# Patient Record
Sex: FEMALE | Race: WHITE | NOT HISPANIC OR LATINO | Employment: FULL TIME | URBAN - METROPOLITAN AREA
[De-identification: names, ages, dates, MRNs, and addresses within clinical notes are randomized per-mention and may not be internally consistent; named-entity substitution may affect disease eponyms.]

---

## 2017-04-27 ENCOUNTER — ALLSCRIPTS OFFICE VISIT (OUTPATIENT)
Dept: OTHER | Facility: OTHER | Age: 52
End: 2017-04-27

## 2017-05-13 ENCOUNTER — HOSPITAL ENCOUNTER (EMERGENCY)
Facility: HOSPITAL | Age: 52
Discharge: HOME | End: 2017-05-13
Attending: EMERGENCY MEDICINE
Payer: COMMERCIAL

## 2017-05-13 ENCOUNTER — APPOINTMENT (EMERGENCY)
Dept: RADIOLOGY | Facility: HOSPITAL | Age: 52
End: 2017-05-13
Payer: COMMERCIAL

## 2017-05-13 VITALS
OXYGEN SATURATION: 98 % | WEIGHT: 147 LBS | TEMPERATURE: 99.2 F | RESPIRATION RATE: 18 BRPM | HEIGHT: 65 IN | SYSTOLIC BLOOD PRESSURE: 149 MMHG | DIASTOLIC BLOOD PRESSURE: 62 MMHG | BODY MASS INDEX: 24.49 KG/M2 | HEART RATE: 89 BPM

## 2017-05-13 DIAGNOSIS — M70.62 TROCHANTERIC BURSITIS OF LEFT HIP: Primary | ICD-10-CM

## 2017-05-13 PROCEDURE — 73502 X-RAY EXAM HIP UNI 2-3 VIEWS: CPT

## 2017-05-13 PROCEDURE — 99283 EMERGENCY DEPT VISIT LOW MDM: CPT

## 2017-05-13 RX ORDER — NAPROXEN 500 MG/1
500 TABLET ORAL 2 TIMES DAILY WITH MEALS
Qty: 14 TABLET | Refills: 0 | Status: SHIPPED | OUTPATIENT
Start: 2017-05-13 | End: 2021-03-28 | Stop reason: ALTCHOICE

## 2017-05-13 RX ORDER — METHOCARBAMOL 500 MG/1
500 TABLET, FILM COATED ORAL 2 TIMES DAILY PRN
Qty: 14 TABLET | Refills: 0 | Status: SHIPPED | OUTPATIENT
Start: 2017-05-13 | End: 2021-03-28 | Stop reason: ALTCHOICE

## 2017-05-13 RX ORDER — ACETAMINOPHEN 325 MG/1
975 TABLET ORAL ONCE
Status: COMPLETED | OUTPATIENT
Start: 2017-05-13 | End: 2017-05-13

## 2017-05-13 RX ORDER — BUPIVACAINE HYDROCHLORIDE 2.5 MG/ML
10 INJECTION, SOLUTION EPIDURAL; INFILTRATION; INTRACAUDAL ONCE
Status: COMPLETED | OUTPATIENT
Start: 2017-05-13 | End: 2017-05-13

## 2017-05-13 RX ADMIN — ACETAMINOPHEN 975 MG: 325 TABLET, FILM COATED ORAL at 16:44

## 2017-05-13 RX ADMIN — BUPIVACAINE HYDROCHLORIDE 10 ML: 2.5 INJECTION, SOLUTION EPIDURAL; INFILTRATION; INTRACAUDAL at 17:02

## 2017-05-13 RX ADMIN — TRIAMCINOLONE ACETONIDE 20 MG: 10 INJECTION, SUSPENSION INTRA-ARTICULAR; INTRALESIONAL at 17:06

## 2018-01-13 VITALS
BODY MASS INDEX: 25.53 KG/M2 | HEART RATE: 80 BPM | SYSTOLIC BLOOD PRESSURE: 145 MMHG | DIASTOLIC BLOOD PRESSURE: 85 MMHG | HEIGHT: 65 IN | WEIGHT: 153.25 LBS

## 2018-05-08 ENCOUNTER — TRANSCRIBE ORDERS (OUTPATIENT)
Dept: ADMINISTRATIVE | Facility: HOSPITAL | Age: 53
End: 2018-05-08

## 2018-05-08 DIAGNOSIS — M79.662 BILATERAL CALF PAIN: Primary | ICD-10-CM

## 2018-05-08 DIAGNOSIS — M79.661 BILATERAL CALF PAIN: Primary | ICD-10-CM

## 2018-05-09 ENCOUNTER — HOSPITAL ENCOUNTER (OUTPATIENT)
Dept: NON INVASIVE DIAGNOSTICS | Facility: HOSPITAL | Age: 53
Discharge: HOME | End: 2018-05-09
Payer: COMMERCIAL

## 2018-05-09 DIAGNOSIS — M79.662 BILATERAL CALF PAIN: ICD-10-CM

## 2018-05-09 DIAGNOSIS — M79.661 BILATERAL CALF PAIN: ICD-10-CM

## 2018-05-09 PROCEDURE — 93970 EXTREMITY STUDY: CPT | Performed by: SURGERY

## 2018-05-09 PROCEDURE — 93970 EXTREMITY STUDY: CPT

## 2020-12-19 ENCOUNTER — NURSE TRIAGE (OUTPATIENT)
Dept: OTHER | Facility: OTHER | Age: 55
End: 2020-12-19

## 2020-12-19 DIAGNOSIS — Z20.828 SARS-ASSOCIATED CORONAVIRUS EXPOSURE: ICD-10-CM

## 2020-12-19 DIAGNOSIS — Z20.828 SARS-ASSOCIATED CORONAVIRUS EXPOSURE: Primary | ICD-10-CM

## 2020-12-19 PROCEDURE — U0003 INFECTIOUS AGENT DETECTION BY NUCLEIC ACID (DNA OR RNA); SEVERE ACUTE RESPIRATORY SYNDROME CORONAVIRUS 2 (SARS-COV-2) (CORONAVIRUS DISEASE [COVID-19]), AMPLIFIED PROBE TECHNIQUE, MAKING USE OF HIGH THROUGHPUT TECHNOLOGIES AS DESCRIBED BY CMS-2020-01-R: HCPCS | Performed by: FAMILY MEDICINE

## 2020-12-21 ENCOUNTER — TELEPHONE (OUTPATIENT)
Dept: DERMATOLOGY | Facility: CLINIC | Age: 55
End: 2020-12-21

## 2020-12-21 LAB — SARS-COV-2 RNA SPEC QL NAA+PROBE: DETECTED

## 2020-12-21 NOTE — RESULT ENCOUNTER NOTE
I spoke with Michael Garner and let her know that her COVID-19 swab was positive  Continue symptomatic treatment  Advised she implement home isolation measures including      Staying home  Stay in a specific "sick room" or area and away from other people or animals, including pets  Wear a mask when leaving your room  Use a separate bathroom, if available  Wipe down all commonly touched surfaces with household       Please schedule follow up video visit

## 2021-03-28 ENCOUNTER — HOSPITAL ENCOUNTER (EMERGENCY)
Facility: HOSPITAL | Age: 56
Discharge: HOME | End: 2021-03-28
Attending: EMERGENCY MEDICINE
Payer: COMMERCIAL

## 2021-03-28 VITALS
RESPIRATION RATE: 20 BRPM | OXYGEN SATURATION: 100 % | HEART RATE: 77 BPM | WEIGHT: 165 LBS | TEMPERATURE: 98.2 F | DIASTOLIC BLOOD PRESSURE: 87 MMHG | BODY MASS INDEX: 27.46 KG/M2 | SYSTOLIC BLOOD PRESSURE: 135 MMHG

## 2021-03-28 DIAGNOSIS — T14.8XXA BRUISING: Primary | ICD-10-CM

## 2021-03-28 LAB
ALBUMIN SERPL BCP-MCNC: 3.6 G/DL (ref 3.5–5)
ALP SERPL-CCNC: 60 U/L (ref 46–116)
ALT SERPL W P-5'-P-CCNC: 31 U/L (ref 12–78)
ANION GAP SERPL CALCULATED.3IONS-SCNC: 13 MMOL/L (ref 4–13)
APTT PPP: 24 SECONDS (ref 23–37)
AST SERPL W P-5'-P-CCNC: 24 U/L (ref 5–45)
BASOPHILS # BLD AUTO: 0.05 THOUSANDS/ΜL (ref 0–0.1)
BASOPHILS NFR BLD AUTO: 1 % (ref 0–1)
BILIRUB DIRECT SERPL-MCNC: 0.1 MG/DL (ref 0–0.2)
BILIRUB SERPL-MCNC: 0.2 MG/DL (ref 0.2–1)
BUN SERPL-MCNC: 25 MG/DL (ref 5–25)
CALCIUM SERPL-MCNC: 8.9 MG/DL (ref 8.3–10.1)
CHLORIDE SERPL-SCNC: 107 MMOL/L (ref 100–108)
CO2 SERPL-SCNC: 22 MMOL/L (ref 21–32)
CREAT SERPL-MCNC: 0.99 MG/DL (ref 0.6–1.3)
EOSINOPHIL # BLD AUTO: 0.08 THOUSAND/ΜL (ref 0–0.61)
EOSINOPHIL NFR BLD AUTO: 1 % (ref 0–6)
ERYTHROCYTE [DISTWIDTH] IN BLOOD BY AUTOMATED COUNT: 14.1 % (ref 11.6–15.1)
GFR SERPL CREATININE-BSD FRML MDRD: 65 ML/MIN/1.73SQ M
GLUCOSE SERPL-MCNC: 116 MG/DL (ref 65–140)
HCT VFR BLD AUTO: 37.1 % (ref 34.8–46.1)
HGB BLD-MCNC: 12 G/DL (ref 11.5–15.4)
IMM GRANULOCYTES # BLD AUTO: 0.04 THOUSAND/UL (ref 0–0.2)
IMM GRANULOCYTES NFR BLD AUTO: 0 % (ref 0–2)
INR PPP: 0.88 (ref 0.84–1.19)
LYMPHOCYTES # BLD AUTO: 1.46 THOUSANDS/ΜL (ref 0.6–4.47)
LYMPHOCYTES NFR BLD AUTO: 16 % (ref 14–44)
MCH RBC QN AUTO: 30.3 PG (ref 26.8–34.3)
MCHC RBC AUTO-ENTMCNC: 32.3 G/DL (ref 31.4–37.4)
MCV RBC AUTO: 94 FL (ref 82–98)
MONOCYTES # BLD AUTO: 0.82 THOUSAND/ΜL (ref 0.17–1.22)
MONOCYTES NFR BLD AUTO: 9 % (ref 4–12)
NEUTROPHILS # BLD AUTO: 6.73 THOUSANDS/ΜL (ref 1.85–7.62)
NEUTS SEG NFR BLD AUTO: 73 % (ref 43–75)
NRBC BLD AUTO-RTO: 0 /100 WBCS
PLATELET # BLD AUTO: 364 THOUSANDS/UL (ref 149–390)
PMV BLD AUTO: 10 FL (ref 8.9–12.7)
POTASSIUM SERPL-SCNC: 4.2 MMOL/L (ref 3.5–5.3)
PROT SERPL-MCNC: 7.4 G/DL (ref 6.4–8.2)
PROTHROMBIN TIME: 12 SECONDS (ref 11.6–14.5)
RBC # BLD AUTO: 3.96 MILLION/UL (ref 3.81–5.12)
SODIUM SERPL-SCNC: 142 MMOL/L (ref 136–145)
WBC # BLD AUTO: 9.18 THOUSAND/UL (ref 4.31–10.16)

## 2021-03-28 PROCEDURE — 80048 BASIC METABOLIC PNL TOTAL CA: CPT | Performed by: PHYSICIAN ASSISTANT

## 2021-03-28 PROCEDURE — 85025 COMPLETE CBC W/AUTO DIFF WBC: CPT | Performed by: PHYSICIAN ASSISTANT

## 2021-03-28 PROCEDURE — 99283 EMERGENCY DEPT VISIT LOW MDM: CPT

## 2021-03-28 PROCEDURE — 85610 PROTHROMBIN TIME: CPT | Performed by: PHYSICIAN ASSISTANT

## 2021-03-28 PROCEDURE — 85730 THROMBOPLASTIN TIME PARTIAL: CPT | Performed by: PHYSICIAN ASSISTANT

## 2021-03-28 PROCEDURE — 36415 COLL VENOUS BLD VENIPUNCTURE: CPT | Performed by: PHYSICIAN ASSISTANT

## 2021-03-28 PROCEDURE — 80076 HEPATIC FUNCTION PANEL: CPT | Performed by: PHYSICIAN ASSISTANT

## 2021-03-28 PROCEDURE — 99282 EMERGENCY DEPT VISIT SF MDM: CPT | Performed by: PHYSICIAN ASSISTANT

## 2021-03-28 RX ORDER — LOSARTAN POTASSIUM 50 MG/1
50 TABLET ORAL DAILY
COMMUNITY

## 2021-03-28 RX ORDER — PREDNISONE 10 MG/1
TABLET ORAL DAILY
COMMUNITY
End: 2021-08-08

## 2021-03-28 RX ORDER — SULFAMETHOXAZOLE AND TRIMETHOPRIM 800; 160 MG/1; MG/1
1 TABLET ORAL EVERY 12 HOURS SCHEDULED
COMMUNITY
End: 2021-08-08

## 2021-03-28 RX ORDER — ESCITALOPRAM OXALATE 5 MG/1
10 TABLET ORAL
COMMUNITY
Start: 2020-12-19

## 2021-03-28 NOTE — ED PROVIDER NOTES
History  Chief Complaint   Patient presents with    Bleeding/Bruising     tO ed WITH C/O BRUISING ALL OVER AFTER STARTING PREDNISONE wEDNESDAY FOR SWOLLEN ELBOW  dENIES ANY PAIN OR INJURY  49 yo female w/ hx of HTN presents to the Emergency Department for evaluation of non traumatic ecchymoses to bilateral legs and arms x 1 day  States she recently was started on TMP-SMX and prednisone for an infected L olecranon bursitis 5d ago, as well was placed on losartan  States she typically bruises easily but has never had this without trauma  No gingival bleeding during toothbrushing, no hematuria or vaginal bleeding  No CP or SOB  Denies hx of liver disease or EtOH use  Prior to Admission Medications   Prescriptions Last Dose Informant Patient Reported? Taking?   escitalopram (LEXAPRO) 5 mg tablet   Yes No   losartan (COZAAR) 50 mg tablet  Self Yes Yes   Sig: Take 50 mg by mouth daily   predniSONE 10 mg tablet  Self Yes Yes   Sig: Take by mouth daily   sulfamethoxazole-trimethoprim (BACTRIM DS) 800-160 mg per tablet  Self Yes Yes   Sig: Take 1 tablet by mouth every 12 (twelve) hours      Facility-Administered Medications: None       Past Medical History:   Diagnosis Date    Hypertension        History reviewed  No pertinent surgical history  History reviewed  No pertinent family history  I have reviewed and agree with the history as documented  E-Cigarette/Vaping    E-Cigarette Use Never User      E-Cigarette/Vaping Substances    Nicotine No     Flavoring No      Social History     Tobacco Use    Smoking status: Current Every Day Smoker    Smokeless tobacco: Never Used   Substance Use Topics    Alcohol use: Yes    Drug use: No       Review of Systems   Constitutional: Negative for chills, diaphoresis and fever  Eyes: Negative for visual disturbance  Respiratory: Negative for cough and shortness of breath  Cardiovascular: Negative for chest pain and palpitations     Gastrointestinal: Negative for abdominal pain, diarrhea, nausea and vomiting  Genitourinary: Negative for dysuria, flank pain and frequency  Musculoskeletal: Positive for joint swelling (L olecranon bursitis, now improving)  Negative for arthralgias and myalgias  Skin: Negative for color change, rash and wound  Allergic/Immunologic: Negative for immunocompromised state  Neurological: Negative for dizziness and light-headedness  Hematological: Bruises/bleeds easily  Psychiatric/Behavioral: Negative for confusion  The patient is not nervous/anxious  Physical Exam  Physical Exam  Vitals signs reviewed  Constitutional:       General: She is not in acute distress  Appearance: She is well-developed  She is not diaphoretic  HENT:      Head: Normocephalic and atraumatic  Mouth/Throat:      Mouth: Mucous membranes are moist    Eyes:      General: No scleral icterus  Pupils: Pupils are equal, round, and reactive to light  Cardiovascular:      Rate and Rhythm: Normal rate and regular rhythm  Heart sounds: No murmur  No friction rub  No gallop  Pulmonary:      Effort: No respiratory distress  Breath sounds: No wheezing or rales  Abdominal:      General: Abdomen is flat  Bowel sounds are normal       Tenderness: There is no abdominal tenderness  Comments: No HSM   Skin:     General: Skin is dry  Capillary Refill: Capillary refill takes less than 2 seconds  Comments: Ecchymoses of varying size to bilateral thighs/calves, bilat forearms  No truncal lesions  No petechiae/purpura, no hemorrhagic vesicles   Neurological:      General: No focal deficit present  Mental Status: She is oriented to person, place, and time     Psychiatric:         Mood and Affect: Mood normal          Behavior: Behavior normal          Vital Signs  ED Triage Vitals [03/28/21 1252]   Temperature Pulse Respirations Blood Pressure SpO2   98 2 °F (36 8 °C) 77 20 135/87 100 %      Temp Source Heart Rate Source Patient Position - Orthostatic VS BP Location FiO2 (%)   Tympanic Monitor Sitting Right arm --      Pain Score       --           Vitals:    03/28/21 1252   BP: 135/87   Pulse: 77   Patient Position - Orthostatic VS: Sitting         Visual Acuity      ED Medications  Medications - No data to display    Diagnostic Studies  Results Reviewed     Procedure Component Value Units Date/Time    Hepatic function panel [[id number]]  (Normal) Collected: 03/28/21 1305    Lab Status: Final result Specimen: Blood from Arm, Left Updated: 03/28/21 1343     Total Bilirubin 0 20 mg/dL      Bilirubin, Direct 0 10 mg/dL      Alkaline Phosphatase 60 U/L      AST 24 U/L      ALT 31 U/L      Total Protein 7 4 g/dL      Albumin 3 6 g/dL     Basic metabolic panel [[id number]] Collected: 03/28/21 1305    Lab Status: Final result Specimen: Blood from Arm, Left Updated: 03/28/21 1343     Sodium 142 mmol/L      Potassium 4 2 mmol/L      Chloride 107 mmol/L      CO2 22 mmol/L      ANION GAP 13 mmol/L      BUN 25 mg/dL      Creatinine 0 99 mg/dL      Glucose 116 mg/dL      Calcium 8 9 mg/dL      eGFR 65 ml/min/1 73sq m     Narrative:      [place] guidelines for Chronic Kidney Disease (CKD):     Stage 1 with normal or high GFR (GFR > 90 mL/min/1 73 square meters)    Stage 2 Mild CKD (GFR = 60-89 mL/min/1 73 square meters)    Stage 3A Moderate CKD (GFR = 45-59 mL/min/1 73 square meters)    Stage 3B Moderate CKD (GFR = 30-44 mL/min/1 73 square meters)    Stage 4 Severe CKD (GFR = 15-29 mL/min/1 73 square meters)    Stage 5 End Stage CKD (GFR <15 mL/min/1 73 square meters)  Note: GFR calculation is accurate only with a steady state creatinine    Protime-INR [[id number]]  (Normal) Collected: 03/28/21 1305    Lab Status: Final result Specimen: Blood from Arm, Left Updated: 03/28/21 1340     Protime 12 0 seconds      INR 0 88    APTT [[id number]]  (Normal) Collected: 03/28/21 1305    Lab Status: Final result Specimen: Blood from Arm, Left Updated: 03/28/21 1340     PTT 24 seconds     CBC and differential [[id number]] Collected: 03/28/21 1305    Lab Status: Final result Specimen: Blood from Arm, Left Updated: 03/28/21 1325     WBC 9 18 Thousand/uL      RBC 3 96 Million/uL      Hemoglobin 12 0 g/dL      Hematocrit 37 1 %      MCV 94 fL      MCH 30 3 pg      MCHC 32 3 g/dL      RDW 14 1 %      MPV 10 0 fL      Platelets 941 Thousands/uL      nRBC 0 /100 WBCs      Neutrophils Relative 73 %      Immat GRANS % 0 %      Lymphocytes Relative 16 %      Monocytes Relative 9 %      Eosinophils Relative 1 %      Basophils Relative 1 %      Neutrophils Absolute 6 73 Thousands/µL      Immature Grans Absolute 0 04 Thousand/uL      Lymphocytes Absolute 1 46 Thousands/µL      Monocytes Absolute 0 82 Thousand/µL      Eosinophils Absolute 0 08 Thousand/µL      Basophils Absolute 0 05 Thousands/µL                  No orders to display              Procedures  Procedures         ED Course                             SBIRT 20yo+      Most Recent Value   SBIRT (24 yo +)   In order to provide better care to our patients, we are screening all of our patients for alcohol and drug use  Would it be okay to ask you these screening questions? No Filed at: 03/28/2021 1321                    MDM  Number of Diagnoses or Management Options  Bruising: new and requires workup  Diagnosis management comments: Labs unremarkable  I have advised her to d/c prednisone as the bursitis is improved with abx, although I do not have a strong suspicion that this is the source   Advised continued abx and PCP f/u if bruising persists       Amount and/or Complexity of Data Reviewed  Clinical lab tests: ordered and reviewed  Tests in the medicine section of CPT®: ordered and reviewed  Review and summarize past medical records: yes        Disposition  Final diagnoses:   Bruising     Time reflects when diagnosis was documented in both MDM as applicable and the Disposition within this note     Time User Action Codes Description Comment    3/28/2021  1:49 PM Rui Guadalupe, [address] [place]  [address] Bruising       ED Disposition     ED Disposition Condition Date/Time Comment    Discharge Stable Sun Mar 28, 2021  1:49 PM Kimberley Chu discharge to home/self care  Follow-up Information     Follow up With Specialties Details Why [address] Magi Peacock MD Family Medicine  If symptoms worsen [facility] [address]  [address] [address] [address] [phone]            Patient's Medications   Discharge Prescriptions    No medications on file     No discharge procedures on file      PDMP Review     None          ED Provider  Electronically Signed by           Haim Pollard PA-C  03/28/21 0702

## 2021-05-22 ENCOUNTER — APPOINTMENT (OUTPATIENT)
Dept: RADIOLOGY | Facility: CLINIC | Age: 56
End: 2021-05-22
Payer: COMMERCIAL

## 2021-05-22 ENCOUNTER — TRANSCRIBE ORDERS (OUTPATIENT)
Dept: ADMINISTRATIVE | Facility: HOSPITAL | Age: 56
End: 2021-05-22

## 2021-05-22 DIAGNOSIS — R42 DIZZY: ICD-10-CM

## 2021-05-22 DIAGNOSIS — R42 DIZZY: Primary | ICD-10-CM

## 2021-05-22 PROCEDURE — 72050 X-RAY EXAM NECK SPINE 4/5VWS: CPT

## 2021-06-02 ENCOUNTER — TRANSCRIBE ORDERS (OUTPATIENT)
Dept: URGENT CARE | Facility: CLINIC | Age: 56
End: 2021-06-02

## 2021-06-02 ENCOUNTER — APPOINTMENT (OUTPATIENT)
Dept: RADIOLOGY | Facility: CLINIC | Age: 56
End: 2021-06-02
Payer: COMMERCIAL

## 2021-06-02 DIAGNOSIS — M54.50 LOW BACK PAIN, UNSPECIFIED BACK PAIN LATERALITY, UNSPECIFIED CHRONICITY, UNSPECIFIED WHETHER SCIATICA PRESENT: ICD-10-CM

## 2021-06-02 DIAGNOSIS — M54.50 LOW BACK PAIN, UNSPECIFIED BACK PAIN LATERALITY, UNSPECIFIED CHRONICITY, UNSPECIFIED WHETHER SCIATICA PRESENT: Primary | ICD-10-CM

## 2021-06-02 PROCEDURE — 72110 X-RAY EXAM L-2 SPINE 4/>VWS: CPT

## 2021-06-07 ENCOUNTER — TRANSCRIBE ORDERS (OUTPATIENT)
Dept: ADMINISTRATIVE | Facility: HOSPITAL | Age: 56
End: 2021-06-07

## 2021-06-07 DIAGNOSIS — D43.4 NEOPLASM OF UNCERTAIN BEHAVIOR OF BRAIN AND SPINAL CORD (HCC): ICD-10-CM

## 2021-06-07 DIAGNOSIS — M54.2 CERVICAL PAIN: ICD-10-CM

## 2021-06-07 DIAGNOSIS — M54.50 LUMBAR PAIN: Primary | ICD-10-CM

## 2021-06-07 DIAGNOSIS — D43.2 NEOPLASM OF UNCERTAIN BEHAVIOR OF BRAIN AND SPINAL CORD (HCC): ICD-10-CM

## 2021-06-20 ENCOUNTER — HOSPITAL ENCOUNTER (OUTPATIENT)
Dept: MRI IMAGING | Facility: HOSPITAL | Age: 56
Discharge: HOME | End: 2021-06-20
Payer: COMMERCIAL

## 2021-06-20 DIAGNOSIS — M54.50 LUMBAR PAIN: ICD-10-CM

## 2021-06-20 DIAGNOSIS — M54.2 CERVICAL PAIN: ICD-10-CM

## 2021-06-20 PROCEDURE — 72148 MRI LUMBAR SPINE W/O DYE: CPT

## 2021-06-20 PROCEDURE — 72141 MRI NECK SPINE W/O DYE: CPT

## 2021-08-05 ENCOUNTER — HOSPITAL ENCOUNTER (OUTPATIENT)
Dept: MRI IMAGING | Facility: HOSPITAL | Age: 56
Discharge: HOME | End: 2021-08-05
Payer: COMMERCIAL

## 2021-08-05 DIAGNOSIS — D43.2 NEOPLASM OF UNCERTAIN BEHAVIOR OF BRAIN AND SPINAL CORD (HCC): ICD-10-CM

## 2021-08-05 DIAGNOSIS — D43.4 NEOPLASM OF UNCERTAIN BEHAVIOR OF BRAIN AND SPINAL CORD (HCC): ICD-10-CM

## 2021-08-05 PROCEDURE — 70553 MRI BRAIN STEM W/O & W/DYE: CPT

## 2021-08-05 PROCEDURE — G1004 CDSM NDSC: HCPCS

## 2021-08-05 PROCEDURE — A9585 GADOBUTROL INJECTION: HCPCS | Performed by: RADIOLOGY

## 2021-08-05 RX ADMIN — GADOBUTROL 7 ML: 604.72 INJECTION INTRAVENOUS at 19:17

## 2021-08-08 ENCOUNTER — HOSPITAL ENCOUNTER (INPATIENT)
Facility: HOSPITAL | Age: 56
LOS: 2 days | Discharge: HOME | DRG: 054 | End: 2021-08-12
Attending: EMERGENCY MEDICINE | Admitting: INTERNAL MEDICINE
Payer: COMMERCIAL

## 2021-08-08 ENCOUNTER — APPOINTMENT (EMERGENCY)
Dept: RADIOLOGY | Facility: HOSPITAL | Age: 56
DRG: 054 | End: 2021-08-08
Payer: COMMERCIAL

## 2021-08-08 DIAGNOSIS — R55 NEAR SYNCOPE: ICD-10-CM

## 2021-08-08 DIAGNOSIS — D33.3 VESTIBULAR SCHWANNOMA (HCC): ICD-10-CM

## 2021-08-08 DIAGNOSIS — D33.3 CEREBELLOPONTINE ANGLE TUMOR (HCC): ICD-10-CM

## 2021-08-08 DIAGNOSIS — R51.9 HEADACHE: Primary | ICD-10-CM

## 2021-08-08 DIAGNOSIS — R20.2 PARESTHESIAS: ICD-10-CM

## 2021-08-08 DIAGNOSIS — G91.9 HYDROCEPHALUS (HCC): ICD-10-CM

## 2021-08-08 DIAGNOSIS — R26.2 AMBULATORY DYSFUNCTION: ICD-10-CM

## 2021-08-08 PROBLEM — I10 HYPERTENSION: Status: ACTIVE | Noted: 2021-08-08

## 2021-08-08 PROBLEM — F41.9 ANXIETY: Status: ACTIVE | Noted: 2021-08-08

## 2021-08-08 LAB
ANION GAP SERPL CALCULATED.3IONS-SCNC: 7 MMOL/L (ref 4–13)
ATRIAL RATE: 53 BPM
BASOPHILS # BLD AUTO: 0.06 THOUSANDS/ΜL (ref 0–0.1)
BASOPHILS NFR BLD AUTO: 1 % (ref 0–1)
BUN SERPL-MCNC: 12 MG/DL (ref 5–25)
CALCIUM SERPL-MCNC: 9.9 MG/DL (ref 8.3–10.1)
CHLORIDE SERPL-SCNC: 107 MMOL/L (ref 100–108)
CO2 SERPL-SCNC: 25 MMOL/L (ref 21–32)
CREAT SERPL-MCNC: 0.87 MG/DL (ref 0.6–1.3)
EOSINOPHIL # BLD AUTO: 0.06 THOUSAND/ΜL (ref 0–0.61)
EOSINOPHIL NFR BLD AUTO: 1 % (ref 0–6)
ERYTHROCYTE [DISTWIDTH] IN BLOOD BY AUTOMATED COUNT: 13.2 % (ref 11.6–15.1)
GFR SERPL CREATININE-BSD FRML MDRD: 75 ML/MIN/1.73SQ M
GLUCOSE SERPL-MCNC: 93 MG/DL (ref 65–140)
HCT VFR BLD AUTO: 42.9 % (ref 34.8–46.1)
HGB BLD-MCNC: 14 G/DL (ref 11.5–15.4)
IMM GRANULOCYTES # BLD AUTO: 0.03 THOUSAND/UL (ref 0–0.2)
IMM GRANULOCYTES NFR BLD AUTO: 0 % (ref 0–2)
LYMPHOCYTES # BLD AUTO: 1.7 THOUSANDS/ΜL (ref 0.6–4.47)
LYMPHOCYTES NFR BLD AUTO: 18 % (ref 14–44)
MCH RBC QN AUTO: 30.4 PG (ref 26.8–34.3)
MCHC RBC AUTO-ENTMCNC: 32.6 G/DL (ref 31.4–37.4)
MCV RBC AUTO: 93 FL (ref 82–98)
MONOCYTES # BLD AUTO: 0.96 THOUSAND/ΜL (ref 0.17–1.22)
MONOCYTES NFR BLD AUTO: 10 % (ref 4–12)
NEUTROPHILS # BLD AUTO: 6.47 THOUSANDS/ΜL (ref 1.85–7.62)
NEUTS SEG NFR BLD AUTO: 70 % (ref 43–75)
NRBC BLD AUTO-RTO: 0 /100 WBCS
P AXIS: 62 DEGREES
PLATELET # BLD AUTO: 317 THOUSANDS/UL (ref 149–390)
PMV BLD AUTO: 10.1 FL (ref 8.9–12.7)
POTASSIUM SERPL-SCNC: 3.7 MMOL/L (ref 3.5–5.3)
PR INTERVAL: 140 MS
QRS AXIS: 58 DEGREES
QRSD INTERVAL: 136 MS
QT INTERVAL: 492 MS
QTC INTERVAL: 461 MS
RBC # BLD AUTO: 4.6 MILLION/UL (ref 3.81–5.12)
SODIUM SERPL-SCNC: 139 MMOL/L (ref 136–145)
T WAVE AXIS: 99 DEGREES
VENTRICULAR RATE: 53 BPM
WBC # BLD AUTO: 9.28 THOUSAND/UL (ref 4.31–10.16)

## 2021-08-08 PROCEDURE — 96374 THER/PROPH/DIAG INJ IV PUSH: CPT

## 2021-08-08 PROCEDURE — 96375 TX/PRO/DX INJ NEW DRUG ADDON: CPT

## 2021-08-08 PROCEDURE — 99285 EMERGENCY DEPT VISIT HI MDM: CPT | Performed by: EMERGENCY MEDICINE

## 2021-08-08 PROCEDURE — 80048 BASIC METABOLIC PNL TOTAL CA: CPT | Performed by: EMERGENCY MEDICINE

## 2021-08-08 PROCEDURE — 36415 COLL VENOUS BLD VENIPUNCTURE: CPT | Performed by: EMERGENCY MEDICINE

## 2021-08-08 PROCEDURE — 99285 EMERGENCY DEPT VISIT HI MDM: CPT

## 2021-08-08 PROCEDURE — 85025 COMPLETE CBC W/AUTO DIFF WBC: CPT | Performed by: EMERGENCY MEDICINE

## 2021-08-08 PROCEDURE — 93005 ELECTROCARDIOGRAM TRACING: CPT

## 2021-08-08 PROCEDURE — 70450 CT HEAD/BRAIN W/O DYE: CPT

## 2021-08-08 PROCEDURE — 93010 ELECTROCARDIOGRAM REPORT: CPT | Performed by: INTERNAL MEDICINE

## 2021-08-08 PROCEDURE — 96376 TX/PRO/DX INJ SAME DRUG ADON: CPT

## 2021-08-08 PROCEDURE — 99220 PR INITIAL OBSERVATION CARE/DAY 70 MINUTES: CPT | Performed by: INTERNAL MEDICINE

## 2021-08-08 RX ORDER — DOCUSATE SODIUM 100 MG/1
100 CAPSULE, LIQUID FILLED ORAL 2 TIMES DAILY
Status: DISCONTINUED | OUTPATIENT
Start: 2021-08-08 | End: 2021-08-12 | Stop reason: HOSPADM

## 2021-08-08 RX ORDER — METOCLOPRAMIDE HYDROCHLORIDE 5 MG/ML
10 INJECTION INTRAMUSCULAR; INTRAVENOUS EVERY 6 HOURS PRN
Status: DISCONTINUED | OUTPATIENT
Start: 2021-08-08 | End: 2021-08-12 | Stop reason: HOSPADM

## 2021-08-08 RX ORDER — ONDANSETRON 2 MG/ML
4 INJECTION INTRAMUSCULAR; INTRAVENOUS ONCE
Status: COMPLETED | OUTPATIENT
Start: 2021-08-08 | End: 2021-08-08

## 2021-08-08 RX ORDER — LOSARTAN POTASSIUM 50 MG/1
50 TABLET ORAL DAILY
Status: DISCONTINUED | OUTPATIENT
Start: 2021-08-09 | End: 2021-08-12 | Stop reason: HOSPADM

## 2021-08-08 RX ORDER — HYDROMORPHONE HCL/PF 1 MG/ML
0.5 SYRINGE (ML) INJECTION ONCE
Status: COMPLETED | OUTPATIENT
Start: 2021-08-08 | End: 2021-08-08

## 2021-08-08 RX ORDER — ACETAMINOPHEN 325 MG/1
650 TABLET ORAL EVERY 6 HOURS PRN
Status: DISCONTINUED | OUTPATIENT
Start: 2021-08-08 | End: 2021-08-12 | Stop reason: HOSPADM

## 2021-08-08 RX ORDER — ESCITALOPRAM OXALATE 5 MG/1
5 TABLET ORAL DAILY
Status: DISCONTINUED | OUTPATIENT
Start: 2021-08-09 | End: 2021-08-12 | Stop reason: HOSPADM

## 2021-08-08 RX ORDER — HYDROMORPHONE HCL/PF 1 MG/ML
0.5 SYRINGE (ML) INJECTION EVERY 4 HOURS PRN
Status: DISCONTINUED | OUTPATIENT
Start: 2021-08-08 | End: 2021-08-12 | Stop reason: HOSPADM

## 2021-08-08 RX ORDER — MAGNESIUM HYDROXIDE/ALUMINUM HYDROXICE/SIMETHICONE 120; 1200; 1200 MG/30ML; MG/30ML; MG/30ML
30 SUSPENSION ORAL EVERY 6 HOURS PRN
Status: DISCONTINUED | OUTPATIENT
Start: 2021-08-08 | End: 2021-08-12 | Stop reason: HOSPADM

## 2021-08-08 RX ORDER — OXYCODONE HYDROCHLORIDE 5 MG/1
2.5 TABLET ORAL EVERY 6 HOURS PRN
Status: DISCONTINUED | OUTPATIENT
Start: 2021-08-08 | End: 2021-08-12 | Stop reason: HOSPADM

## 2021-08-08 RX ORDER — OXYCODONE HYDROCHLORIDE 5 MG/1
5 TABLET ORAL EVERY 6 HOURS PRN
Status: DISCONTINUED | OUTPATIENT
Start: 2021-08-08 | End: 2021-08-12 | Stop reason: HOSPADM

## 2021-08-08 RX ORDER — POLYETHYLENE GLYCOL 3350 17 G/17G
17 POWDER, FOR SOLUTION ORAL DAILY
Status: DISCONTINUED | OUTPATIENT
Start: 2021-08-09 | End: 2021-08-12 | Stop reason: HOSPADM

## 2021-08-08 RX ORDER — MECLIZINE HYDROCHLORIDE 25 MG/1
25 TABLET ORAL EVERY 8 HOURS SCHEDULED
Status: DISCONTINUED | OUTPATIENT
Start: 2021-08-08 | End: 2021-08-10

## 2021-08-08 RX ORDER — ONDANSETRON 2 MG/ML
4 INJECTION INTRAMUSCULAR; INTRAVENOUS EVERY 6 HOURS PRN
Status: DISCONTINUED | OUTPATIENT
Start: 2021-08-08 | End: 2021-08-12 | Stop reason: HOSPADM

## 2021-08-08 RX ADMIN — HYDROMORPHONE HYDROCHLORIDE 0.5 MG: 1 INJECTION, SOLUTION INTRAMUSCULAR; INTRAVENOUS; SUBCUTANEOUS at 19:03

## 2021-08-08 RX ADMIN — HYDROMORPHONE HYDROCHLORIDE 0.5 MG: 1 INJECTION, SOLUTION INTRAMUSCULAR; INTRAVENOUS; SUBCUTANEOUS at 13:45

## 2021-08-08 RX ADMIN — ONDANSETRON 4 MG: 2 INJECTION INTRAMUSCULAR; INTRAVENOUS at 12:16

## 2021-08-08 RX ADMIN — MECLIZINE HYDROCHLORIDE 25 MG: 25 TABLET ORAL at 17:02

## 2021-08-08 RX ADMIN — OXYCODONE HYDROCHLORIDE 5 MG: 5 TABLET ORAL at 18:02

## 2021-08-08 RX ADMIN — ACETAMINOPHEN 650 MG: 325 TABLET, FILM COATED ORAL at 17:02

## 2021-08-08 RX ADMIN — HYDROMORPHONE HYDROCHLORIDE 0.5 MG: 1 INJECTION, SOLUTION INTRAMUSCULAR; INTRAVENOUS; SUBCUTANEOUS at 12:16

## 2021-08-08 RX ADMIN — ONDANSETRON 4 MG: 2 INJECTION INTRAMUSCULAR; INTRAVENOUS at 18:04

## 2021-08-08 RX ADMIN — MECLIZINE HYDROCHLORIDE 25 MG: 25 TABLET ORAL at 22:04

## 2021-08-08 RX ADMIN — HYDROMORPHONE HYDROCHLORIDE 0.5 MG: 1 INJECTION, SOLUTION INTRAMUSCULAR; INTRAVENOUS; SUBCUTANEOUS at 23:08

## 2021-08-08 NOTE — ED PROVIDER NOTES
History  Chief Complaint   Patient presents with    Numbness     bilateral leg numbness when walking with a severe posterior head pain that caused her to fall striking her head on a cabinet  Denies LOC     54 y/o female with hx of HTN presents to the ER for evaluation of worsening headaches x 4 days  She has been following up with her PCP for intermittent headaches and neck pain over the last 6 months, increasing in frequency and intensity over the last 4 days  She experiences numbness and tingling of the face and bilateral arms and legs associated with her posterior headaches  Today she experienced a near-syncopal episode with her headache  She had an MRI of the cervical spine on 21 that showed "incidental partially imaged 3 cm left CP angle mass that is most consistent with vestibular schwannoma  Mass effect on the cerebellum without edema " She had a follow up MRI brain IAC with and without on 21 which had not been read before arriving to the ER today  Prior to Admission Medications   Prescriptions Last Dose Informant Patient Reported? Taking?   escitalopram (LEXAPRO) 5 mg tablet Past Week at Unknown time  Yes Yes   losartan (COZAAR) 50 mg tablet 2021 at Unknown time Self Yes Yes   Sig: Take 50 mg by mouth daily      Facility-Administered Medications: None       Past Medical History:   Diagnosis Date    Hypertension        History reviewed  No pertinent surgical history  History reviewed  No pertinent family history  I have reviewed and agree with the history as documented  E-Cigarette/Vaping    E-Cigarette Use Never User      E-Cigarette/Vaping Substances    Nicotine No     Flavoring No      Social History     Tobacco Use    Smoking status: Former Smoker     Quit date: 2021     Years since quittin 6    Smokeless tobacco: Never Used   Vaping Use    Vaping Use: Never used   Substance Use Topics    Alcohol use:  Yes    Drug use: No        Review of Systems Constitutional: Negative for chills and fever  HENT: Negative for congestion, rhinorrhea and sore throat  Respiratory: Negative for cough and shortness of breath  Cardiovascular: Negative for chest pain and palpitations  Gastrointestinal: Negative for abdominal pain, diarrhea, nausea and vomiting  Genitourinary: Negative for dysuria and hematuria  Musculoskeletal: Positive for neck pain  Negative for back pain  Neurological: Positive for numbness and headaches  Negative for dizziness, weakness and light-headedness  All other systems reviewed and are negative  Physical Exam  ED Triage Vitals   Temperature Pulse Respirations Blood Pressure SpO2   08/08/21 1042 08/08/21 1038 08/08/21 1038 08/08/21 1038 08/08/21 1038   97 9 °F (36 6 °C) 65 16 161/90 97 %      Temp Source Heart Rate Source Patient Position - Orthostatic VS BP Location FiO2 (%)   08/08/21 1042 -- 08/11/21 1532 08/11/21 1532 --   Oral  Lying Left arm       Pain Score       08/08/21 1038       2             Orthostatic Vital Signs  Vitals:    08/11/21 1532 08/11/21 2223 08/12/21 0754 08/12/21 1528   BP: 123/68 123/67 124/67 121/69   Pulse: 67 68 78    Patient Position - Orthostatic VS: Lying Lying         Physical Exam  Vitals and nursing note reviewed  Constitutional:       General: She is not in acute distress  Appearance: Normal appearance  She is normal weight  She is not ill-appearing  HENT:      Head: Normocephalic and atraumatic  Right Ear: External ear normal       Left Ear: External ear normal       Nose: Nose normal  No congestion or rhinorrhea  Mouth/Throat:      Mouth: Mucous membranes are moist       Pharynx: Oropharynx is clear  No oropharyngeal exudate or posterior oropharyngeal erythema  Eyes:      Extraocular Movements: Extraocular movements intact  Conjunctiva/sclera: Conjunctivae normal       Pupils: Pupils are equal, round, and reactive to light     Cardiovascular:      Rate and Rhythm: Normal rate and regular rhythm  Pulses: Normal pulses  Heart sounds: Normal heart sounds  No murmur heard  Pulmonary:      Effort: Pulmonary effort is normal  No respiratory distress  Breath sounds: Normal breath sounds  No wheezing or rales  Abdominal:      General: Abdomen is flat  Bowel sounds are normal  There is no distension  Palpations: Abdomen is soft  Tenderness: There is no abdominal tenderness  There is no right CVA tenderness, left CVA tenderness or guarding  Musculoskeletal:         General: No swelling or tenderness  Normal range of motion  Cervical back: Normal range of motion and neck supple  No tenderness  Skin:     General: Skin is warm and dry  Capillary Refill: Capillary refill takes less than 2 seconds  Neurological:      General: No focal deficit present  Mental Status: She is alert and oriented to person, place, and time  Cranial Nerves: No cranial nerve deficit  Sensory: No sensory deficit  Motor: No weakness        Coordination: Coordination normal          ED Medications  Medications   ondansetron (ZOFRAN) injection 4 mg (4 mg Intravenous Given 8/8/21 1216)   HYDROmorphone (DILAUDID) injection 0 5 mg (0 5 mg Intravenous Given 8/8/21 1216)   HYDROmorphone (DILAUDID) injection 0 5 mg (0 5 mg Intravenous Given 8/8/21 1345)   dexamethasone (DECADRON) tablet 4 mg (4 mg Oral Given 8/12/21 0654)   cyclobenzaprine (FLEXERIL) tablet 10 mg (10 mg Oral Given 8/12/21 1122)       Diagnostic Studies  Results Reviewed     Procedure Component Value Units Date/Time    Basic metabolic panel [[id number]]  (Abnormal) Collected: 08/11/21 0502    Lab Status: Final result Specimen: Blood from Hand, Left Updated: 08/11/21 0745     Sodium 137 mmol/L      Potassium 3 9 mmol/L      Chloride 110 mmol/L      CO2 20 mmol/L      ANION GAP 7 mmol/L      BUN 20 mg/dL      Creatinine 1 02 mg/dL      Glucose 118 mg/dL      Calcium 10 1 mg/dL      eGFR 62 ml/min/1 73sq m     Narrative:      National Kid[employer] Foundation guidelines for Chronic Kidney Disease (CKD):     Stage 1 with normal or high GFR (GFR > 90 mL/min/1 73 square meters)    Stage 2 Mild CKD (GFR = 60-89 mL/min/1 73 square meters)    Stage 3A Moderate CKD (GFR = 45-59 mL/min/1 73 square meters)    Stage 3B Moderate CKD (GFR = 30-44 mL/min/1 73 square meters)    Stage 4 Severe CKD (GFR = 15-29 mL/min/1 73 square meters)    Stage 5 End Stage CKD (GFR <15 mL/min/1 73 square meters)  Note: GFR calculation is accurate only with a steady state creatinine    CBC [[id number]]  (Normal) Collected: 08/11/21 0502    Lab Status: Final result Specimen: Blood from Hand, Left Updated: 08/11/21 0706     WBC 9 91 Thousand/uL      RBC 4 37 Million/uL      Hemoglobin 13 6 g/dL      Hematocrit 42 2 %      MCV 97 fL      MCH 31 1 pg      MCHC 32 2 g/dL      RDW 13 3 %      Platelets 778 Thousands/uL      MPV 10 9 fL     Comprehensive metabolic panel [[id number]]  (Abnormal) Collected: 08/09/21 0450    Lab Status: Final result Specimen: Blood from Arm, Right Updated: 08/09/21 0715     Sodium 136 mmol/L      Potassium 3 5 mmol/L      Chloride 104 mmol/L      CO2 27 mmol/L      ANION GAP 5 mmol/L      BUN 13 mg/dL      Creatinine 0 88 mg/dL      Glucose 77 mg/dL      Calcium 9 6 mg/dL      AST 17 U/L      ALT 29 U/L      Alkaline Phosphatase 60 U/L      Total Protein 8 3 g/dL      Albumin 3 8 g/dL      Total Bilirubin 0 46 mg/dL      eGFR 74 ml/min/1 73sq m     Narrative:      [place] guidelines for Chronic Kidney Disease (CKD):     Stage 1 with normal or high GFR (GFR > 90 mL/min/1 73 square meters)    Stage 2 Mild CKD (GFR = 60-89 mL/min/1 73 square meters)    Stage 3A Moderate CKD (GFR = 45-59 mL/min/1 73 square meters)    Stage 3B Moderate CKD (GFR = 30-44 mL/min/1 73 square meters)    Stage 4 Severe CKD (GFR = 15-29 mL/min/1 73 square meters)    Stage 5 End Stage CKD (GFR <15 mL/min/1 73 square meters)  Note: GFR calculation is accurate only with a steady state creatinine    CBC and differential [[id number]] Collected: 08/09/21 0450    Lab Status: Final result Specimen: Blood from Arm, Right Updated: 08/09/21 0647     WBC 9 39 Thousand/uL      RBC 4 68 Million/uL      Hemoglobin 14 1 g/dL      Hematocrit 44 1 %      MCV 94 fL      MCH 30 1 pg      MCHC 32 0 g/dL      RDW 13 5 %      MPV 10 7 fL      Platelets 976 Thousands/uL      nRBC 0 /100 WBCs      Neutrophils Relative 62 %      Immat GRANS % 0 %      Lymphocytes Relative 25 %      Monocytes Relative 11 %      Eosinophils Relative 1 %      Basophils Relative 1 %      Neutrophils Absolute 5 92 Thousands/µL      Immature Grans Absolute 0 03 Thousand/uL      Lymphocytes Absolute 2 31 Thousands/µL      Monocytes Absolute 0 99 Thousand/µL      Eosinophils Absolute 0 08 Thousand/µL      Basophils Absolute 0 06 Thousands/µL     Basic metabolic panel [[id number]] Collected: 08/08/21 1216    Lab Status: Final result Specimen: Blood from Arm, Left Updated: 08/08/21 1248     Sodium 139 mmol/L      Potassium 3 7 mmol/L      Chloride 107 mmol/L      CO2 25 mmol/L      ANION GAP 7 mmol/L      BUN 12 mg/dL      Creatinine 0 87 mg/dL      Glucose 93 mg/dL      Calcium 9 9 mg/dL      eGFR 75 ml/min/1 73sq m     Narrative:      [place] guidelines for Chronic Kidney Disease (CKD):     Stage 1 with normal or high GFR (GFR > 90 mL/min/1 73 square meters)    Stage 2 Mild CKD (GFR = 60-89 mL/min/1 73 square meters)    Stage 3A Moderate CKD (GFR = 45-59 mL/min/1 73 square meters)    Stage 3B Moderate CKD (GFR = 30-44 mL/min/1 73 square meters)    Stage 4 Severe CKD (GFR = 15-29 mL/min/1 73 square meters)    Stage 5 End Stage CKD (GFR <15 mL/min/1 73 square meters)  Note: GFR calculation is accurate only with a steady state creatinine    CBC and differential [[id number]] Collected: 08/08/21 1216    Lab Status: Final result Specimen: Blood from Arm, Left Updated: 08/08/21 1230     WBC 9 28 Thousand/uL      RBC 4 60 Million/uL      Hemoglobin 14 0 g/dL      Hematocrit 42 9 %      MCV 93 fL      MCH 30 4 pg      MCHC 32 6 g/dL      RDW 13 2 %      MPV 10 1 fL      Platelets 479 Thousands/uL      nRBC 0 /100 WBCs      Neutrophils Relative 70 %      Immat GRANS % 0 %      Lymphocytes Relative 18 %      Monocytes Relative 10 %      Eosinophils Relative 1 %      Basophils Relative 1 %      Neutrophils Absolute 6 47 Thousands/µL      Immature Grans Absolute 0 03 Thousand/uL      Lymphocytes Absolute 1 70 Thousands/µL      Monocytes Absolute 0 96 Thousand/µL      Eosinophils Absolute 0 06 Thousand/µL      Basophils Absolute 0 06 Thousands/µL                  CT head without contrast   Final Result by Gloria Saleh MD (08/08 1238)         1  Marked hydrocephalus, potentially obstructive in etiology, likely related to large, 3 cm left cerebellar pontine angle cistern mass, compressing the 4th ventricle and brainstem  The left cerebellopontine angle cistern mass is most likely a large    vestibular schwannoma given the extension into the internal auditory canal   Emergent neurosurgical assessment advised  I personally discussed this study with Emma Su on 8/8/2021 at 12:36 PM                      Workstation performed: [id number]               Procedures  Procedures      ED Course  ED Course as of Aug 15 1251   Sun Aug 08, 2021   1250 "1  Marked hydrocephalus, potentially obstructive in etiology, likely related to large, 3 cm left cerebellar pontine angle cistern mass, compressing the 4th ventricle and brainstem    The left cerebellopontine angle cistern mass is most likely a large   vestibular schwannoma given the extension into the internal auditory canal   Emergent neurosurgical assessment advised "   CT head without contrast                                       MDM  Number of Diagnoses or Management Options  Headache  Hydrocephalus (HCC)  Near syncope  Paresthesias  Vestibular schwannoma [facility])  Diagnosis management comments: 54 y/o F presents for evaluation of worsening headaches x 4 days  She has been following up with her PCP for intermittent headaches and neck pain over the last 6 months, increasing in frequency and intensity over the last 4 days  She experiences numbness and tingling of the face and bilateral arms and legs associated with her posterior headaches  Today she experienced a near-syncopal episode with her headache  She had an MRI of the cervical spine on 06/20/21 that showed "incidental partially imaged 3 cm left CP angle mass that is most consistent with vestibular schwannoma  Mass effect on the cerebellum without edema " She had a follow up MRI brain IAC with and without on 08/05/21 which had not been read before arriving to the ER today  Afebrile, VSS  No focal neurological deficits on exam  Repeat CT head today was compared to MRI from 3 days ago, and per radiology the mass appears stable in size but is compressing on the brain stem and causing obstructive hydrocephalus  Case discussed with Dr Elif Dukes, neurosurgery, who reviewed the images and notes that her ventricles appear larger than 2012 but otherwise does not seem acutely obstructing, they will see her in the morning to discuss treatment options  Case discussed with CHRISTIAN for admission        Disposition  Final diagnoses:   Headache   Hydrocephalus ([address] )   Paresthesias   Near syncope   Vestibular schwannoma ([address] ) - Suspected, left     Time reflects when diagnosis was documented in both MDM as applicable and the Disposition within this note     Time User Action Codes Description Comment    8/8/2021  3:26 PM Lon Shed Add [R51 9] Headache     8/8/2021  3:26 PM Lon Shed Add [G91 9] Hydrocephalus ([address] )     8/8/2021  3:26 PM Lon Shed Add [R20 2] Paresthesias     8/8/2021  3:26 PM Lon Shed Add [R55] Near syncope 8/8/2021  3:27 PM Edis Amy Add [D33 3] Vestibular schwannoma ([facility] [address] )     8/8/2021  3:27 PM Edis Amy Modify [D33 3] Vestibular schwannoma ([address] ) Suspected, left    8/8/2021  4:51 PM Federico Baltimore B Add [D33 3] 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[address] )     8/12/2021  3:29 PM Allyssa Hartman Add [R26 2] Ambulatory dysfunction       ED Disposition     ED Disposition Condition Date/Time Comment    Admit Stable Sun Aug 8, 2021  3:26 PM Case was discussed with Dr Delmi Welsh, and the patient's admission status was agreed to be Admission Status: observation status to the service of Dr García Farley, [facility]  Follow-up Information     Follow up With Specialties Details Why Contact Info Additional Information    Jeffrey Good MD Otolaryngology Go to go to appointment at 1 pm on 8/13 [address] [facility] [address] [place] [address], MD Family Medicine Follow up  [address]  [address] [address] Neurosurgery Follow up  [address] [facility] [place] [address] [id number]  [address], [address] [facility], [id number] [phone]          Discharge Medication List as of 8/12/2021  4:21 PM      START taking these medications    Details   acetaZOLAMIDE (DIAMOX) 125 mg tablet Take 1 tablet (125 mg total) by mouth 2 (two) times a day, Starting Thu 8/12/2021, Normal      cyclobenzaprine (FLEXERIL) 10 mg tablet Take 1 tablet (10 mg total) by mouth 3 (three) times a day as needed for muscle spasms (headache), Starting u 8/12/2021, Normal      dexamethasone (DECADRON) 2 mg tablet Take 4 mg tonight 8/12, 4 mg every 8 hours 8/13, followed by 2 mg every 6 hours x2 days, 2 mg every 8 hours x2 days, 2 mg every 12 hours x2 days, Normal      oxyCODONE (ROXICODONE) 5 mg immediate release tablet Take 1 tablet (5 mg total) by mouth every 6 (six) hours as needed for severe pain for up to 10 dosesMax Daily Amount: 20 mg, Starting Thu 8/12/2021, Normal         CONTINUE these medications which have NOT CHANGED    Details   escitalopram (LEXAPRO) 5 mg tablet Starting Sat 12/19/2020, Historical Med      losartan (COZAAR) 50 mg tablet Take 50 mg by mouth daily, Historical Med           Outpatient Discharge Orders   Walker     Shower chair     Ambulatory referral to Physical Therapy   Standing Status: Future Standing Exp  Date: 08/12/22      Ambulatory referral to Occupational Therapy   Standing Status: Future Standing Exp  Date: 08/12/22      Discharge Diet     Activity as tolerated       PDMP Review       Value Time User    PDMP Reviewed  Yes 8/15/2021 12:29 AM Mariana Ly DO           ED Provider  Attending physically available and evaluated Radha Givens  I managed the patient along with the ED Attending      Electronically Signed by         Nnamdi Green MD  08/15/21 6640

## 2021-08-08 NOTE — ASSESSMENT & PLAN NOTE
Left cerebellopontine angle tumor with hydrocephalus  ER physician discussed with neurosurgery  Neurosurgery following

## 2021-08-08 NOTE — PLAN OF CARE
Problem: MOBILITY - ADULT  Goal: Maintain or return to baseline ADL function  Description: INTERVENTIONS:  -  Assess patient's ability to carry out ADLs; assess patient's baseline for ADL function and identify physical deficits which impact ability to perform ADLs (bathing, care of mouth/teeth, toileting, grooming, dressing, etc )  - Assess/evaluate cause of self-care deficits   - Assess range of motion  - Assess patient's mobility; develop plan if impaired  - Assess patient's need for assistive devices and provide as appropriate  - Encourage maximum independence but intervene and supervise when necessary  - Involve family in performance of ADLs  - Assess for home care needs following discharge   - Consider OT consult to assist with ADL evaluation and planning for discharge  - Provide patient education as appropriate  Outcome: Progressing  Goal: Maintains/Returns to pre admission functional level  Description: INTERVENTIONS:  - Perform BMAT or MOVE assessment daily    - Set and communicate daily mobility goal to care team and patient/family/caregiver     - Collaborate with rehabilitation services on mobility goals if consulted  - Out of bed for toileting  - Record patient progress and toleration of activity level   Outcome: Progressing     Problem: Potential for Falls  Goal: Patient will remain free of falls  Description: INTERVENTIONS:  - Educate patient/family on patient safety including physical limitations  - Instruct patient to call for assistance with activity   - Consult OT/PT to assist with strengthening/mobility   - Keep Call bell within reach  - Keep bed low and locked with side rails adjusted as appropriate  - Keep care items and personal belongings within reach  - Initiate and maintain comfort rounds  - Make Fall Risk Sign visible to staff  - Apply yellow socks and bracelet for high fall risk patients  - Consider moving patient to room near nurses station  Outcome: Progressing     Problem: NEUROSENSORY - ADULT  Goal: Achieves stable or improved neurological status  Description: INTERVENTIONS  - Monitor and report changes in neurological status  - Monitor vital signs such as temperature, blood pressure, glucose, and any other labs ordered   - Initiate measures to prevent increased intracranial pressure  - Monitor for seizure activity and implement precautions if appropriate      Outcome: Progressing     Problem: PAIN - ADULT  Goal: Verbalizes/displays adequate comfort level or baseline comfort level  Description: Interventions:  - Encourage patient to monitor pain and request assistance  - Assess pain using appropriate pain scale  - Administer analgesics based on type and severity of pain and evaluate response  - Implement non-pharmacological measures as appropriate and evaluate response  - Consider cultural and social influences on pain and pain management  - Notify physician/advanced practitioner if interventions unsuccessful or patient reports new pain  Outcome: Progressing     Problem: INFECTION - ADULT  Goal: Absence or prevention of progression during hospitalization  Description: INTERVENTIONS:  - Assess and monitor for signs and symptoms of infection  - Monitor lab/diagnostic results  - Monitor all insertion sites, i e  indwelling lines, tubes, and drains  - Monitor endotracheal if appropriate and nasal secretions for changes in amount and color  - Clay appropriate cooling/warming therapies per order  - Administer medications as ordered  - Instruct and encourage patient and family to use good hand hygiene technique  - Identify and instruct in appropriate isolation precautions for identified infection/condition  Outcome: Progressing  Goal: Absence of fever/infection during neutropenic period  Description: INTERVENTIONS:  - Monitor WBC    Outcome: Progressing     Problem: SAFETY ADULT  Goal: Maintain or return to baseline ADL function  Description: INTERVENTIONS:  -  Assess patient's ability to carry out ADLs; assess patient's baseline for ADL function and identify physical deficits which impact ability to perform ADLs (bathing, care of mouth/teeth, toileting, grooming, dressing, etc )  - Assess/evaluate cause of self-care deficits   - Assess range of motion  - Assess patient's mobility; develop plan if impaired  - Assess patient's need for assistive devices and provide as appropriate  - Encourage maximum independence but intervene and supervise when necessary  - Involve family in performance of ADLs  - Assess for home care needs following discharge   - Consider OT consult to assist with ADL evaluation and planning for discharge  - Provide patient education as appropriate  Outcome: Progressing  Goal: Maintains/Returns to pre admission functional level  Description: INTERVENTIONS:  - Perform BMAT or MOVE assessment daily    - Set and communicate daily mobility goal to care team and patient/family/caregiver     - Collaborate with rehabilitation services on mobility goals if consulted  - Out of bed for toileting  - Record patient progress and toleration of activity level   Outcome: Progressing  Goal: Patient will remain free of falls  Description: INTERVENTIONS:  - Educate patient/family on patient safety including physical limitations  - Instruct patient to call for assistance with activity   - Consult OT/PT to assist with strengthening/mobility   - Keep Call bell within reach  - Keep bed low and locked with side rails adjusted as appropriate  - Keep care items and personal belongings within reach  - Initiate and maintain comfort rounds  - Make Fall Risk Sign visible to staff  - Apply yellow socks and bracelet for high fall risk patients  - Consider moving patient to room near nurses station  Outcome: Progressing     Problem: DISCHARGE PLANNING  Goal: Discharge to home or other facility with appropriate resources  Description: INTERVENTIONS:  - Identify barriers to discharge w/patient and caregiver  - Arrange for needed discharge resources and transportation as appropriate  - Identify discharge learning needs (meds, wound care, etc )  - Arrange for interpretive services to assist at discharge as needed  - Refer to Case Management Department for coordinating discharge planning if the patient needs post-hospital services based on physician/advanced practitioner order or complex needs related to functional status, cognitive ability, or social support system  Outcome: Progressing     Problem: Knowledge Deficit  Goal: Patient/family/caregiver demonstrates understanding of disease process, treatment plan, medications, and discharge instructions  Description: Complete learning assessment and assess knowledge base    Interventions:  - Provide teaching at level of understanding  - Provide teaching via preferred learning methods  Outcome: Progressing

## 2021-08-08 NOTE — H&P
[address] [facility],[address] 1965, 55 y o  female MRN: [id number]  Unit/Bed#: ED 22 Encounter: [id number]  Primary Care Provider: Tabitha Alves MD   Date and time admitted to hospital: 8/8/2021 10:35 AM    * Cerebellopontine angle tumor [facility])  Assessment & Plan  Left cerebellopontine angle tumor with hydrocephalus  ER physician discussed with neurosurgery  Neurosurgery following    Hydrocephalus [facility])  Assessment & Plan  Obstructive hydrocephalus due to CP angle tumor  Neurosurgery following    Hypertension  Assessment & Plan  Monitor blood pressures  Avoid hypotension    Anxiety  Assessment & Plan  Patient was reassured  Continue Lexapro      VTE Pharmacologic Prophylaxis: VTE Score: 6 High Risk (Score >/= 5) - Pharmacological DVT Prophylaxis Ordered: enoxaparin (Lovenox)  Sequential Compression Devices Ordered  Code Status: Level 1 - Full Code   Discussion with family: Updated  () at bedside  Discussed the patient and spouse at bedside in detail questions answered    Anticipated Length of Stay: Patient will be admitted on an observation basis with an anticipated length of stay of less than 2 midnights secondary to CP angle tumor  Chief Complaint:     Headache dizziness vertigo    History of Present Illness:  Michael Cm is a 55 y o  female with a PMH of hypertension, anxiety who presents with headache dizziness vertigo  Patient initially noticed gait instability vertiginous symptoms gradually progressive deafness in the left ear since February 2021, she was following up with her primary care physician underwent imaging studies including MRI of the cervical spine, MRI cervical spine revealed lesion suspicious for CP angle tumor hence a dedicated MRI brain was obtained      Patient reports worsening symptoms since 2-3 days, she has intractable vertiginous symptoms, early morning headaches especially occipital progressing to rest of her head, associated with nausea episodes of vomiting  Today she fell to the floor hit her head, hence presents to the hospital for evaluation  In the ED the ER physician reviewed the MRI results with her, given the MRI findings and symptoms as well as obstructive hydrocephalus patient will be admitted, ER physician discussed with Neurosurgery with plans for Neurosurgery evaluation tomorrow for further management  History of fever chills sweats constitutional symptoms  Denies chest pain shortness breath palpitations presyncope syncope  Denies cough chest tightness wheezing  Denies urinary symptoms  Denies abdominal pain diarrhea  No history of arthritis arthralgias myalgias rash    No known personal history of malignancy  No family history suggestive schwannomas CP angle tumors  She reports her mother had malignant melanoma        Review of Systems:  Review of Systems   All other systems reviewed and are negative  Past Medical and Surgical History:   Past Medical History:   Diagnosis Date    Hypertension        History reviewed  No pertinent surgical history  Meds/Allergies:  Prior to Admission medications    Medication Sig Start Date End Date Taking? Authorizing Provider   escitalopram (LEXAPRO) 5 mg tablet  12/19/20  Yes Historical Provider, MD   losartan (COZAAR) 50 mg tablet Take 50 mg by mouth daily   Yes Historical Provider, MD   predniSONE 10 mg tablet Take by mouth daily  8/8/21  Historical Provider, MD   sulfamethoxazole-trimethoprim (BACTRIM DS) 800-160 mg per tablet Take 1 tablet by mouth every 12 (twelve) hours  8/8/21  Historical Provider, MD     I have reviewed home medications with patient personally      Allergies: No Known Allergies    Social History:  Marital Status: /Civil Union   Occupation:   Patient Pre-hospital Living Situation: Home  Patient Pre-hospital Level of Mobility: walks  Patient Pre-hospital Diet Restrictions: no  Substance Use History:   Social History     Substance and Sexual Activity   Alcohol Use Yes     Social History     Tobacco Use   Smoking Status Former Smoker    Quit date: 2021    Years since quittin 5   Smokeless Tobacco Never Used     Social History     Substance and Sexual Activity   Drug Use No       Family History:  History reviewed  No pertinent family history  Physical Exam:     Vitals:   Blood Pressure: 136/79 (21 1600)  Pulse: 59 (21 1600)  Temperature: 97 9 °F (36 6 °C) (21 1042)  Temp Source: Oral (21 1042)  Respirations: 16 (21 1600)  SpO2: 90 % (21 1600)    Physical Exam     Comfortably lying in bed  Neck supple  Lungs clear to auscultation  Heart sounds S1 and S2 noted  Abdomen soft  Awake alert obeys simple commands  Pulses noted  No rash    Additional Data:     Lab Results:  Results from last 7 days   Lab Units 21  1216   WBC Thousand/uL 9 28   HEMOGLOBIN g/dL 14 0   HEMATOCRIT % 42 9   PLATELETS Thousands/uL 317   NEUTROS PCT % 70   LYMPHS PCT % 18   MONOS PCT % 10   EOS PCT % 1     Results from last 7 days   Lab Units 21  1216   SODIUM mmol/L 139   POTASSIUM mmol/L 3 7   CHLORIDE mmol/L 107   CO2 mmol/L 25   BUN mg/dL 12   CREATININE mg/dL 0 87   ANION GAP mmol/L 7   CALCIUM mg/dL 9 9   GLUCOSE RANDOM mg/dL 93                       Imaging: Reviewed radiology reports from this admission including: MRI spine   CT head    CT head without contrast   Final Result by Cl Montiel MD ( 1238)         1  Marked hydrocephalus, potentially obstructive in etiology, likely related to large, 3 cm left cerebellar pontine angle cistern mass, compressing the 4th ventricle and brainstem  The left cerebellopontine angle cistern mass is most likely a large    vestibular schwannoma given the extension into the internal auditory canal   Emergent neurosurgical assessment advised               I personally discussed this study with Chrissie Swann on 2021 at 12:36 PM  Workstation performed: [id number]             EKG and Other Studies Reviewed on Admission:   · EKG: Sinus rhythm, no ST T-wave changes  ** Please Note: This note has been constructed using a voice recognition system   **

## 2021-08-08 NOTE — ED ATTENDING ATTESTATION
8/8/2021  Herminia Zhang DO, saw and evaluated the patient  I have discussed the patient with the resident/non-physician practitioner and agree with the resident's/non-physician practitioner's findings, Plan of Care, and MDM as documented in the resident's/non-physician practitioner's note, except where noted  All available labs and Radiology studies were reviewed  I was present for key portions of any procedure(s) performed by the resident/non-physician practitioner and I was immediately available to provide assistance  At this point I agree with the current assessment done in the Emergency Department  I have conducted an independent evaluation of this patient a history and physical is as follows:    ED Course     55 yof with a prior hx of left sided HA p/w similar left posterior headache with associated b/l hand and leg tingling  Although patient has had these headaches previously, states it was much worse today  No msk weakness but just tingling  Exam: subjective parethesias to b/l UE and LE  5/5 strength  Pupils equal  Pain improved after dilaudid  A/P: 55 yof dx with schwannoma on MRI with worsening HA and paresthesias  MRI 3 days ago but not read yet  Obtain CTH, labs, d/w neurosx  Admit for further evaluation      Critical Care Time  Procedures

## 2021-08-09 LAB
ALBUMIN SERPL BCP-MCNC: 3.8 G/DL (ref 3.5–5)
ALP SERPL-CCNC: 60 U/L (ref 46–116)
ALT SERPL W P-5'-P-CCNC: 29 U/L (ref 12–78)
ANION GAP SERPL CALCULATED.3IONS-SCNC: 5 MMOL/L (ref 4–13)
AST SERPL W P-5'-P-CCNC: 17 U/L (ref 5–45)
BASOPHILS # BLD AUTO: 0.06 THOUSANDS/ΜL (ref 0–0.1)
BASOPHILS NFR BLD AUTO: 1 % (ref 0–1)
BILIRUB SERPL-MCNC: 0.46 MG/DL (ref 0.2–1)
BUN SERPL-MCNC: 13 MG/DL (ref 5–25)
CALCIUM SERPL-MCNC: 9.6 MG/DL (ref 8.3–10.1)
CHLORIDE SERPL-SCNC: 104 MMOL/L (ref 100–108)
CO2 SERPL-SCNC: 27 MMOL/L (ref 21–32)
CREAT SERPL-MCNC: 0.88 MG/DL (ref 0.6–1.3)
EOSINOPHIL # BLD AUTO: 0.08 THOUSAND/ΜL (ref 0–0.61)
EOSINOPHIL NFR BLD AUTO: 1 % (ref 0–6)
ERYTHROCYTE [DISTWIDTH] IN BLOOD BY AUTOMATED COUNT: 13.5 % (ref 11.6–15.1)
GFR SERPL CREATININE-BSD FRML MDRD: 74 ML/MIN/1.73SQ M
GLUCOSE SERPL-MCNC: 77 MG/DL (ref 65–140)
HCT VFR BLD AUTO: 44.1 % (ref 34.8–46.1)
HGB BLD-MCNC: 14.1 G/DL (ref 11.5–15.4)
IMM GRANULOCYTES # BLD AUTO: 0.03 THOUSAND/UL (ref 0–0.2)
IMM GRANULOCYTES NFR BLD AUTO: 0 % (ref 0–2)
LYMPHOCYTES # BLD AUTO: 2.31 THOUSANDS/ΜL (ref 0.6–4.47)
LYMPHOCYTES NFR BLD AUTO: 25 % (ref 14–44)
MCH RBC QN AUTO: 30.1 PG (ref 26.8–34.3)
MCHC RBC AUTO-ENTMCNC: 32 G/DL (ref 31.4–37.4)
MCV RBC AUTO: 94 FL (ref 82–98)
MONOCYTES # BLD AUTO: 0.99 THOUSAND/ΜL (ref 0.17–1.22)
MONOCYTES NFR BLD AUTO: 11 % (ref 4–12)
NEUTROPHILS # BLD AUTO: 5.92 THOUSANDS/ΜL (ref 1.85–7.62)
NEUTS SEG NFR BLD AUTO: 62 % (ref 43–75)
NRBC BLD AUTO-RTO: 0 /100 WBCS
PLATELET # BLD AUTO: 337 THOUSANDS/UL (ref 149–390)
PMV BLD AUTO: 10.7 FL (ref 8.9–12.7)
POTASSIUM SERPL-SCNC: 3.5 MMOL/L (ref 3.5–5.3)
PROT SERPL-MCNC: 8.3 G/DL (ref 6.4–8.2)
RBC # BLD AUTO: 4.68 MILLION/UL (ref 3.81–5.12)
SODIUM SERPL-SCNC: 136 MMOL/L (ref 136–145)
WBC # BLD AUTO: 9.39 THOUSAND/UL (ref 4.31–10.16)

## 2021-08-09 PROCEDURE — 99223 1ST HOSP IP/OBS HIGH 75: CPT | Performed by: NEUROLOGICAL SURGERY

## 2021-08-09 PROCEDURE — 99225 PR SBSQ OBSERVATION CARE/DAY 25 MINUTES: CPT | Performed by: PHYSICIAN ASSISTANT

## 2021-08-09 PROCEDURE — 85025 COMPLETE CBC W/AUTO DIFF WBC: CPT | Performed by: INTERNAL MEDICINE

## 2021-08-09 PROCEDURE — 80053 COMPREHEN METABOLIC PANEL: CPT | Performed by: INTERNAL MEDICINE

## 2021-08-09 RX ADMIN — ESCITALOPRAM 5 MG: 5 TABLET, FILM COATED ORAL at 08:57

## 2021-08-09 RX ADMIN — LOSARTAN POTASSIUM 50 MG: 50 TABLET, FILM COATED ORAL at 08:56

## 2021-08-09 RX ADMIN — ONDANSETRON 4 MG: 2 INJECTION INTRAMUSCULAR; INTRAVENOUS at 10:49

## 2021-08-09 RX ADMIN — METOCLOPRAMIDE HYDROCHLORIDE 10 MG: 5 INJECTION INTRAMUSCULAR; INTRAVENOUS at 15:02

## 2021-08-09 RX ADMIN — HYDROMORPHONE HYDROCHLORIDE 0.5 MG: 1 INJECTION, SOLUTION INTRAMUSCULAR; INTRAVENOUS; SUBCUTANEOUS at 04:45

## 2021-08-09 RX ADMIN — MECLIZINE HYDROCHLORIDE 25 MG: 25 TABLET ORAL at 05:10

## 2021-08-09 RX ADMIN — HYDROMORPHONE HYDROCHLORIDE 0.5 MG: 1 INJECTION, SOLUTION INTRAMUSCULAR; INTRAVENOUS; SUBCUTANEOUS at 10:49

## 2021-08-09 RX ADMIN — OXYCODONE HYDROCHLORIDE 5 MG: 5 TABLET ORAL at 08:57

## 2021-08-09 RX ADMIN — ONDANSETRON 4 MG: 2 INJECTION INTRAMUSCULAR; INTRAVENOUS at 21:39

## 2021-08-09 RX ADMIN — OXYCODONE HYDROCHLORIDE 5 MG: 5 TABLET ORAL at 02:50

## 2021-08-09 RX ADMIN — OXYCODONE HYDROCHLORIDE 5 MG: 5 TABLET ORAL at 21:38

## 2021-08-09 RX ADMIN — ENOXAPARIN SODIUM 40 MG: 40 INJECTION SUBCUTANEOUS at 08:57

## 2021-08-09 RX ADMIN — HYDROMORPHONE HYDROCHLORIDE 0.5 MG: 1 INJECTION, SOLUTION INTRAMUSCULAR; INTRAVENOUS; SUBCUTANEOUS at 16:06

## 2021-08-09 RX ADMIN — OXYCODONE HYDROCHLORIDE 5 MG: 5 TABLET ORAL at 15:00

## 2021-08-09 RX ADMIN — POLYETHYLENE GLYCOL 3350 17 G: 17 POWDER, FOR SOLUTION ORAL at 08:56

## 2021-08-09 RX ADMIN — ONDANSETRON 4 MG: 2 INJECTION INTRAMUSCULAR; INTRAVENOUS at 02:50

## 2021-08-09 RX ADMIN — DOCUSATE SODIUM 100 MG: 100 CAPSULE ORAL at 08:56

## 2021-08-09 NOTE — ASSESSMENT & PLAN NOTE
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  · Patient presented with headaches, vertigo and hearing loss    Imaging:   · CT head without 8/8/2021:  Marked hydrocephalus  Large 3 cm left CP angle mass compressing 4th ventricle and brainstem  Mass with extension into the internal auditory canal   · MRI brain IAC with without 8/5/2021:  Final report pending  Large left CP angle mass with mass effect on 4th ventricle and brainstem  Enlarged ventricles consistent with hydrocephalus  Plan:   · Continue monitor neurological exam  · CT head and MRI imaging results reviewed with patient and her  who was at bedside  · Discussed diagnosis of left CP angle mass along with hydrocephalus  · Discussed consideration for external ventricular drain if patient were to have a decline in exam or mental status  Patient stated she would be agreeable to proceed if indicated  · Discussed ultimately need for surgical resection  Will review the surgical schedule to see if able to be completed later this week  · Medical management per primary team    · Mobilize as tolerated with assistance  · DVT prophylaxis:  Bilateral SCDs  Lovenox    Neurosurgery will continue to follow  Call if questions or concerns

## 2021-08-09 NOTE — ASSESSMENT & PLAN NOTE
· Patient reports progressive symptoms since February  · Outpatient MRI C-spine from June revealed, per the results report, incidental partially imaged 3 cm left CP angle mass that is most consistent with vestibular schwannoma  Mass effect on the cerebellum without edema  · MRI brain IAC was done as outpatient, results report still pending  · CT head on admission revealed, per the results report, marked hydrocephalus, potentially obstructive in etiology, likely related to large 3 cm left cerebellar pontine angle cistern mass compressing the 4th ventricle and brainstem    The left cerebellopontine angle cistern mass is 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 given the extension into the internal auditory canal  · Neurosurgery consult pending

## 2021-08-09 NOTE — CONSULTS
[address] [facility]  1965, 55 y o  female MRN: [id number]  Unit/Bed#: [facility] 707-01 Encounter: [id number]  Primary Care Provider: Mitali Jackson MD   Date and time admitted to hospital: 8/8/2021 10:35 AM    Inpatient consult to Neurosurgery  Consult performed by: Kim Dorsey PA-C  Consult ordered by: Martha Montenegro MD          * Cerebellopontine angle tumor [facility])  Assessment & Plan  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  · Patient presented with headaches, vertigo and hearing loss    Imaging:   · CT head without 8/8/2021:  Marked hydrocephalus  Large 3 cm left CP angle mass compressing 4th ventricle and brainstem  Mass with extension into the internal auditory canal   · MRI brain IAC with without 8/5/2021:  Final report pending  Large left CP angle mass with mass effect on 4th ventricle and brainstem  Enlarged ventricles consistent with hydrocephalus  Plan:   · Continue monitor neurological exam  · CT head and MRI imaging results reviewed with patient and her  who was at bedside  · Discussed diagnosis of left CP angle mass along with hydrocephalus  · Discussed consideration for external ventricular drain if patient were to have a decline in exam or mental status  Patient stated she would be agreeable to proceed if indicated  · Discussed ultimately need for surgical resection  Will review the surgical schedule to see if able to be completed later this week  · Medical management per primary team    · Mobilize as tolerated with assistance  · DVT prophylaxis:  Bilateral SCDs  Lovenox    Neurosurgery will continue to follow  Call if questions or concerns  History of Present Illness     HPI: Obdulia Brito is a 55 y o  female with PMH including hypertension who presents with complaint of dizziness gait disturbance and headaches    Patient admits to mild COVID infection in December which did not require any hospitalization  In February she developed dizziness along with balance difficulties  She had noted progressive hearing loss on the left  Patient was seen by her PCP and states she was started on blood pressure medications  There was discussion of referral for ENT but states this was not completed  Given no improvement of her symptoms and slow progression of her symptoms patient followed up with her PCP in June and underwent x-rays of her cervical and lumbar spine  She subsequently had MRI cervical lumbar spine imaging completed  The MRI cervical spine noted a left CP angle mass for which subsequent MRI brain imaging was completed on August 5th  This demonstrated a 3 cm left CP angle mass 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 causing mass effect on the brainstem and 4th ventricle along with evidence of hydrocephalus  Patient states rapid progression of her symptoms since that time and presented to the emergency room  CT head without contrast completed demonstrating similar findings compared to MRI done several days prior  Patient admits to episodes of severe head pressure radiating from the back of her neck  At times there is associated facial numbness and progressive blurry vision  Admits to Lasix three years ago but notes difficulty focusing her vision  Denies any double vision or visual deficits  Patient states when standing she developed diffuse numbness throughout her body causing her to fall Sunday morning  Patient states she has been crawling at home secondary to dizziness and difficulty with balance  In addition she has had significant nausea and intermittent vomiting  Since admission to the hospital she has required multiple medications for symptom control  She remains awake alert and able to provide a good history  Review of Systems   Constitutional: Positive for activity change  Negative for fatigue and fever  HENT: Positive for hearing loss (left)   Negative for trouble swallowing and voice change  Eyes: Positive for visual disturbance  Negative for photophobia  Respiratory: Negative for cough and shortness of breath  Cardiovascular: Negative for chest pain and leg swelling  Gastrointestinal: Positive for nausea and vomiting  Negative for abdominal pain  Genitourinary: Negative for decreased urine volume and difficulty urinating  Musculoskeletal: Positive for gait problem  Negative for back pain and neck pain  Skin: Negative for pallor, rash and wound  Neurological: Positive for dizziness, numbness and headaches  Negative for tremors, seizures, speech difficulty, weakness and light-headedness  Psychiatric/Behavioral: Positive for decreased concentration  Negative for agitation and confusion  Historical Information   Past Medical History:   Diagnosis Date    Hypertension      History reviewed  No pertinent surgical history  Social History     Substance and Sexual Activity   Alcohol Use Yes     Social History     Substance and Sexual Activity   Drug Use No     Social History     Tobacco Use   Smoking Status Former Smoker    Quit date: 2021    Years since quittin 5   Smokeless Tobacco Never Used     History reviewed  No pertinent family history      Meds/Allergies   all current active meds have been reviewed, current meds:   Current Facility-Administered Medications   Medication Dose Route Frequency    acetaminophen (TYLENOL) tablet 650 mg  650 mg Oral Q6H PRN    aluminum-magnesium hydroxide-simethicone (MYLANTA) oral suspension 30 mL  30 mL Oral Q6H PRN    docusate sodium (COLACE) capsule 100 mg  100 mg Oral BID    enoxaparin (LOVENOX) subcutaneous injection 40 mg  40 mg Subcutaneous Daily    escitalopram (LEXAPRO) tablet 5 mg  5 mg Oral Daily    HYDROmorphone (DILAUDID) injection 0 5 mg  0 5 mg Intravenous Q4H PRN    losartan (COZAAR) tablet 50 mg  50 mg Oral Daily    meclizine (ANTIVERT) tablet 25 mg  25 mg Oral Q8H [address]    metoclopramide (REGLAN) injection 10 mg  10 mg Intravenous Q6H PRN    ondansetron (ZOFRAN) injection 4 mg  4 mg Intravenous Q6H PRN    oxyCODONE (ROXICODONE) IR tablet 2 5 mg  2 5 mg Oral Q6H PRN    oxyCODONE (ROXICODONE) IR tablet 5 mg  5 mg Oral Q6H PRN    polyethylene glycol (MIRALAX) packet 17 g  17 g Oral Daily    and PTA meds:   Prior to Admission Medications   Prescriptions Last Dose Informant Patient Reported? Taking?   escitalopram (LEXAPRO) 5 mg tablet Past Week at Unknown time  Yes Yes   losartan (COZAAR) 50 mg tablet 8/7/2021 at Unknown time Self Yes Yes   Sig: Take 50 mg by mouth daily      Facility-Administered Medications: None     No Known Allergies    Objective   I/O       08/07 0701 - 08/08 0700 08/08 0701 - 08/09 0700 08/09 0701 - 08/10 0700    P  O    120    Total Intake(mL/kg)   120 (1 6)    Emesis/NG output  1     Total Output  1     Net  -1 +120                 Physical Exam  Constitutional:       General: She is not in acute distress  Appearance: Normal appearance  She is well-developed  She is not ill-appearing  HENT:      Head: Normocephalic and atraumatic  Right Ear: External ear normal       Left Ear: External ear normal       Nose: Nose normal       Mouth/Throat:      Mouth: Mucous membranes are moist    Eyes:      General: No scleral icterus  Right eye: No discharge  Left eye: No discharge  Extraocular Movements: Extraocular movements intact and EOM normal       Conjunctiva/sclera: Conjunctivae normal       Pupils: Pupils are equal, round, and reactive to light  Cardiovascular:      Rate and Rhythm: Normal rate  Pulmonary:      Effort: Pulmonary effort is normal  No respiratory distress  Abdominal:      General: There is no distension  Musculoskeletal:         General: No deformity  Cervical back: Tenderness (Left greater than right paraspinal) present  Skin:     General: Skin is warm and dry  Findings: No rash     Neurological: Mental Status: She is alert  Coordination: Finger-Nose-Finger Test abnormal       Deep Tendon Reflexes: Strength normal    Psychiatric:         Mood and Affect: Mood normal          Speech: Speech normal          Behavior: Behavior normal          Thought Content: Thought content normal          Judgment: Judgment normal        Neurologic Exam     Mental Status   Follows 3 step commands  Attention: normal  Concentration: normal    Speech: speech is normal   Level of consciousness: alert  Knowledge: good  Able to perform simple calculations  Normal comprehension  Cranial Nerves     CN III, IV, VI   Pupils are equal, round, and reactive to light  Extraocular motions are normal    Nystagmus: none   Upgaze: normal  Conjugate gaze: present    CN V   Facial sensation intact  CN VII   Facial expression full, symmetric  CN VIII   Hearing: impaired (No hearing on left to finger rub)    CN XI   Right trapezius strength: normal  Left trapezius strength: normal    CN XII   Tongue: not atrophic  Fasciculations: absent  Tongue deviation: none    Motor Exam   Muscle bulk: normal  Overall muscle tone: normal  Right arm pronator drift: absent  Left arm pronator drift: absent    Strength   Strength 5/5 throughout  Sensory Exam   Light touch normal      Gait, Coordination, and Reflexes     Coordination   Finger to nose coordination: abnormal    Tremor   Resting tremor: absent  Intention tremor: absent  Action tremor: absent    Reflexes   Right Ye: absent  Left Ye: absent  Right ankle clonus: absent  Left ankle clonus: absent        Vitals:Blood pressure 141/79, pulse 80, temperature 98 °F (36 7 °C), resp  rate (!) 23, height 5' 5 5" (1 664 m), weight 74 4 kg (164 lb), SpO2 97 %, not currently breastfeeding  ,Body mass index is 26 88 kg/m²       Lab Results:   Results from last 7 days   Lab Units 08/09/21  0450 08/08/21  1216   WBC Thousand/uL 9 39 9 28   HEMOGLOBIN g/dL 14 1 14 0   HEMATOCRIT % 44 1 42 9   PLATELETS Thousands/uL 337 317   NEUTROS PCT % 62 70   MONOS PCT % 11 10     Results from last 7 days   Lab Units 08/09/21  0450 08/08/21  1216   POTASSIUM mmol/L 3 5 3 7   CHLORIDE mmol/L 104 107   CO2 mmol/L 27 25   BUN mg/dL 13 12   CREATININE mg/dL 0 88 0 87   CALCIUM mg/dL 9 6 9 9   ALK PHOS U/L 60  --    ALT U/L 29  --    AST U/L 17  --                  No results found for: TROPONINT  ABG:No results found for: PHART, CCX3AEW, PO2ART, YFO5XIH, O3OQMLDK, BEART, SOURCE    Imaging Studies: I have personally reviewed pertinent reports  and I have personally reviewed pertinent films in PACS    CT head without contrast    Result Date: 8/8/2021  Impression: 1  Marked hydrocephalus, potentially obstructive in etiology, likely related to large, 3 cm left cerebellar pontine angle cistern mass, compressing the 4th ventricle and brainstem  The left cerebellopontine angle cistern mass is 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 given the extension into the internal auditory canal   Emergent neurosurgical assessment advised  I personally discussed this study with Pallavi Crooks on 8/8/2021 at 12:36 PM  Workstation performed: [id number]       EKG, Pathology, and Other Studies: none    VTE Prophylaxis: Sequential compression device (Venodyne)  and Enoxaparin (Lovenox)    Code Status: Level 1 - Full Code  Advance Directive and Living Will:      Power of :    POLST:      Counseling / Coordination of Care  I spent 45 minutes with the patient

## 2021-08-09 NOTE — PLAN OF CARE
Problem: MOBILITY - ADULT  Goal: Maintain or return to baseline ADL function  Description: INTERVENTIONS:  -  Assess patient's ability to carry out ADLs; assess patient's baseline for ADL function and identify physical deficits which impact ability to perform ADLs (bathing, care of mouth/teeth, toileting, grooming, dressing, etc )  - Assess/evaluate cause of self-care deficits   - Assess range of motion  - Assess patient's mobility; develop plan if impaired  - Assess patient's need for assistive devices and provide as appropriate  - Encourage maximum independence but intervene and supervise when necessary  - Involve family in performance of ADLs  - Assess for home care needs following discharge   - Consider OT consult to assist with ADL evaluation and planning for discharge  - Provide patient education as appropriate  Outcome: Progressing  Goal: Maintains/Returns to pre admission functional level  Description: INTERVENTIONS:  - Perform BMAT or MOVE assessment daily    - Set and communicate daily mobility goal to care team and patient/family/caregiver     - Collaborate with rehabilitation services on mobility goals if consulted    Problem: Potential for Falls  Goal: Patient will remain free of falls  Description: INTERVENTIONS:  - Educate patient/family on patient safety including physical limitations  - Instruct patient to call for assistance with activity   - Consult OT/PT to assist with strengthening/mobility   - Keep Call bell within reach  - Keep bed low and locked with side rails adjusted as appropriate  - Keep care items and personal belongings within reach  - Initiate and maintain comfort rounds  - Make Fall Risk Sign visible to staff  Problem: NEUROSENSORY - ADULT  Goal: Achieves stable or improved neurological status  Description: INTERVENTIONS  - Monitor and report changes in neurological status  - Monitor vital signs such as temperature, blood pressure, glucose, and any other labs ordered   - Initiate measures to prevent increased intracranial pressure  - Monitor for seizure activity and implement precautions if appropriate      Outcome: Progressing     Problem: PAIN - ADULT  Goal: Verbalizes/displays adequate comfort level or baseline comfort level  Description: Interventions:  - Encourage patient to monitor pain and request assistance  - Assess pain using appropriate pain scale  - Administer analgesics based on type and severity of pain and evaluate response  - Implement non-pharmacological measures as appropriate and evaluate response  - Consider cultural and social influences on pain and pain management  - Notify physician/advanced practitioner if interventions unsuccessful or patient reports new pain  Outcome: Progressing     - Apply yellow socks and bracelet for high fall risk patients  - Consider moving patient to room near nurses station  Outcome: Progressing     - Out of bed for toileting  - Record patient progress and toleration of activity level   Outcome: Progressing

## 2021-08-09 NOTE — PROGRESS NOTES
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)  Assessment & Plan  · Patient reports progressive symptoms since February  · Outpatient MRI C-spine from June revealed, per the results report, incidental partially imaged 3 cm left CP angle mass that is most consistent with vestibular schwannoma  Mass effect on the cerebellum without edema  · MRI brain IAC was done as outpatient, results report still pending  · CT head on admission revealed, per the results report, marked hydrocephalus, potentially obstructive in etiology, likely related to large 3 cm left cerebellar pontine angle cistern mass compressing the 4th ventricle and brainstem  The left cerebellopontine angle cistern mass is 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 given the extension into the internal auditory canal  · Neurosurgery consult pending    Hydrocephalus [facility])  Assessment & Plan  · May be obstructive in etiology given CP angle tumor as above  · Neurosurgery consult pending    Hypertension  Assessment & Plan  · On losartan as outpatient  · Monitor blood pressures  · Avoid hypotension    Anxiety  Assessment & Plan  · Continue Lexapro      VTE Pharmacologic Prophylaxis:   Pharmacologic: Enoxaparin (Lovenox)  Mechanical VTE Prophylaxis in Place: Yes    Patient Centered Rounds: I have performed bedside rounds with nursing staff today  Vazquez    Discussions with Specialists or Other Care Team Provider:     Education and Discussions with Family / Patient: patient and her  at bedside     Time Spent for Care: 30 minutes  More than 50% of total time spent on counseling and coordination of care as described above      Current Length of Stay: 0 day(s)    Current Patient Status: Observation   Certification Statement: The patient, admitted on an observation basis, will now require > 2 midnight hospital stay due to pending neurosurgeru consult given cerebellar pontine angle cistern mass as above    Discharge Plan: pending Neurosurgery recs    Code Status: Level 1 - Full Code      Subjective:   Ms Robert Boudreaux reports she has been feeling weak, numbness and tingling, off balance, falling, headaches, neck pain, N/V all progressive since February    Objective:     Vitals:   Temp (24hrs), Av 1 °F (36 7 °C), Min:97 9 °F (36 6 °C), Max:98 3 °F (36 8 °C)    Temp:  [97 9 °F (36 6 °C)-98 3 °F (36 8 °C)] 98 3 °F (36 8 °C)  HR:  [56-86] 86  Resp:  [15-21] 15  BP: (133-167)/(67-90) 133/67  SpO2:  [90 %-99 %] 95 %  Body mass index is 26 88 kg/m²  Input and Output Summary (last 24 hours): Intake/Output Summary (Last 24 hours) at 2021 0858  Last data filed at 2021 7160  Gross per 24 hour   Intake 120 ml   Output 1 ml   Net 119 ml       Physical Exam:     Physical Exam  Vitals and nursing note reviewed  Constitutional:       Comments: Patient seen sitting in bed with her  present at bedside   Cardiovascular:      Rate and Rhythm: Normal rate and regular rhythm  Pulmonary:      Effort: Pulmonary effort is normal  No respiratory distress  Breath sounds: Normal breath sounds  Abdominal:      General: Bowel sounds are normal       Palpations: Abdomen is soft  Tenderness: There is no abdominal tenderness  Musculoskeletal:      Right lower leg: No edema  Left lower leg: No edema  Skin:     General: Skin is warm  Neurological:      Mental Status: She is alert and oriented to person, place, and time     Psychiatric:      Comments: Anxious appearing       Additional Data:     Labs:    Results from last 7 days   Lab Units 21  0450   WBC Thousand/uL 9 39   HEMOGLOBIN g/dL 14 1   HEMATOCRIT % 44 1   PLATELETS Thousands/uL 337   NEUTROS PCT % 62   LYMPHS PCT % 25 MONOS PCT % 11   EOS PCT % 1     Results from last 7 days   Lab Units 08/09/21  0450   SODIUM mmol/L 136   POTASSIUM mmol/L 3 5   CHLORIDE mmol/L 104   CO2 mmol/L 27   BUN mg/dL 13   CREATININE mg/dL 0 88   ANION GAP mmol/L 5   CALCIUM mg/dL 9 6   ALBUMIN g/dL 3 8   TOTAL BILIRUBIN mg/dL 0 46   ALK PHOS U/L 60   ALT U/L 29   AST U/L 17   GLUCOSE RANDOM mg/dL 77                           * I Have Reviewed All Lab Data Listed Above  * Additional Pertinent Lab Tests Reviewed: All Labs Within Last 24 Hours Reviewed    Imaging:    Imaging Reports Reviewed Today Include: MRI C-spine and CT head as above  Imaging Personally Reviewed by Myself Includes:  none    Recent Cultures (last 7 days):           Last 24 Hours Medication List:   Current Facility-Administered Medications   Medication Dose Route Frequency Provider Last Rate    acetaminophen  650 mg Oral Q6H PRN Kamron Murphy MD      aluminum-magnesium hydroxide-simethicone  30 mL Oral Q6H PRN Kamron Murphy MD      docusate sodium  100 mg Oral BID Kamron Murphy MD      enoxaparin  40 mg Subcutaneous Daily Kamron Murphy MD      escitalopram  5 mg Oral Daily Kamron Murphy MD      HYDROmorphone  0 5 mg Intravenous Q4H PRN Kamron Murphy MD      losartan  50 mg Oral Daily Kamron Murphy MD      meclizine  25 mg Oral [facility] Kamron Murphy MD      metoclopramide  10 mg Intravenous Q6H PRN Kamron Murphy MD      ondansetron  4 mg Intravenous Q6H PRN Kamron Murphy MD      oxyCODONE  2 5 mg Oral Q6H PRN Kamron Murphy MD      oxyCODONE  5 mg Oral Q6H PRN Kamron Murphy MD      polyethylene glycol  17 g Oral Daily Kamron Murphy MD          Today, Patient Was Seen By: Jazmine Latif PA-C    ** Please Note: Dictation voice to text software may have been used in the creation of this document   **

## 2021-08-09 NOTE — PHYSICAL THERAPY NOTE
Physical Therapy Cancellation Note         08/09/21 0900   PT Last Visit   PT Visit Date 08/09/21   Note Type   Note type Evaluation   Cancel Reasons Medical status     PT orders received and chart reviewed  Pt pending neurosx consult for cerebellopotine angle tumor  Will hold PT at this time pending neurosx POC  Will continue to follow and initiate PT evaluation as able      Mis Alcantar, PT, DPT

## 2021-08-09 NOTE — RESTORATIVE TECHNICIAN NOTE
Restorative Technician Note      Patient Name: Jovi Marks     Note Type: Mobility  Patient Position Upon Consult: Supine  Activity Performed: Dangled; Other (Comment) (Pt unable to stand up 2* c/o dizziness RN aware )  Assistive Device: Other (Comment) (Assist x1)  Education Provided: Yes (Educated/encouraged pt to sit EOB/ambulate with assistance )  Patient Position at End of Consult: Supine; All needs within reach;Bed/Chair alarm activated  Dorothy BAH, Restorative Technician, [employer]

## 2021-08-09 NOTE — UTILIZATION REVIEW
Initial Clinical Review    Admission: Date/Time/Statement:   Admission Orders (From admission, onward)     Ordered        08/08/21 1527  Place in Observation  Once                   Orders Placed This Encounter   Procedures    Place in Observation     Standing Status:   Standing     Number of Occurrences:   1     Order Specific Question:   Level of Care     Answer:   Med Surg [16]     ED Arrival Information     Expected Arrival Acuity    - 8/8/2021 10:34 Urgent         Means of arrival Escorted by Service Admission type    Ambulance [facility] Hospitalist Urgent         Arrival complaint    Near Syncope        Chief Complaint   Patient presents with    Numbness     bilateral leg numbness when walking with a severe posterior head pain that caused her to fall striking her head on a cabinet  Denies LOC       Initial Presentation:     55year old female presents to ed via ems for evaluation and treatment of bilateral leg numbness, severe head pain resulting in a fall with head strike on a cabinet  Clinical assessment significant for marked hydrocephalus, likely related to left cerebellar pontine angle cistern mass compressing the 4th ventricle and brainstem  Treated in ed with iv dilaudid x2, antivert, tylenol and iv zofran  Admit to observation for cerebellopontine angle tumor, hydrocephalus  Plan includes: neuro surgery consult, PT/OT evaluation  Date: 8-9-21 Day 2: observation    Patient reports feeling weak with numbness and tingling  She also reports being off balance, headaches, neck pain and falling        ED Triage Vitals   08/08/21 1042 08/08/21 1038 08/08/21 1038 08/08/21 1038 08/08/21 1038   97 9 °F (36 6 °C) 65 16 161/90 97 %      Oral          Pain Score       2          08/08/21 74 4 kg (164 lb)     Additional Vital Signs:     Date and Time Eye Opening Best Verbal Response Best Motor Response Thomas Coma Scale Score   08/09/21 0857 4 5 6 15   08/08/21 1905 4 5 6 15   08/08/21 1040 4 5 6 15       Date/Time  Temp  Pulse  Resp  BP  MAP  SpO2  O2 Device   08/09/21 0857  --  --  --  --  --  96 %  None (Room air)   08/09/21 07:35:46  98 3 °F (36 8 °C)  86  15  133/67  89  95 %  --   08/08/21 21:49:38  98 3 °F (36 8 °C)  62  --  149/82  104  95 %  --   08/08/21 17:36:09  98 1 °F (36 7 °C)  56  16  149/85  106  96 %  --   08/08/21 17:33:43  98 1 °F (36 7 °C)  --  --  149/85  106  --  --   08/08/21 1700  --  59  16  143/75  103  95 %  --   08/08/21 1600  --  59  16  136/79  102  90 %  --   08/08/21 1346  --  66  16  167/86  --  97 %  --   08/08/21 1145  --  60  16  149/71  102  98 %  --   08/08/21 1100  --  68  16  134/73  --  94 %             Pertinent Labs/Diagnostic Test Results:       Collection Time Result Time Vent R Atrial R WV Int  QRSD Int  QT Int  QTC Int  P Axis QRS Axis T Wave Ax    08/08/21 11:54:14 08/08/21 15:21:40 53 53 140 136 492 461 62 58 99                  Sinus bradycardia   Left bundle branch block   Abnormal ECG   When compared with ECG of 21-AUG-2013 09:20,   Vent  rate has decreased BY  33 BPM   T wave inversion no longer evident in Inferior leads                     CT head without contrast   Final (08/08 1238)         1  Marked hydrocephalus, potentially obstructive in etiology, likely related to large, 3 cm left cerebellar pontine angle cistern mass, compressing the 4th ventricle and brainstem  The left cerebellopontine angle cistern mass is most likely a large    vestibular schwannoma given the extension into the internal auditory canal   Emergent neurosurgical assessment advised              Results from last 7 days   Lab Units 08/09/21  0450 08/08/21  1216   WBC Thousand/uL 9 39 9 28   HEMOGLOBIN g/dL 14 1 14 0   HEMATOCRIT % 44 1 42 9   PLATELETS Thousands/uL 337 317   NEUTROS ABS Thousands/µL 5 92 6 47         Results from last 7 days   Lab Units 08/09/21  0450 08/08/21  1216   SODIUM mmol/L 136 139   POTASSIUM mmol/L 3 5 3 7   CHLORIDE mmol/L 104 107   CO2 mmol/L 27 25   ANION GAP mmol/L 5 7   BUN mg/dL 13 12   CREATININE mg/dL 0 88 0 87   EGFR ml/min/1 73sq m 74 75   CALCIUM mg/dL 9 6 9 9     Results from last 7 days   Lab Units 08/09/21  0450   AST U/L 17   ALT U/L 29   ALK PHOS U/L 60   TOTAL PROTEIN g/dL 8 3*   ALBUMIN g/dL 3 8   TOTAL BILIRUBIN mg/dL 0 46         Results from last 7 days   Lab Units 08/09/21  0450 08/08/21  1216   GLUCOSE RANDOM mg/dL 77 93       ED Treatment:   Medication Administration from 08/08/2021 1034 to 08/08/2021 1721       Date/Time Order Dose Route Action     08/08/2021 1216 ondansetron (ZOFRAN) injection 4 mg 4 mg Intravenous Given     08/08/2021 1216 HYDROmorphone (DILAUDID) injection 0 5 mg 0 5 mg Intravenous Given     08/08/2021 1345 HYDROmorphone (DILAUDID) injection 0 5 mg 0 5 mg Intravenous Given     08/08/2021 1702 acetaminophen (TYLENOL) tablet 650 mg 650 mg Oral Given     08/08/2021 1702 meclizine (ANTIVERT) tablet 25 mg 25 mg Oral Given        Past Medical History:   Diagnosis    Hypertension     Present on Admission:  **None**      Admitting Diagnosis:     Hydrocephalus (HCC) [G91 9]  Paresthesias [R20 2]  Vestibular schwannoma (HCC) [D33 3]  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HCC) [D33 3]  Near syncope [R55]  Headache [R51 9]    Age/Sex: 55 y o  female    Scheduled Medications:    docusate sodium, 100 mg, Oral, BID  enoxaparin, 40 mg, Subcutaneous, Daily  escitalopram, 5 mg, Oral, Daily  losartan, 50 mg, Oral, Daily  meclizine, 25 mg, Oral, Q8H RONNIE  polyethylene glycol, 17 g, Oral, Daily      Continuous IV Infusions:     PRN Meds:  acetaminophen, 650 mg, Oral, Q6H PRN  aluminum-magnesium hydroxide-simethicone, 30 mL, Oral, Q6H PRN  HYDROmorphone, 0 5 mg, Intravenous, Q4H PRN  metoclopramide, 10 mg, Intravenous, Q6H PRN  ondansetron, 4 mg, Intravenous, Q6H PRN  oxyCODONE, 2 5 mg, Oral, Q6H PRN  oxyCODONE, 5 mg, Oral, Q6H PRN        IP CONSULT TO NEUROSURGERY    Network Utilization Review Department  ATTENTION: Please call with any questions or concerns to [phone] and carefully listen to the prompts so that you are directed to the right person  All voicemails are confidential   Sudeep Calender all requests for admission clinical reviews, approved or denied determinations and any other requests to dedicated fax number below belonging to the campus where the patient is receiving treatment   List of dedicated fax numbers for the Facilities:  [address] DENIALS (Administrative/Medical Necessity) [phone]   [address] (Maternity/NICU/Pediatrics) [phone]   [address] [place] [id number] [place] [address] [address] [place] [address] [phone]

## 2021-08-10 PROCEDURE — 99232 SBSQ HOSP IP/OBS MODERATE 35: CPT | Performed by: NEUROLOGICAL SURGERY

## 2021-08-10 PROCEDURE — 99232 SBSQ HOSP IP/OBS MODERATE 35: CPT | Performed by: PHYSICIAN ASSISTANT

## 2021-08-10 RX ORDER — DEXAMETHASONE 4 MG/1
4 TABLET ORAL EVERY 6 HOURS SCHEDULED
Status: COMPLETED | OUTPATIENT
Start: 2021-08-10 | End: 2021-08-12

## 2021-08-10 RX ORDER — DEXAMETHASONE 2 MG/1
2 TABLET ORAL EVERY 8 HOURS SCHEDULED
Status: DISCONTINUED | OUTPATIENT
Start: 2021-08-16 | End: 2021-08-12 | Stop reason: HOSPADM

## 2021-08-10 RX ORDER — DEXAMETHASONE 2 MG/1
2 TABLET ORAL EVERY 12 HOURS SCHEDULED
Status: DISCONTINUED | OUTPATIENT
Start: 2021-08-18 | End: 2021-08-12 | Stop reason: HOSPADM

## 2021-08-10 RX ORDER — DEXAMETHASONE 2 MG/1
2 TABLET ORAL EVERY 6 HOURS SCHEDULED
Status: DISCONTINUED | OUTPATIENT
Start: 2021-08-14 | End: 2021-08-12 | Stop reason: HOSPADM

## 2021-08-10 RX ORDER — DEXAMETHASONE 4 MG/1
4 TABLET ORAL EVERY 8 HOURS SCHEDULED
Status: DISCONTINUED | OUTPATIENT
Start: 2021-08-12 | End: 2021-08-12 | Stop reason: HOSPADM

## 2021-08-10 RX ORDER — ACETAZOLAMIDE 125 MG/1
125 TABLET ORAL EVERY 12 HOURS SCHEDULED
Status: DISCONTINUED | OUTPATIENT
Start: 2021-08-10 | End: 2021-08-12 | Stop reason: HOSPADM

## 2021-08-10 RX ADMIN — HYDROMORPHONE HYDROCHLORIDE 0.5 MG: 1 INJECTION, SOLUTION INTRAMUSCULAR; INTRAVENOUS; SUBCUTANEOUS at 23:17

## 2021-08-10 RX ADMIN — ENOXAPARIN SODIUM 40 MG: 40 INJECTION SUBCUTANEOUS at 08:14

## 2021-08-10 RX ADMIN — OXYCODONE HYDROCHLORIDE 5 MG: 5 TABLET ORAL at 12:57

## 2021-08-10 RX ADMIN — HYDROMORPHONE HYDROCHLORIDE 0.5 MG: 1 INJECTION, SOLUTION INTRAMUSCULAR; INTRAVENOUS; SUBCUTANEOUS at 08:14

## 2021-08-10 RX ADMIN — DEXAMETHASONE 4 MG: 4 TABLET ORAL at 23:17

## 2021-08-10 RX ADMIN — DEXAMETHASONE 4 MG: 4 TABLET ORAL at 11:46

## 2021-08-10 RX ADMIN — ACETAZOLAMIDE 125 MG: 125 TABLET ORAL at 11:46

## 2021-08-10 RX ADMIN — HYDROMORPHONE HYDROCHLORIDE 0.5 MG: 1 INJECTION, SOLUTION INTRAMUSCULAR; INTRAVENOUS; SUBCUTANEOUS at 15:03

## 2021-08-10 RX ADMIN — LOSARTAN POTASSIUM 50 MG: 50 TABLET, FILM COATED ORAL at 08:13

## 2021-08-10 RX ADMIN — ACETAZOLAMIDE 125 MG: 125 TABLET ORAL at 20:27

## 2021-08-10 RX ADMIN — DEXAMETHASONE 4 MG: 4 TABLET ORAL at 17:42

## 2021-08-10 RX ADMIN — ESCITALOPRAM 5 MG: 5 TABLET, FILM COATED ORAL at 08:14

## 2021-08-10 RX ADMIN — ONDANSETRON 4 MG: 2 INJECTION INTRAMUSCULAR; INTRAVENOUS at 12:56

## 2021-08-10 RX ADMIN — OXYCODONE HYDROCHLORIDE 5 MG: 5 TABLET ORAL at 20:27

## 2021-08-10 RX ADMIN — OXYCODONE HYDROCHLORIDE 5 MG: 5 TABLET ORAL at 06:49

## 2021-08-10 RX ADMIN — HYDROMORPHONE HYDROCHLORIDE 0.5 MG: 1 INJECTION, SOLUTION INTRAMUSCULAR; INTRAVENOUS; SUBCUTANEOUS at 02:40

## 2021-08-10 NOTE — OCCUPATIONAL THERAPY NOTE
Occupational Therapy Cancellation Note        Patient Name: Michael WREN Date: 8/10/2021       08/09/21 0900   Note Type   Note type Evaluation   Cancel Reasons Medical status     OT consult received, chart reviewed  Pt admitted with cerebellopontine angle tumor, Neurosurgery consulted and awaiting recommendation prior to evaluation  Will continue to follow and attempt evaluation as medically appropriate   [facility] MOT, OTR/L

## 2021-08-10 NOTE — ASSESSMENT & PLAN NOTE
· Patient reports progressive symptoms since February  · Outpatient MRI C-spine from June revealed, per the results report, incidental partially imaged 3 cm left CP angle mass that is most consistent with vestibular schwannoma  Mass effect on the cerebellum without edema  · MRI brain IAC was done as outpatient, results report still pending  · CT head on admission revealed, per the results report, marked hydrocephalus, potentially obstructive in etiology, likely related to large 3 cm left cerebellar pontine angle cistern mass compressing the 4th ventricle and brainstem  The left cerebellopontine angle cistern mass is 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 given the extension into the internal auditory canal  · Neurosurgery following - they started the patient on Diamox and steroid today  Surgery timing TBD by Neurosurgery - monitor symptoms with decadron/diamox     · If no improvement then plan for inpatient surgery  · If symptoms improve patient has OP appointment on Monday

## 2021-08-10 NOTE — PROGRESS NOTES
[address] 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[facility])  Assessment & Plan  · Patient reports progressive symptoms since February  · Outpatient MRI C-spine from June revealed, per the results report, incidental partially imaged 3 cm left CP angle mass that is most consistent with vestibular schwannoma  Mass effect on the cerebellum without edema  · MRI brain IAC was done as outpatient, results report still pending  · CT head on admission revealed, per the results report, marked hydrocephalus, potentially obstructive in etiology, likely related to large 3 cm left cerebellar pontine angle cistern mass compressing the 4th ventricle and brainstem  The left cerebellopontine angle cistern mass is 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 given the extension into the internal auditory canal  · Neurosurgery following - they started the patient on Diamox and steroid today  Surgery timing TBD by Neurosurgery - monitor symptoms with decadron/diamox  · If no improvement then plan for inpatient surgery  · If symptoms improve patient has OP appointment on Monday    Hydrocephalus [facility])  Assessment & Plan  · May be obstructive in etiology given CP angle tumor as above  · Neurosurgery following    Hypertension  Assessment & Plan  · On losartan as outpatient  · Monitor blood pressures  · Avoid hypotension    Anxiety  Assessment & Plan  · Continue Lexapro    VTE Pharmacologic Prophylaxis:   Pharmacologic: Enoxaparin (Lovenox)  Mechanical VTE Prophylaxis in Place: Yes    Patient Centered Rounds: I have performed bedside rounds with nursing staff today   Vazquez    Discussions with Specialists or Other Care Team Provider: [facility] Neurosurgery AP    Education and Discussions with Family / Patient: patient and her daughter at bedside     Time Spent for Care: 20 minutes  More than 50% of total time spent on counseling and coordination of care as described above  Current Length of Stay: 0 day(s)    Current Patient Status: Observation   Certification Statement: The patient, admitted on an observation basis, will now require > 2 midnight hospital stay due to starting on steroids and diamox, need to monitor symptoms, inpatient vs outpatient surgery per neurosurgery    Discharge Plan: pending symptoms with steroids/diamox will need inpatient vs outpatient surgery     Code Status: Level 1 - Full Code      Subjective:   Ms Robert Boudreaux reports no nausea since yesterday  She reports posterior head soreness today  Objective:     Vitals:   Temp (24hrs), Av 5 °F (36 9 °C), Min:98 °F (36 7 °C), Max:98 9 °F (37 2 °C)    Temp:  [98 °F (36 7 °C)-98 9 °F (37 2 °C)] 98 9 °F (37 2 °C)  HR:  [68-80] 68  Resp:  [23] 23  BP: (112-141)/(68-79) 112/68  SpO2:  [90 %-97 %] 94 %  Body mass index is 26 88 kg/m²  Input and Output Summary (last 24 hours): Intake/Output Summary (Last 24 hours) at 8/10/2021 1004  Last data filed at 8/10/2021 0732  Gross per 24 hour   Intake 200 ml   Output --   Net 200 ml       Physical Exam:     Physical Exam  Vitals and nursing note reviewed  Constitutional:       General: She is not in acute distress  Comments: Patient seen sitting in bed with her daughter present   Cardiovascular:      Rate and Rhythm: Normal rate and regular rhythm  Pulmonary:      Effort: Pulmonary effort is normal  No respiratory distress  Breath sounds: Normal breath sounds  Abdominal:      General: Bowel sounds are normal       Palpations: Abdomen is soft  Tenderness: There is no abdominal tenderness  Musculoskeletal:      Right lower leg: No edema  Left lower leg: No edema  Skin:     General: Skin is warm     Neurological:      Mental Status: She is alert and oriented to person, place, and time  Psychiatric:         Mood and Affect: Mood normal          Behavior: Behavior normal            Additional Data:     Labs:    Results from last 7 days   Lab Units 08/09/21  0450   WBC Thousand/uL 9 39   HEMOGLOBIN g/dL 14 1   HEMATOCRIT % 44 1   PLATELETS Thousands/uL 337   NEUTROS PCT % 62   LYMPHS PCT % 25   MONOS PCT % 11   EOS PCT % 1     Results from last 7 days   Lab Units 08/09/21  0450   SODIUM mmol/L 136   POTASSIUM mmol/L 3 5   CHLORIDE mmol/L 104   CO2 mmol/L 27   BUN mg/dL 13   CREATININE mg/dL 0 88   ANION GAP mmol/L 5   CALCIUM mg/dL 9 6   ALBUMIN g/dL 3 8   TOTAL BILIRUBIN mg/dL 0 46   ALK PHOS U/L 60   ALT U/L 29   AST U/L 17   GLUCOSE RANDOM mg/dL 77                           * I Have Reviewed All Lab Data Listed Above  * Additional Pertinent Lab Tests Reviewed:  All Labs Within Last 24 Hours Reviewed    Imaging:    Imaging Reports Reviewed Today Include: none  Imaging Personally Reviewed by Myself Includes:  none    Recent Cultures (last 7 days):           Last 24 Hours Medication List:   Current Facility-Administered Medications   Medication Dose Route Frequency Provider Last Rate    acetaminophen  650 mg Oral Q6H PRN Saratha Scheuermann, MD      acetaZOLAMIDE  125 mg Oral Q12H [facility] Arminda Humphrey PA-C      aluminum-magnesium hydroxide-simethicone  30 mL Oral Q6H PRN Saratha Scheuermann, MD      dexamethasone  4 mg Oral Q6H [address] [facility]      Followed by   Estrella Carbo ON 8/12/2021] dexamethasone  4 mg Oral Q8H [facility] Lefty Cardenas PA-C      Followed by   Estrella Carbo ON 8/14/2021] dexamethasone  2 mg Oral Q6H [facility] Lefty Cardenas PA-C      Followed by   Estrella Carbo ON 8/16/2021] dexamethasone  2 mg Oral Q8H [facility] Lefty Cardenas PA-C      Followed by   Estrella Carbo ON 8/18/2021] dexamethasone  2 mg Oral Q12H [facility] Lefty Cardenas PA-C      docusate sodium  100 mg Oral BID Saratha Scheuermann, MD      enoxaparin  40 mg Subcutaneous Daily Rand Sandra Campos MD      escitalopram  5 mg Oral Daily Gurjit Angel MD      HYDROmorphone  0 5 mg Intravenous Q4H PRN Gurjit Angel MD      losartan  50 mg Oral Daily Gurjit Angel MD      metoclopramide  10 mg Intravenous Q6H PRN Gurjit Angel MD      ondansetron  4 mg Intravenous Q6H PRN Gurjit Angel MD      oxyCODONE  2 5 mg Oral Q6H PRN Gurjit Angel MD      oxyCODONE  5 mg Oral Q6H PRN Gurjit Angel MD      polyethylene glycol  17 g Oral Daily Gurjit Angel MD          Today, Patient Was Seen By: Pedro Carr PA-C    ** Please Note: Dictation voice to text software may have been used in the creation of this document   **

## 2021-08-10 NOTE — ASSESSMENT & PLAN NOTE
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  · Patient presented with headaches, vertigo and hearing loss    Imaging:   · CT head without 8/8/2021:  Marked hydrocephalus  Large 3 cm left CP angle mass compressing 4th ventricle and brainstem  Mass with extension into the internal auditory canal   · MRI brain IAC with without 8/5/2021:  Final report pending  Large left CP angle mass with mass effect on 4th ventricle and brainstem  Enlarged ventricles consistent with hydrocephalus  Plan:   · Continue monitor neurological exam  · CT head and MRI imaging results again discussed with patient and daughter in room  · Discussed diagnosis of left CP angle mass along with hydrocephalus  · Discussed consideration for external ventricular drain if patient were to have a decline in exam or mental status  Patient stated she would be agreeable to proceed if indicated  · Discussed ultimately need for surgical resection  Timing is TBD  · Placed patient on decadron 4mg Q6 to taper to a lower dose for maintenance until surgery   · Diamox 150mg BID also started in attempt to decrease CSF production in attempt to improve her symptoms profile  · If symptoms improve then patient can keep her outpatient appointment, but if no improvement, then she will likely require inpatient surgery  · Medical management per primary team    · Mobilize as tolerated with assistance  · DVT prophylaxis:  Bilateral SCDs  Lovenox    Neurosurgery will continue to follow  Call if questions or concerns

## 2021-08-10 NOTE — RESTORATIVE TECHNICIAN NOTE
Restorative Technician Note      Patient Name: Isiah Ghosh     Note Type: Mobility  Patient Position Upon Consult: Supine  Activity Performed: Ambulated;Dangled;Stood  Assistive Device: Roller walker  Education Provided: Yes (Educated/encouraged pt to ambulate with assistance 3-4 x's/day )  Patient Position at End of Consult: Supine; All needs within reach;Bed/Chair alarm activated    Pacheco BAH, Restorative Technician, [employer]

## 2021-08-10 NOTE — PROGRESS NOTES
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  · Patient presented with headaches, vertigo and hearing loss    Imaging:   · CT head without 8/8/2021:  Marked hydrocephalus  Large 3 cm left CP angle mass compressing 4th ventricle and brainstem  Mass with extension into the internal auditory canal   · MRI brain IAC with without 8/5/2021:  Final report pending  Large left CP angle mass with mass effect on 4th ventricle and brainstem  Enlarged ventricles consistent with hydrocephalus  Plan:   · Continue monitor neurological exam  · CT head and MRI imaging results again discussed with patient and daughter in room  · Discussed diagnosis of left CP angle mass along with hydrocephalus  · Discussed consideration for external ventricular drain if patient were to have a decline in exam or mental status  Patient stated she would be agreeable to proceed if indicated  · Discussed ultimately need for surgical resection  Timing is TBD  · Placed patient on decadron 4mg Q6 to taper to a lower dose for maintenance until surgery   · Diamox 150mg BID also started in attempt to decrease CSF production in attempt to improve her symptoms profile  · If symptoms improve then patient can keep her outpatient appointment, but if no improvement, then she will likely require inpatient surgery  · Medical management per primary team    · Mobilize as tolerated with assistance  · DVT prophylaxis:  Bilateral SCDs  Lovenox    Neurosurgery will continue to follow  Call if questions or concerns      Hydrocephalus (HCC)  Assessment & Plan  · Evident on MRI brain and CT head   · No intervention at this time as it appears to be communicating rather than obstructive  Subjective/Objective   Chief Complaint: head pressure     Subjective:  Patient states last night with difficulty her  She had very significant headaches and head pressure  She states her typical pressure that centered behind her left ear was now on the right side which is new  At this time it is currently at a 8/11 age she is resting relatively comfortably  She denies any change in vision, trouble speech, facial weakness, facial numbness changes in hearing, chest pain pain weakness or tingling in her arms or legs  Objective:  Lying in bed in no acute distress  I/O       08/08 0701 - 08/09 0700 08/09 0701 - 08/10 0700 08/10 0701 - 08/11 0700    P  O   120 200    Total Intake(mL/kg)  120 (1 6) 200 (2 7)    Emesis/NG output 1      Total Output 1      Net -1 +120 +200                 Invasive Devices     Peripheral Intravenous Line            Peripheral IV 08/09/21 Proximal;Right Antecubital 1 day                Physical Exam:  Vitals: Blood pressure 112/68, pulse 68, temperature 98 9 °F (37 2 °C), resp  rate (!) 23, height 5' 5 5" (1 664 m), weight 74 4 kg (164 lb), SpO2 94 %, not currently breastfeeding  ,Body mass index is 26 88 kg/m²  General appearance: alert, appears stated age, cooperative and no distress  Head: Normocephalic, without obvious abnormality, atraumatic  Eyes: EOMI, PERRL  Neck: supple, symmetrical, trachea midline   Back: no kyphosis present, no tenderness to percussion or palpation  Lungs: non labored breathing  Heart: regular heart rate  Neurologic:   Mental status: Alert, oriented x3, thought content appropriate  Cranial nerves: grossly intact (Cranial nerves II-XII)  Significant hearing deficit on the left    Sensory: normal to light touch  Motor: moving all extremities without focal weakness 5/5 strength throughout  Reflexes: 2+ and symmetric  Coordination: finger to nose normal bilaterally, no drift bilaterally    Lab Results:  Results from last 7 days   Lab Units 08/09/21  0450 08/08/21  1216   WBC Thousand/uL 9 39 9 28   HEMOGLOBIN g/dL 14 1 14 0   HEMATOCRIT % 44 1 42 9   PLATELETS Thousands/uL 337 317   NEUTROS PCT % 62 70   MONOS PCT % 11 10     Results from last 7 days   Lab Units 08/09/21  0450 08/08/21  1216   POTASSIUM mmol/L 3 5 3 7   CHLORIDE mmol/L 104 107   CO2 mmol/L 27 25   BUN mg/dL 13 12   CREATININE mg/dL 0 88 0 87   CALCIUM mg/dL 9 6 9 9   ALK PHOS U/L 60  --    ALT U/L 29  --    AST U/L 17  --                  No results found for: TROPONINT  ABG:No results found for: PHART, LAV7YPP, PO2ART, EAT8TXO, X6QUSPXD, BEART, SOURCE    Imaging Studies: I have personally reviewed pertinent reports  and I have personally reviewed pertinent films in PACS  CT head without contrast    Result Date: 8/8/2021  Impression: 1  Marked hydrocephalus, potentially obstructive in etiology, likely related to large, 3 cm left cerebellar pontine angle cistern mass, compressing the 4th ventricle and brainstem  The left cerebellopontine angle cistern mass is 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 given the extension into the internal auditory canal   Emergent neurosurgical assessment advised  I personally discussed this study with Tomy Mancia on 8/8/2021 at 12:36 PM  Workstation performed: [id number]       EKG, Pathology, and Other Studies: I have personally reviewed pertinent reports        VTE Pharmacologic Prophylaxis: Enoxaparin (Lovenox)    VTE Mechanical Prophylaxis: sequential compression device

## 2021-08-10 NOTE — CASE MANAGEMENT
LOS: Day 2  Bundle: pt is not a bundle  Readmission risk: pt is not a 30 day readmit    Cm reviewed role with pt at bedside  Pt reported her spouse Amber Angulo ([phone]) as her emergency contact  Pt reported that she and her spouse live in a multi-level home, family uses the first floor only - [address]  Pt reported that she was IPTA with ADLs, pt has a license, but spouse does the driving  Pt denied hx of VNA and confirmed hx of OPPT with  and [facility]  PCP is Dr Velasquez Lee; pharmacy of choice is [facility] in [place]  Pt denied hx of MH or D&A  No LW/POA on file  Spouse to transport home  CM reviewed d/c planning process including the following: identifying help at home, patient preference for d/c planning needs, Discharge Lounge, [facility] Meds to Bed program, availability of treatment team to discuss questions or concerns patient and/or family may have regarding understanding medications and recognizing signs and symptoms once discharged  CM also encouraged patient to follow up with all recommended appointments after discharge  Patient advised of importance for patient and family to participate in managing patients medical well being

## 2021-08-11 LAB
ANION GAP SERPL CALCULATED.3IONS-SCNC: 7 MMOL/L (ref 4–13)
BUN SERPL-MCNC: 20 MG/DL (ref 5–25)
CALCIUM SERPL-MCNC: 10.1 MG/DL (ref 8.3–10.1)
CHLORIDE SERPL-SCNC: 110 MMOL/L (ref 100–108)
CO2 SERPL-SCNC: 20 MMOL/L (ref 21–32)
CREAT SERPL-MCNC: 1.02 MG/DL (ref 0.6–1.3)
ERYTHROCYTE [DISTWIDTH] IN BLOOD BY AUTOMATED COUNT: 13.3 % (ref 11.6–15.1)
GFR SERPL CREATININE-BSD FRML MDRD: 62 ML/MIN/1.73SQ M
GLUCOSE SERPL-MCNC: 118 MG/DL (ref 65–140)
HCT VFR BLD AUTO: 42.2 % (ref 34.8–46.1)
HGB BLD-MCNC: 13.6 G/DL (ref 11.5–15.4)
MCH RBC QN AUTO: 31.1 PG (ref 26.8–34.3)
MCHC RBC AUTO-ENTMCNC: 32.2 G/DL (ref 31.4–37.4)
MCV RBC AUTO: 97 FL (ref 82–98)
PLATELET # BLD AUTO: 326 THOUSANDS/UL (ref 149–390)
PMV BLD AUTO: 10.9 FL (ref 8.9–12.7)
POTASSIUM SERPL-SCNC: 3.9 MMOL/L (ref 3.5–5.3)
RBC # BLD AUTO: 4.37 MILLION/UL (ref 3.81–5.12)
SODIUM SERPL-SCNC: 137 MMOL/L (ref 136–145)
WBC # BLD AUTO: 9.91 THOUSAND/UL (ref 4.31–10.16)

## 2021-08-11 PROCEDURE — 97167 OT EVAL HIGH COMPLEX 60 MIN: CPT

## 2021-08-11 PROCEDURE — 99232 SBSQ HOSP IP/OBS MODERATE 35: CPT | Performed by: NEUROLOGICAL SURGERY

## 2021-08-11 PROCEDURE — 80048 BASIC METABOLIC PNL TOTAL CA: CPT | Performed by: PHYSICIAN ASSISTANT

## 2021-08-11 PROCEDURE — 99232 SBSQ HOSP IP/OBS MODERATE 35: CPT | Performed by: PHYSICIAN ASSISTANT

## 2021-08-11 PROCEDURE — 97163 PT EVAL HIGH COMPLEX 45 MIN: CPT

## 2021-08-11 PROCEDURE — 85027 COMPLETE CBC AUTOMATED: CPT | Performed by: PHYSICIAN ASSISTANT

## 2021-08-11 RX ADMIN — DEXAMETHASONE 4 MG: 4 TABLET ORAL at 05:00

## 2021-08-11 RX ADMIN — OXYCODONE HYDROCHLORIDE 5 MG: 5 TABLET ORAL at 16:13

## 2021-08-11 RX ADMIN — POLYETHYLENE GLYCOL 3350 17 G: 17 POWDER, FOR SOLUTION ORAL at 08:24

## 2021-08-11 RX ADMIN — OXYCODONE HYDROCHLORIDE 5 MG: 5 TABLET ORAL at 02:45

## 2021-08-11 RX ADMIN — DEXAMETHASONE 4 MG: 4 TABLET ORAL at 23:41

## 2021-08-11 RX ADMIN — ENOXAPARIN SODIUM 40 MG: 40 INJECTION SUBCUTANEOUS at 08:24

## 2021-08-11 RX ADMIN — OXYCODONE HYDROCHLORIDE 2.5 MG: 5 TABLET ORAL at 23:43

## 2021-08-11 RX ADMIN — DEXAMETHASONE 4 MG: 4 TABLET ORAL at 11:40

## 2021-08-11 RX ADMIN — ONDANSETRON 4 MG: 2 INJECTION INTRAMUSCULAR; INTRAVENOUS at 09:03

## 2021-08-11 RX ADMIN — ACETAZOLAMIDE 125 MG: 125 TABLET ORAL at 20:22

## 2021-08-11 RX ADMIN — ESCITALOPRAM 5 MG: 5 TABLET, FILM COATED ORAL at 08:24

## 2021-08-11 RX ADMIN — DOCUSATE SODIUM 100 MG: 100 CAPSULE ORAL at 17:01

## 2021-08-11 RX ADMIN — ACETAZOLAMIDE 125 MG: 125 TABLET ORAL at 08:25

## 2021-08-11 RX ADMIN — DOCUSATE SODIUM 100 MG: 100 CAPSULE ORAL at 08:24

## 2021-08-11 RX ADMIN — HYDROMORPHONE HYDROCHLORIDE 0.5 MG: 1 INJECTION, SOLUTION INTRAMUSCULAR; INTRAVENOUS; SUBCUTANEOUS at 20:27

## 2021-08-11 RX ADMIN — LOSARTAN POTASSIUM 50 MG: 50 TABLET, FILM COATED ORAL at 08:23

## 2021-08-11 RX ADMIN — DEXAMETHASONE 4 MG: 4 TABLET ORAL at 17:01

## 2021-08-11 RX ADMIN — OXYCODONE HYDROCHLORIDE 5 MG: 5 TABLET ORAL at 09:02

## 2021-08-11 NOTE — PROGRESS NOTES
[address] [facility]  Progress Note - Jovi Marks 1965, 55 y o  female MRN: [id number]  Unit/Bed#: [facility] 707-01 Encounter: [id number]  Primary Care Provider: Yissel Hicks MD   Date and time admitted to hospital: 8/8/2021 10:35 AM    * Cerebellopontine angle tumor [facility])  Assessment & Plan  · Patient reports progressive symptoms since February  · Outpatient MRI C-spine from June revealed, per the results report, incidental partially imaged 3 cm left CP angle mass that is most consistent with vestibular schwannoma  Mass effect on the cerebellum without edema  · MRI brain IAC was done as outpatient, revealing, per the results report, large left CP angle mass extending into the internal auditory canal, most consistent with acoustic neuroma  Moderate mass effect upon the posterior fossa structures on the left without edema  Interval development of hydrocephalus involving the lateral ventricles and 3rd ventricle which may be result of the above described mass and it is mass effect upon the 4th ventricle or disruption of CSF flow within the posterior fossa  Mild increased signal on FLAIR imaging adjacent to the lateral ventricles may represent minor transependymal flow of CSF   · CT head on admission revealed, per the results report, marked hydrocephalus, potentially obstructive in etiology, likely related to large 3 cm left cerebellar pontine angle cistern mass compressing the 4th ventricle and brainstem  The left cerebellopontine angle cistern mass is 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 given the extension into the internal auditory canal  · Neurosurgery following - they started the patient on Diamox and steroid  Surgery timing TBD by Neurosurgery - monitor symptoms with decadron/diamox     · If no improvement then plan for inpatient surgery  · If symptoms improve patient has OP appointment on Monday    Hydrocephalus [facility])  Assessment & Plan  · May be obstructive in etiology given CP angle tumor as above  · Neurosurgery following    Hypertension  Assessment & Plan  · On losartan as outpatient  · Monitor blood pressures, stable  · Avoid hypotension    Anxiety  Assessment & Plan  · Continue Lexapro        VTE Pharmacologic Prophylaxis: VTE Score: 6 High Risk (Score >/= 5) - Pharmacological DVT Prophylaxis Ordered: enoxaparin (Lovenox)  Sequential Compression Devices Ordered  Patient Centered Rounds: I performed bedside rounds with nursing staff today  Discussions with Specialists or Other Care Team Provider: Neurosurgery AP    Education and Discussions with Family / Patient: Patient declined call to   Time Spent for Care: 20 minutes  More than 50% of total time spent on counseling and coordination of care as described above  Current Length of Stay: 1 day(s)  Current Patient Status: Inpatient   Certification Statement: The patient will continue to require additional inpatient hospital stay due to continued symptom management with steroids and diamox, pending inpatient vs outpatient surgery decision  Discharge Plan: Anticipate discharge tomorrow to home  vs remaining in the hospital for inpatient surgery depending on symptomatic improvement with steroids/diamox and neurosurgery recommendations     Code Status: Level 1 - Full Code    Subjective:   Ms Debra Irizarry reports feeling a little better today and says she's trying to use less pain medication for headache  She denies nausea or vomiting, CP or SOB, or abdominal pain    Objective:     Vitals:   Temp (24hrs), Av °F (36 7 °C), Min:97 8 °F (36 6 °C), Max:98 1 °F (36 7 °C)    Temp:  [97 8 °F (36 6 °C)-98 1 °F (36 7 °C)] 98 °F (36 7 °C)  HR:  [65-69] 69  Resp:  [18] 18  BP: (122-126)/(65-80) 126/68  SpO2:  [93 %-94 %] 93 %  Body mass index is 26 88 kg/m²  Input and Output Summary (last 24 hours):      Intake/Output Summary (Last 24 hours) at 2021 1137  Last data filed at 2021 0751  Gross per 24 hour   Intake 240 ml   Output --   Net 240 ml       Physical Exam:   Physical Exam  Vitals and nursing note reviewed  Constitutional:       Comments: Patient seen sitting in bed comfortably resting   Cardiovascular:      Rate and Rhythm: Normal rate and regular rhythm  Pulmonary:      Effort: Pulmonary effort is normal  No respiratory distress  Breath sounds: Normal breath sounds  Abdominal:      General: Bowel sounds are normal       Palpations: Abdomen is soft  Tenderness: There is no abdominal tenderness  Musculoskeletal:      Right lower leg: No edema  Left lower leg: No edema  Skin:     General: Skin is warm  Neurological:      Mental Status: She is alert and oriented to person, place, and time     Psychiatric:         Mood and Affect: Mood normal          Behavior: Behavior normal          Additional Data:     Labs:  Results from last 7 days   Lab Units 08/11/21  0502 08/09/21  0450   WBC Thousand/uL 9 91 9 39   HEMOGLOBIN g/dL 13 6 14 1   HEMATOCRIT % 42 2 44 1   PLATELETS Thousands/uL 326 337   NEUTROS PCT %  --  62   LYMPHS PCT %  --  25   MONOS PCT %  --  11   EOS PCT %  --  1     Results from last 7 days   Lab Units 08/11/21  0502 08/09/21  0450   SODIUM mmol/L 137 136   POTASSIUM mmol/L 3 9 3 5   CHLORIDE mmol/L 110* 104   CO2 mmol/L 20* 27   BUN mg/dL 20 13   CREATININE mg/dL 1 02 0 88   ANION GAP mmol/L 7 5   CALCIUM mg/dL 10 1 9 6   ALBUMIN g/dL  --  3 8   TOTAL BILIRUBIN mg/dL  --  0 46   ALK PHOS U/L  --  60   ALT U/L  --  29   AST U/L  --  17   GLUCOSE RANDOM mg/dL 118 77                       Lines/Drains:  Invasive Devices     Peripheral Intravenous Line            Peripheral IV 08/10/21 Right;Upper;Ventral (anterior) Arm <1 day                      Imaging: Reviewed radiology reports from this admission including: MRI brain    Recent Cultures (last 7 days):         Last 24 Hours Medication List:   Current Facility-Administered Medications   Medication Dose Route Frequency Provider Last Rate    acetaminophen  650 mg Oral Q6H PRN King Castaneda MD      acetaZOLAMIDE  125 mg Oral Q12H [address] Dc Lopez PA-C      aluminum-magnesium hydroxide-simethicone  30 mL Oral Q6H PRN King Castaneda MD      dexamethasone  4 mg Oral Q6H [address] Dc Lopez PA-C      Followed by   Samantha Mckeon ON 8/12/2021] dexamethasone  4 mg Oral Q8H [address] Lefty Leon Mccrary PA-C      Followed by   Samantha Mckeon ON 8/14/2021] dexamethasone  2 mg Oral Q6H [address] Lefty Mccrary PA-C      Followed by   Samantha Mckeon ON 8/16/2021] dexamethasone  2 mg Oral Q8H [address] Lefty Mccrary PA-C      Followed by   Samantha Mckeon ON 8/18/2021] dexamethasone  2 mg Oral Q12H [address] Lefty Mccrary PA-C      docusate sodium  100 mg Oral BID King Castaneda MD      enoxaparin  40 mg Subcutaneous Daily King Castaneda MD      escitalopram  5 mg Oral Daily King Castaneda MD      HYDROmorphone  0 5 mg Intravenous Q4H PRN King Castaneda MD      losartan  50 mg Oral Daily King Castaneda MD      metoclopramide  10 mg Intravenous Q6H PRN King Castaneda MD      ondansetron  4 mg Intravenous Q6H PRN King Castaneda MD      oxyCODONE  2 5 mg Oral Q6H PRN King Castaneda MD      oxyCODONE  5 mg Oral Q6H PRN King Castaneda MD      polyethylene glycol  17 g Oral Daily King Castaneda MD          Today, Patient Was Seen By: Tran Barrett PA-C    **Please Note: This note may have been constructed using a voice recognition system  **

## 2021-08-11 NOTE — PLAN OF CARE
Problem: OCCUPATIONAL THERAPY ADULT  Goal: Performs self-care activities at highest level of function for planned discharge setting  See evaluation for individualized goals  Descriptiions self care tasks  Note: Limitation: Decreased high-level ADLs, Decreased ADL status, Decreased self-care trans  Prognosis: Good  Assessment: Pt is a 55 y o  female who was admitted to [facility] on 8/8/2021 with 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HCC) +large left CP angle mass extending into the internal auditory canal, most consistent with acoustic neuroma, hydrocephalus, HTN, anxiety   Per Neurosurgery pt will required ENT testing and with surgery (tumor resection) will perform on outpatient schedule  Pt's problem list also includes PMH of  has a past medical history of Hypertension    At baseline pt was completing I with ADL's, since symptoms began 2/21 requires assistance with IADL's including driving  Pt lives with spouse, daughter, daughter's boyfriend, grandchild (21 months old)  Currently pt requires min a for CG dynamic standing self care tasks for overall ADLS and min a with RW for functional mobility/transfers  Pt currently presents with impairments in the following categories -steps to enter environment, difficulty performing ADLS and difficulty performing IADLS   These impairments, as well as pt's fatigue and risk for falls  limit pt's ability to safely engage in all baseline areas of occupation, includingbathing, dressing, toileting, functional mobility/transfers, community mobility, laundry , driving, house maintenance, medication management, meal prep, cleaning, work/volunteer work  and leisure activities  The patient's raw score on the AM-PAC Daily Activity inpatient short form is 21, standardized score is 44 27, greater than 39 4  Patients at this level are likely to benefit from discharge to home  Please refer to the recommendation of the Occupational Therapist for safe discharge planning    From OT standpoint, recommend home with increased family support and 3-1 commode and shower chair (continued education) upon D/C  OT will continue to follow to address the below stated goal     OT Discharge Recommendation: No rehabilitation needs  OT - OK to Discharge:  Yes

## 2021-08-11 NOTE — OCCUPATIONAL THERAPY NOTE
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[facility] [address] )  Active Problems:    Anxiety    Hydrocephalus ([facility] [address] )    Hypertension    Past Medical History  Past Medical History:   Diagnosis Date    Hypertension      Past Surgical History  History reviewed  No pertinent surgical history  08/11/21 0945   OT Last Visit   OT Visit Date 08/11/21   Note Type   Note type Evaluation   Restrictions/Precautions   Weight Bearing Precautions Per Order No   Other Precautions Fall Risk;Hard of hearing;Telemetry  (left hearing impaired due to tumor)   Pain Assessment   Pain Assessment Tool Pain Assessment not indicated - pt denies pain   Pain Score No Pain   Home Living   Type of Home House   Home Layout Multi-level; Able to live on main level with bedroom/bathroom; Performs ADLs on one level; Laundry in basement  (1STE)   Bathroom Shower/Tub Tub only  ("jacuzzi tub")   Bathroom Toilet Standard   Bathroom Equipment   (No DME at baseline)   Bathroom Accessibility Accessible   Prior Function   Level of [place] Independent with ADLs and functional mobility   Lives With Spouse;Daughter  (daughter's boyfriend and grandchild (21 months old))   Receives Help From [employer] in the last 6 months 5 to 10  (falls from dizziness, B/L Legs buckling)   Vocational Unemployed   Lifestyle   Autonomy I with ADL's, assistance from family recently with IADL's, no AD with functional ambulation +'s license, hasn't driven since medical symptoms began     Reciprocal Relationships spouse, daughter   Service to Others unemployed    Intrinsic Gratification pets 3 cats/1 dog   Psychosocial   Psychosocial (WDL) WDL   ADL   Eating Assistance 7  Independent   Grooming Assistance 7  Independent   UB Bathing Assistance 1 [id number] [facility] Bathing Assistance 4  Minimal Assistance   LB Bathing Deficit Steadying   UB Dressing Assistance 5  Supervision/Setup   LB Dressing Assistance 4  Minimal Assistance   LB Dressing Deficit Steadying   Toileting Assistance  4  Minimal Assistance   Toileting Deficit Steadying   Bed Mobility   Supine to Sit 5  Supervision   Additional items Assist x 1   Additional Comments pt left in chair with all needs met   Transfers   Sit to Stand 5  Supervision   Additional items Assist x 1   Stand to Sit 5  Supervision   Additional items Assist x 1   Functional Mobility   Functional Mobility 4  Minimal assistance   Additional Comments min a with RW short distance functional mobility  min a for pivoting turns, CG with forward mobility - fair B/L LE weightbearing   Additional items Rolling walker   Balance   Static Sitting Good   Dynamic Sitting Fair +   Static Standing Fair   Dynamic Standing Fair -   Ambulatory Poor +   Activity Tolerance   Activity Tolerance Patient limited by fatigue   Medical Staff Made Aware PT Co-evaluated luis fernando Burton  due to the patient's co-morbidities, clinically unstable presentation, and present impairments which are a regression from the patient's baseline       Nurse Made Aware RN cleared pt for therapy   RUE Assessment   RUE Assessment WFL   LUE Assessment   LUE Assessment WFL   Hand Function   Gross Motor Coordination Functional   Fine Motor Coordination Functional  (finger to thumb sequencing)   Sensation   Light Touch Partial deficits in the RUE;Partial deficits in the LUE   Sharp/Dull Partial deficits in the RUE;Partial deficits in the LUE   Additional Comments pt reports tingling throughout body increasing with "attacks of pain"   Vision-Basic Assessment   Current Vision Wears glasses all the time   Vision - Complex Assessment   Acuity Able to read clock/calendar on wall without difficulty   Perception   Inattention/Neglect Appears intact   Cognition   Overall Cognitive Status [facility] Arousal/Participation Alert; Cooperative   Attention Within functional limits   Orientation Level Oriented X4   Memory Within functional limits   Following Commands Follows all commands and directions without difficulty   Comments pt demonstrating good cognition with safety ((-) driveing "I dont want to endanger someone on the road" "yes I need the RW right now for balance" good insight, resoning/judgement with medical deficits, followed all directives, good memory observed  Assessment   Limitation Decreased high-level ADLs; Decreased ADL status; Decreased self-care trans   Prognosis Good   Assessment Pt is a 55 y o  female who was admitted to [facility] on 8/8/2021 with 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HCC) +large left CP angle mass extending into the internal auditory canal, most consistent with acoustic neuroma, hydrocephalus, HTN, anxiety   Per Neurosurgery pt will required ENT testing and with surgery (tumor resection) will perform on outpatient schedule  Pt's problem list also includes PMH of  has a past medical history of Hypertension    At baseline pt was completing I with ADL's, since symptoms began 2/21 requires assistance with IADL's including driving  Pt lives with spouse, daughter, daughter's boyfriend, grandchild (21 months old)  Currently pt requires min a for CG dynamic standing self care tasks for overall ADLS and min a with RW for functional mobility/transfers  Pt currently presents with impairments in the following categories -steps to enter environment, difficulty performing ADLS and difficulty performing IADLS  activity tolerance, endurance and standing balance/tolerance   These impairments, as well as pt's fatigue and risk for falls  limit pt's ability to safely engage in all baseline areas of occupation, includingbathing, dressing, toileting, functional mobility/transfers, community mobility, laundry , driving, house maintenance, medication management, meal prep, cleaning, work/volunteer work  and leisure activities  The patient's raw score on the AM-PAC Daily Activity inpatient short form is 21, standardized score is 44 27, greater than 39 4  Patients at this level are likely to benefit from discharge to home  Please refer to the recommendation of the Occupational Therapist for safe discharge planning  From OT standpoint, recommend home with increased family support and 3-1 commode and shower chair (continued education) upon D/C  OT will continue to follow to address the below stated goal   Goals   Patient Goals go home   LTG Time Frame 10-14   Long Term Goal #1 see goals below   Plan   Treatment Interventions ADL retraining;Functional transfer training; Endurance training;Patient/family training;Equipment evaluation/education; Compensatory technique education; Activityengagement; Energy conservation   Goal Expiration Date 08/25/21   OT Frequency 3-5x/wk   Recommendation   OT Discharge Recommendation No rehabilitation needs- 3:1 commode, shower chair (continue education)   OT - OK to Discharge Yes   AM-PAC Daily Activity Inpatient   Lower Body Dressing 3   Bathing 3   Toileting 3   Upper Body Dressing 4   Grooming 4   Eating 4   Daily Activity Raw Score 21   Daily Activity Standardized Score (Calc for Raw Score >=11) 44 27   AM-[facility] Applied Cognition Inpatient   Following a Speech/Presentation 4   Understanding Ordinary Conversation 4   Taking Medications 4   Remembering Where Things Are Placed or Put Away 4   Remembering List of 4-5 Errands 4   Taking Care of Complicated Tasks 4   Applied Cognition Raw Score 24   Applied Cognition Standardized Score 62 21      Occupational Therapy Goals:    *Mod I with bed mobility to engage in functional tasks    *Mod I Adl's after setup with use of AE PRN  *Mod I toileting and clothing management   *Mod I functional mobility and transfers to/from all surfaces with Fair + dynamic balance and safety for participation in dynamic adls and iadl tasks   *Demonstrate good carryover with safe use of RW during functional tasks   *Assess DME needs   *Increase activity tolerance to 25-30 minutes for participation in adls and enjoyable activities  Pt will independently identify and utilize 2-3 coping strategies to increase positive affect and promote overall well-being  *Increase standing tolerance to 8 minutes with F+ standing balance to engage in self care tasks  *Demonstrate good carryover of pt/family education and training with good tolerance for increased safety and independence with ADL's/ADl's      Casie Arteaga MOT, OTR/L

## 2021-08-11 NOTE — ASSESSMENT & PLAN NOTE
· Patient reports progressive symptoms since February  · Outpatient MRI C-spine from June revealed, per the results report, incidental partially imaged 3 cm left CP angle mass that is most consistent with vestibular schwannoma  Mass effect on the cerebellum without edema  · MRI brain IAC was done as outpatient, revealing, per the results report, large left CP angle mass extending into the internal auditory canal, most consistent with acoustic neuroma  Moderate mass effect upon the posterior fossa structures on the left without edema  Interval development of hydrocephalus involving the lateral ventricles and 3rd ventricle which may be result of the above described mass and it is mass effect upon the 4th ventricle or disruption of CSF flow within the posterior fossa  Mild increased signal on FLAIR imaging adjacent to the lateral ventricles may represent minor transependymal flow of CSF   · CT head on admission revealed, per the results report, marked hydrocephalus, potentially obstructive in etiology, likely related to large 3 cm left cerebellar pontine angle cistern mass compressing the 4th ventricle and brainstem  The left cerebellopontine angle cistern mass is 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 given the extension into the internal auditory canal  · Neurosurgery following - they started the patient on Diamox and steroid  Surgery timing TBD by Neurosurgery - monitor symptoms with decadron/diamox     · If no improvement then plan for inpatient surgery  · If symptoms improve patient has OP appointment on Monday

## 2021-08-11 NOTE — PROGRESS NOTES
[address] [facility]  Progress Note - [facility] 1965, 55 y o  female MRN: [id number]  Unit/Bed#: [facility] 707-01 Encounter: [id number]  Primary Care Provider: Tabitha Miguel MD   Date and time admitted to hospital: 8/8/2021 10:35 AM    * 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HCC)  Assessment & Plan  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  · Patient presented with headaches, vertigo and hearing loss    Imaging:   · CT head without 8/8/2021:  Marked hydrocephalus  Large 3 cm left CP angle mass compressing 4th ventricle and brainstem  Mass with extension into the internal auditory canal   · MRI brain IAC with without 8/5/2021:  Final report pending  Large left CP angle mass with mass effect on 4th ventricle and brainstem  Enlarged ventricles consistent with hydrocephalus  Plan:   · Continue monitor neurological exam  · CT head and MRI imaging results again discussed with patient and daughter in room  · Discussed diagnosis of left CP angle mass along with hydrocephalus  · Discussed consideration for external ventricular drain if patient were to have a decline in exam or mental status  Patient stated she would be agreeable to proceed if indicated  · Discussed ultimately need for surgical resection  Timing is TBD  · Placed patient on decadron 4mg Q6 to taper to a lower dose for maintenance until surgery   · Diamox 150mg BID also started in attempt to decrease CSF production in attempt to improve her symptoms profile  · Some subjective improvement in symptoms since yesterday  Will continue to monitor with goal for patient to be able to get out of hospital and follow up for elective surgery  · Medical management per primary team    · Mobilize as tolerated with assistance  · DVT prophylaxis:  Bilateral SCDs  Lovenox    Neurosurgery will continue to follow  Call if questions or concerns      Hydrocephalus (HCC)  Assessment & Plan  · Evident on MRI brain and CT head · No intervention at this time as it appears to be communicating rather than obstructive  · Diamox 125mg BID at this time  Subjective/Objective   Chief Complaint: None     Subjective: Patient with no significant complaints or concerns at this time  Patient was ambulating with physical therapy this morning and doing well with only slight healing of 5 lb  Subjectively feels her right side is a little weaker than her left side states that she sitting up for too long, watching TV for too long, or using her phone for too long she begins to feel the pressure sensation and needs to recline herself but this does help provide some relief  She denies any new or worsening neurologic issues  Objective:  Lying bed in acute distress  I/O       08/09 0701 - 08/10 0700 08/10 0701 - 08/11 0700 08/11 0701 - 08/12 0700    P  O  120 320 120    Total Intake(mL/kg) 120 (1 6) 320 (4 3) 120 (1 6)    Emesis/NG output       Total Output       Net +120 +320 +120                 Invasive Devices     Peripheral Intravenous Line            Peripheral IV 08/10/21 Right;Upper;Ventral (anterior) Arm <1 day                Physical Exam:  Vitals: Blood pressure 126/68, pulse 69, temperature 98 °F (36 7 °C), resp  rate 18, height 5' 5 5" (1 664 m), weight 74 4 kg (164 lb), SpO2 93 %, not currently breastfeeding  ,Body mass index is 26 88 kg/m²  General appearance: alert, appears stated age, cooperative and no distress  Head: Normocephalic, without obvious abnormality, atraumatic  Eyes: EOMI, PERRL  Neck: supple, symmetrical, trachea midline  Back: no kyphosis present,   Lungs: non labored breathing  Heart: regular heart rate  Neurologic:   Mental status: Alert, oriented x3, thought content appropriate  Cranial nerves: grossly intact (Cranial nerves II-XII)  Sensory: normal to light touch  Motor: moving all extremities without focal weakness    Trace left hip flexion 4+/5  Reflexes: 2+ and symmetric    Lab Results:  Results from last 7 days   Lab Units 08/11/21  0502 08/09/21  0450 08/08/21  1216   WBC Thousand/uL 9 91 9 39 9 28   HEMOGLOBIN g/dL 13 6 14 1 14 0   HEMATOCRIT % 42 2 44 1 42 9   PLATELETS Thousands/uL 326 337 317   NEUTROS PCT %  --  62 70   MONOS PCT %  --  11 10     Results from last 7 days   Lab Units 08/11/21  0502 08/09/21  0450 08/08/21  1216   POTASSIUM mmol/L 3 9 3 5 3 7   CHLORIDE mmol/L 110* 104 107   CO2 mmol/L 20* 27 25   BUN mg/dL 20 13 12   CREATININE mg/dL 1 02 0 88 0 87   CALCIUM mg/dL 10 1 9 6 9 9   ALK PHOS U/L  --  60  --    ALT U/L  --  29  --    AST U/L  --  17  --                  No results found for: TROPONINT  ABG:No results found for: PHART, WRC5HLS, PO2ART, KTT4ZFC, M8TMEXCR, BEART, SOURCE    Imaging Studies: I have personally reviewed pertinent reports  and I have personally reviewed pertinent films in PACS  CT head without contrast    Result Date: 8/8/2021  Impression: 1  Marked hydrocephalus, potentially obstructive in etiology, likely related to large, 3 cm left cerebellar pontine angle cistern mass, compressing the 4th ventricle and brainstem  The left cerebellopontine angle cistern mass is 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 given the extension into the internal auditory canal   Emergent neurosurgical assessment advised  I personally discussed this study with Chrissie Swann on 8/8/2021 at 12:36 PM  Workstation performed: [id number]       EKG, Pathology, and Other Studies: I have personally reviewed pertinent reports        VTE Pharmacologic Prophylaxis: Enoxaparin (Lovenox)    VTE Mechanical Prophylaxis: sequential compression device

## 2021-08-11 NOTE — ASSESSMENT & PLAN NOTE
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  · Patient presented with headaches, vertigo and hearing loss    Imaging:   · CT head without 8/8/2021:  Marked hydrocephalus  Large 3 cm left CP angle mass compressing 4th ventricle and brainstem  Mass with extension into the internal auditory canal   · MRI brain IAC with without 8/5/2021:  Final report pending  Large left CP angle mass with mass effect on 4th ventricle and brainstem  Enlarged ventricles consistent with hydrocephalus  Plan:   · Continue monitor neurological exam  · CT head and MRI imaging results again discussed with patient and daughter in room  · Discussed diagnosis of left CP angle mass along with hydrocephalus  · Discussed consideration for external ventricular drain if patient were to have a decline in exam or mental status  Patient stated she would be agreeable to proceed if indicated  · Discussed ultimately need for surgical resection  Timing is TBD  · Placed patient on decadron 4mg Q6 to taper to a lower dose for maintenance until surgery   · Diamox 150mg BID also started in attempt to decrease CSF production in attempt to improve her symptoms profile  · Some subjective improvement in symptoms since yesterday  Will continue to monitor with goal for patient to be able to get out of hospital and follow up for elective surgery  · Medical management per primary team    · Mobilize as tolerated with assistance  · DVT prophylaxis:  Bilateral SCDs  Lovenox    Neurosurgery will continue to follow  Call if questions or concerns

## 2021-08-11 NOTE — PHYSICAL THERAPY NOTE
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]  Near syncope [R55]  Headache [R51 9]    Problem List  Patient Active Problem List   Diagnosis    Anxiety    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[address] )    Hydrocephalus ([address] )    Hypertension       Past Medical History  Past Medical History:   Diagnosis Date    Hypertension        Past Surgical History  History reviewed  No pertinent surgical history  08/11/21 1005   PT Last Visit   PT Visit Date 08/11/21   Note Type   Note type Evaluation   Pain Assessment   Pain Assessment Tool 0-10   Pain Score No Pain   Home Living   Type of Home House   Home Layout Two level; Able to live on main level with bedroom/bathroom;Stairs to enter with rails   Bathroom Shower/Tub Tub only   [address]   (denies DME)   Additional Comments Pt resides in [facility] w/ FFSU and 1+1 GEORGES   Pt reports ambulating w/out AD PTA but would have to grab onto furniture or the walls at home   Prior Function   Level of [place] Independent with ADLs and functional mobility   Lives With Spouse;Daughter  (daughter's boyfriend & grandchild)   Receives Help From Family   ADL Assistance Independent   IADLs Needs assistance   Falls in the last 6 months 5 to 10   Comments Pt reports family works during the day but provides assistance when home   Restrictions/Precautions   Weight Bearing Precautions Per Order No   Other Precautions Fall Risk;Hard of hearing   General   Family/Caregiver Present No   Cognition   Overall Cognitive Status WFL   Arousal/Participation Alert   Attention Within functional limits   Orientation Level Oriented X4   Memory Within functional limits   Following Commands Follows all commands and directions without difficulty   Comments Pt pleasant and cooperative to work w/ therapy   RLE Assessment   RLE Assessment WFL   LLE Assessment   LLE Assessment WFL   Coordination   Movements are Fluid and Coordinated 1   Bed Mobility   Supine to Sit 5  Supervision   Additional items Assist x 1   Sit to Supine Unable to assess   Additional Comments Pt lying supine upon PT arrival  Pt returned seated OOB at end of session w/ all needs within reach   Transfers   Sit to Stand 5  Supervision   Additional items Verbal cues   Stand to Sit 5  Supervision   Additional items Verbal cues   Additional Comments Transfers w/ RW   Ambulation/Elevation   Gait pattern Excessively slow; Short stride; Inconsistent lyubov   Gait Assistance   (CGA)   Additional items Assist x 1;Verbal cues   Assistive Device Rolling walker   Distance 60ft x2   Stair Management Assistance Not tested   Balance   Static Sitting Fair +   Dynamic Sitting Fair   Static Standing Fair -   Dynamic Standing Poor +   Ambulatory Poor +   Endurance Deficit   Endurance Deficit Yes   Endurance Deficit Description weakness, fatigue   Activity Tolerance   Activity Tolerance Patient limited by fatigue   Medical Staff Made Aware OT Rosaura; Co-evaluation performed w/ OT due to pt's multiple co-morbidities, clinically unstable presentation, and regression in functional impairments as compared to baseline  PT focused on transfers, mobility, and LE assessment     Nurse Made Aware yes   Assessment   Prognosis Good   Problem List Decreased strength;Decreased endurance; Impaired balance;Decreased mobility   Assessment Pt is 55 y o  female seen for PT evaluation s/p admit to [facility] on 8/8/2021 w/ 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[facility] [address] )  PT consulted to assess pt's functional mobility and d/c needs  Order placed for PT eval and tx, w/ up w/ A order  Comorbidities affecting pt's physical performance at time of assessment include: anxiety, CP angle tumor, hydrocephalus, HTN   PTA, pt was independent w/ all functional mobility w/ no AD however holding onto furniture and walls and lives w/ spouse, dtr, NISHA, and grandchild in two level house w/ 1+1 GEORGES and FFSU  Personal factors affecting pt at time of IE include: lives in two story house, stairs to enter home, inability to navigate community distances, limited home support, positive fall history and inability to perform IADLs  Please find objective findings from PT assessment regarding body systems outlined above with impairments and limitations including weakness, impaired balance, decreased endurance, gait deviations, decreased activity tolerance, decreased functional mobility tolerance and fall risk  Pt performed bed mobility and STS at supervision  Pt ambulated 60ft x2 w/ RW at [facility]  The following objective measures performed on IE also reveal limitations: Barthel Index: 82/538 and AM-PAC 6-Clicks: 17/75  Pt's clinical presentation is currently unstable/unpredictable seen in pt's presentation of recent admission for CP angle tumor requiring medical attention, recent decline in function as compared to baseline, multiple lines, fall risk, reliance on RW as compared to baseline  Pt to benefit from continued PT tx to address deficits as defined above and maximize level of functional independent mobility and consistency  From PT/mobility standpoint, recommendation at time of d/c would be home with outpatient rehabilitation pending progress in order to facilitate return to PLOF  Barriers to Discharge Decreased caregiver support   Goals   Patient Goals to return home   STG Expiration Date 08/25/21   Short Term Goal #1 1  Pt will demonstrate the ability to perform all aspects of bed mobility at Mod I in onder to maximize functional independence and decrease burden on caregivers  2 Pt will demonstrate the ability to perform all functional transfers at Mod I in order to maximize functional independence and decrease burden on caregivers   3 Pt will demonstrate the ability to ambulate at least 150ft with least restrictive device at Mod I in order to maximize functional independence  4 Pt will demonstrate the ability to negotiate 2 steps at Mod I in order to return to household/community mobility  5 Pt will demonstrate improved balance by one grade in order to decrease risk of falls  6 Pt will increase b/l LE strength by 1 grade in order to increase ease of functional mobility and transfers   PT Treatment Day 0   Plan   Treatment/Interventions Functional transfer training;LE strengthening/ROM; Elevations; Therapeutic exercise; Endurance training;Patient/family training;Equipment eval/education; Bed mobility;Gait training;Spoke to nursing   PT Frequency   (3-5x/week)   Recommendation   PT Discharge Recommendation Home with outpatient rehabilitation   Equipment Recommended Walker   PT - OK to Discharge Yes   AM-PAC Basic Mobility Inpatient   Turning in Bed Without Bedrails 4   Lying on Back to Sitting on Edge of Flat Bed 4   Moving Bed to Chair 3   Standing Up From Chair 3   Walk in Room 3   Climb 3-5 Stairs 2   Basic Mobility Inpatient Raw Score 19   Basic Mobility Standardized Score 42 48   Modified Lugoff Scale   Modified Lugoff Scale 4   Barthel Index   Feeding 10   Bathing 0   Grooming Score 5   Dressing Score 10   Bladder Score 10   Bowels Score 10   Toilet Use Score 5   Transfers (Bed/Chair) Score 10   Mobility (Level Surface) Score 10   Stairs Score 5   Barthel Index Score 75     The patient's AM-PAC Basic Mobility Inpatient Short Form Raw Score is 19, Standardized Score is 42 48  A standardized score less than 42 9 suggests the patient may benefit from discharge to post-acute rehabilitation services  Please also refer to the recommendation of the Physical Therapist for safe discharge planning      Layla Pickard, PT, DPT

## 2021-08-11 NOTE — PLAN OF CARE
Problem: PHYSICAL THERAPY ADULT  Goal: Performs mobility at highest level of function for planned discharge setting  See evaluation for individualized goals  Description: Treatment/Interventions: Functional transfer training, LE strengthening/ROM, Elevations, Therapeutic exercise, Endurance training, Patient/family training, Equipment eval/education, Bed mobility, Gait training, Spoke to nursing  Equipment Recommended: Keenan Francis       See flowsheet documentation for full assessment, interventions and recommendations  Note: Prognosis: Good  Problem List: Decreased strength, Decreased endurance, Impaired balance, Decreased mobility  Assessment: Pt is 55 y o  female seen for PT evaluation s/p admit to [facility] on 8/8/2021 w/ 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[facility] [address] )  PT consulted to assess pt's functional mobility and d/c needs  Order placed for PT eval and tx, w/ up w/ A order  Comorbidities affecting pt's physical performance at time of assessment include: anxiety, CP angle tumor, hydrocephalus, HTN  PTA, pt was independent w/ all functional mobility w/ no AD however holding onto furniture and walls and lives w/ spouse, dtr, NISHA, and grandchild in two level house w/ 1+1 GEORGES and FFSU  Personal factors affecting pt at time of IE include: lives in two story house, stairs to enter home, inability to navigate community distances, limited home support, positive fall history and inability to perform IADLs  Please find objective findings from PT assessment regarding body systems outlined above with impairments and limitations including weakness, impaired balance, decreased endurance, gait deviations, decreased activity tolerance, decreased functional mobility tolerance and fall risk  Pt performed bed mobility and STS at supervision  Pt ambulated 60ft x2 w/ RW at [facility]  The following objective measures performed on IE also reveal limitations: Barthel Index: 41/574 and AM-PAC 6-Clicks: 01/53   Pt's clinical presentation is currently unstable/unpredictable seen in pt's presentation of recent admission for CP angle tumor requiring medical attention, recent decline in function as compared to baseline, multiple lines, fall risk, reliance on RW as compared to baseline  Pt to benefit from continued PT tx to address deficits as defined above and maximize level of functional independent mobility and consistency  From PT/mobility standpoint, recommendation at time of d/c would be home with outpatient rehabilitation pending progress in order to facilitate return to PLOF  Barriers to Discharge: Decreased caregiver support        PT Discharge Recommendation: Home with outpatient rehabilitation     PT - OK to Discharge: Yes    See flowsheet documentation for full assessment

## 2021-08-11 NOTE — ASSESSMENT & PLAN NOTE
· Evident on MRI brain and CT head   · No intervention at this time as it appears to be communicating rather than obstructive  · Diamox 125mg BID at this time

## 2021-08-12 VITALS
OXYGEN SATURATION: 97 % | HEIGHT: 66 IN | SYSTOLIC BLOOD PRESSURE: 121 MMHG | TEMPERATURE: 97.9 F | DIASTOLIC BLOOD PRESSURE: 69 MMHG | BODY MASS INDEX: 26.36 KG/M2 | RESPIRATION RATE: 18 BRPM | HEART RATE: 78 BPM | WEIGHT: 164 LBS

## 2021-08-12 PROCEDURE — 99239 HOSP IP/OBS DSCHRG MGMT >30: CPT | Performed by: PHYSICIAN ASSISTANT

## 2021-08-12 PROCEDURE — 99232 SBSQ HOSP IP/OBS MODERATE 35: CPT | Performed by: PHYSICIAN ASSISTANT

## 2021-08-12 RX ORDER — CYCLOBENZAPRINE HCL 10 MG
10 TABLET ORAL ONCE
Status: COMPLETED | OUTPATIENT
Start: 2021-08-12 | End: 2021-08-12

## 2021-08-12 RX ORDER — ACETAZOLAMIDE 125 MG/1
125 TABLET ORAL 2 TIMES DAILY
Qty: 60 TABLET | Refills: 0 | Status: SHIPPED | OUTPATIENT
Start: 2021-08-12 | End: 2021-09-01 | Stop reason: HOSPADM

## 2021-08-12 RX ORDER — OXYCODONE HYDROCHLORIDE 5 MG/1
5 TABLET ORAL EVERY 6 HOURS PRN
Qty: 10 TABLET | Refills: 0 | Status: SHIPPED | OUTPATIENT
Start: 2021-08-12 | End: 2021-09-01 | Stop reason: HOSPADM

## 2021-08-12 RX ORDER — CYCLOBENZAPRINE HCL 10 MG
10 TABLET ORAL 3 TIMES DAILY PRN
Qty: 30 TABLET | Refills: 0 | Status: SHIPPED | OUTPATIENT
Start: 2021-08-12 | End: 2022-03-03

## 2021-08-12 RX ORDER — DEXAMETHASONE 2 MG/1
TABLET ORAL
Qty: 26 TABLET | Refills: 0 | Status: SHIPPED | OUTPATIENT
Start: 2021-08-12 | End: 2021-09-01 | Stop reason: HOSPADM

## 2021-08-12 RX ADMIN — CYCLOBENZAPRINE HYDROCHLORIDE 10 MG: 10 TABLET, FILM COATED ORAL at 11:22

## 2021-08-12 RX ADMIN — HYDROMORPHONE HYDROCHLORIDE 0.5 MG: 1 INJECTION, SOLUTION INTRAMUSCULAR; INTRAVENOUS; SUBCUTANEOUS at 10:55

## 2021-08-12 RX ADMIN — LOSARTAN POTASSIUM 50 MG: 50 TABLET, FILM COATED ORAL at 08:29

## 2021-08-12 RX ADMIN — DEXAMETHASONE 4 MG: 4 TABLET ORAL at 14:34

## 2021-08-12 RX ADMIN — DEXAMETHASONE 4 MG: 4 TABLET ORAL at 06:54

## 2021-08-12 RX ADMIN — OXYCODONE HYDROCHLORIDE 5 MG: 5 TABLET ORAL at 08:29

## 2021-08-12 RX ADMIN — ENOXAPARIN SODIUM 40 MG: 40 INJECTION SUBCUTANEOUS at 08:29

## 2021-08-12 RX ADMIN — ACETAZOLAMIDE 125 MG: 125 TABLET ORAL at 08:30

## 2021-08-12 RX ADMIN — DOCUSATE SODIUM 100 MG: 100 CAPSULE ORAL at 08:29

## 2021-08-12 RX ADMIN — POLYETHYLENE GLYCOL 3350 17 G: 17 POWDER, FOR SOLUTION ORAL at 08:28

## 2021-08-12 RX ADMIN — ESCITALOPRAM 5 MG: 5 TABLET, FILM COATED ORAL at 08:31

## 2021-08-12 NOTE — RESTORATIVE TECHNICIAN NOTE
Restorative Technician Note      Patient Name: Bryanna Kyle     Note Type: Mobility (Educated/encouraged pt to ambulate with assistance 3-4 x's/day, pt refused 2* c/o dizziness/pain RN Kinga beard )  Patient Position Upon Consult: Supine  Activity Performed: Ambulated;Dangled;Stood  Assistive Device: Roller walker  Education Provided: Yes (Educated/encouraged pt to ambulate with assistance 3-4 x's/day )  Patient Position at End of Consult: Bed/Chair alarm activated; All needs within reach; Supine    [employer] BS, Restorative Technician, [employer]

## 2021-08-12 NOTE — ASSESSMENT & PLAN NOTE
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  · Patient presented with headaches, vertigo and hearing loss    Imaging:   · CT head without 8/8/2021:  Marked hydrocephalus  Large 3 cm left CP angle mass compressing 4th ventricle and brainstem  Mass with extension into the internal auditory canal   · MRI brain IAC with without 8/5/2021:  Final report pending  Large left CP angle mass with mass effect on 4th ventricle and brainstem  Enlarged ventricles consistent with hydrocephalus  Plan:   · Continue monitor neurological exam  · Placed patient on decadron 4mg Q6 to taper to a 2Q12H dose for maintenance until surgery   · Currently on Diamox 125mg BID in attempt to decrease CSF production in attempt to improve her symptoms profile  · Medical management per primary team   · Mobilize as tolerated with assistance  · DVT prophylaxis:  Bilateral SCDs  Lovenox  · Given improvement in symptoms, plan for discharge home and close outpatient follow-up  · Patient has appointment with ENT tomorrow 1pm  I confirmed patient will have audiogram tomorrow  · Outpatient follow-up with Dr Rebecca Valencia scheduled 8/20/2021  · Ultimate plan for surgical resection as joint case with neurosurgery and ENT  · Patient and wife agreeable with plan  All questions were answered  Call if questions or concerns

## 2021-08-12 NOTE — DISCHARGE SUMMARY
[address] [facility]  Discharge- Radha Ink 1965, 55 y o  female MRN: [id number]  Unit/Bed#: [facility] 707-01 Encounter: [id number]  Primary Care Provider: Jose Ramon Rios MD   Date and time admitted to hospital: 8/8/2021 10:35 AM    * Cerebellopontine angle tumor [facility])  Assessment & Plan  · Patient reports progressive symptoms since February - headaches, nausea/vomiting, hearing impairment, progressive unsteady balance  · Outpatient MRI C-spine from June revealed, incidental partially imaged 3 cm left CP angle mass that is most consistent with vestibular schwannoma  Mass effect on the cerebellum without edema  · MRI brain IAC was done as outpatient, revealed large left CP angle mass extending into the internal auditory canal, most consistent with acoustic neuroma  Moderate mass effect upon the posterior fossa structures on the left without edema  Interval development of hydrocephalus involving the lateral ventricles and 3rd ventricle which may be result of the above described mass and it is mass effect upon the 4th ventricle or disruption of CSF flow within the posterior fossa  Mild increased signal on FLAIR imaging adjacent to the lateral ventricles may represent minor transependymal flow of CSF   · CT head on admission revealed, marked hydrocephalus, potentially obstructive in etiology, likely related to large 3 cm left cerebellar pontine angle cistern mass compressing the 4th ventricle and brainstem  The left cerebellopontine angle cistern mass is 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 given the extension into the internal auditory canal  · Neurosurgery following - they started the patient on Diamox and steroid   Surgery timing TBD in the outpatient setting by Neurosurgery, has OP appointment on Monday  · Outpatient PT/OT, will need a walker upon discharge  · Patient ENT eval for audiogram    Hypertension  Assessment & Plan  · On losartan as outpatient  · Monitor blood pressures, stable  · Avoid hypotension    Hydrocephalus (HCC)  Assessment & Plan  · May be obstructive in etiology given CP angle tumor as above  · Neurosurgery following    [address]  · Continue Lexapro    Discharging Physician / Practitioner: Edwin Saldivar PA-C  PCP: Darcy Dominguez MD  Admission Date:   Admission Orders (From admission, onward)     Ordered        08/10/21 1449  Inpatient Admission  Once         08/08/21 1527  Place in Observation  Once                   Discharge Date: 08/12/21    Medical Problems     Resolved Problems  Date Reviewed: 8/12/2021    None              Consultations During Hospital Stay:  · Neurosurgery    Procedures Performed:   · None    Significant Findings / Test Results:   · CT head - 1  Marked hydrocephalus, potentially obstructive in etiology, likely related to large, 3 cm left cerebellar pontine angle cistern mass, compressing the 4th ventricle and brainstem  The left cerebellopontine angle cistern mass is 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 given the extension into the internal auditory canal  Emergent neurosurgical assessment advised  Incidental Findings:   · none    Test Results Pending at Discharge (will require follow up):   · none     Outpatient Tests Requested:  · Follow-up with PCP, ENT, Neurosurgery, outpatient therapy    Complications:  None    Reason for Admission:  Longstanding history of a hearing loss, headaches and nausea/vomiting    Hospital Course:     [place] is a 55 y o  female patient with past medical history significant for anxiety and hypertension who originally presented to the hospital on 8/8/2021 due to longstanding symptoms and diagnosed with schwannoma  Patient was seen by Neurosurgery and started on Decadron and Diamox  Symptoms were monitored closely and improved  She was ambulating safely with a walker    She was referred for elective outpatient surgery in the next 1 month and has appointment with ENT and Neurosurgery next week  She is medically stable for discharge home at this time with close outpatient follow-up  She expressed understanding and agreed with plan  Please see above list of diagnoses and related plan for additional information  Condition at Discharge: stable     Discharge Day Visit / Exam:     Subjective:  Patient seen examined at bedside  States she feels well after trying Flexeril for her headache  Ambulating a little bit better to with a walker  Vitals: Blood Pressure: 121/69 (08/12/21 1528)  Pulse: 78 (08/12/21 0754)  Temperature: 97 9 °F (36 6 °C) (08/12/21 1528)  Temp Source: Oral (08/11/21 2223)  Respirations: 18 (08/12/21 0754)  Height: 5' 5 5" (166 4 cm) (08/08/21 1736)  Weight - Scale: 74 4 kg (164 lb) (08/08/21 1736)  SpO2: 97 % (08/12/21 0754)    Exam:   Physical Exam  Constitutional:       Appearance: Normal appearance  HENT:      Head: Normocephalic and atraumatic  Mouth/Throat:      Mouth: Mucous membranes are moist    Eyes:      Extraocular Movements: Extraocular movements intact  Cardiovascular:      Rate and Rhythm: Normal rate and regular rhythm  Pulmonary:      Effort: Pulmonary effort is normal       Breath sounds: Normal breath sounds  Abdominal:      General: Abdomen is flat  Palpations: Abdomen is soft  Musculoskeletal:         General: Normal range of motion  Cervical back: Normal range of motion and neck supple  Skin:     General: Skin is warm and dry  Neurological:      General: No focal deficit present  Mental Status: She is alert and oriented to person, place, and time  Psychiatric:         Mood and Affect: Mood normal        Discussion with Family: patient  at bedside     Discharge instructions/Information to patient and family:   See after visit summary for information provided to patient and family        Provisions for Follow-Up Care:  See after visit summary for information related to follow-up care and any pertinent home health orders  Disposition:     Home    For Discharges to [facility] SNF:   · Not Applicable to this Patient - Not Applicable to this Patient    Planned Readmission: yes for surgery in near future      Discharge Statement:  I spent 45 minutes discharging the patient  This time was spent on the day of discharge  I had direct contact with the patient on the day of discharge  Greater than 50% of the total time was spent examining patient, answering all patient questions, arranging and discussing plan of care with patient as well as directly providing post-discharge instructions  Additional time then spent on discharge activities  Discharge Medications:  See after visit summary for reconciled discharge medications provided to patient and family        ** Please Note: This note has been constructed using a voice recognition system **

## 2021-08-12 NOTE — ASSESSMENT & PLAN NOTE
· Patient reports progressive symptoms since February - headaches, nausea/vomiting, hearing impairment, progressive unsteady balance  · Outpatient MRI C-spine from June revealed, incidental partially imaged 3 cm left CP angle mass that is most consistent with vestibular schwannoma  Mass effect on the cerebellum without edema  · MRI brain IAC was done as outpatient, revealed large left CP angle mass extending into the internal auditory canal, most consistent with acoustic neuroma  Moderate mass effect upon the posterior fossa structures on the left without edema  Interval development of hydrocephalus involving the lateral ventricles and 3rd ventricle which may be result of the above described mass and it is mass effect upon the 4th ventricle or disruption of CSF flow within the posterior fossa  Mild increased signal on FLAIR imaging adjacent to the lateral ventricles may represent minor transependymal flow of CSF   · CT head on admission revealed, marked hydrocephalus, potentially obstructive in etiology, likely related to large 3 cm left cerebellar pontine angle cistern mass compressing the 4th ventricle and brainstem  The left cerebellopontine angle cistern mass is 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 given the extension into the internal auditory canal  · Neurosurgery following - they started the patient on Diamox and steroid   Surgery timing TBD in the outpatient setting by Neurosurgery, has OP appointment on Monday  · Outpatient PT/OT, will need a walker upon discharge  · Patient ENT eval for audiogram

## 2021-08-12 NOTE — DISCHARGE INSTRUCTIONS
Acoustic Neuroma   WHAT YOU NEED TO KNOW:   An acoustic neuroma (AN) is a slow growing tumor that forms on the nerves of your ear  The nerves help control your balance and hearing  ANs normally only occur on one side  DISCHARGE INSTRUCTIONS:   Medicines:   · Pain medicine: You may need medicine to take away or decrease pain  ¨ Learn how to take your medicine  Ask what medicine and how much you should take  Be sure you know how, when, and how often to take it  ¨ Do not wait until the pain is severe before you take your medicine  Tell caregivers if your pain does not decrease  ¨ Pain medicine can make you dizzy or sleepy  Prevent falls by calling someone when you get out of bed or if you need help  · Take your medicine as directed  Contact your healthcare provider if you think your medicine is not helping or if you have side effects  Tell him or her if you are allergic to any medicine  Keep a list of the medicines, vitamins, and herbs you take  Include the amounts, and when and why you take them  Bring the list or the pill bottles to follow-up visits  Carry your medicine list with you in case of an emergency  Follow up with your healthcare provider as directed:  He will monitor your AN to see if it grows  You may need to return for more hearing tests  Write down your questions so you remember to ask them during your visits  Wound care:  Ask your healthcare provider how to care for your wound  Contact your healthcare provider if:   · You have a fever  · You have new headaches or dizziness  · You have new or worse hearing loss or ringing in your ears  · You have questions or concerns about your condition or care  Seek care immediately or call 911 if:   · You have a sudden, severe headache  · You have clear fluid, blood, or pus coming out of your ear or nose  · You have a fever, chills, and cannot move your neck  · Your face becomes numb and you cannot move it  · You have new or worsening trouble walking or staying balanced  · You have a seizure  © 2017 [address] is for End User's use only and may not be sold, redistributed or otherwise used for commercial purposes  All illustrations and images included in CareNotes® are the copyrighted property of A D A M [employer] Inc  or Fortunato Banuelos  The above information is an  only  It is not intended as medical advice for individual conditions or treatments  Talk to your doctor, nurse or pharmacist before following any medical regimen to see if it is safe and effective for you

## 2021-08-12 NOTE — RESTORATIVE TECHNICIAN NOTE
Restorative Technician Note      Patient Name: Jovi Marks     Note Type: Mobility  Patient Position Upon Consult: Supine  Activity Performed: Ambulated;Dangled;Stood  Assistive Device: Roller walker  Education Provided: Yes (Educated/encouraged pt to ambulate with assistance 3-4 x's/day )  Patient Position at End of Consult: Bed/Chair alarm activated; All needs within reach; Supine    Dorothy BAH, Restorative Technician, [employer]

## 2021-08-12 NOTE — PLAN OF CARE
Problem: MOBILITY - ADULT  Goal: Maintain or return to baseline ADL function  Description: INTERVENTIONS:  -  Assess patient's ability to carry out ADLs; assess patient's baseline for ADL function and identify physical deficits which impact ability to perform ADLs (bathing, care of mouth/teeth, toileting, grooming, dressing, etc )  - Assess/evaluate cause of self-care deficits   - Assess range of motion  - Assess patient's mobility; develop plan if impaired  - Assess patient's need for assistive devices and provide as appropriate  - Encourage maximum independence but intervene and supervise when necessary  - Involve family in performance of ADLs  - Assess for home care needs following discharge   - Consider OT consult to assist with ADL evaluation and planning for discharge  - Provide patient education as appropriate  Outcome: Progressing  Goal: Maintains/Returns to pre admission functional level  Description: INTERVENTIONS:  - Perform BMAT or MOVE assessment daily    - Set and communicate daily mobility goal to care team and patient/family/caregiver  - Collaborate with rehabilitation services on mobility goals if consulted  - Perform Range of Motion 3 times a day  - Reposition patient every 2 hours    - Dangle patient 2 times a day  - Stand patient 2 times a day  - Ambulate patient 2 times a day  - Out of bed to chair 2 times a day   - Out of bed for meals 2 times a day  - Out of bed for toileting  - Record patient progress and toleration of activity level   Outcome: Progressing     Problem: Potential for Falls  Goal: Patient will remain free of falls  Description: INTERVENTIONS:  - Educate patient/family on patient safety including physical limitations  - Instruct patient to call for assistance with activity   - Consult OT/PT to assist with strengthening/mobility   - Keep Call bell within reach  - Keep bed low and locked with side rails adjusted as appropriate  - Keep care items and personal belongings within reach  - Initiate and maintain comfort rounds  - Make Fall Risk Sign visible to staff  - Offer Toileting every 2 Hours, in advance of need  - Initiate/Maintain bed alarm  - Apply yellow socks and bracelet for high fall risk patients  - Consider moving patient to room near nurses station  Outcome: Progressing     Problem: NEUROSENSORY - ADULT  Goal: Achieves stable or improved neurological status  Description: INTERVENTIONS  - Monitor and report changes in neurological status  - Monitor vital signs such as temperature, blood pressure, glucose, and any other labs ordered   - Initiate measures to prevent increased intracranial pressure  - Monitor for seizure activity and implement precautions if appropriate      Outcome: Progressing     Problem: PAIN - ADULT  Goal: Verbalizes/displays adequate comfort level or baseline comfort level  Description: Interventions:  - Encourage patient to monitor pain and request assistance  - Assess pain using appropriate pain scale  - Administer analgesics based on type and severity of pain and evaluate response  - Implement non-pharmacological measures as appropriate and evaluate response  - Consider cultural and social influences on pain and pain management  - Notify physician/advanced practitioner if interventions unsuccessful or patient reports new pain  Outcome: Progressing     Problem: INFECTION - ADULT  Goal: Absence or prevention of progression during hospitalization  Description: INTERVENTIONS:  - Assess and monitor for signs and symptoms of infection  - Monitor lab/diagnostic results  - Monitor all insertion sites, i e  indwelling lines, tubes, and drains  - [place] appropriate cooling/warming therapies per order  - Administer medications as ordered  - Instruct and encourage patient and family to use good hand hygiene technique  - Identify and instruct in appropriate isolation precautions for identified infection/condition  Outcome: Progressing  Goal: Absence of fever/infection during neutropenic period  Description: INTERVENTIONS:  - Monitor WBC    Outcome: Progressing     Problem: SAFETY ADULT  Goal: Maintain or return to baseline ADL function  Description: INTERVENTIONS:  -  Assess patient's ability to carry out ADLs; assess patient's baseline for ADL function and identify physical deficits which impact ability to perform ADLs (bathing, care of mouth/teeth, toileting, grooming, dressing, etc )  - Assess/evaluate cause of self-care deficits   - Assess range of motion  - Assess patient's mobility; develop plan if impaired  - Assess patient's need for assistive devices and provide as appropriate  - Encourage maximum independence but intervene and supervise when necessary  - Involve family in performance of ADLs  - Assess for home care needs following discharge   - Consider OT consult to assist with ADL evaluation and planning for discharge  - Provide patient education as appropriate  Outcome: Progressing  Goal: Maintains/Returns to pre admission functional level  Description: INTERVENTIONS:  - Perform BMAT or MOVE assessment daily    - Set and communicate daily mobility goal to care team and patient/family/caregiver  - Collaborate with rehabilitation services on mobility goals if consulted  - Perform Range of Motion 3 times a day  - Reposition patient every 2 hours    - Dangle patient 2 times a day  - Stand patient 2 times a day  - Ambulate patient 2 times a day  - Out of bed to chair 2 times a day   - Out of bed for meals 2 times a day  - Out of bed for toileting  - Record patient progress and toleration of activity level   Outcome: Progressing  Goal: Patient will remain free of falls  Description: INTERVENTIONS:  - Educate patient/family on patient safety including physical limitations  - Instruct patient to call for assistance with activity   - Consult OT/PT to assist with strengthening/mobility   - Keep Call bell within reach  - Keep bed low and locked with side rails adjusted as appropriate  - Keep care items and personal belongings within reach  - Initiate and maintain comfort rounds  - Make Fall Risk Sign visible to staff  - Offer Toileting every 2 Hours, in advance of need  - Initiate/Maintain bed alarm  - Apply yellow socks and bracelet for high fall risk patients  - Consider moving patient to room near nurses station  Outcome: Progressing     Problem: DISCHARGE PLANNING  Goal: Discharge to home or other facility with appropriate resources  Description: INTERVENTIONS:  - Identify barriers to discharge w/patient and caregiver  - Arrange for needed discharge resources and transportation as appropriate  - Identify discharge learning needs (meds, wound care, etc )  - Arrange for interpretive services to assist at discharge as needed  - Refer to Case Management Department for coordinating discharge planning if the patient needs post-hospital services based on physician/advanced practitioner order or complex needs related to functional status, cognitive ability, or social support system  Outcome: Progressing     Problem: Knowledge Deficit  Goal: Patient/family/caregiver demonstrates understanding of disease process, treatment plan, medications, and discharge instructions  Description: Complete learning assessment and assess knowledge base    Interventions:  - Provide teaching at level of understanding  - Provide teaching via preferred learning methods  Outcome: Progressing

## 2021-08-12 NOTE — PROGRESS NOTES
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  · Patient presented with headaches, vertigo and hearing loss    Imaging:   · CT head without 8/8/2021:  Marked hydrocephalus  Large 3 cm left CP angle mass compressing 4th ventricle and brainstem  Mass with extension into the internal auditory canal   · MRI brain IAC with without 8/5/2021:  Final report pending  Large left CP angle mass with mass effect on 4th ventricle and brainstem  Enlarged ventricles consistent with hydrocephalus  Plan:   · Continue monitor neurological exam  · Placed patient on decadron 4mg Q6 to taper to a 2Q12H dose for maintenance until surgery   · Currently on Diamox 125mg BID in attempt to decrease CSF production in attempt to improve her symptoms profile  · Medical management per primary team   · Mobilize as tolerated with assistance  · DVT prophylaxis:  Bilateral SCDs  Lovenox  · Given improvement in symptoms, plan for discharge home and close outpatient follow-up  · Patient has appointment with ENT tomorrow 1pm  I confirmed patient will have audiogram tomorrow  · Outpatient follow-up with Dr Dc Reyes scheduled 8/20/2021  · Ultimate plan for surgical resection as joint case with neurosurgery and ENT  · Patient and wife agreeable with plan  All questions were answered  Call if questions or concerns        Subjective/Objective   Chief Complaint: follow-up CP angle mass    Subjective: Patient concerned with walking today as she was leaning to the left with therapy  Overall symptoms improved from time of presentation  Continues with head pressure/pain but overall improved  No new symptoms  Patient eager for treatment  Objective: sitting up in bed  NAD    I/O       08/10 0701 - 08/11 0700 08/11 0701 - 08/12 0700 08/12 0701 - 08/13 0700    P  O  320 840 600    Total Intake(mL/kg) 320 (4 3) 840 (11 3) 600 (8 1)    Net +320 +840 +600                 Invasive Devices     Peripheral Intravenous Line            Peripheral IV 08/10/21 Right;Upper;Ventral (anterior) Arm 2 days                Physical Exam:  Vitals: Blood pressure 121/69, pulse 78, temperature 97 9 °F (36 6 °C), resp  rate 18, height 5' 5 5" (1 664 m), weight 74 4 kg (164 lb), SpO2 97 %, not currently breastfeeding  ,Body mass index is 26 88 kg/m²      General appearance: alert, appears stated age, cooperative and no distress  Head: Normocephalic, without obvious abnormality, atraumatic  Eyes: EOMI, PERRL  Neck: supple, symmetrical, trachea midline   Lungs: non labored breathing  Heart: regular heart rate  Neurologic:   Mental status: Alert, oriented, thought content appropriate  Cranial nerves: grossly intact (Cranial nerves II-XII)  Sensory: normal to LT  Motor: moving all extremities without focal weakness  Coordination: finger to nose normal bilaterally, no drift bilaterally    Lab Results:  Results from last 7 days   Lab Units 08/11/21  0502 08/09/21  0450 08/08/21  1216   WBC Thousand/uL 9 91 9 39 9 28   HEMOGLOBIN g/dL 13 6 14 1 14 0   HEMATOCRIT % 42 2 44 1 42 9   PLATELETS Thousands/uL 326 337 317   NEUTROS PCT %  --  62 70   MONOS PCT %  --  11 10     Results from last 7 days   Lab Units 08/11/21  0502 08/09/21  0450 08/08/21  1216   POTASSIUM mmol/L 3 9 3 5 3 7   CHLORIDE mmol/L 110* 104 107   CO2 mmol/L 20* 27 25   BUN mg/dL 20 13 12   CREATININE mg/dL 1 02 0 88 0 87   CALCIUM mg/dL 10 1 9 6 9 9   ALK PHOS U/L  --  60  --    ALT U/L --  29  --    AST U/L  --  17  --                  No results found for: TROPONINT  ABG:No results found for: PHART, DKU1HRA, PO2ART, AZD3OSO, D8ERPARO, BEART, SOURCE    Imaging Studies: I have personally reviewed pertinent reports  and I have personally reviewed pertinent films in PACS    CT head without contrast    Result Date: 8/8/2021  Impression: 1  Marked hydrocephalus, potentially obstructive in etiology, likely related to large, 3 cm left cerebellar pontine angle cistern mass, compressing the 4th ventricle and brainstem  The left cerebellopontine angle cistern mass is 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

## 2021-08-13 ENCOUNTER — TELEPHONE (OUTPATIENT)
Dept: NEUROSURGERY | Facility: CLINIC | Age: 56
End: 2021-08-13

## 2021-08-13 PROBLEM — D33.3 ACOUSTIC NEUROMA (HCC): Status: ACTIVE | Noted: 2021-08-13

## 2021-08-13 PROBLEM — H91.8X3 ASYMMETRICAL HEARING LOSS: Status: ACTIVE | Noted: 2021-08-13

## 2021-08-13 PROBLEM — H91.8X9 ASYMMETRICAL HEARING LOSS: Status: ACTIVE | Noted: 2021-08-13

## 2021-08-13 NOTE — TELEPHONE ENCOUNTER
8/13/21- PT DISCHARGED HOME  HOSP F/U SCHEDULED 8/20/21  LMOM TO CONFIRM APT    8/12/21- (ALEKSANDRA) S/O F/U AS SCHEDULED

## 2021-08-14 ENCOUNTER — HOSPITAL ENCOUNTER (INPATIENT)
Facility: HOSPITAL | Age: 56
LOS: 17 days | Discharge: HOME HEALTH | DRG: 025 | End: 2021-09-01
Attending: EMERGENCY MEDICINE | Admitting: INTERNAL MEDICINE
Payer: COMMERCIAL

## 2021-08-14 ENCOUNTER — APPOINTMENT (EMERGENCY)
Dept: RADIOLOGY | Facility: HOSPITAL | Age: 56
DRG: 025 | End: 2021-08-14
Payer: COMMERCIAL

## 2021-08-14 DIAGNOSIS — I49.8 POTS (POSTURAL ORTHOSTATIC TACHYCARDIA SYNDROME): ICD-10-CM

## 2021-08-14 DIAGNOSIS — R56.9 SEIZURE-LIKE ACTIVITY (HCC): ICD-10-CM

## 2021-08-14 DIAGNOSIS — G91.1 OBSTRUCTIVE HYDROCEPHALUS (HCC): ICD-10-CM
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
## 2021-08-14 DIAGNOSIS — Z98.890 S/P CRANIOTOMY: ICD-10-CM

## 2021-08-14 DIAGNOSIS — D33.3 CEREBELLOPONTINE ANGLE TUMOR (HCC): ICD-10-CM
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
## 2021-08-14 DIAGNOSIS — J93.9 PNEUMOTHORAX: Primary | ICD-10-CM

## 2021-08-14 LAB
ANION GAP SERPL CALCULATED.3IONS-SCNC: 8 MMOL/L (ref 4–13)
BASOPHILS # BLD AUTO: 0.02 THOUSANDS/ΜL (ref 0–0.1)
BASOPHILS NFR BLD AUTO: 0 % (ref 0–1)
BUN SERPL-MCNC: 27 MG/DL (ref 5–25)
CALCIUM SERPL-MCNC: 9.2 MG/DL (ref 8.3–10.1)
CHLORIDE SERPL-SCNC: 111 MMOL/L (ref 100–108)
CO2 SERPL-SCNC: 18 MMOL/L (ref 21–32)
CREAT SERPL-MCNC: 1.05 MG/DL (ref 0.6–1.3)
EOSINOPHIL # BLD AUTO: 0 THOUSAND/ΜL (ref 0–0.61)
EOSINOPHIL NFR BLD AUTO: 0 % (ref 0–6)
ERYTHROCYTE [DISTWIDTH] IN BLOOD BY AUTOMATED COUNT: 13.3 % (ref 11.6–15.1)
GFR SERPL CREATININE-BSD FRML MDRD: 60 ML/MIN/1.73SQ M
GLUCOSE SERPL-MCNC: 87 MG/DL (ref 65–140)
HCT VFR BLD AUTO: 43.4 % (ref 34.8–46.1)
HGB BLD-MCNC: 14.4 G/DL (ref 11.5–15.4)
IMM GRANULOCYTES # BLD AUTO: 0.11 THOUSAND/UL (ref 0–0.2)
IMM GRANULOCYTES NFR BLD AUTO: 1 % (ref 0–2)
LYMPHOCYTES # BLD AUTO: 1.99 THOUSANDS/ΜL (ref 0.6–4.47)
LYMPHOCYTES NFR BLD AUTO: 14 % (ref 14–44)
MCH RBC QN AUTO: 30.5 PG (ref 26.8–34.3)
MCHC RBC AUTO-ENTMCNC: 33.2 G/DL (ref 31.4–37.4)
MCV RBC AUTO: 92 FL (ref 82–98)
MONOCYTES # BLD AUTO: 2.14 THOUSAND/ΜL (ref 0.17–1.22)
MONOCYTES NFR BLD AUTO: 15 % (ref 4–12)
NEUTROPHILS # BLD AUTO: 10.27 THOUSANDS/ΜL (ref 1.85–7.62)
NEUTS SEG NFR BLD AUTO: 70 % (ref 43–75)
NRBC BLD AUTO-RTO: 0 /100 WBCS
PLATELET # BLD AUTO: 377 THOUSANDS/UL (ref 149–390)
PMV BLD AUTO: 11.1 FL (ref 8.9–12.7)
POTASSIUM SERPL-SCNC: 3.7 MMOL/L (ref 3.5–5.3)
RBC # BLD AUTO: 4.72 MILLION/UL (ref 3.81–5.12)
SODIUM SERPL-SCNC: 137 MMOL/L (ref 136–145)
WBC # BLD AUTO: 14.53 THOUSAND/UL (ref 4.31–10.16)

## 2021-08-14 PROCEDURE — 85025 COMPLETE CBC W/AUTO DIFF WBC: CPT

## 2021-08-14 PROCEDURE — 80048 BASIC METABOLIC PNL TOTAL CA: CPT

## 2021-08-14 PROCEDURE — 99285 EMERGENCY DEPT VISIT HI MDM: CPT

## 2021-08-14 PROCEDURE — 70450 CT HEAD/BRAIN W/O DYE: CPT

## 2021-08-14 PROCEDURE — 93005 ELECTROCARDIOGRAM TRACING: CPT

## 2021-08-14 PROCEDURE — G1004 CDSM NDSC: HCPCS

## 2021-08-14 PROCEDURE — 36415 COLL VENOUS BLD VENIPUNCTURE: CPT

## 2021-08-14 PROCEDURE — 99285 EMERGENCY DEPT VISIT HI MDM: CPT | Performed by: EMERGENCY MEDICINE

## 2021-08-15 PROBLEM — R51.9 HEADACHE: Status: ACTIVE | Noted: 2021-08-15

## 2021-08-15 PROBLEM — D72.829 LEUKOCYTOSIS: Status: ACTIVE | Noted: 2021-08-15

## 2021-08-15 PROBLEM — R56.9 SEIZURE-LIKE ACTIVITY (HCC): Status: ACTIVE | Noted: 2021-08-15

## 2021-08-15 LAB
ANION GAP SERPL CALCULATED.3IONS-SCNC: 6 MMOL/L (ref 4–13)
BUN SERPL-MCNC: 25 MG/DL (ref 5–25)
CALCIUM SERPL-MCNC: 9.4 MG/DL (ref 8.3–10.1)
CHLORIDE SERPL-SCNC: 111 MMOL/L (ref 100–108)
CO2 SERPL-SCNC: 19 MMOL/L (ref 21–32)
CREAT SERPL-MCNC: 0.87 MG/DL (ref 0.6–1.3)
ERYTHROCYTE [DISTWIDTH] IN BLOOD BY AUTOMATED COUNT: 13.5 % (ref 11.6–15.1)
GFR SERPL CREATININE-BSD FRML MDRD: 75 ML/MIN/1.73SQ M
GLUCOSE SERPL-MCNC: 105 MG/DL (ref 65–140)
HCT VFR BLD AUTO: 41.4 % (ref 34.8–46.1)
HGB BLD-MCNC: 13.9 G/DL (ref 11.5–15.4)
MCH RBC QN AUTO: 31.2 PG (ref 26.8–34.3)
MCHC RBC AUTO-ENTMCNC: 33.6 G/DL (ref 31.4–37.4)
MCV RBC AUTO: 93 FL (ref 82–98)
PLATELET # BLD AUTO: 355 THOUSANDS/UL (ref 149–390)
PMV BLD AUTO: 10.8 FL (ref 8.9–12.7)
POTASSIUM SERPL-SCNC: 4.7 MMOL/L (ref 3.5–5.3)
RBC # BLD AUTO: 4.45 MILLION/UL (ref 3.81–5.12)
SODIUM SERPL-SCNC: 136 MMOL/L (ref 136–145)
WBC # BLD AUTO: 10.33 THOUSAND/UL (ref 4.31–10.16)

## 2021-08-15 PROCEDURE — 99223 1ST HOSP IP/OBS HIGH 75: CPT | Performed by: INTERNAL MEDICINE

## 2021-08-15 PROCEDURE — 36415 COLL VENOUS BLD VENIPUNCTURE: CPT | Performed by: INTERNAL MEDICINE

## 2021-08-15 PROCEDURE — 80048 BASIC METABOLIC PNL TOTAL CA: CPT | Performed by: INTERNAL MEDICINE

## 2021-08-15 PROCEDURE — 99223 1ST HOSP IP/OBS HIGH 75: CPT | Performed by: NURSE PRACTITIONER

## 2021-08-15 PROCEDURE — 85027 COMPLETE CBC AUTOMATED: CPT | Performed by: INTERNAL MEDICINE

## 2021-08-15 PROCEDURE — 99222 1ST HOSP IP/OBS MODERATE 55: CPT | Performed by: PSYCHIATRY & NEUROLOGY

## 2021-08-15 RX ORDER — DEXAMETHASONE 2 MG/1
3 TABLET ORAL EVERY 6 HOURS SCHEDULED
Status: DISCONTINUED | OUTPATIENT
Start: 2021-08-15 | End: 2021-08-19

## 2021-08-15 RX ORDER — OXYCODONE HYDROCHLORIDE 5 MG/1
5 TABLET ORAL EVERY 6 HOURS PRN
Status: DISCONTINUED | OUTPATIENT
Start: 2021-08-15 | End: 2021-08-19

## 2021-08-15 RX ORDER — TIZANIDINE 2 MG/1
2 TABLET ORAL 3 TIMES DAILY
Status: DISCONTINUED | OUTPATIENT
Start: 2021-08-15 | End: 2021-08-19

## 2021-08-15 RX ORDER — DEXAMETHASONE 2 MG/1
2 TABLET ORAL EVERY 6 HOURS SCHEDULED
Status: DISCONTINUED | OUTPATIENT
Start: 2021-08-15 | End: 2021-08-15

## 2021-08-15 RX ORDER — LOSARTAN POTASSIUM 50 MG/1
50 TABLET ORAL DAILY
Status: DISCONTINUED | OUTPATIENT
Start: 2021-08-15 | End: 2021-08-17

## 2021-08-15 RX ORDER — MAGNESIUM SULFATE HEPTAHYDRATE 40 MG/ML
2 INJECTION, SOLUTION INTRAVENOUS
Status: COMPLETED | OUTPATIENT
Start: 2021-08-15 | End: 2021-08-17

## 2021-08-15 RX ORDER — ACETAZOLAMIDE 125 MG/1
125 TABLET ORAL 2 TIMES DAILY
Status: DISCONTINUED | OUTPATIENT
Start: 2021-08-15 | End: 2021-08-19

## 2021-08-15 RX ORDER — DIAZEPAM 5 MG/ML
2.5 INJECTION, SOLUTION INTRAMUSCULAR; INTRAVENOUS EVERY 6 HOURS PRN
Status: DISCONTINUED | OUTPATIENT
Start: 2021-08-15 | End: 2021-09-01 | Stop reason: HOSPADM

## 2021-08-15 RX ORDER — CYCLOBENZAPRINE HCL 10 MG
10 TABLET ORAL 3 TIMES DAILY PRN
Status: DISCONTINUED | OUTPATIENT
Start: 2021-08-15 | End: 2021-09-01 | Stop reason: HOSPADM

## 2021-08-15 RX ORDER — ACETAMINOPHEN 325 MG/1
650 TABLET ORAL EVERY 6 HOURS PRN
Status: DISCONTINUED | OUTPATIENT
Start: 2021-08-15 | End: 2021-08-19

## 2021-08-15 RX ORDER — ESCITALOPRAM OXALATE 10 MG/1
5 TABLET ORAL DAILY
Status: DISCONTINUED | OUTPATIENT
Start: 2021-08-15 | End: 2021-09-01 | Stop reason: HOSPADM

## 2021-08-15 RX ORDER — ONDANSETRON 2 MG/ML
4 INJECTION INTRAMUSCULAR; INTRAVENOUS EVERY 6 HOURS PRN
Status: DISCONTINUED | OUTPATIENT
Start: 2021-08-15 | End: 2021-09-01 | Stop reason: HOSPADM

## 2021-08-15 RX ADMIN — ENOXAPARIN SODIUM 40 MG: 40 INJECTION SUBCUTANEOUS at 08:50

## 2021-08-15 RX ADMIN — OXYCODONE HYDROCHLORIDE 5 MG: 5 TABLET ORAL at 16:10

## 2021-08-15 RX ADMIN — TIZANIDINE 2 MG: 2 TABLET ORAL at 17:03

## 2021-08-15 RX ADMIN — ESCITALOPRAM 5 MG: 5 TABLET, FILM COATED ORAL at 08:48

## 2021-08-15 RX ADMIN — DEXAMETHASONE 2 MG: 2 TABLET ORAL at 03:36

## 2021-08-15 RX ADMIN — SODIUM CHLORIDE 1000 MG: 9 INJECTION, SOLUTION INTRAVENOUS at 12:25

## 2021-08-15 RX ADMIN — DEXAMETHASONE 3 MG: 2 TABLET ORAL at 18:52

## 2021-08-15 RX ADMIN — MAGNESIUM SULFATE HEPTAHYDRATE 2 G: 40 INJECTION, SOLUTION INTRAVENOUS at 11:23

## 2021-08-15 RX ADMIN — TIZANIDINE 2 MG: 2 TABLET ORAL at 11:59

## 2021-08-15 RX ADMIN — LOSARTAN POTASSIUM 50 MG: 50 TABLET, FILM COATED ORAL at 08:48

## 2021-08-15 RX ADMIN — DEXAMETHASONE 3 MG: 2 TABLET ORAL at 11:59

## 2021-08-15 RX ADMIN — TIZANIDINE 2 MG: 2 TABLET ORAL at 21:18

## 2021-08-15 RX ADMIN — ACETAZOLAMIDE 125 MG: 125 TABLET ORAL at 08:48

## 2021-08-15 RX ADMIN — ACETAZOLAMIDE 125 MG: 125 TABLET ORAL at 18:52

## 2021-08-15 NOTE — ASSESSMENT & PLAN NOTE
Hawk Lopez is a 55 y o  female with recent diagnosis of CP angle tumor, hydrocephalus, and HTN presented to the ED on 8/14 after a fall at home with possible seizure activity  Patient stood up from couch then recalls falling   noted her to be lying on the ground with a blank stare, with quivering of her fingers, unable to respond to him  She then began moving spontaneously, he sat her up, and then she began to respond normally  Entire episode lasted about 2 minutes  CTH stable compared to prior imaging in regards to the CP angle tumor and hydrocephalus  Labs unremarkable  She has had several falls recently with position changes, associated with tunnel vision and lightheadedness, but never demonstrated tremors or impaired ability to speak  The description of bilateral hand tremors with preserved consciousness (patient able to hear what was going on but not respond) is not consistent with generalized tonic clonic or complex partial seizures  Regardless, CP angle tumor is not cortical in nature, so unlikely to cause seizures  Routine EEG unremarkable  The hydrocephalus however can cause elevated ICP, which may cause seizures  Patient is already on Diamox 125 mg BID  Unclear etiology at this time  Suspect possibly secondary to convulsive syncope, however cannot entirely rule out seizure  With concern for  POTS, cardiology consulted as patient's resting heart rate noted to be in the 60s-70s, but elevated to 120s when attempting to have patient walk  Cardiology reports syncopal episode not consistent with POTS and symptoms are likely not secondary to tachycardia  Plan:  - Will defer AEDs at this time   - Appreciate Cardiology recommendations  - PT/OT  - Patient states she recently stopped driving due to fear of having an episode while driving   Will defer submitting [employer] at this time per attending neurologist   - Monitor neuro exam; notify with any changes  - Fall precautions  - Seizure precautions  - Medical management and supportive care per primary team  Correction of any metabolic or infectious disturbances

## 2021-08-15 NOTE — ASSESSMENT & PLAN NOTE
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  · Patient originally presented on 8/8/21with headaches, vertigo and hearing loss  · Patient presented yesterday with transient LOC after fall with questionable seizure-like activity  · Questionable seizure-like activity unlikely caused by hydrocephalus or CP angle mass    Imaging:     CT head without 8/14/21: Moderate hydrocephalus with the size of the ventricles not significantly changed when compared to a recent CT brain dated August 8, 2021  Again, the findings are likely obstructive in nature or secondary to a 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 dated August 5, 2021    Plan:   · Continue monitor neurological exam  · STAT CT head with any neurological decline including drop GCS of 2pts within 1 hr   · Patient was placed on decadron 4mg Q6 to taper to a 2Q12H dose for maintenance until surgery  Patient currently on 2 mg q 6 hours, increased to 3 mg q 6 hours to see if this helps with patient's symptoms  · Continue Diamox 125mg BID in attempt to decrease CSF production in attempt to improve her symptoms profile  · Medical management pain control per primary team  · EEG pending  · Neurology following, input appreciated  · Mobilize as tolerated with assistance  · DVT prophylaxis:  Bilateral SCDs  Lovenox  · Patient did have audiogram on 08/13/2021: "Left ear with mild to severe SNHL between the mid and high frequencies  Right ear with mild high frequency SNHL  Word recognition left, 24% at 90 dB, right 100 % at 60 dB  Tympanograms type A bilaterally"  · Patient was scheduled for outpatient follow-up with Dr Alia Lynn on 8/20/2021  · No immediate need for neurosurgical intervention  Ultimate plan for surgical resection as joint case with neurosurgery and ENT  Surgery TBD  Neurosurgery will continue to follow closely, call with any further questions or concerns

## 2021-08-15 NOTE — ASSESSMENT & PLAN NOTE
· Continue with Diamox   · Will appreciate Neurosurgery recs regarding steroid dosing as above  · Neurosurgery consult pending

## 2021-08-15 NOTE — ASSESSMENT & PLAN NOTE
· Recent admission with hydrocephalus discovered with 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  · Was planned for outpatient follow-up with Neurosurgery for timing of intervention, however given new symptoms as above will request input  · Continue Diamox and steroid  · Neuro checks

## 2021-08-15 NOTE — ASSESSMENT & PLAN NOTE
- With associated hydrocephalus  - Suspect the off balance sensation/room spinning sensation that she experiences intermittently is attributed to this  - On Diamox 125 mg BID, plan to continue   - Will defer any dosing changes to neurosurgery team if needed  - Patient will be seen by Dr Christi Pelletier in the office on 8/20 to discuss surgical resection

## 2021-08-15 NOTE — PLAN OF CARE
Problem: PAIN - ADULT  Goal: Verbalizes/displays adequate comfort level or baseline comfort level  Description: Interventions:  - Encourage patient to monitor pain and request assistance  - Assess pain using appropriate pain scale  - Administer analgesics based on type and severity of pain and evaluate response  - Implement non-pharmacological measures as appropriate and evaluate response  - Consider cultural and social influences on pain and pain management  - Notify physician/advanced practitioner if interventions unsuccessful or patient reports new pain  Outcome: Progressing     Problem: SAFETY ADULT  Goal: Patient will remain free of falls  Description: INTERVENTIONS:  - Educate patient/family on patient safety including physical limitations  - Instruct patient to call for assistance with activity   - Consult OT/PT to assist with strengthening/mobility   - Keep Call bell within reach  - Keep bed low and locked with side rails adjusted as appropriate  - Keep care items and personal belongings within reach  - Initiate and maintain comfort rounds  - Make Fall Risk Sign visible to staff  - Apply yellow socks and bracelet for high fall risk patients  - Consider moving patient to room near nurses station  Outcome: Progressing  Goal: Maintain or return to baseline ADL function  Description: INTERVENTIONS:  -  Assess patient's ability to carry out ADLs; assess patient's baseline for ADL function and identify physical deficits which impact ability to perform ADLs (bathing, care of mouth/teeth, toileting, grooming, dressing, etc )  - Assess/evaluate cause of self-care deficits   - Assess range of motion  - Assess patient's mobility; develop plan if impaired  - Assess patient's need for assistive devices and provide as appropriate  - Encourage maximum independence but intervene and supervise when necessary  - Involve family in performance of ADLs  - Assess for home care needs following discharge   - Consider OT consult to assist with ADL evaluation and planning for discharge  - Provide patient education as appropriate  Outcome: Progressing  Goal: Maintains/Returns to pre admission functional level  Description: INTERVENTIONS:  - Perform BMAT or MOVE assessment daily    - Set and communicate daily mobility goal to care team and patient/family/caregiver  - Collaborate with rehabilitation services on mobility goals if consulted  - Out of bed for toileting  - Record patient progress and toleration of activity level   Outcome: Progressing     Problem: DISCHARGE PLANNING  Goal: Discharge to home or other facility with appropriate resources  Description: INTERVENTIONS:  - Identify barriers to discharge w/patient and caregiver  - Arrange for needed discharge resources and transportation as appropriate  - Identify discharge learning needs (meds, wound care, etc )  - Arrange for interpretive services to assist at discharge as needed  - Refer to Case Management Department for coordinating discharge planning if the patient needs post-hospital services based on physician/advanced practitioner order or complex needs related to functional status, cognitive ability, or social support system  Outcome: Progressing     Problem: Knowledge Deficit  Goal: Patient/family/caregiver demonstrates understanding of disease process, treatment plan, medications, and discharge instructions  Description: Complete learning assessment and assess knowledge base    Interventions:  - Provide teaching at level of understanding  - Provide teaching via preferred learning methods  Outcome: Progressing

## 2021-08-15 NOTE — ASSESSMENT & PLAN NOTE
· Patient was recently admitted 8/8/21-8/12/21  · She was seen by Neurosurgery during recent admission and was placed on steroid taper and Diamox  · Patient reports worsening headaches since steroids began to taper - will appreciate Neurosurgery recommendations regarding steroid dosing  · Neurosurgery consult pending   Defer surgical planning to Neurosurgery   · Patient followed up with ENT after recent discharge  · CT head this admission as above

## 2021-08-15 NOTE — CONSULTS
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  · Patient originally presented on 8/8/21with headaches, vertigo and hearing loss  · Patient presented yesterday with transient LOC after fall with questionable seizure-like activity  · Questionable seizure-like activity unlikely caused by hydrocephalus or CP angle mass    Imaging:     CT head without 8/14/21: Moderate hydrocephalus with the size of the ventricles not significantly changed when compared to a recent CT brain dated August 8, 2021  Again, the findings are likely obstructive in nature or secondary to a 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 dated August 5, 2021    Plan:   · Continue monitor neurological exam  · STAT CT head with any neurological decline including drop GCS of 2pts within 1 hr   · Patient was placed on decadron 4mg Q6 to taper to a 2Q12H dose for maintenance until surgery  Patient currently on 2 mg q 6 hours, increased to 3 mg q 6 hours to see if this helps with patient's symptoms  · Continue Diamox 125mg BID in attempt to decrease CSF production in attempt to improve her symptoms profile  · Medical management pain control per primary team  · EEG pending  · Neurology following, input appreciated  · Mobilize as tolerated with assistance  · DVT prophylaxis:  Bilateral SCDs    Lovenox  · Patient did have audiogram on 08/13/2021: "Left ear with mild to severe SNHL between the mid and high frequencies  Right ear with mild high frequency SNHL  Word recognition left, 24% at 90 dB, right 100 % at 60 dB  Tympanograms type A bilaterally"  · Patient was scheduled for outpatient follow-up with Dr Imelda Licea on 8/20/2021  · No immediate need for neurosurgical intervention  Ultimate plan for surgical resection as joint case with neurosurgery and ENT  Surgery TBD  Neurosurgery will continue to follow closely, call with any further questions or concerns  Hydrocephalus (HCC)  Assessment & Plan  · Evident on MRI brain and CT head  On repeat CT head hydrocephalus appears stable  · No intervention at this time as it appears to be communicating rather than obstructive  · Continue Diamox 125mg BID at this time  * Fall with possible seizure-like activity ([facility] [address] )  Assessment & Plan  · EEG pending  · See above plan    History of Present Illness     HPI: Massimo Brewster is a 55 y o   female with PMH significant for hypertension  Patient originally presented to the ED on 08/08/2021 with complaints of dizziness, gait disturbance, and headaches  Per chart review patient admits to mild COVID infection in December which did not require any hospitalization  In February she developed dizziness along with balance difficulties  Patient noted progressive hearing loss on the left side  Patient was seen by PCP and states she was started on blood pressure medication  There was also discussion of referral to ENT but patient states this was not completed  Given no improvement of her symptoms and slow progression of her symptoms patient followed up with her PCP again in June and underwent x-rays of her cervical and lumbar spine  She subsequently had MRI cervical and lumbar spine imaging completed  MRI cervical spine noted a left CP angle mass for which subsequent MRI brain imaging was completed on 08/05/2021    MRI brain demonstrated a 3 cm left CP angle mass 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 causing mass effect on the brainstem and 4th ventricle along with evidence of hydrocephalus  Patient stated rapid progression of her symptoms since that time and presented to the ED on 08/08/2021  Patient was discharged home on 08/12/2021 due to improvement in her symptoms  Plan was for close outpatient follow-up on 08/20/2021 with Dr Ayoub to discuss surgical resection  Patient was discharged on Diamox and Decadron taper  Unfortunately patient re-presented to the ED last night after a fall at home with reported possible seizure-like activity  Since discharge patient admits to 3 falls described as her lower legs giving out and her falling on her knees  However patient had a fall last night when her legs gave out and she fell on her knees  Her  heard her and went to see, she was lying on her face when he noticed some shaking in her upper extremities with transient confusion  Patient reports she was able to hear everything around her but was unable to respond  She denies LOC during this event  Patient's  states this lasted about a minute and he sat patient up and called EMS  She was back at her baseline  Patient and  state this has never happened before after a fall  She also reports possible bowel incontinence during this event and was unaware that she went to the bathroom until she presented to the ED and was noted on sheets  Patient continues to have episodes of severe head pressure radiating from the front of her head to the back of her neck  Patient currently reports this pressure as 6/10, patient reports her current pain regimen helps with her pain  Patient also reports when she goes from lying to sitting to standing she will developed dizziness and blurry vision which resolves at rest   Patient reports she feels as though her whole body is numb and weak  Patient states she has been using a walker at home    Patient also reports ongoing left-sided tinnitus and hearing loss   Patient reports since she started to taper her steroids she reports her headaches and symptoms have worsened  She denies any double vision or visual deficits  She denies any chest pain, shortness of breath, abdominal pain, nausea, vomiting, diarrhea, no current problems with bowel or bladder, no new weakness or numbness/tingling  Review of Systems   Constitutional: Positive for activity change  Negative for chills and fever  HENT: Positive for tinnitus (Constant left ear tinnitus with impaired hearing)  Negative for trouble swallowing  Eyes: Positive for visual disturbance  Respiratory: Negative for shortness of breath  Cardiovascular: Negative for chest pain  Gastrointestinal: Negative for abdominal pain, constipation, diarrhea, nausea and vomiting  Genitourinary: Negative for difficulty urinating  Musculoskeletal: Positive for gait problem and neck pain  Negative for back pain  Skin: Positive for wound ( bilateral knee abrasions)  Neurological: Positive for dizziness, weakness, numbness and headaches  Negative for speech difficulty and light-headedness  Historical Information   Past Medical History:   Diagnosis Date    Hypertension      History reviewed  No pertinent surgical history  Social History     Substance and Sexual Activity   Alcohol Use Yes     Social History     Substance and Sexual Activity   Drug Use No     Social History     Tobacco Use   Smoking Status Former Smoker    Quit date: 2021    Years since quittin 6   Smokeless Tobacco Never Used     History reviewed  No pertinent family history      Meds/Allergies   all current active meds have been reviewed, current meds:   Current Facility-Administered Medications   Medication Dose Route Frequency    acetaminophen (TYLENOL) tablet 650 mg  650 mg Oral Q6H PRN    acetaZOLAMIDE (DIAMOX) tablet 125 mg  125 mg Oral BID    cyclobenzaprine (FLEXERIL) tablet 10 mg  10 mg Oral TID PRN    dexamethasone (DECADRON) tablet 3 mg  3 mg Oral Q6H [address]    enoxaparin (LOVENOX) subcutaneous injection 40 mg  40 mg Subcutaneous Daily    escitalopram (LEXAPRO) tablet 5 mg  5 mg Oral Daily    losartan (COZAAR) tablet 50 mg  50 mg Oral Daily    ondansetron (ZOFRAN) injection 4 mg  4 mg Intravenous Q6H PRN    oxyCODONE (ROXICODONE) IR tablet 5 mg  5 mg Oral Q6H PRN    and PTA meds:   Prior to Admission Medications   Prescriptions Last Dose Informant Patient Reported? Taking?   acetaZOLAMIDE (DIAMOX) 125 mg tablet   No No   Sig: Take 1 tablet (125 mg total) by mouth 2 (two) times a day   cyclobenzaprine (FLEXERIL) 10 mg tablet   No No   Sig: Take 1 tablet (10 mg total) by mouth 3 (three) times a day as needed for muscle spasms (headache)   dexamethasone (DECADRON) 2 mg tablet   No No   Sig: Take 4 mg tonight 8/12, 4 mg every 8 hours 8/13, followed by 2 mg every 6 hours x2 days, 2 mg every 8 hours x2 days, 2 mg every 12 hours x2 days   escitalopram (LEXAPRO) 5 mg tablet   Yes No   losartan (COZAAR) 50 mg tablet  Self Yes No   Sig: Take 50 mg by mouth daily   oxyCODONE (ROXICODONE) 5 mg immediate release tablet   No No   Sig: Take 1 tablet (5 mg total) by mouth every 6 (six) hours as needed for severe pain for up to 10 dosesMax Daily Amount: 20 mg      Facility-Administered Medications: None     No Known Allergies    Objective   I/O     None          Physical Exam  Vitals reviewed  Exam conducted with a chaperone present (Patient accompanied by her )  Constitutional:       General: She is awake  She is not in acute distress  Appearance: Normal appearance  She is not ill-appearing  HENT:      Head: Normocephalic and atraumatic  Eyes:      Extraocular Movements: Extraocular movements intact and EOM normal       Conjunctiva/sclera: Conjunctivae normal       Pupils: Pupils are equal, round, and reactive to light     Neck:      Comments: Skull base and upper cervical spine tenderness to palpation  Cardiovascular: Rate and Rhythm: Normal rate  Pulmonary:      Effort: Pulmonary effort is normal  No respiratory distress  Chest:      Chest wall: No tenderness  Abdominal:      General: There is no distension  Palpations: Abdomen is soft  Tenderness: There is no abdominal tenderness  Musculoskeletal:         General: Normal range of motion  Cervical back: Normal range of motion and neck supple  Tenderness present  Spinous process tenderness and muscular tenderness present  Skin:     General: Skin is warm and dry  Comments: Bilateral knee abrasions   Neurological:      Mental Status: She is alert and oriented to person, place, and time  Coordination: Finger-Nose-Finger Test normal       Deep Tendon Reflexes: Strength normal       Reflex Scores:       Tricep reflexes are 2+ on the right side and 2+ on the left side  Bicep reflexes are 2+ on the right side and 2+ on the left side  Brachioradialis reflexes are 2+ on the right side and 2+ on the left side  Patellar reflexes are 2+ on the right side and 2+ on the left side  Psychiatric:         Attention and Perception: Attention and perception normal          Mood and Affect: Mood and affect normal          Speech: Speech normal          Behavior: Behavior normal  Behavior is cooperative  Thought Content: Thought content normal          Cognition and Memory: Cognition and memory normal          Judgment: Judgment normal        Neurologic Exam     Mental Status   Oriented to person, place, and time  Follows 2 step commands  Attention: normal  Concentration: normal    Speech: speech is normal   Level of consciousness: alert  Knowledge: good  Able to perform simple calculations  Able to name object  Able to repeat  Normal comprehension  Patient is able to identify 3 objects  Cranial Nerves     CN III, IV, VI   Pupils are equal, round, and reactive to light    Extraocular motions are normal    CN III: no CN III palsy  CN VI: no CN VI palsy  Nystagmus: left   Diplopia: none  Conjugate gaze: present    CN V   Facial sensation intact  CN VII   Facial expression full, symmetric  CN VIII   Hearing: impaired    CN IX, X   CN IX normal      CN XI   CN XI normal      CN XII   CN XII normal    Mild left nystagmus with lateral gaze    Left hearing deficit     Motor Exam   Muscle bulk: normal  Overall muscle tone: normal  Right arm pronator drift: absent  Left arm pronator drift: absent    Strength   Strength 5/5 throughout  Rapid finger tap testing quick bilaterally     Sensory Exam   Light touch normal    Proprioception normal    JPS and DST intact     Gait, Coordination, and Reflexes     Coordination   Finger to nose coordination: normal    Tremor   Resting tremor: absent  Intention tremor: absent  Action tremor: absent    Reflexes   Right brachioradialis: 2+  Left brachioradialis: 2+  Right biceps: 2+  Left biceps: 2+  Right triceps: 2+  Left triceps: 2+  Right patellar: 2+  Left patellar: 2+  Right Ye: absent  Left Ye: absent  Right ankle clonus: absent  Left ankle clonus: absent      Vitals:Blood pressure 167/79, pulse 68, temperature 97 9 °F (36 6 °C), resp  rate 16, weight 74 4 kg (164 lb), SpO2 97 %, not currently breastfeeding  ,Body mass index is 26 88 kg/m²       Lab Results:   Results from last 7 days   Lab Units 08/15/21  0511 08/14/21 2108 08/11/21  0502 08/09/21  0450 08/08/21  1216   WBC Thousand/uL 10 33* 14 53* 9 91 9 39 9 28   HEMOGLOBIN g/dL 13 9 14 4 13 6 14 1 14 0   HEMATOCRIT % 41 4 43 4 42 2 44 1 42 9   PLATELETS Thousands/uL 355 377 326 337 317   NEUTROS PCT %  --  70  --  62 70   MONOS PCT %  --  15*  --  11 10     Results from last 7 days   Lab Units 08/15/21  0511 08/14/21 2108 08/11/21  0502 08/09/21  0450   POTASSIUM mmol/L 4 7 3 7 3 9 3 5   CHLORIDE mmol/L 111* 111* 110* 104   CO2 mmol/L 19* 18* 20* 27   BUN mg/dL 25 27* 20 13   CREATININE mg/dL 0 87 1 05 1 02 0 88   CALCIUM mg/dL 9 4 9  2 10 1 9 6   ALK PHOS U/L  --   --   --  60   ALT U/L  --   --   --  29   AST U/L  --   --   --  17                 No results found for: TROPONINT  ABG:No results found for: PHART, ZSN1PDM, PO2ART, VOW7WEU, H9URXTDO, BEART, SOURCE    Imaging Studies: I have personally reviewed pertinent reports  and I have personally reviewed pertinent films in PACS    EKG, Pathology, and Other Studies: I have personally reviewed pertinent reports        VTE Prophylaxis: Sequential compression device (Venodyne)  and Enoxaparin (Lovenox)    Code Status: Level 1 - Full Code  Advance Directive and Living Will:      Power of :    POLST:

## 2021-08-15 NOTE — ED ATTENDING ATTESTATION
8/14/2021  Franny GRAHAM, DO, saw and evaluated the patient  I have discussed the patient with the resident/non-physician practitioner and agree with the resident's/non-physician practitioner's findings, Plan of Care, and MDM as documented in the resident's/non-physician practitioner's note, except where noted  All available labs and Radiology studies were reviewed  I was present for key portions of any procedure(s) performed by the resident/non-physician practitioner and I was immediately available to provide assistance  At this point I agree with the current assessment done in the Emergency Department  I have conducted an independent evaluation of this patient a history and physical is as follows:      55 yof with cerebellopontine angle mass diagnosis recently with resultant hydrocephalus on diamox and decadron taper presents for syncope vs seizure  She reports multiple falls recently and today had an episode where she was falling and then what sounds like a syncopal episode, patient was aware of her surrounding but couldn't talk  Since her dc a few days ago, she has had 3 falls although they consisted of her being lowered to the ground  Tonight, she was using her walker when she fell/syncope  qiuestionable seizure like activity,  witnessed shaking in extremities  Seems more like myoclonic jerking rather than seizure  It could be vasovagal given her symptoms, also worsening headache  Plan ct to eval for worsening hydrocephalus  Maybe increase in decadron will help?  Nonetheless, given her significant truncal ataxia may require admission for neurosx eval  She is supposed to have outpatient f/u 8/20 for planned ent/neurosx removal  ED Course         Critical Care Time  Procedures

## 2021-08-15 NOTE — ASSESSMENT & PLAN NOTE
· Patient reported 3 falls since recent discharge  · The first one she reports falling onto her knees while going into the house after her ENT appointment Friday  · The second one she reports falling onto her knees while going into the bathroom at home Saturday  · The third one she reports falling forward and being able to hear those around her but was unable to respond  She reports this lasted for approximately 1 minute witnesses told her   Also report of bilateral hand shaking and possible bowel incontinence during the event  · Will request Neurology evaluation, concern for possible seizure   · CT head this admission with the size of the ventricles not significantly changes when compared to a recent CT brain dated August 8, 2021 per the results report  · EEG Pending  · Continue seizure precautions  · Neurosurgery consult pending  · PT/OT evaluations pending  · Fall precautions ordered

## 2021-08-15 NOTE — ASSESSMENT & PLAN NOTE
· Continue with Diamox and steroids  · Neurosurgery evaluation as per cerebellopontine angle tumor "

## 2021-08-15 NOTE — PROGRESS NOTES
[facility]  Progress Note - Grace Bowen 1965, 55 y o  female MRN: [id number]  Unit/Bed#: ED 20 Encounter: [id number]  Primary Care Provider: Larry Bray MD   Date and time admitted to hospital: 8/14/2021  7:56 PM    POST-ADMIT CHECK    * Fall with possible seizure-like activity [facility])  Assessment & Plan  · Patient reported 3 falls since recent discharge  · The first one she reports falling onto her knees while going into the house after her ENT appointment Friday  · The second one she reports falling onto her knees while going into the bathroom at home Saturday  · The third one she reports falling forward and being able to hear those around her but was unable to respond  She reports this lasted for approximately 1 minute witnesses told her  Also report of bilateral hand shaking and possible bowel incontinence during the event  · Will request Neurology evaluation, concern for possible seizure   · CT head this admission with the size of the ventricles not significantly changes when compared to a recent CT brain dated August 8, 2021 per the results report  · EEG Pending  · Continue seizure precautions  · Neurosurgery consult pending  · PT/OT evaluations pending  · Fall precautions ordered    Cerebellopontine angle tumor [facility])  Assessment & Plan  · Patient was recently admitted 8/8/21-8/12/21  · She was seen by Neurosurgery during recent admission and was placed on steroid taper and Diamox  · Patient reports worsening headaches since steroids began to taper - will appreciate Neurosurgery recommendations regarding steroid dosing  · Neurosurgery consult pending   Defer surgical planning to Neurosurgery   · Patient followed up with ENT after recent discharge  · CT head this admission as above    Hydrocephalus [facility])  Assessment & Plan  · Continue with Diamox   · Will appreciate Neurosurgery recs regarding steroid dosing as above  · Neurosurgery consult pending    Hypertension  Assessment & Plan  · Continue losartan 50 mg daily with hold parameters  · Monitor blood pressure per protocol    Anxiety  Assessment & Plan  · Continue Lexapro    Leukocytosis  Assessment & Plan  · Suspect steroid-induced  · Monitor         VTE Pharmacologic Prophylaxis: VTE Score: 4 Moderate Risk (Score 3-4) - Pharmacological DVT Prophylaxis Ordered: enoxaparin (Lovenox)  Discussions with Specialists or Other Care Team Provider: Bridgett Neurologist     Education and Discussions with Family / Patient: Patient declined call to   Time Spent for Care: 30 minutes  More than 50% of total time spent on counseling and coordination of care as described above  Current Length of Stay: 0 day(s)  Current Patient Status: Inpatient   Certification Statement: The patient will continue to require additional inpatient hospital stay due to as above  Discharge Plan: pending neurosurgery and neurology recs and ongoing work up as above    Code Status: Level 1 - Full Code    Subjective:   Ms Emerita Nicole reports that she promised Dr Bebeto Covarrubias that she wouldn't fall when she went home but fell 3 times since discharge - once onto her knees going into the house after ENT appointment Friday, once going into the bathroom onto her knees at home Saturday, and a third time where she called out for help and fell straight forward at home Saturday and could hear people but couldn't respond and they told her her hands were shaking and she was altered for about a minute  When she got here she realized she had had a BM and wonders is this happened during the third fall event  She denies SOB, CP, abdominal pain   She reports her headaches have been worse again since tapering down the steroids     Objective:     Vitals:   Temp (24hrs), Av 9 °F (36 6 °C), Min:97 9 °F (36 6 °C), Max:97 9 °F (36 6 °C)    Temp:  [97 9 °F (36 6 °C)] 97 9 °F (36 6 °C)  HR:  [58-76] 60  Resp:  [16-20] 17  BP: (135-151)/(80-93) 151/80  SpO2:  [95 %-98 %] 96 %  Body mass index is 26 88 kg/m²  Input and Output Summary (last 24 hours):   No intake or output data in the 24 hours ending 08/15/21 0852    Physical Exam:   Physical Exam  Vitals and nursing note reviewed  Constitutional:       Comments: Patient seen lying in ED bed comfortably resting, NAD   Cardiovascular:      Rate and Rhythm: Normal rate and regular rhythm  Pulmonary:      Effort: Pulmonary effort is normal  No respiratory distress  Breath sounds: Normal breath sounds  Abdominal:      General: Bowel sounds are normal       Palpations: Abdomen is soft  Tenderness: There is no abdominal tenderness  Musculoskeletal:      Right lower leg: No edema  Left lower leg: No edema  Skin:     General: Skin is warm  Neurological:      Mental Status: She is alert and oriented to person, place, and time     Psychiatric:         Mood and Affect: Mood normal          Behavior: Behavior normal          Additional Data:     Labs:  Results from last 7 days   Lab Units 08/15/21  0511 08/14/21  2108   WBC Thousand/uL 10 33* 14 53*   HEMOGLOBIN g/dL 13 9 14 4   HEMATOCRIT % 41 4 43 4   PLATELETS Thousands/uL 355 377   NEUTROS PCT %  --  70   LYMPHS PCT %  --  14   MONOS PCT %  --  15*   EOS PCT %  --  0     Results from last 7 days   Lab Units 08/15/21  0511 08/09/21  0450   SODIUM mmol/L 136 136   POTASSIUM mmol/L 4 7 3 5   CHLORIDE mmol/L 111* 104   CO2 mmol/L 19* 27   BUN mg/dL 25 13   CREATININE mg/dL 0 87 0 88   ANION GAP mmol/L 6 5   CALCIUM mg/dL 9 4 9 6   ALBUMIN g/dL  --  3 8   TOTAL BILIRUBIN mg/dL  --  0 46   ALK PHOS U/L  --  60   ALT U/L  --  29   AST U/L  --  17   GLUCOSE RANDOM mg/dL 105 77                       Lines/Drains:  Invasive Devices     Peripheral Intravenous Line            Peripheral IV 08/15/21 Right Antecubital <1 day                      Imaging: Reviewed radiology reports from this admission including: CT head    Recent Cultures (last 7 days): Last 24 Hours Medication List:   Current Facility-Administered Medications   Medication Dose Route Frequency Provider Last Rate    acetaminophen  650 mg Oral Q6H PRN [place] Leoma, DO      acetaZOLAMIDE  125 mg Oral BID [place] Leoma, DO      cyclobenzaprine  10 mg Oral TID PRN [place], DO      dexamethasone  2 mg Oral Q6H [facility] [place] Leoma, DO      enoxaparin  40 mg Subcutaneous Daily [place] Le[place], DO      escitalopram  5 mg Oral Daily [place] Le[place], DO      losartan  50 mg Oral Daily [place] Le[place], DO      ondansetron  4 mg Intravenous Q6H PRN [place], DO      oxyCODONE  5 mg Oral Q6H PRN [place] Le[place], DO          Today, Patient Was Seen By: Brian Fisher PA-C    **Please Note: This note may have been constructed using a voice recognition system  **

## 2021-08-15 NOTE — ED PROVIDER NOTES
History  Chief Complaint   Patient presents with    Seizure - New Onset     Recently diagnosed with brain tumor  Pt stood up states her legs gave out, lowered self to floor  Family reports seizure like activity  15-year-old female with recent diagnosis of cerebellopontine angle brain mass with hydrocephalus presents to emergency room for transient loss of consciousness after a fall with seizure-like activity  Approximately 1 hour prior to arrival patient was getting up from sitting on the couch and felt her legs give out  She fell forward onto the carpet and was unable to move or speak for approximately 1 minute   was in adjoining room and arrived when he heard the fall  He reports that patient was lying in the prone position with head turned to the side  Patient was nonresponsive with eyes open with bilateral shaking of both upper extremities with elbows flexed  Shaking lasted approximately 45 seconds to 1 minute  Patient was then picked up and placed in the sitting position  After 1 minute patient was verbalizing normally  Patient states she did not lose consciousness during this event  She was able to hear everything but was unable to respond  After regaining to the sitting position patient reports feeling disoriented and unable to express the words she wanted to say  Entire event lasted approximately 2 minutes before patient was fully responsive  Denies tongue biting, urinary incontinence, numbness, tingling, lightheadedness, palpitations, vertigo, or weakness in the extremities  Denies head strike, or injury to any part of her body from the fall  Denies headache, fevers, or history of seizures  Prior to Admission Medications   Prescriptions Last Dose Informant Patient Reported?  Taking?   acetaZOLAMIDE (DIAMOX) 125 mg tablet   No No   Sig: Take 1 tablet (125 mg total) by mouth 2 (two) times a day   cyclobenzaprine (FLEXERIL) 10 mg tablet   No No   Sig: Take 1 tablet (10 mg total) by mouth 3 (three) times a day as needed for muscle spasms (headache)   dexamethasone (DECADRON) 2 mg tablet   No No   Sig: Take 4 mg tonight , 4 mg every 8 hours , followed by 2 mg every 6 hours x2 days, 2 mg every 8 hours x2 days, 2 mg every 12 hours x2 days   escitalopram (LEXAPRO) 5 mg tablet   Yes No   losartan (COZAAR) 50 mg tablet  Self Yes No   Sig: Take 50 mg by mouth daily   oxyCODONE (ROXICODONE) 5 mg immediate release tablet   No No   Sig: Take 1 tablet (5 mg total) by mouth every 6 (six) hours as needed for severe pain for up to 10 dosesMax Daily Amount: 20 mg      Facility-Administered Medications: None       Past Medical History:   Diagnosis Date    Hypertension        History reviewed  No pertinent surgical history  History reviewed  No pertinent family history  I have reviewed and agree with the history as documented  E-Cigarette/Vaping    E-Cigarette Use Never User      E-Cigarette/Vaping Substances    Nicotine No     Flavoring No      Social History     Tobacco Use    Smoking status: Former Smoker     Quit date: 2021     Years since quittin 6    Smokeless tobacco: Never Used   Vaping Use    Vaping Use: Never used   Substance Use Topics    Alcohol use: Yes    Drug use: No        Review of Systems   All other systems reviewed and are negative  Physical Exam  ED Triage Vitals [21]   Temperature Pulse Respirations Blood Pressure SpO2   97 9 °F (36 6 °C) 76 18 135/82 98 %      Temp src Heart Rate Source Patient Position - Orthostatic VS BP Location FiO2 (%)   -- -- -- -- --      Pain Score       No Pain             Orthostatic Vital Signs  Vitals:    21   BP: 135/82 145/93 144/84 146/85   Pulse: 76 74 65 58       Physical Exam  Vitals and nursing note reviewed  Constitutional:       General: She is not in acute distress  Appearance: Normal appearance  She is normal weight   She is not ill-appearing, toxic-appearing or diaphoretic  HENT:      Head: Normocephalic and atraumatic  Right Ear: Tympanic membrane, ear canal and external ear normal       Left Ear: Tympanic membrane, ear canal and external ear normal       Nose: Nose normal  No congestion or rhinorrhea  Mouth/Throat:      Mouth: Mucous membranes are moist       Pharynx: Oropharynx is clear  No oropharyngeal exudate or posterior oropharyngeal erythema  Comments: No tongue lacerations  Eyes:      General: No visual field deficit  Right eye: No discharge  Left eye: No discharge  Extraocular Movements: Extraocular movements intact  Conjunctiva/sclera: Conjunctivae normal    Cardiovascular:      Rate and Rhythm: Normal rate and regular rhythm  Pulses: Normal pulses  Heart sounds: Normal heart sounds  No murmur heard  No friction rub  Pulmonary:      Effort: Pulmonary effort is normal  No respiratory distress  Breath sounds: Normal breath sounds  No wheezing or rales  Abdominal:      General: Abdomen is flat  Bowel sounds are normal  There is no distension  Palpations: Abdomen is soft  Tenderness: There is no abdominal tenderness  There is no guarding  Musculoskeletal:         General: No swelling, tenderness, deformity or signs of injury  Normal range of motion  Cervical back: Normal range of motion and neck supple  No rigidity or tenderness  Lymphadenopathy:      Cervical: No cervical adenopathy  Skin:     General: Skin is warm and dry  Capillary Refill: Capillary refill takes less than 2 seconds  Coloration: Skin is not pale  Neurological:      Mental Status: She is alert and oriented to person, place, and time  GCS: GCS eye subscore is 4  GCS verbal subscore is 5  GCS motor subscore is 6  Cranial Nerves: No cranial nerve deficit, dysarthria or facial asymmetry  Sensory: Sensation is intact  No sensory deficit        Motor: No weakness, tremor, abnormal muscle tone or seizure activity        Coordination: Coordination normal  Finger-Nose-Finger Test normal  Rapid alternating movements normal    Psychiatric:         Mood and Affect: Mood normal          ED Medications  Medications - No data to display    Diagnostic Studies  Results Reviewed     Procedure Component Value Units Date/Time    Basic metabolic panel [[id number]]  (Abnormal) Collected: 08/14/21 2108    Lab Status: Final result Specimen: Blood from Arm, Left Updated: 08/14/21 2138     Sodium 137 mmol/L      Potassium 3 7 mmol/L      Chloride 111 mmol/L      CO2 18 mmol/L      ANION GAP 8 mmol/L      BUN 27 mg/dL      Creatinine 1 05 mg/dL      Glucose 87 mg/dL      Calcium 9 2 mg/dL      eGFR 60 ml/min/1 73sq m     Narrative:      [place] guidelines for Chronic Kidney Disease (CKD):     Stage 1 with normal or high GFR (GFR > 90 mL/min/1 73 square meters)    Stage 2 Mild CKD (GFR = 60-89 mL/min/1 73 square meters)    Stage 3A Moderate CKD (GFR = 45-59 mL/min/1 73 square meters)    Stage 3B Moderate CKD (GFR = 30-44 mL/min/1 73 square meters)    Stage 4 Severe CKD (GFR = 15-29 mL/min/1 73 square meters)    Stage 5 End Stage CKD (GFR <15 mL/min/1 73 square meters)  Note: GFR calculation is accurate only with a steady state creatinine    CBC and differential [[id number]]  (Abnormal) Collected: 08/14/21 2108    Lab Status: Final result Specimen: Blood from Arm, Left Updated: 08/14/21 2115     WBC 14 53 Thousand/uL      RBC 4 72 Million/uL      Hemoglobin 14 4 g/dL      Hematocrit 43 4 %      MCV 92 fL      MCH 30 5 pg      MCHC 33 2 g/dL      RDW 13 3 %      MPV 11 1 fL      Platelets 833 Thousands/uL      nRBC 0 /100 WBCs      Neutrophils Relative 70 %      Immat GRANS % 1 %      Lymphocytes Relative 14 %      Monocytes Relative 15 %      Eosinophils Relative 0 %      Basophils Relative 0 %      Neutrophils Absolute 10 27 Thousands/µL      Immature Grans Absolute 0 11 Thousand/uL      Lymphocytes Absolute 1 99 Thousands/µL      Monocytes Absolute 2 14 Thousand/µL      Eosinophils Absolute 0 00 Thousand/µL      Basophils Absolute 0 02 Thousands/µL                  CT head without contrast   Final Result by Abhijit Poole MD (08/14 3070)      Moderate hydrocephalus with the size of the ventricles not significantly changed when compared to a recent CT brain dated August 8, 2021  Again, the findings are likely obstructive in nature or secondary to a 3 cm left cerebellopontine angle mass    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 dated August 5, 2021  Workstation performed: [id number]               Procedures  Procedures      ED Course                                       MDM  Number of Diagnoses or Management Options  Fall at home, subsequent encounter: established and worsening  Diagnosis management comments: Impression: Ddx includes syncope, seizure, new neuro symptoms from possible worsening hydrocephalus  Repeat head CT ordered  Multiple falls recently at home  Plan to admit for ambulatory dysfunction and further evaluation by neurosurgery  CT head unchanged from previous  Amount and/or Complexity of Data Reviewed  Clinical lab tests: ordered and reviewed  Tests in the radiology section of CPT®: ordered and reviewed  Review and summarize past medical records: yes  Independent visualization of images, tracings, or specimens: yes    Risk of Complications, Morbidity, and/or Mortality  Presenting problems: moderate  Diagnostic procedures: low  Management options: moderate    Patient Progress  Patient progress: stable      Disposition  Final diagnoses:   Fall at home, subsequent encounter     Time reflects when diagnosis was documented in both MDM as applicable and the Disposition within this note     Time User Action Codes Description Comment    8/14/2021 11:50 PM Jessica Man Ruth Johan  XXXD,  P03 556] Fall at home, subsequent encounter     8/14/2021 11:51 PM Lanny Pouch Add [R47 01] Aphasia of unknown origin     8/14/2021 11:51 PM Lanny Pouch Remove [R47 01] Aphasia of unknown origin     8/15/2021 12:30 AM Tanmay KAYE Add [D33 3] 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[address] )     8/15/2021 12:30 AM Tanmay KAYE Add [G91 1] Obstructive hydrocephalus [facility])       ED Disposition     ED Disposition Condition Date/Time Comment    Admit Stable Sat Aug 14, 2021 11:49 PM Case was discussed with CHRISTIAN and the patient's admission status was agreed to be Admission Status: observation status to the service of Dr Tom Rowley  Follow-up Information    None         Patient's Medications   Discharge Prescriptions    No medications on file     No discharge procedures on file  PDMP Review       Value Time User    PDMP Reviewed  Yes 8/15/2021 12:29 AM Parris Etienne DO           ED Provider  Attending physically available and evaluated Grace Bowen I managed the patient along with the ED Attending      Electronically Signed by         Felipa Borrego MD  08/15/21 1439

## 2021-08-15 NOTE — CONSULTS
Consultation - Neurology   Sylvia Hester 55 y o  female MRN: [id number]  Unit/Bed#: ED 20 Encounter: [id number]      Assessment/Plan     * Fall with possible seizure-like activity [facility])  Assessment & Plan  55year old female with recent dx of CP angle tumor, hydrocephalus, and HTN presented to the ED on 8/14 after a fall at home with possible seizure activity  Patient stood up from couch then recalls falling   noted her to be lying on the ground with a blank stare, with quivering of her fingers, unable to respond to him  She then began moving spontaneously, he sat her up, and then she began to respond normally  Entire episode lasted about 2 minutes  CTH stable compared to prior imaging in regards to the CP angle tumor and hydrocephalus  Labs unremarkable  She has had several falls recently with position changes, associated with tunnel vision and lightheadedness, but never demonstrated tremors or impaired ability to speak  The description of bilateral hand tremors with preserved consciousness (patient able to hear what was going on but not respond) is not consistent with generalized tonic clonic or complex partial seizures  Regardless, CP angle tumor is not cortical in nature, so unlikely to cause seizures  The hydrocephalus however can cause elevated ICP  Patient is already on Diamox 125 mg BID  Question if patient's presentation is secondary to convulsive syncope  Also would consider POTS as patient's resting heart rate noted to be in the 60s-70s, but elevated to 120s when attempting to have patient walk  Lower suspicion for seizure at this time  Will await further workup  Plan:  - Routine EEG pending   - Will defer AEDs at this time   - PT/OT  - Orthostatic blood pressures pending   - Medical management and supportive care per primary team  Correction of any metabolic or infectious disturbances       Headache  Assessment & Plan  Patient has been experiencing headaches since February  Described as originating from the right side of her neck and radiating upward, associated with photophobia  Currently 7/10 pain  Plan:  - Magnesium sulfate 2g IV daily   - Tizanidine 2 mg TID scheduled   · Reports some sedation with Flexeril (which is a home med)  Encouraged to use at bedtime only if tizanidine seems to be helpful during the day   - Depacon 1g daily x 3 days   - Valium PRN severe spasms and/or vertigo     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HCC)  Assessment & Plan  - With associated hydrocephalus  - On Diamox 125 mg BID  - Will defer any dosing changes to neurosurgery team if needed  - Patient will be seen by Dr Renae Kearns in the office on 8/20 to discuss surgical resection  Recommendations for outpatient neurological follow up have yet to be determined  History of Present Illness     Reason for Consult / Principal Problem: Seizure like activity     HPI: Radha Givens is a 55 y o  left handed female with recent diagnosis of cerebellopontine angle tumor, hydrocephalus, hypertension, and anxiety who presented to the ED for evaluation of frequent falls and possible seizure like activity  Patient has been experiencing daily headaches since February  She describes them as pain in her neck radiating upwards  Currently her pain is about a 7/10 which is more severe than normal  She denies any nausea/vomiting  She does endorse photophobia  She has been treating her headaches with OTC medications, such as Tylenol  She denies any prior neck injuries or trauma  She does report poor sleep due to feeling uncomfortable  Over the past few months, she also developed intermittent gait unsteadiness  Hearing was intermittently more muffled  This ultimately led to workup which included an MRI brain  MRI on 8/5 revealed a sizable left acoustic mass, likely a schwannoma  The gait unsteadiness has been worsening recently  Episodes vary in length  She has had several falls   She had an episode this past Friday where she was trying to get into the house and was using the key pad lock to get in  She started swaying and then fell onto her knees  She was awake the whole time  Patient reports that within the past two weeks she has been experiencing lightheadedness as well  Episodes are associated with tunnel vision  Some are associated with vomiting  She has had multiple episodes of feeling lightheaded upon standing/changing positions  The episode that led to admission was different than prior events  Patient stood up from the chair to go to the bathroom  She then recalls falling to the ground  She felt her legs give out  Her  ran into the room and found her on the ground  Her arms were extended out in front of her and her hands were quivering L>R at a low amplitude  Eyes were open  She was not responding verbally, but  reports it appeared that she wanted to say something  Patient recalls her  talking to her, but she could not identify what he was saying or respond to it  He describes her appearance as a blank stare  Legs were not moving  Once there tremoring movements stopped, she then moved all around spontaneously   does not think she appeared pale during the episode  Eventually he helped her up and she could respond appropriately  Episode lasted about 30 seconds to a minute  No tongue bite acquired during this presentation  No urinary incontinence  They questioned stool incontinence as patient reportedly had a bowel movement, which was only discovered once she was in the hospital  She did not realize or recall that she had a bowel movement  Vital signs were stable on arrival to the ED  Lab work revealed mild leukocytosis 14 53  CTH noted moderate hydrocephalus, not significantly changed compared to recent CT 8/8  CP angle mass again noted               Inpatient consult to Neurology  Consult performed by: Ham Ordonez PA-C  Consult ordered by: Izaiah Aguero MAGDA          Review of Systems   Constitutional: Positive for fatigue  Musculoskeletal: Positive for gait problem and neck pain  Neurological: Positive for weakness, light-headedness, numbness and headaches  LH upon standing  Intermittent loss of balance   Psychiatric/Behavioral: Positive for sleep disturbance  All other systems reviewed and are negative  Historical Information   Past Medical History:   Diagnosis Date    Hypertension      History reviewed  No pertinent surgical history  Social History   Social History     Substance and Sexual Activity   Alcohol Use Yes     Social History     Substance and Sexual Activity   Drug Use No     E-Cigarette/Vaping    E-Cigarette Use Never User      E-Cigarette/Vaping Substances    Nicotine No     Flavoring No      Social History     Tobacco Use   Smoking Status Former Smoker    Quit date: 2021    Years since quittin 6   Smokeless Tobacco Never Used     Family History: History reviewed  No pertinent family history  Review of previous medical records was completed      Meds/Allergies   all current active meds have been reviewed, current meds:   Current Facility-Administered Medications   Medication Dose Route Frequency    acetaminophen (TYLENOL) tablet 650 mg  650 mg Oral Q6H PRN    acetaZOLAMIDE (DIAMOX) tablet 125 mg  125 mg Oral BID    cyclobenzaprine (FLEXERIL) tablet 10 mg  10 mg Oral TID PRN    dexamethasone (DECADRON) tablet 3 mg  3 mg Oral Q6H [address]    diazepam (VALIUM) injection 2 5 mg  2 5 mg Intravenous Q6H PRN    enoxaparin (LOVENOX) subcutaneous injection 40 mg  40 mg Subcutaneous Daily    escitalopram (LEXAPRO) tablet 5 mg  5 mg Oral Daily    losartan (COZAAR) tablet 50 mg  50 mg Oral Daily    magnesium sulfate 2 g/50 mL IVPB (premix) 2 g  2 g Intravenous Q24H RONNIE    ondansetron (ZOFRAN) injection 4 mg  4 mg Intravenous Q6H PRN    oxyCODONE (ROXICODONE) IR tablet 5 mg  5 mg Oral Q6H PRN    tiZANidine (Charliene Lick) tablet 2 mg  2 mg Oral TID    valproate (DEPACON) 1,000 mg in sodium chloride 0 9 % 50 mL IVPB  1,000 mg Intravenous Q24H [address]    and PTA meds:   Prior to Admission Medications   Prescriptions Last Dose Informant Patient Reported? Taking?   acetaZOLAMIDE (DIAMOX) 125 mg tablet   No No   Sig: Take 1 tablet (125 mg total) by mouth 2 (two) times a day   cyclobenzaprine (FLEXERIL) 10 mg tablet   No No   Sig: Take 1 tablet (10 mg total) by mouth 3 (three) times a day as needed for muscle spasms (headache)   dexamethasone (DECADRON) 2 mg tablet   No No   Sig: Take 4 mg tonight 8/12, 4 mg every 8 hours 8/13, followed by 2 mg every 6 hours x2 days, 2 mg every 8 hours x2 days, 2 mg every 12 hours x2 days   escitalopram (LEXAPRO) 5 mg tablet   Yes No   losartan (COZAAR) 50 mg tablet  Self Yes No   Sig: Take 50 mg by mouth daily   oxyCODONE (ROXICODONE) 5 mg immediate release tablet   No No   Sig: Take 1 tablet (5 mg total) by mouth every 6 (six) hours as needed for severe pain for up to 10 dosesMax Daily Amount: 20 mg      Facility-Administered Medications: None       No Known Allergies    Objective   Vitals:Blood pressure 167/79, pulse 68, temperature 97 9 °F (36 6 °C), resp  rate 16, weight 74 4 kg (164 lb), SpO2 97 %, not currently breastfeeding  ,Body mass index is 26 88 kg/m²  No intake or output data in the 24 hours ending 08/15/21 1256    Invasive Devices: Invasive Devices     Peripheral Intravenous Line            Peripheral IV 08/15/21 Right Antecubital <1 day                Physical Exam  Vitals and nursing note reviewed  Constitutional:       General: She is not in acute distress  Appearance: She is not ill-appearing or diaphoretic  HENT:      Head: Normocephalic and atraumatic  Right Ear: External ear normal       Left Ear: External ear normal       Nose: Nose normal       Mouth/Throat:      Mouth: Mucous membranes are moist       Pharynx: Oropharynx is clear     Eyes:      General: No scleral icterus  Right eye: No discharge  Left eye: No discharge  Extraocular Movements: Extraocular movements intact and EOM normal       Conjunctiva/sclera: Conjunctivae normal    Neck:      Comments: Slight restriction with ROM of neck to the R  Cardiovascular:      Rate and Rhythm: Normal rate  Pulses: Normal pulses  Pulmonary:      Effort: Pulmonary effort is normal  No respiratory distress  Musculoskeletal:         General: No deformity  Normal range of motion  Cervical back: Tenderness present  Right lower leg: No edema  Left lower leg: No edema  Skin:     General: Skin is warm and dry  Coloration: Skin is not jaundiced or pale  Findings: No erythema or rash  Neurological:      Mental Status: She is alert and oriented to person, place, and time  Cranial Nerves: No cranial nerve deficit  Sensory: No sensory deficit  Coordination: Coordination normal  Finger-Nose-Finger Test (Mild end point tremor) normal       Deep Tendon Reflexes: Strength normal       Reflex Scores:       Tricep reflexes are 2+ on the right side and 2+ on the left side  Bicep reflexes are 2+ on the right side and 2+ on the left side  Brachioradialis reflexes are 2+ on the right side and 2+ on the left side  Patellar reflexes are 3+ on the right side and 3+ on the left side  Achilles reflexes are 2+ on the right side and 2+ on the left side  Psychiatric:         Mood and Affect: Mood normal          Behavior: Behavior normal          Thought Content: Thought content normal          Judgment: Judgment normal        Neurologic Exam     Mental Status   Oriented to person, place, and time  Level of consciousness: alert  Follows all commands without difficulty  No dysarthria or aphasia  Cranial Nerves     CN II   Visual fields full to confrontation       CN III, IV, VI   Extraocular motions are normal    Nystagmus: none   Upgaze: normal  Downgaze: normal  Conjugate gaze: present    CN V   Facial sensation intact  CN VII   Facial expression full, symmetric  CN VIII   Hearing: intact    CN XII   Tongue: not atrophic  Fasciculations: absent  Tongue deviation: none    Motor Exam   Muscle bulk: normal  Overall muscle tone: normal    Strength   Strength 5/5 throughout  Sensory Exam   Light touch normal    Vibration normal    Temperature intact  Proprioception slightly diminished  Gait, Coordination, and Reflexes     Coordination   Finger to nose coordination: normal (Mild end point tremor)    Reflexes   Right brachioradialis: 2+  Left brachioradialis: 2+  Right biceps: 2+  Left biceps: 2+  Right triceps: 2+  Left triceps: 2+  Right patellar: 3+  Left patellar: 3+  Right achilles: 2+  Left achilles: 2+  Right plantar: equivocal  Left plantar: normal  Right ankle clonus: absent      Lab Results: I have personally reviewed pertinent reports  Imaging Studies: I have personally reviewed pertinent reports  and I have personally reviewed pertinent films in PACS [facility])  EKG, Pathology, and Other Studies: I have personally reviewed pertinent reports      VTE Prophylaxis: Sequential compression device (Venodyne)     Code Status: Level 1 - Full Code

## 2021-08-15 NOTE — ASSESSMENT & PLAN NOTE
· Patient reporting at least 3 falls since recent discharge, presented after numbness/period of brief altered sensorium without loss of consciousness following most recent fall  · CT head with hydrocephalus unchanged from prior  · May all be falls related hydrocephalus, however cannot exclude potential partial seizure as underlying etiology  · Will obtain EEG, seizure precautions    Await neurosurgical evaluation  · PT/OT evaluation

## 2021-08-15 NOTE — ASSESSMENT & PLAN NOTE
Patient has been experiencing headaches since February  Described as originating from the right side of her neck and radiating upward, associated with photophobia  Currently, reports headache improved from 10/10 this morning to 6/10 after receiving medications  Denies any photophobia, phonophobia, nausea/vomiting  Plan:  - Magnesium sulfate 2g IV daily   - Tizanidine 2 mg TID scheduled   · Reports some sedation with Flexeril (which is a home med)   Encouraged to use at bedtime only if tizanidine seems to be helpful during the day   - Depacon 1g daily x 3 days (today is day 2/3)  - Valium PRN severe spasms and/or vertigo

## 2021-08-15 NOTE — H&P
[address] [facility],[address] 1965, 55 y o  female MRN: [id number]  Unit/Bed#: ED 20 Encounter: [id number]  Primary Care Provider: Sarah Wang MD   Date and time admitted to hospital: 8/14/2021  7:56 PM    * Fall with possible seizure-like activity [facility])  Assessment & Plan  · Patient reporting at least 3 falls since recent discharge, presented after numbness/period of brief altered sensorium without loss of consciousness following most recent fall  · CT head with hydrocephalus unchanged from prior  · May all be falls related hydrocephalus, however cannot exclude potential partial seizure as underlying etiology  · Will obtain EEG, seizure precautions  Await neurosurgical evaluation  · PT/OT evaluation    Hypertension  Assessment & Plan  · Continue losartan 50 mg daily with hold parameters, monitor blood pressure per protocol    Hydrocephalus (HCC)  Assessment & Plan  · Continue with Diamox and steroids  · Neurosurgery evaluation as per cerebellopontine angle tumor "    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[address] )  Assessment & Plan  · Recent admission with hydrocephalus discovered with 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  · Was planned for outpatient follow-up with Neurosurgery for timing of intervention, however given new symptoms as above will request input  · Continue Diamox and steroid  · Neuro checks      VTE Prophylaxis: Enoxaparin (Lovenox)  / sequential compression device   Code Status: Level 1 - Full Code  POLST: POLST form is not discussed and not completed at this time  Discussion with family:  Offered however patient declines at this time    Anticipated Length of Stay:  Patient will be admitted on an Inpatient basis with an anticipated length of stay of  greater than 2 midnights  Justification for Hospital Stay: Please see detailed plans noted above  Chief Complaint:     Fall  History of Present Illness:  Mayte Medellin is a 55 y  o  female who presented after fall at home with reported possible seizure-like activity  She was recently hospitalized at this hospital 08/08/2021-08/12/2021 for evaluation of progressive neurologic symptoms found with hydrocephalus and MRI revealing cerebellopontine angle mass suspicious for possible vestibular schwannoma, seen by Neurosurgery who started patient on Diamox and steroids with plan for operative intervention in the future  Unfortunately since discharge reported 3 falls described as her legs giving out, however on the date of presentation with the most recent fall and approximately 1 minute episode of shaking followed by 1 episode of transient confusion before rapid return to mental baseline; the patient states she was able to hear everything around her but unable to respond and noted numbness in her legs and denies any loss of consciousness during this event  She denies any focal weakness, headache, dizziness/lightheadedness, facial droop, or fevers/chills  Additionally reports all numbness had resolved by the time of presentation ED  During ED evaluation she underwent CT head which revealed stable hydrocephalus from prior  Given symptoms she is admitted for further evaluation of falls and potential seizure-like activity  Currently, patient is lying in bed in no acute distress, comfortable appearing, answering all questions appropriately  Offers no other acute complaints at this time      Review of Systems:    Constitutional:  Denies fever or chills   Eyes:  Denies change in visual acuity   HENT:  Denies nasal congestion or sore throat   Respiratory:  Denies cough or shortness of breath   Cardiovascular:  Denies chest pain or edema   GI:  Denies abdominal pain, nausea, vomiting, bloody stools or diarrhea   :  Denies dysuria   Musculoskeletal:  Denies back pain or joint pain but endorses falls  Integument:  Denies rash   Neurologic:  Denies headache, focal weakness or sensory changes Endocrine:  Denies polyuria or polydipsia   Lymphatic:  Denies swollen glands   Psychiatric:  Denies depression or anxiety but endorsed transient altered sensorium    Past Medical and Surgical History:   Past Medical History:   Diagnosis Date    Hypertension      History reviewed  No pertinent surgical history      Meds/Allergies:    Current Facility-Administered Medications:     acetaminophen (TYLENOL) tablet 650 mg, 650 mg, Oral, Q6H PRN, Maxim Danas, DO    acetaZOLAMIDE (DIAMOX) tablet 125 mg, 125 mg, Oral, BID, Maxim Danas, DO    cyclobenzaprine (FLEXERIL) tablet 10 mg, 10 mg, Oral, TID PRN, Maxim Danas, DO    dexamethasone (DECADRON) tablet 2 mg, 2 mg, Oral, Q6H [address], Maxim Urvashi, DO    enoxaparin (LOVENOX) subcutaneous injection 40 mg, 40 mg, Subcutaneous, Daily, Maxim Danas, DO    escitalopram (LEXAPRO) tablet 5 mg, 5 mg, Oral, Daily, Maxim Danas, DO    losartan (COZAAR) tablet 50 mg, 50 mg, Oral, Daily, Maxim Danas, DO    ondansetron [facility]) injection 4 mg, 4 mg, Intravenous, Q6H PRN, Maxim Urvashi, DO    oxyCODONE (ROXICODONE) IR tablet 5 mg, 5 mg, Oral, Q6H PRN, Maxim Danas, DO    Current Outpatient Medications:     acetaZOLAMIDE (DIAMOX) 125 mg tablet, Take 1 tablet (125 mg total) by mouth 2 (two) times a day, Disp: 60 tablet, Rfl: 0    cyclobenzaprine (FLEXERIL) 10 mg tablet, Take 1 tablet (10 mg total) by mouth 3 (three) times a day as needed for muscle spasms (headache), Disp: 30 tablet, Rfl: 0    dexamethasone (DECADRON) 2 mg tablet, Take 4 mg tonight 8/12, 4 mg every 8 hours 8/13, followed by 2 mg every 6 hours x2 days, 2 mg every 8 hours x2 days, 2 mg every 12 hours x2 days, Disp: 26 tablet, Rfl: 0    escitalopram (LEXAPRO) 5 mg tablet, , Disp: , Rfl:     losartan (COZAAR) 50 mg tablet, Take 50 mg by mouth daily, Disp: , Rfl:     oxyCODONE (ROXICODONE) 5 mg immediate release tablet, Take 1 tablet (5 mg total) by mouth every 6 (six) hours as needed for severe pain for up to 10 dosesMax Daily Amount: 20 mg, Disp: 10 tablet, Rfl: 0    Allergies: No Known Allergies  History:  Marital Status: /Civil Union     Substance Use History:   Social History     Substance and Sexual Activity   Alcohol Use Yes     Social History     Tobacco Use   Smoking Status Former Smoker    Quit date: 2021    Years since quittin 6   Smokeless Tobacco Never Used     Social History     Substance and Sexual Activity   Drug Use No       Family History:  Noncontributory  Physical Exam:     Vitals:   Blood Pressure: 146/85 (21)  Pulse: 58 (21)  Temperature: 97 9 °F (36 6 °C) (21)  Respirations: 18 (21)  Weight - Scale: 74 4 kg (164 lb) (21)  SpO2: 98 % (21)    Constitutional:  Well developed, well nourished, no acute distress, non-toxic appearance   Eyes:  PERRL, conjunctiva normal   HENT:  Atraumatic, external ears normal, nose normal, oropharynx moist, no pharyngeal exudates  Neck- normal range of motion, no tenderness, supple   Respiratory:  No respiratory distress, normal breath sounds, no rales, no wheezing   Cardiovascular:  Normal rate, normal rhythm, no murmurs, no gallops, no rubs   GI:  Soft, nondistended, normal bowel sounds, nontender, no organomegaly, no mass, no rebound, no guarding   :  No costovertebral angle tenderness   Musculoskeletal:  No edema, no tenderness, no deformities  Back- no tenderness  Integument:  Well hydrated, no rash   Lymphatic:  No lymphadenopathy noted   Neurologic:  Alert &awake, communicative, CN 2-12 normal, normal motor function, normal sensory function, no focal deficits noted   Psychiatric:  Speech and behavior appropriate       Lab Results: I have personally reviewed pertinent reports        Results from last 7 days   Lab Units 21  2108   WBC Thousand/uL 14 53*   HEMOGLOBIN g/dL 14 4   HEMATOCRIT % 43 4   PLATELETS Thousands/uL 377   NEUTROS PCT % 70   LYMPHS PCT % 14 MONOS PCT % 15*   EOS PCT % 0     Results from last 7 days   Lab Units 08/14/21 2108 08/09/21  0450   POTASSIUM mmol/L 3 7 3 5   CHLORIDE mmol/L 111* 104   CO2 mmol/L 18* 27   BUN mg/dL 27* 13   CREATININE mg/dL 1 05 0 88   CALCIUM mg/dL 9 2 9 6   ALK PHOS U/L  --  60   ALT U/L  --  29   AST U/L  --  17           EKG:  Sinus bradycardia HR 59, LBBB; unchanged from prior    Imaging: I have personally reviewed pertinent reports  CT head without contrast    Result Date: 8/14/2021  Narrative: CT BRAIN - WITHOUT CONTRAST INDICATION:   Altered mental status syncope, possible worsening hydrocephalus  COMPARISON:  CT brain dated August 8, 2021  MRI brain dated August 5, 2021  TECHNIQUE:  CT examination of the brain was performed  In addition to axial images, sagittal and coronal 2D reformatted images were created and submitted for interpretation  Radiation dose length product (DLP) for this visit:  784 14 mGy-cm   This examination, like all CT scans performed in the [facility], was performed utilizing techniques to minimize radiation dose exposure, including the use of iterative  reconstruction and automated exposure control  IMAGE QUALITY:  Diagnostic  FINDINGS: PARENCHYMA:  Again noted is an approximately 3 x 2 5 cm left CPA angle mass causing mass effect on the adjacent left lateral brainstem, brachium pontis and anterior left cerebellar hemisphere  There is mild local mass effect on the adjacent 4th ventricle with no significant stenosis  No acute hemorrhage or ischemia  No midline shift  There is mild periventricular white matter low attenuation which is nonspecific and most likely related to chronic small vessel ischemic changes or secondary to transependymal flow of CSF  VENTRICLES AND EXTRA-AXIAL SPACES:  Again noted is hydrocephalus with the size of the ventricles not significantly changed from the prior exam  VISUALIZED ORBITS AND PARANASAL SINUSES:  Unremarkable   CALVARIUM AND EXTRACRANIAL SOFT TISSUES:  Normal      Impression: Moderate hydrocephalus with the size of the ventricles not significantly changed when compared to a recent CT brain dated August 8, 2021  Again, the findings are likely obstructive in nature or secondary to a 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 dated August 5, 2021  Workstation performed: [id number]     CT head without contrast    Result Date: 8/8/2021  Narrative: CT BRAIN - WITHOUT CONTRAST INDICATION:   Headache, near syncope, brain mass  COMPARISON:  8/5/2021; 6/20/2021 TECHNIQUE:  CT examination of the brain was performed  In addition to axial images, sagittal and coronal 2D reformatted images were created and submitted for interpretation  Radiation dose length product (DLP) for this visit:  766 11 mGy-cm   This examination, like all CT scans performed in the [facility], was performed utilizing techniques to minimize radiation dose exposure, including the use of iterative  reconstruction and automated exposure control  IMAGE QUALITY:  Diagnostic  FINDINGS: PARENCHYMA:  Large extra-axial mass in the left cerebellopontine angle cistern, measuring at least 3 2 cm in maximal AP dimension on image 10, series 2 is similar to the prior study  There is significant mass effect on the brainstem  The 4th ventricle is partially compressed and the foramen of Luschka on the left is slightly effaced  The left internal auditory canal is mildly widened better characterized on the recent brain MRI from 5/20/2021  VENTRICLES AND EXTRA-AXIAL SPACES:  There is hydrocephalus, likely obstructive in nature given the mass effect on the region of the left foramen of Luschka  VISUALIZED ORBITS AND PARANASAL SINUSES:  Unremarkable  CALVARIUM AND EXTRACRANIAL SOFT TISSUES:  Normal      Impression: 1    Marked hydrocephalus, potentially obstructive in etiology, likely related to large, 3 cm left cerebellar pontine angle cistern mass, compressing the 4th ventricle and brainstem  The left cerebellopontine angle cistern mass is 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 given the extension into the internal auditory canal   Emergent neurosurgical assessment advised  I personally discussed this study with Jad Given on 8/8/2021 at 12:36 PM  Workstation performed: [id number]     MRI brain IAC wo and w contrast    Result Date: 8/10/2021  Narrative: MRI BRAIN AND IAC'S -  WITH AND WITHOUT CONTRAST INDICATION: D43 2: Neoplasm of uncertain behavior of brain, unspecified D43 4: 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  COMPARISON:  Noncontrast MRI dated 10/12/2012  TECHNIQUE: Brain:   Sagittal T1, axial T2, axial [place], axial T1, axial FLAIR, axial diffusion imaging  Axial T1 postcontrast   Axial BRAVO post contrast  IAC'S:  Coronal FIESTA, coronal T1 postcontrast, axial T1 postcontrast with fat suppression  Targeted images of the IAC'S were performed requiring additional time at acquisition and interpretation of approximately 25% IV Contrast:  7 mL of Gadobutrol injection (SINGLE-DOSE) IMAGE QUALITY:   Diagnostic  FINDINGS: BRAIN PARENCHYMA AND IACS:  There is a large intensely enhancing left CP angle mass extending into the internal auditory canal resulting in slight expansion of the canal   This mass measures 2 5 x 3 1 x 3 2 cm  There is moderate mass effect upon the left lateral brainstem, brachium pontis and anterior left cerebellar hemisphere best seen on series 7 image 8  There is no associated edema within these structures  This mass does result in mass effect upon the 4th ventricle with no stenosis of the aqueduct  Foramen of Luschka appears patent on the left  Evaluation of the supratentorial brain parenchyma demonstrates appropriate gray-white differentiation    The ventricular system is enlarged compared to the prior study from 2012 and there is mild increased FLAIR signal noted immediately adjacent to the surface of the lateral ventricles which may represent transependymal flow of CSF  Postcontrast imaging of the cerebral hemispheres is unremarkable  No acute ischemia  VENTRICLES:  As described above the ventricular system is significantly larger than the prior remote study from 2012 and there is increased T2/FLAIR signal adjacent to the surface of the lateral ventricle suspicious for transependymal flow of CSF  SELLA AND PITUITARY GLAND:  Normal  ORBITS:  Normal  PARANASAL SINUSES:  Normal  VASCULATURE:  Evaluation of the major intracranial vasculature demonstrates appropriate flow voids  CALVARIUM AND SKULL BASE:  Normal  EXTRACRANIAL SOFT TISSUES:  Normal      Impression: Large left CP angle mass extending into the internal auditory canal, most consistent with acoustic neuroma  Moderate mass effect upon the posterior fossa structures on the left without edema  Interval development of hydrocephalus involving the lateral ventricles and 3rd ventricle which may be a result of the above described mass and its mass effect upon the 4th ventricle or disruption of CSF flow within the posterior fossa  Mild increased signal on FLAIR imaging adjacent to the lateral ventricles may represent minor transependymal flow of CSF  I personally discussed this study with Dr Tere Russell of neurosurgery on 8/9/2021 at 4:50 PM  Workstation performed: [id number]         ** Please Note: Dragon 360 Dictation voice to text software was used in the creation of this document   **

## 2021-08-15 NOTE — ASSESSMENT & PLAN NOTE
· Evident on MRI brain and CT head  On repeat CT head hydrocephalus appears stable  · No intervention at this time as it appears to be communicating rather than obstructive  · Continue Diamox 125mg BID at this time

## 2021-08-16 ENCOUNTER — APPOINTMENT (INPATIENT)
Dept: NEUROLOGY | Facility: CLINIC | Age: 56
DRG: 025 | End: 2021-08-16
Payer: COMMERCIAL

## 2021-08-16 LAB
ANION GAP SERPL CALCULATED.3IONS-SCNC: 7 MMOL/L (ref 4–13)
ATRIAL RATE: 59 BPM
BUN SERPL-MCNC: 25 MG/DL (ref 5–25)
CALCIUM SERPL-MCNC: 9.3 MG/DL (ref 8.3–10.1)
CHLORIDE SERPL-SCNC: 110 MMOL/L (ref 100–108)
CO2 SERPL-SCNC: 20 MMOL/L (ref 21–32)
CREAT SERPL-MCNC: 0.94 MG/DL (ref 0.6–1.3)
ERYTHROCYTE [DISTWIDTH] IN BLOOD BY AUTOMATED COUNT: 13.3 % (ref 11.6–15.1)
GFR SERPL CREATININE-BSD FRML MDRD: 69 ML/MIN/1.73SQ M
GLUCOSE SERPL-MCNC: 113 MG/DL (ref 65–140)
HCT VFR BLD AUTO: 45.1 % (ref 34.8–46.1)
HGB BLD-MCNC: 15 G/DL (ref 11.5–15.4)
MCH RBC QN AUTO: 30.5 PG (ref 26.8–34.3)
MCHC RBC AUTO-ENTMCNC: 33.3 G/DL (ref 31.4–37.4)
MCV RBC AUTO: 92 FL (ref 82–98)
P AXIS: 56 DEGREES
PLATELET # BLD AUTO: 357 THOUSANDS/UL (ref 149–390)
PMV BLD AUTO: 10.4 FL (ref 8.9–12.7)
POTASSIUM SERPL-SCNC: 3.9 MMOL/L (ref 3.5–5.3)
PR INTERVAL: 112 MS
QRS AXIS: 43 DEGREES
QRSD INTERVAL: 132 MS
QT INTERVAL: 450 MS
QTC INTERVAL: 445 MS
RBC # BLD AUTO: 4.91 MILLION/UL (ref 3.81–5.12)
SODIUM SERPL-SCNC: 137 MMOL/L (ref 136–145)
T WAVE AXIS: 181 DEGREES
VENTRICULAR RATE: 59 BPM
WBC # BLD AUTO: 12.04 THOUSAND/UL (ref 4.31–10.16)

## 2021-08-16 PROCEDURE — 85027 COMPLETE CBC AUTOMATED: CPT | Performed by: PHYSICIAN ASSISTANT

## 2021-08-16 PROCEDURE — 97163 PT EVAL HIGH COMPLEX 45 MIN: CPT

## 2021-08-16 PROCEDURE — 99232 SBSQ HOSP IP/OBS MODERATE 35: CPT | Performed by: PHYSICIAN ASSISTANT

## 2021-08-16 PROCEDURE — 97167 OT EVAL HIGH COMPLEX 60 MIN: CPT

## 2021-08-16 PROCEDURE — 80048 BASIC METABOLIC PNL TOTAL CA: CPT | Performed by: PHYSICIAN ASSISTANT

## 2021-08-16 PROCEDURE — 99232 SBSQ HOSP IP/OBS MODERATE 35: CPT | Performed by: NURSE PRACTITIONER

## 2021-08-16 PROCEDURE — 95816 EEG AWAKE AND DROWSY: CPT

## 2021-08-16 PROCEDURE — 95819 EEG AWAKE AND ASLEEP: CPT | Performed by: PSYCHIATRY & NEUROLOGY

## 2021-08-16 PROCEDURE — 99221 1ST HOSP IP/OBS SF/LOW 40: CPT | Performed by: INTERNAL MEDICINE

## 2021-08-16 PROCEDURE — 93010 ELECTROCARDIOGRAM REPORT: CPT | Performed by: INTERNAL MEDICINE

## 2021-08-16 PROCEDURE — 97535 SELF CARE MNGMENT TRAINING: CPT

## 2021-08-16 PROCEDURE — 99233 SBSQ HOSP IP/OBS HIGH 50: CPT | Performed by: PSYCHIATRY & NEUROLOGY

## 2021-08-16 RX ADMIN — SODIUM CHLORIDE 1000 MG: 9 INJECTION, SOLUTION INTRAVENOUS at 10:12

## 2021-08-16 RX ADMIN — ENOXAPARIN SODIUM 40 MG: 40 INJECTION SUBCUTANEOUS at 09:56

## 2021-08-16 RX ADMIN — MAGNESIUM SULFATE HEPTAHYDRATE 2 G: 40 INJECTION, SOLUTION INTRAVENOUS at 10:12

## 2021-08-16 RX ADMIN — ACETAZOLAMIDE 125 MG: 125 TABLET ORAL at 17:01

## 2021-08-16 RX ADMIN — DEXAMETHASONE 3 MG: 2 TABLET ORAL at 17:01

## 2021-08-16 RX ADMIN — DEXAMETHASONE 3 MG: 2 TABLET ORAL at 00:01

## 2021-08-16 RX ADMIN — TIZANIDINE 2 MG: 2 TABLET ORAL at 10:02

## 2021-08-16 RX ADMIN — ACETAZOLAMIDE 125 MG: 125 TABLET ORAL at 10:04

## 2021-08-16 RX ADMIN — LOSARTAN POTASSIUM 50 MG: 50 TABLET, FILM COATED ORAL at 10:02

## 2021-08-16 RX ADMIN — TIZANIDINE 2 MG: 2 TABLET ORAL at 21:34

## 2021-08-16 RX ADMIN — OXYCODONE HYDROCHLORIDE 5 MG: 5 TABLET ORAL at 06:10

## 2021-08-16 RX ADMIN — DEXAMETHASONE 3 MG: 2 TABLET ORAL at 11:57

## 2021-08-16 RX ADMIN — DEXAMETHASONE 3 MG: 2 TABLET ORAL at 05:36

## 2021-08-16 RX ADMIN — TIZANIDINE 2 MG: 2 TABLET ORAL at 17:01

## 2021-08-16 RX ADMIN — CYCLOBENZAPRINE HYDROCHLORIDE 10 MG: 10 TABLET, FILM COATED ORAL at 06:40

## 2021-08-16 RX ADMIN — ESCITALOPRAM 5 MG: 5 TABLET, FILM COATED ORAL at 10:01

## 2021-08-16 NOTE — CONSULTS
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 during hospitalization from 8/8-8/12 who presented on 8/14 following three falls at home with the third associated with a one minute episode of arm shaking, transient confusion, numbness and tingling in her legs with a rapid return to baseline mental status  During this episode the patient could hear but could not respond  On presentation to hospital she reported headaches starting on her neck with blurry vision denied any focal weakness, dizziness, lightheadedness, facial droop, F/C  CTH stable from prior admission  She was admitted for future evaluation of falls and potential seizure like activity  Neurosurgery was consulted, was originally discharged on 8/12 on Diamox 125 BID to decrease CSF production and decadron 4mg Q6 to taper to a 2Q12H dose for maintenance until surgery  She was scheduled to follow up for outpatient workup for surgical resection of mass  In hospital, decadron increased 3 mg q6hrs  Neurology consulted, their evaluation of her episode with bilateral hand tremors with preserved consciousness is not consistent with a generalized tonic clonic seizure or complex partial seizure  On their evaluation, her resting heart rate was in 60-70s but increased to 120s when the patient was walked  Recommend further evaluation of POTS vs convulsive syncope  She was also started on Valproate 1000 mg daily  On evaluation today, patient reports that she still has headaches that start in occipital region that radiate frontally  She also has blurry vision accompanying these headaches  She reports hearing loss in left ear  She states when she stands up she fells lightheaded and develops tunnel vision  She says with the most recent episodes of lightheadedness and falls she does not experience the sensation of the room spinning  She doesn't report any numbness/tingling  She reports she drinks about 5-6 20 oz beverage equivalents each day  She reports no history of syncope or seizure prior to the last six months  Cardiac hx positive for a Holter monitor in March done at [facility] during outpatient workup for worsening dizziness which showed normal sinus rhythm and LBBB with reported lightheadedness with sinus tachycardia at 110 bpm     Review of Systems  ROS as noted above  Historical Information   Past Medical History:   Diagnosis Date    Hypertension      History reviewed  No pertinent surgical history    Social History     Substance and Sexual Activity   Alcohol Use Yes     Social History     Substance and Sexual Activity   Drug Use No     Social History     Tobacco Use   Smoking Status Former Smoker    Quit date: 2021    Years since quittin 6   Smokeless Tobacco Never Used     Family History: non-contributory    Meds/Allergies   Hospital Medications:   Current Facility-Administered Medications   Medication Dose Route Frequency    acetaminophen (TYLENOL) tablet 650 mg  650 mg Oral Q6H PRN    acetaZOLAMIDE (DIAMOX) tablet 125 mg  125 mg Oral BID    cyclobenzaprine (FLEXERIL) tablet 10 mg  10 mg Oral TID PRN    dexamethasone (DECADRON) tablet 3 mg  3 mg Oral Q6H [address]    diazepam (VALIUM) injection 2 5 mg  2 5 mg Intravenous Q6H PRN    enoxaparin (LOVENOX) subcutaneous injection 40 mg  40 mg Subcutaneous Daily    escitalopram (LEXAPRO) tablet 5 mg  5 mg Oral Daily    losartan (COZAAR) tablet 50 mg  50 mg Oral Daily    magnesium sulfate 2 g/50 mL IVPB (premix) 2 g  2 g Intravenous Q24H [address]    ondansetron (ZOFRAN) injection 4 mg  4 mg Intravenous Q6H PRN    oxyCODONE (ROXICODONE) IR tablet 5 mg  5 mg Oral Q6H PRN    tiZANidine (ZANAFLEX) tablet 2 mg  2 mg Oral TID    valproate (DEPACON) 1,000 mg in sodium chloride 0 9 % 50 mL IVPB  1,000 mg Intravenous Q24H [address]     Home Medications:   Medications Prior to Admission   Medication    acetaZOLAMIDE (DIAMOX) 125 mg tablet    cyclobenzaprine (FLEXERIL) 10 mg tablet    dexamethasone (DECADRON) 2 mg tablet    escitalopram (LEXAPRO) 5 mg tablet    losartan (COZAAR) 50 mg tablet    oxyCODONE (ROXICODONE) 5 mg immediate release tablet       No Known Allergies    Objective   Vitals: Blood pressure 121/77, pulse 64, temperature 97 7 °F (36 5 °C), resp  rate 18, height 5' 5 5" (1 664 m), weight 74 4 kg (164 lb 0 4 oz), SpO2 95 %, not currently breastfeeding    Orthostatic Blood Pressures      Most Recent Value   Blood Pressure  121/77 filed at 08/16/2021 1517   Patient Position - Orthostatic VS  Standing filed at 08/16/2021 1100            Invasive Devices     Peripheral Intravenous Line            Peripheral IV 08/15/21 Right Antecubital 1 day                Physical Exam    GEN: Noam Prasad appears well, alert and oriented x 3, pleasant and cooperative   HEENT:  Normocephalic, atraumatic, anicteric, moist mucous membranes  NECK: No JVD or carotid bruits   HEART: regular rhythm, normal rate, normal S1 and S2, no murmurs, clicks, gallops or rubs   LUNGS: Clear to auscultation bilaterally; no wheezes, rales, or rhonchi; respiration nonlabored   ABDOMEN:  Normoactive bowel sounds, soft, no tenderness, no distention  EXTREMITIES: peripheral pulses palpable; no edema  NEURO: no gross focal findings; left sided hearing loss   SKIN:  Dry, intact, warm to touch    Lab Results: I have personally reviewed pertinent lab results  Results from last 7 days   Lab Units 08/16/21  0558 08/15/21  0511 08/14/21  2108   POTASSIUM mmol/L 3 9 4 7 3 7   CO2 mmol/L 20* 19* 18*   CHLORIDE mmol/L 110* 111* 111*   BUN mg/dL 25 25 27*   CREATININE mg/dL 0 94 0 87 1 05     Results from last 7 days   Lab Units 08/16/21  0557 08/15/21  0511 08/14/21  2108   HEMOGLOBIN g/dL 15 0 13 9 14 4   HEMATOCRIT % 45 1 41 4 43 4   PLATELETS Thousands/uL 357 355 377                 Imaging: I have personally reviewed pertinent reports  8/14 CT Head wo contrast: MModerate hydrocephalus with the size of the ventricles not significantly changed when compared to a recent CT brain dated August 8, 2021  Again, the findings are likely obstructive in nature or secondary to a 3 cm left cerebellopontine angle mass   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 dated August 5, 2021  oderate hydrocephalus with the size of the ventricles not significantly changed when compared to a recent CT brain dated August 8, 2021  Again, the findings are likely obstructive in nature or secondary to a 3 cm left cerebellopontine angle mass   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 dated August 5, 2021  Telemetry:   Not on telemetry    EKG:   Date: 8/8  Interpretation: Sinus bradycardia with LBBB      Previous STRESS TEST:  No results found for this or any previous visit  No results found for this or any previous visit  No results found for this or any previous visit  Previous Cath/PCI:  No results found for this or any previous visit  ECHO:  No results found for this or any previous visit  No results found for this or any previous visit  HOLTER  4/5/2021   48 hours study   The predominant rhythm is sinus  She was in LBBB  There were no ventricular ectopic beats  0 ventricular run  There were rare (0 % of daily beats) supraventricular ectopic beats   4 atrial  run, 10 beats longest, 145 beats fastest   No high degree AV block or prolonged pauses  No evidence of atrial fibrillation  Patient reported lightheadedness associated with sinus rhythm at 110 bpm       VTE Prophylaxis: Enoxaparin (Lovenox)       Assessment/Plan     Assessment:     1  Fall with possible seizure activity  Patient has 3 cm left CP angle brain mass diagnosed during prior admission  She had been dealing with worsening dizziness and falls since this February  On 8/14, patient had fall with bilateral arm/hand tremor, numbness tingling of lower limbs, inability to speak  CTH no new intracranial changes  She reports lightheadness and tunnel vision when rising from seated position with increase in HR to 120 when walking  She does not appear volume depleted  EEG normal  Unclear if she actually had a seizure  Full orthostatic vitals unavailable  Tentative plan for surgery on Wednesday  The likelihood of POTS as a cause for her recent increase in falls, especially this most recent episode with associated arm waving, inability to speak, in unlikely  With respect to increase HR in context of normotensive pressures and possible vertiginous symptoms, suspect symptoms more likely due to intracranial mass and not POTS  Plan  - Obtain full orthostatic vitals including HR and BP    - No further cardiac workup required during current admission  Case discussed and reviewed with Dr Marilee Morales who agrees with my assessment and plan  Thank you for involving us in the care of your patient  [address] [facility] Student, MS4    ==========================================================================================      ** Please Note: Fluency DirectDictation voice to text software may have been used in the creation of this document   **

## 2021-08-16 NOTE — PROGRESS NOTES
[address] [facility]  Progress Note - [facility] 1965, [address] y o  female MRN: [id number]  Unit/Bed#: [facility] 705-01 Encounter: [id number]  Primary Care Provider: Tabitha Miguel MD   Date and time admitted to hospital: 8/14/2021  7:56 PM    * Fall with possible seizure-like activity [facility])  Assessment & Plan  · Patient reported 3 falls since recent discharge  · The first one she reports falling onto her knees while going into the house after her ENT appointment Friday  · The second one she reports falling onto her knees while going into the bathroom at home Saturday  · The third one she reports falling forward and being able to hear those around her but was unable to respond  She reports this lasted for approximately 1 minute witnesses told her  Also report of bilateral hand shaking and possible bowel incontinence during the event  · Neurology following, appreciate their ongoing recommendations   Concern for POTS and convulsive syncope   · Discussed with Neurology regarding possible POTS, Cardiology consulted  · CT head this admission with the size of the ventricles not significantly changes when compared to a recent CT brain dated August 8, 2021 per the results report  · EEG Pending  · Continue seizure precautions  · Neurosurgery following  · PT/OT evaluations pending  · Fall precautions on board    Cerebellopontine angle tumor [facility])  Assessment & Plan  · Patient was recently admitted 8/8/21-8/12/21  · She was seen by Neurosurgery during recent admission and was placed on steroid taper and Diamox  · Neurosurgery consult appreciated  · Decadron was increased to 3 mg q 6 hours  · Continue current dose Diamox per neurosurgery  · No immediate need for neurosurgical intervention per Neurosurgery with ultimate plan for surgical resection as joint case between Neurosurgery and ENT, timing to be determined  · Patient followed up with ENT after recent discharge  · CT head this admission as above    Hydrocephalus (HCC)  Assessment & Plan  · Continue with Diamox   · Neurology and neurosurgery following  · Plan as per above    Hypertension  Assessment & Plan  · Continue losartan 50 mg daily with hold parameters  · Monitor blood pressure per protocol    Anxiety  Assessment & Plan  · Continue Lexapro    Leukocytosis  Assessment & Plan  · Suspect steroid-induced  · Monitor     Headache  Assessment & Plan  · Neurology and neurosurgery following  · CT head on admission as above  · Steroids were increased on admission per neurosurgery recommendations  · On Diamox as above  · On IV magnesium, tizanidine, Depacon  Valium also on board p r n  VTE Pharmacologic Prophylaxis: VTE Score: 4 Moderate Risk (Score 3-4) - Pharmacological DVT Prophylaxis Ordered: enoxaparin (Lovenox)  Patient Centered Rounds: I performed bedside rounds with nursing staff today  Discussions with Specialists or Other Care Team Provider: Neurology AP    Education and Discussions with Family / Patient: Patient declined call to   Time Spent for Care: 20 minutes  More than 50% of total time spent on counseling and coordination of care as described above  Current Length of Stay: 1 day(s)  Current Patient Status: Inpatient   Certification Statement: The patient will continue to require additional inpatient hospital stay due to ongoing headache, ongoing Neurolosy and Neurosx recs  Discharge Plan: pending improvement in symptoms and Neurology/Neurosurgery clearance    Code Status: Level 1 - Full Code    Subjective:   Ms Kari Sethi reports a headache this AM and having her EEG    She denies chest pain, shortness of breath, abdominal pain, difficulty going to the bathroom    Objective:     Vitals:   Temp (24hrs), Av °F (36 7 °C), Min:97 8 °F (36 6 °C), Max:98 2 °F (36 8 °C)    Temp:  [97 8 °F (36 6 °C)-98 2 °F (36 8 °C)] 98 1 °F (36 7 °C)  HR:  [] 67  Resp:  [17-20] 18  BP: (118-149)/(68-85) 128/80  SpO2:  [94 %-98 %] 94 %  Body mass index is 26 88 kg/m²  Input and Output Summary (last 24 hours): Intake/Output Summary (Last 24 hours) at 8/16/2021 1048  Last data filed at 8/15/2021 1708  Gross per 24 hour   Intake 120 ml   Output --   Net 120 ml       Physical Exam:   Physical Exam  Vitals and nursing note reviewed  Constitutional:       Comments: Patient seen sitting in bed comfortably resting  No acute distress   Cardiovascular:      Rate and Rhythm: Normal rate and regular rhythm  Pulmonary:      Effort: Pulmonary effort is normal  No respiratory distress  Breath sounds: Normal breath sounds  Abdominal:      General: Bowel sounds are normal       Palpations: Abdomen is soft  Tenderness: There is no abdominal tenderness  Musculoskeletal:      Right lower leg: No edema  Left lower leg: No edema  Skin:     General: Skin is warm  Neurological:      Mental Status: She is alert and oriented to person, place, and time     Psychiatric:         Mood and Affect: Mood normal          Behavior: Behavior normal          Additional Data:     Labs:  Results from last 7 days   Lab Units 08/16/21  0557 08/14/21  2108   WBC Thousand/uL 12 04* 14 53*   HEMOGLOBIN g/dL 15 0 14 4   HEMATOCRIT % 45 1 43 4   PLATELETS Thousands/uL 357 377   NEUTROS PCT %  --  70   LYMPHS PCT %  --  14   MONOS PCT %  --  15*   EOS PCT %  --  0     Results from last 7 days   Lab Units 08/16/21  0558   SODIUM mmol/L 137   POTASSIUM mmol/L 3 9   CHLORIDE mmol/L 110*   CO2 mmol/L 20*   BUN mg/dL 25   CREATININE mg/dL 0 94   ANION GAP mmol/L 7   CALCIUM mg/dL 9 3   GLUCOSE RANDOM mg/dL 113                       Lines/Drains:  Invasive Devices     Peripheral Intravenous Line            Peripheral IV 08/15/21 Right Antecubital 1 day                      Imaging: Reviewed radiology reports from this admission including: CT head    Recent Cultures (last 7 days):         Last 24 Hours Medication List:   Current Facility-Administered Medications   Medication Dose Route Frequency Provider Last Rate    acetaminophen  650 mg Oral Q6H PRN Melva Child, DO      acetaZOLAMIDE  125 mg Oral BID Melva Child, DO      cyclobenzaprine  10 mg Oral TID PRN Melva Child, DO      dexamethasone  3 mg Oral Q6H [address] Minnie Mario PA-C      diazepam  2 5 mg Intravenous Q6H PRN Stephane Shore PA-C      enoxaparin  40 mg Subcutaneous Daily Melva Child, DO      escitalopram  5 mg Oral Daily Melva Child, DO      losartan  50 mg Oral Daily Melva Child, DO      magnesium sulfate  2 g Intravenous Q24H [address] Maria Elena Pham PA-C      ondansetron  4 mg Intravenous Q6H PRN Melva Child, DO      oxyCODONE  5 mg Oral Q6H PRN Melva Child, DO      tiZANidine  2 mg Oral TID Maria Elena Pham PA-C      valproate (DEPACON) IVPB  1,000 mg Intravenous Q24H [address] Maria Elena Pham PA-C 1,000 mg (08/16/21 1012)        Today, Patient Was Seen By: Minnie Mario PA-C    **Please Note: This note may have been constructed using a voice recognition system  **

## 2021-08-16 NOTE — PROGRESS NOTES
[address] [facility]  Progress Note - Mojgan Wallace 1965, 55 y o  female MRN: [id number]  Unit/Bed#: [facility] 705-01 Encounter: [id number]  Primary Care Provider: Isabel Newman MD   Date and time admitted to hospital: 8/14/2021  7:56 PM    Hydrocephalus [facility])  Assessment & Plan  · Evident on MRI brain and CT head  On repeat CT head hydrocephalus appears stable  · No intervention at this time as it appears to be communicating rather than obstructive  · Continue Diamox 125mg BID at this time  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HCC)  Assessment & Plan  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 dated August 5, 2021    Plan:   · Continue monitor neurological exam  · STAT CT head with any neurological decline including drop GCS of 2pts within 1 hr   · Patient was placed on decadron 4mg Q6 to taper to a 2Q12H dose for maintenance until surgery  Patient currently on 2 mg q 6 hours, increased to 3 mg q 6 hours to see if this helps with patient's symptoms  · Continue Diamox 125mg BID in attempt to decrease CSF production to improve her symptoms profile  · Medical management and pain control per primary team  · EEG pending  · Neurology following, input appreciated  · On depacon 1,000 mg daily for seizures  · Mobilize with PT/OT    · DVT prophylaxis:  Bilateral SCDs  Lovenox  · Tentative plan for surgical resection as joint case with neurosurgery and ENT on Wednesday 8/16/21  Neurosurgery will continue to follow closely, call with any further questions or concerns  * Fall with possible seizure-like activity ([address] )  Assessment & Plan  · EEG pending  · See above plan      Subjective/Objective   Chief Complaint: "I'm really having a hard time walking "    Subjective: States attempted to ambulate with walker and PT to doorway  Was able to do so with difficulty  Continues to feel as though her legs keep "giving out " Denies any visual disturbance  Persistent hearing loss to left ear  No headaches  Feels like "I knew something was wrong "    Objective: Exam non-focal  Slight right tongue deviation  Left ear hearing loss  NAD  Resting in recliner  I/O       08/14 0701 - 08/15 0700 08/15 0701 - 08/16 0700 08/16 0701 - 08/17 0700    P  O   120     Total Intake(mL/kg)  120 (1 6)     Net  +120                  Invasive Devices     Peripheral Intravenous Line            Peripheral IV 08/15/21 Right Antecubital 1 day                Physical Exam:  Vitals: Blood pressure 133/83, pulse 67, temperature 98 1 °F (36 7 °C), resp  rate 18, height 5' 5 5" (1 664 m), weight 74 4 kg (164 lb 0 4 oz), SpO2 94 %, not currently breastfeeding  ,Body mass index is 26 88 kg/m²        General appearance: alert, appears stated age, cooperative and no distress  Head: Normocephalic, without obvious abnormality, atraumatic  Eyes: EOMI, PERRL  Neck: supple, symmetrical, trachea midline and NT  Back: no kyphosis present, no tenderness to percussion or palpation  Lungs: non labored breathing  Heart: regular heart rate  Neurologic:   Mental status: Alert, oriented x3, thought content appropriate  Cranial nerves: grossly intact (Cranial nerves II-XII), left ear hearing loss, right tongue deviation  Sensory: normal to LT  Motor: moving all extremities without focal weakness, strength grossly 5/5  Coordination: mild bilateral dysmetria      Lab Results:  Results from last 7 days   Lab Units 08/16/21  0557 08/15/21  0511 08/14/21  2108   WBC Thousand/uL 12 04* 10 33* 14 53*   HEMOGLOBIN g/dL 15 0 13 9 14 4   HEMATOCRIT % 45 1 41 4 43 4   PLATELETS Thousands/uL 357 355 377   NEUTROS PCT %  --   --  70   MONOS PCT %  --   --  15*     Results from last 7 days   Lab Units 08/16/21  0558 08/15/21  0511 08/14/21  2108   POTASSIUM mmol/L 3 9 4 7 3 7   CHLORIDE mmol/L 110* 111* 111*   CO2 mmol/L 20* 19* 18*   BUN mg/dL 25 25 27*   CREATININE mg/dL 0 94 0 87 1 05   CALCIUM mg/dL 9 3 9 4 9 2                 No results found for: TROPONINT  ABG:No results found for: PHART, AXN5VMV, PO2ART, NDG5GRD, P4VTHTIA, BEART, SOURCE    Imaging Studies: I have personally reviewed pertinent reports  and I have personally reviewed pertinent films in PACS    CT head without contrast    Result Date: 8/14/2021  Impression: Moderate hydrocephalus with the size of the ventricles not significantly changed when compared to a recent CT brain dated August 8, 2021  Again, the findings are likely obstructive in nature or secondary to a 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 dated August 5, 2021  Workstation performed: [id number]       EKG, Pathology, and Other Studies: I have personally reviewed pertinent reports        VTE Pharmacologic Prophylaxis: Sequential compression device (Venodyne)  and Enoxaparin (Lovenox)    VTE Mechanical Prophylaxis: sequential compression device

## 2021-08-16 NOTE — PROGRESS NOTES
PT SEEN BY PT THEN ABSOLUTELY INSISTANT ON GETTING A SHOWER IV SITE COVERED ASSISTED BY NURSE WATER LUKE WARM AND LITERALLY JUST GOT INTO SHOWER WHEN PT FELT TERRIBLY DIZZY AND FELL OVER ON SHOWER TO RT SIDE SUPPORTED BY NURSE, IMMEDIATELY ASSISTED PT BACK INTO BED AND SHE NOW UNDERSTANDS THAT THIS IS NOT AN OPTION AT THIS TIME, WASHED HER HAIR WITH SHOWER CAP AND SLIM MADE AWARE OF ABOVE, BED ALARMED

## 2021-08-16 NOTE — CASE MANAGEMENT
LOS: Day 1  Bundle: pt is not a bundle  Readmission risk: pt IS a 30 day readmit - last d/c on 8/12    Pt reported her spouse Nito Velarde ([phone]) as her emergency contact  Pt reported that she and her spouse live in a multi-level home, family uses the first floor only - [address]  Pt reported that she was IPTA with ADLs, pt has a license, but spouse does the driving  Pt denied hx of VNA and confirmed hx of OPPT with [facility] and [facility]  PCP is Dr Carlos Arriaga; pharmacy of choice is [facility] in [place]  Pt denied hx of MH or D&A  No LW/POA on file  Spouse to transport home  CM reviewed d/c planning process including the following: identifying help at home, patient preference for d/c planning needs, Discharge Lounge, [facility] Meds to Bed program, availability of treatment team to discuss questions or concerns patient and/or family may have regarding understanding medications and recognizing signs and symptoms once discharged  CM also encouraged patient to follow up with all recommended appointments after discharge  Patient advised of importance for patient and family to participate in managing patients medical well being

## 2021-08-16 NOTE — UTILIZATION REVIEW
UNABLE TO COMPLETE [facility] ENTRY : PHYSICIAN / PROVIDER  INFORMATION NOT RECOGNIZED  Initial Clinical Review    Admission: Date/Time/Statement:   Admission Orders (From admission, onward)     Ordered        08/15/21 0032  Inpatient Admission  Once                   Orders Placed This Encounter   Procedures    Inpatient Admission     Standing Status:   Standing     Number of Occurrences:   1     Order Specific Question:   Level of Care     Answer:   Med Surg [16]     Order Specific Question:   Estimated length of stay     Answer:   More than 2 Midnights     Order Specific Question:   Certification     Answer:   I certify that inpatient services are medically necessary for this patient for a duration of greater than two midnights  See H&P and MD Progress Notes for additional information about the patient's course of treatment  ED Arrival Information     Expected Arrival Acuity    - 8/14/2021 19:55 Urgent         Means of arrival Escorted by Service Admission type    [facility] [address] [facility] EMS Hospitalist Urgent         Arrival complaint    seizure        Chief Complaint   Patient presents with    Seizure - New Onset     Recently diagnosed with brain tumor  Pt stood up states her legs gave out, lowered self to floor  Family reports seizure like activity  Initial Presentation:     55year old female presents to ed from home via ems for evaluation and treatment of seizure like activity  She has had 3 falls since her previous admission and has had headaches  Patient who was recently diagnosed with a brain tumor reports weakness and legs gave out  Clinical assessment significant for unsteady gait and headache  CT shows hydrocephalus  Findings are likely obstructive in nature described as acoustic neuroma  Treated in ed with po decadron  Iv mag so4, iv valproate, iv fluids  Admit to inpatient medical surgical for fall with possible seizure   Plan to consult neurology and neuro surgery, neuro checks q4 hr, PT/OT evaluations  Date: 8-15-21    Day 2: med surg inpatient  Patient with gait instability with walker, right tongue slight deviation and left hearing loss  Routine EEG today was normal   Orthostatic bp ordered and cardiology consult  Consult Neurology   Currently she is not encephalopathic  No focal neurologic deficits  She is fully oriented and able to explain the onset of her symptoms  She has what appears to be 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 side of her neck into her head with acompanying development of migrainous features  She also has the recently diagnosed left acoustic mass likely causing compression on vestibular nerve for months resulting in balance issues  Also the stable hydrocephalus may also be contributing to the balance issues  She was unsteady on gait exam with cautious widened gait and felt more wobbly when turning to the left  She also endorses events of slightheadedness and tunnel vision when standing and symptoms improve shorlty  Full orthostats needed not just laying/seating positions  While initiating walking her heart rate became tachycardic into 120s  Further eval needed including for POTS  At this point strong suspicion for convulsive syncope however exact etiology uncertain  Plan:  - Routine EEG pending   - Will defer AEDs at this time   - PT/OT  - Orthostatic blood pressures pending     Consult neuro surgery   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HCC)  Assessment & Plan  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  · Patient originally presented on 8/8/21with headaches, vertigo and hearing loss  · Patient presented yesterday with transient LOC after fall with questionable seizure-like activity  · Questionable seizure-like activity unlikely caused by hydrocephalus or CP angle mass     Imaging:      CT head without 8/14/21: Moderate hydrocephalus with the size of the ventricles not significantly changed when compared to a recent CT brain dated August 8, 2021   Again, the findings are likely obstructive in nature or secondary to a 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 dated August 5, 2021     Plan:   · Continue monitor neurological exam  · STAT CT head with any neurological decline including drop GCS of 2pts within 1 hr   · Patient was placed on decadron 4mg Q6 to taper to a 2Q12H dose for maintenance until surgery  Patient currently on 2 mg q 6 hours, increased to 3 mg q 6 hours to see if this helps with patient's symptoms  · Continue Diamox 125mg BID in attempt to decrease CSF production in attempt to improve her symptoms profile  · Medical management pain control per primary team  · EEG pending  · Neurology following, input appreciated  · Mobilize as tolerated with assistance  · DVT prophylaxis:  Bilateral SCDs  Lovenox  · Patient did have audiogram on 08/13/2021: "Left ear with mild to severe SNHL between the mid and high frequencies  Right ear with mild high frequency SNHL  Word recognition left, 24% at 90 dB, right 100 % at 60 dB  Tympanograms type A bilaterally"  · Patient was scheduled for outpatient follow-up with Dr Bola [place] on 8/20/2021  · No immediate need for neurosurgical intervention  Ultimate plan for surgical resection as joint case with neurosurgery and ENT  Surgery TBD      ED Triage Vitals   08/14/21 1958 08/14/21 1958 08/14/21 1958 08/14/21 1958 08/14/21 1958   97 9 °F (36 6 °C) 76 18 135/82 98 %      Tympanic Monitor         No Pain          08/15/21 74 4 kg (164 lb 0 4 oz)     Additional Vital Signs:     Date/Time  Temp  Pulse  Resp  BP  MAP   SpO2  O2 Device   08/16/21 15:17:23  97 7 °F (36 5 °C)  64  18  121/77  92  95 %  --   08/16/21 1100  --  --  --  --  --  --  --   08/16/21 1059  --  --  --  133/83   --  --  --   08/16/21 1058  --  --  --  130/81   --  --  --   08/16/21 05:38:43  --  67  --  128/80  96  94 %  --   08/15/21 22:15:56  98 1 °F (36 7 °C)  62  18 138/77  97  96 %  --   08/15/21 1800  --  --  --  --  --  --  None (Room air)   08/15/21 17:08:01  97 8 °F (36 6 °C)  64  18  149/85  106  96 %  --   08/15/21 1522  --  66  18  124/78  --  96 %  --   08/15/21 1436  --  117  Abnormal   20  --  --  --  --   08/15/21 1431  --  74  18  124/81  --  --  --   08/15/21 1429  --  69  18  118/68  --  --  --   08/15/21 1100  98 2 °F (36 8 °C)  82  17  122/68  --  98 %  --   08/15/21 0852  --  68  16  167/79  --  97 %  None (Room air)   08/15/21 0600  --  60  17  151/80  108  96 %  None (Room air)   08/15/21 0345  --  60  16  151/83  109  95 %  None (Room air)   08/15/21 0330  --  60  20  151/83  --  98 %  None (Room air)   08/14/21 2237  --  58  18  146/85  109  98 %  --   08/14/21 2115  --  65  18  144/84  109  98 %  None (Room air)   08/14/21 2015  --  74  --  145/93  114  97 %  --         Date and Time Eye Opening Best Verbal Response Best Motor Response Thomas Coma Scale Score   08/16/21 1200 4 5 6 15   08/16/21 0800 4 5 6 15   08/16/21 0400 4 5 6 15   08/16/21 0000 4 5 6 15   08/15/21 2000 4 5 6 15   08/15/21 1800 4 5 6 15   08/15/21 1314 4 5 6 15   08/15/21 0800 4 5 6 15   08/15/21 0600 4 5 6 15   08/15/21 0536 4 5 6 15   08/15/21 0330 4 5 6 15   08/15/21 0000 4 5 6 15   08/14/21 1959 4 5 6 15         Pertinent Labs/Diagnostic Test Results:     CT head without contrast   Final  (08/14 2315)      Moderate hydrocephalus with the size of the ventricles not significantly changed when compared to a recent CT brain dated August 8, 2021  Again, the findings are likely obstructive in nature or secondary to a 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 dated August 5, 2021                 Results from last 7 days   Lab Units 08/16/21  0557 08/15/21  0511 08/14/21  2108 08/11/21  0502   WBC Thousand/uL 12 04* 10 33* 14 53* 9 91   HEMOGLOBIN g/dL 15 0 13 9 14 4 13 6   HEMATOCRIT % 45 1 41 4 43 4 42 2   PLATELETS Thousands/uL 357 355 377 326   NEUTROS ABS Thousands/µL  --   --  10 27*  --          Results from last 7 days   Lab Units 08/16/21  0558 08/15/21  0511 08/14/21 2108 08/11/21  0502   SODIUM mmol/L 137 136 137 137   POTASSIUM mmol/L 3 9 4 7 3 7 3 9   CHLORIDE mmol/L 110* 111* 111* 110*   CO2 mmol/L 20* 19* 18* 20*   ANION GAP mmol/L 7 6 8 7   BUN mg/dL 25 25 27* 20   CREATININE mg/dL 0 94 0 87 1 05 1 02   EGFR ml/min/1 73sq m 69 75 60 62   CALCIUM mg/dL 9 3 9 4 9 2 10 1             Results from last 7 days   Lab Units 08/16/21  0558 08/15/21  0511 08/14/21 2108 08/11/21  0502   GLUCOSE RANDOM mg/dL 113 105 87 118       ED Treatment:   Medication Administration from 08/14/2021 1955 to 08/15/2021 1636       Date/Time Order Dose Route Action     08/15/2021 0848 acetaZOLAMIDE (DIAMOX) tablet 125 mg 125 mg Oral Given     08/15/2021 0848 escitalopram (LEXAPRO) tablet 5 mg 5 mg Oral Given     08/15/2021 0848 losartan (COZAAR) tablet 50 mg 50 mg Oral Given     08/15/2021 1610 oxyCODONE (ROXICODONE) IR tablet 5 mg 5 mg Oral Given     08/15/2021 0336 dexamethasone (DECADRON) tablet 2 mg 2 mg Oral Given     08/15/2021 0850 enoxaparin (LOVENOX) subcutaneous injection 40 mg 40 mg Subcutaneous Given     08/15/2021 1159 dexamethasone (DECADRON) tablet 3 mg 3 mg Oral Given     08/15/2021 1159 tiZANidine (ZANAFLEX) tablet 2 mg 2 mg Oral Given     08/15/2021 1123 magnesium sulfate 2 g/50 mL IVPB (premix) 2 g 2 g Intravenous New Bag     08/15/2021 1225 valproate (DEPACON) 1,000 mg in sodium chloride 0 9 % 50 mL IVPB 1,000 mg Intravenous New Bag        Past Medical History:   Diagnosis Date    Hypertension      Present on Admission:   Fall with possible seizure-like activity ([facility] )   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[facility] )   Hydrocephalus ([facility] )   Hypertension   Anxiety      Admitting Diagnosis:     Obstructive hydrocephalus ([facility] ) [G91 1]  Seizure ([facility] ) [R56 9]  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[facility] ) [D33 3]  Seizure-like activity ([facility] ) [R56 9]  Injury, unspecified, initial encounter [T14 90XA]    Age/Sex: 55 y o  female    Scheduled Medications:    acetaZOLAMIDE, 125 mg, Oral, BID  dexamethasone, 3 mg, Oral, Q6H RONNIE  enoxaparin, 40 mg, Subcutaneous, Daily  escitalopram, 5 mg, Oral, Daily  losartan, 50 mg, Oral, Daily  magnesium sulfate, 2 g, Intravenous, Q24H [address]  tiZANidine, 2 mg, Oral, TID  valproate (DEPACON) IVPB, 1,000 mg, Intravenous, Q24H [address]      Continuous IV Infusions:     PRN Meds:  acetaminophen, 650 mg, Oral, Q6H PRN  cyclobenzaprine, 10 mg, Oral, TID PRN  diazepam, 2 5 mg, Intravenous, Q6H PRN  ondansetron, 4 mg, Intravenous, Q6H PRN  oxyCODONE, 5 mg, Oral, Q6H PRN        IP CONSULT TO NEUROSURGERY  IP CONSULT TO NEUROLOGY  IP CONSULT TO CARDIOLOGY    Network Utilization Review Department  ATTENTION: Please call with any questions or concerns to [phone] and carefully listen to the prompts so that you are directed to the right person  All voicemails are confidential   Lam Cook all requests for admission clinical reviews, approved or denied determinations and any other requests to dedicated fax number below belonging to the campus where the patient is receiving treatment   List of dedicated fax numbers for the Facilities:  [address] DENIALS (Administrative/Medical Necessity) [phone]   [address] (Maternity/NICU/Pediatrics) [phone]   [address] [place] [address] Dr Bhavna Head [id number] [place] [address] [address]ardo [address]es [address]ek[address] [facility] [place] [address] [phone]

## 2021-08-16 NOTE — OCCUPATIONAL THERAPY NOTE
Occupational Therapy Evaluation     Patient Name: Nancy KELSEY'S Date: 8/16/2021  Problem List  Principal Problem:    Fall with possible seizure-like activity ([facility] )  Active Problems:    Anxiety    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[facility] )    Hydrocephalus ([facility] )    Hypertension    Leukocytosis    Headache    Past Medical History  Past Medical History:   Diagnosis Date    Hypertension      Past Surgical History  History reviewed  No pertinent surgical history  08/16/21 1042   OT Last Visit   OT Visit Date 08/16/21   Note Type   Note type Evaluation   Restrictions/Precautions   Weight Bearing Precautions Per Order No   Other Precautions Chair Alarm; Bed Alarm; Fall Risk;Multiple lines   Pain Assessment   Pain Assessment Tool 0-10   Pain Score No Pain   Home Living   Type of Home House   Home Layout Multi-level;Performs ADLs on one level; Able to live on main level with bedroom/bathroom;Stairs to enter with rails   Bathroom Shower/Tub Tub only   [address]; Shower chair   [address] Walker   Additional Comments Pt reports living in a 3SH with 1STE-able to live on main floor-utilized a walker PTA   Prior Function   Level of [place] Independent with ADLs and functional mobility; Needs assistance with IADLs   Lives With Spouse; Family   Receives Help From Family   ADL Assistance Independent   IADLs Needs assistance   Falls in the last 6 months >10   Vocational Unemployed   Comments Pt reports living with , daughter, daughter's boyfriend, and grandson-reports not driving anymore - manages her own meds and finances- 10+ falls in past 6 months 2* seizures   Lifestyle   Autonomy independent in ADLs, walker use for functional mobility PTA   Reciprocal Relationships supportive family    Service to Others unemployed   [address] enjoys spending time with family   Psychosocial   Psychosocial (WDL) WDL   Subjective Subjective "I am worried about being alone at home"   ADL   Where Assessed Edge of bed   Eating Assistance 5  Supervision/Setup   Grooming Assistance 5  Supervision/Setup   UB Bathing Assistance 5  Supervision/Setup   LB Bathing Assistance 5  Supervision/Setup   UB Dressing Assistance 5  Supervision/Setup   LB Dressing Assistance 5  Supervision/Setup  (socks)   [address]  5  Supervision/Setup   Bed Mobility   Supine to Sit 5  Supervision   Sit to Supine 5  Supervision   Additional Comments Pt lying supine in bed upon OT arrival, Pt left in chair with food tray for lunch, call bell in reach, and all needs met   Transfers   Sit to Stand 5  Supervision   Stand to Sit 5  Supervision   Additional Comments Pt utilized RW for functional transfers   Functional Mobility   Functional Mobility 5  Supervision   Additional Comments Pt utilized RW for functional mobility- from bed to chair   Additional items Rolling walker   Balance   Static Sitting Fair   Dynamic Sitting Fair   Static Standing Fair -   Dynamic Standing Fair -   Ambulatory Fair -   Activity Tolerance   Activity Tolerance Patient tolerated treatment well   Medical Staff Made Aware OT [place] cleared session   RUE Assessment   RUE Assessment WFL  (for transfers, mobility with RW, and LB dressing)   LUE Assessment   LUE Assessment WFL  (for transfers, mobility with RW, and LB dressing)   Hand Function   Gross Motor Coordination Functional   Fine Motor Coordination Functional  (gripping and manipulating socks)   Cognition   Overall Cognitive Status WFL   Arousal/Participation Alert; Responsive; Cooperative   Attention Within functional limits   Orientation Level Oriented X4   Memory Within functional limits   Following Commands Follows all commands and directions without difficulty   Comments Pt agreeable and cooperative to work with therapy   Assessment   Limitation Decreased high-level ADLs; Decreased endurance   Prognosis Good Assessment Pt is a 55 y o  female [facility] on [facility]/14[facility]2021 with Seizure-like activity ([address] ), Hypertension, anxiety, cerebellopontine angle tumor, hydrocephalus, leukocytosis, and headache  Per neurosurgery consult, "No immediate need for neurosurgical intervention  Ultimate plan for surgical resection as joint case with neurosurgery and ENT  Surgery TBD "  PTA, Pt's baseline was Independent in ADLs and functional mobility (with use of RW)  Pt reports she does not drive anymore- manages her meds and finances independently  Pt lives at home with her , daughter, daughter's boyfriend, and grandson in a 3 story house with 1  GEORGES  Upon OT eval, Pt is currently Supervision with ADLs and functional mobility (with use of RW)  Pt's BP was taken supine (130/81), EOB (133/83), standing (114/89)- values reported to RN  Pt is demonstrating deficits including decreased high-level ADLs  Pt has supportive family/friends that can assist prn  Continue plan of care to increase independence and safety in ADLs/IADLs  Goals   Patient Goals to go home   Plan   Treatment Interventions Compensatory technique education; Energy conservation;Patient/family training   Goal Expiration Date 08/19/21   OT Treatment Day 1   OT Frequency 3-5x/wk   Additional Treatment Session   Start Time 1350   End Time 5315   Treatment Assessment Pt seen for 1 Tx session for education on fall prevention/safety, LifeAlert, and compensatory techniques for ADLs/IADLs  Pt receptive to education, OT Toolkit handouts provided to Pt  No further OT needs indicated at this time- Home with social support- No AD/DME needed at this time- Pt owns and utilizes walker, commode, and shower chair  Pt ok to d/c when medically cleared  D/c from caseload     Recommendation   OT Discharge Recommendation No rehabilitation needs   OT - OK to Discharge Yes  (when medically cleared)   AM-PAC Daily Activity Inpatient   Lower Body Dressing 4   Bathing 4 Toileting 4   Upper Body Dressing 4   Grooming 4   Eating 4   Daily Activity Raw Score 24   Daily Activity Standardized Score (Calc for Raw Score >=11) 57 54   AM-[facility] Applied Cognition Inpatient   Following a Speech/Presentation 4   Understanding Ordinary Conversation 4   Taking Medications 4   Remembering Where Things Are Placed or Put Away 4   Remembering List of 4-5 Errands 4   Taking Care of Complicated Tasks 4   Applied Cognition Raw Score 24   Applied Cognition Standardized Score 62 21       The patient's raw score on the AM-PAC Daily Activity inpatient short form is 24, standardized score is 57 54, greater than 39 4  Patients at this level are likely to benefit from discharge to home  Please refer to the recommendation of the Occupational Therapist for safe discharge planning      Jamia Frias, OTS

## 2021-08-16 NOTE — PLAN OF CARE
Problem: PAIN - ADULT  Goal: Verbalizes/displays adequate comfort level or baseline comfort level  Description: Interventions:  - Encourage patient to monitor pain and request assistance  - Assess pain using appropriate pain scale  - Administer analgesics based on type and severity of pain and evaluate response  - Implement non-pharmacological measures as appropriate and evaluate response  - Consider cultural and social influences on pain and pain management  - Notify physician/advanced practitioner if interventions unsuccessful or patient reports new pain  Outcome: Progressing     Problem: SAFETY ADULT  Goal: Patient will remain free of falls  Description: INTERVENTIONS:  - Educate patient/family on patient safety including physical limitations  - Instruct patient to call for assistance with activity   - Consult OT/PT to assist with strengthening/mobility   - Keep Call bell within reach  - Keep bed low and locked with side rails adjusted as appropriate  - Keep care items and personal belongings within reach  - Initiate and maintain comfort rounds  - Make Fall Risk Sign visible to staff  - Offer Toileting every 2 Hours, in advance of need  - Initiate/Maintain bedalarm  - Obtain necessary fall risk management equipment:   - Apply yellow socks and bracelet for high fall risk patients  - Consider moving patient to room near nurses station  Outcome: Progressing  Goal: Maintain or return to baseline ADL function  Description: INTERVENTIONS:  -  Assess patient's ability to carry out ADLs; assess patient's baseline for ADL function and identify physical deficits which impact ability to perform ADLs (bathing, care of mouth/teeth, toileting, grooming, dressing, etc )  - Assess/evaluate cause of self-care deficits   - Assess range of motion  - Assess patient's mobility; develop plan if impaired  - Assess patient's need for assistive devices and provide as appropriate  - Encourage maximum independence but intervene and supervise when necessary  - Involve family in performance of ADLs  - Assess for home care needs following discharge   - Consider OT consult to assist with ADL evaluation and planning for discharge  - Provide patient education as appropriate  Outcome: Progressing  Goal: Maintains/Returns to pre admission functional level  Description: INTERVENTIONS:  - Perform BMAT or MOVE assessment daily    - Set and communicate daily mobility goal to care team and patient/family/caregiver  - Collaborate with rehabilitation services on mobility goals if consulted  - Perform Range of Motion 3 times a day  - Reposition patient every 3 hours  - Dangle patient 3 times a day  - Stand patient 3 times a day  - Ambulate patient 3 times a day  - Out of bed to chair 3 times a day   - Out of bed for meals 3 times a day  - Out of bed for toileting  - Record patient progress and toleration of activity level   Outcome: Progressing     Problem: DISCHARGE PLANNING  Goal: Discharge to home or other facility with appropriate resources  Description: INTERVENTIONS:  - Identify barriers to discharge w/patient and caregiver  - Arrange for needed discharge resources and transportation as appropriate  - Identify discharge learning needs (meds, wound care, etc )  - Arrange for interpretive services to assist at discharge as needed  - Refer to Case Management Department for coordinating discharge planning if the patient needs post-hospital services based on physician/advanced practitioner order or complex needs related to functional status, cognitive ability, or social support system  Outcome: Progressing     Problem: Knowledge Deficit  Goal: Patient/family/caregiver demonstrates understanding of disease process, treatment plan, medications, and discharge instructions  Description: Complete learning assessment and assess knowledge base    Interventions:  - Provide teaching at level of understanding  - Provide teaching via preferred learning methods  Outcome: Progressing     Problem: MOBILITY - ADULT  Goal: Maintain or return to baseline ADL function  Description: INTERVENTIONS:  -  Assess patient's ability to carry out ADLs; assess patient's baseline for ADL function and identify physical deficits which impact ability to perform ADLs (bathing, care of mouth/teeth, toileting, grooming, dressing, etc )  - Assess/evaluate cause of self-care deficits   - Assess range of motion  - Assess patient's mobility; develop plan if impaired  - Assess patient's need for assistive devices and provide as appropriate  - Encourage maximum independence but intervene and supervise when necessary  - Involve family in performance of ADLs  - Assess for home care needs following discharge   - Consider OT consult to assist with ADL evaluation and planning for discharge  - Provide patient education as appropriate  Outcome: Progressing  Goal: Maintains/Returns to pre admission functional level  Description: INTERVENTIONS:  - Perform BMAT or MOVE assessment daily    - Set and communicate daily mobility goal to care team and patient/family/caregiver  - Collaborate with rehabilitation services on mobility goals if consulted  - Perform Range of Motion  times a day  - Reposition patient every  hours    - Dangle patient  times a day  - Stand patient  times a day  - Ambulate patient  times a day  - Out of bed to chair  times a day   - Out of bed for meals  times a day  - Out of bed for toileting  - Record patient progress and toleration of activity level   Outcome: Progressing

## 2021-08-16 NOTE — RESTORATIVE TECHNICIAN NOTE
Restorative Technician Note      Patient Name: Jovi Marks     Note Type: Mobility  Patient Position Upon Consult: Supine  Activity Performed: Ambulated;Dangled;Stood  Assistive Device: Roller walker  Education Provided: Yes (Educated/encouraged pt to ambulate with assistance 3-4 x's/day )  Patient Position at End of Consult: Supine; All needs within reach;Bed/Chair alarm activated    Dorothy BAH, Restorative Technician, [employer]

## 2021-08-16 NOTE — ASSESSMENT & PLAN NOTE
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 dated August 5, 2021    Plan:   · Continue monitor neurological exam  · STAT CT head with any neurological decline including drop GCS of 2pts within 1 hr   · Patient was placed on decadron 4mg Q6 to taper to a 2Q12H dose for maintenance until surgery  Patient currently on 2 mg q 6 hours, increased to 3 mg q 6 hours to see if this helps with patient's symptoms  · Continue Diamox 125mg BID in attempt to decrease CSF production to improve her symptoms profile  · Medical management and pain control per primary team  · EEG pending  · Neurology following, input appreciated  · On depacon 1,000 mg daily for seizures  · Mobilize with PT/OT  · DVT prophylaxis:  Bilateral SCDs  Lovenox  · Tentative plan for surgical resection as joint case with neurosurgery and ENT on Wednesday 8/16/21  Neurosurgery will continue to follow closely, call with any further questions or concerns

## 2021-08-16 NOTE — ASSESSMENT & PLAN NOTE
· Patient was recently admitted 8/8/21-8/12/21  · She was seen by Neurosurgery during recent admission and was placed on steroid taper and Diamox  · Neurosurgery consult appreciated  · Decadron was increased to 3 mg q 6 hours  · Continue current dose Diamox per neurosurgery  · No immediate need for neurosurgical intervention per Neurosurgery with ultimate plan for surgical resection as joint case between Neurosurgery and ENT, timing to be determined  · Patient followed up with ENT after recent discharge  · CT head this admission as above

## 2021-08-16 NOTE — PHYSICAL THERAPY NOTE
PHYSICAL THERAPY EVALUATION  NAME:  Monika Isaacs  DATE: 08/16/21    AGE:   55 y o  Mrn:   [id number]  ADMIT DX:  Obstructive hydrocephalus ([facility] [address] ) [G91 1]  Seizure ([facility] [address] ) [R56 9]  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[facility] [address] ) [D33 3]  Seizure-like activity ([facility] [address] ) [R56 9]  Injury, unspecified, initial encounter [T14 90XA]    Past Medical History:   Diagnosis Date    Hypertension      History reviewed  No pertinent surgical history  Length Of Stay: 1  PHYSICAL THERAPY EVALUATION :    08/16/21 1345   PT Last Visit   PT Visit Date 08/16/21   Note Type   Note type Evaluation   Pain Assessment   Pain Assessment Tool 0-10   Pain Score No Pain   Home Living   Type of [address] Multi-level;Stairs to enter with rails; Performs ADLs on one level; Able to live on main level with bedroom/bathroom  (has FFSU)   Bathroom Shower/Tub Tub only   Bathroom Equipment Commode; Shower chair   Bathroom Accessibility Accessible   Home Equipment Walker   Additional Comments lives w/ spouse who works days- [employer] [employer] [address]/ [address] and [address]; reports fall >10 "passing out" - recent d/c from inpt - was about to start OPPT (was readmitted priort o initiation of PT)- has 3 falls w/o injury since last admission    Prior Function   Level of [place] Independent with ADLs and functional mobility   Lives With Spouse; Family   Receives Help From Family   ADL Assistance Independent   IADLs Needs assistance   Falls in the last 6 months >10   Restrictions/Precautions   Weight Bearing Precautions Per Order No   Braces or Orthoses   (none )   Other Precautions Fall Risk;Multiple lines; Chair Alarm; Bed Alarm; Impulsive   General   Additional Pertinent History Patient is found her extensively educated on need to use call light and to have assistance with all mobility to prevent falls and injury both expressed agreement     Family/Caregiver Present Yes   Cognition   Overall Cognitive Status WFL   Attention Within functional limits   Orientation Level Oriented X4 Memory Within functional limits   Following Commands Follows all commands and directions without difficulty   Comments Patient noticed to be mildly impulsive required cues for safety and to slow down at times  Was able to follow directions and self-correct with cues  Remains motivated to participate with therapy  With highly focused on taking a shower today  (Info on request for shower related to nursing)   RUE Assessment   RUE Assessment WFL   LUE Assessment   LUE Assessment WFL   RLE Assessment   RLE Assessment WFL   LLE Assessment   LLE Assessment WFL   Coordination   Movements are Fluid and Coordinated 0   Coordination and Movement Description Slow guarded   Sensation   (Reports chronic numbness and tingling in fingertips and toes)   Light Touch   RLE Light Touch Grossly intact   LLE Light Touch Grossly intact   Sharp/Dull   RLE Sharp/Dull Grossly intact   LLE Sharp/Dull Grossly intact   Bed Mobility   Supine to Sit 5  Supervision   Sit to Supine 5  Supervision   Transfers   Sit to Stand 5  Supervision   Stand to Sit 5  Supervision   Stand pivot 5  Supervision   Additional Comments Patient reports mild dizziness on standing and with walking that resolves with seated positioning  Orthostatics negative   Ambulation/Elevation   Gait pattern Foward flexed; Shuffling;Decreased L stance;Decreased R stance;Decreased foot clearance;Narrow [place]; Improper Weight shift   Gait Assistance 4  Minimal assist   Additional items Assist x 1; Tactile cues; Verbal cues   Assistive Device Rolling walker   Distance 25   Balance   Static Sitting Good   Dynamic Sitting Fair +   Static Standing Fair -   Dynamic Standing Fair -   Ambulatory Poor +  (Rolling walker)   Activity Tolerance   Activity Tolerance Patient limited by fatigue;Treatment limited secondary to medical complications (Comment); Other (Comment)  (Dizziness loss of balance with ambulation )   Nurse Made Aware JADE Sawyer aware     Assessment   Prognosis Fair   Problem List Decreased strength;Decreased range of motion;Decreased endurance; Impaired balance;Decreased mobility; Impaired judgement;Decreased safety awareness; Impaired sensation   Assessment Pt is [address] [place] y o  female seen for PT evaluation s/p admit to [facility] on 8/14/2021  Pt presenting s/p repeat falls w/ possible seizure-like activity  Patient reports at least 3 falls since recent discharge home  Originally presented on 8/8/21with headaches, vertigo and hearing loss (per tympanogram left ear hearing at 24%)  CT head was positive for hydrocephalus that was unchanged from prior exams  Patient has a past medical history consisting of hypertension, hydrocephalus, cerebellar pontine angle tumor, hearing loss and multiple falls  Comorbidities affecting pt's physical performance at time of assessment listed above Personal factors affecting pt at time of IE include: steps to enter environment, limited home support / spouse works,  past experience,  Loss of independence inability to perform IADLs, inability to perform ADLs, inability to ambulate household distances w/o external assist, ; frustration w/ current health condition and adjustment to disability recent fall(s)  Due to acute medical issues, ongoing medical workup for primary dx; pain, fall risk, increased reliance on more restrictive AD compared to baseline;  decreased activity tolerance compared to baseline, increased assistance needed from caregiver at current time, continuous  Monitoring, pending neuro surgical plan;  multiple lines, decline in overall functional mobility status; health management issues; note unstable clinical picture (high complexity)   Prior to admission, pt reports living w/ spouse varsha  3 [address] w/ 1 [address] and 1st floor setup  Patient reports recently having to use rolling walker  Was recently discharged from the hospital and has experienced 3 or more falls since that time  Patient denies injury    Reports she was about to start outpatient physical therapy when she was readmitted to the hospital   Patient reports at baseline she is normally independent and is able to perform all ADLs independently    Currently pt  is requiring close supervision A for bed skills; close supervision for functional transfers and contact guard assist for ambulation w/ rolling walker patient with loss of balance and feeling weak and about to pass out patient requiring Min assist to transfer back to bed  Vitals were stable orthostatics were negative  RN Urbano Blanco) made aware  Pt presents functioning below baseline and currently w/ overall mobility deficits 2* to:  Dizziness and positional change with possible mild nystagmus noted  generalized weakness/ deconditioning; decreas   Barriers to Discharge Inaccessible home environment;Decreased caregiver support   Barriers to Discharge Comments falls/ alone at times while spouse works- w/c level as option for home discharge discussed 2* falls- pt is in agreement w/ plan and recommendation for w/c level when alone until progressed    Goals   Patient Goals To go home   STG Expiration Date 08/26/21   Short Term Goal #1 In 10 days pt will complete: 1) Bed mobility skills with MI to facilitate safe return to previous living environment 2) Functional transfers with MI  to facilitate safe return to previous living environment  3) Ambulation with least restrictive AD > 300 w/o LOB or dizziness / ' without LOB and stable vitals for safe ambulation in home/ community environment  4) Stair training up/ down 1 step/s with S rail/s for safe access to previous living environment  5) Improve balance grades by 1  6) w/ mobility and  management skills 300' level surfaces MI  Improve LE strength grades by 1 to increase independence w/ transfers and gait  7) LE HEP independently  8) PT for ongoing pt and family education; DME needs and D/C planning to promote highest level of function in least restrictive environment       PT Treatment Day 0 Plan   Treatment/Interventions ADL retraining;Functional transfer training;LE strengthening/ROM; Elevations; Therapeutic exercise; Endurance training;Cognitive reorientation;Patient/family training;Bed mobility; Equipment eval/education;Gait training; Compensatory technique education;Spoke to nursing;Spoke to case management   PT Frequency   (3-5x/wk )   Recommendation   PT Discharge Recommendation Home with outpatient rehabilitation   Equipment Recommended Wheelchair   Wheelchair Package Recommended Lightweight   Additional Comments bed alarm post session    AM-PAC Basic Mobility Inpatient   Turning in Bed Without Bedrails 4   Lying on Back to Sitting on Edge of Flat Bed 4   Moving Bed to Chair 4   Standing Up From Chair 4   Walk in Room 3   Climb 3-5 Stairs 2   Basic Mobility Inpatient Raw Score 21   Basic Mobility Standardized Score 45 55   Barthel Index   Feeding 10   Bathing 5   Grooming Score 5   Dressing Score 10   Bladder Score 10   Bowels Score 10   Toilet Use Score 10   Transfers (Bed/Chair) Score 10   Mobility (Level Surface) Score 0   Stairs Score 0   Barthel Index Score 70     Pt is 55 y o  female seen for PT evaluation s/p admit to [facility] on 8/14/2021  Pt presenting s/p repeat falls w/ possible seizure-like activity  Patient reports at least 3 falls since recent discharge home  Originally presented on 8/8/21with headaches, vertigo and hearing loss (per tympanogram left ear hearing at 24%)  CT head was positive for hydrocephalus that was unchanged from prior exams  Patient has a past medical history consisting of hypertension, hydrocephalus, cerebellar pontine angle tumor, hearing loss and multiple falls    Comorbidities affecting pt's physical performance at time of assessment listed above Personal factors affecting pt at time of IE include: steps to enter environment, limited home support / spouse works,  past experience,  Loss of independence inability to perform IADLs, inability to perform ADLs, inability to ambulate household distances w/o external assist, ; frustration w/ current health condition and adjustment to disability recent fall(s)  Due to acute medical issues, ongoing medical workup for primary dx; pain, fall risk, increased reliance on more restrictive AD compared to baseline;  decreased activity tolerance compared to baseline, increased assistance needed from caregiver at current time, continuous  Monitoring, pending neuro surgical plan;  multiple lines, decline in overall functional mobility status; health management issues; note unstable clinical picture (high complexity)   Prior to admission, pt reports living w/ spouse varsha  3 [address] w/ 1 [address] and 1st floor setup  Patient reports recently having to use rolling walker  Was recently discharged from the hospital and has experienced 3 or more falls since that time  Patient denies injury  Reports she was about to start outpatient physical therapy when she was readmitted to the hospital   Patient reports at baseline she is normally independent and is able to perform all ADLs independently    Currently pt  is requiring close supervision A for bed skills; close supervision for functional transfers and contact guard assist for ambulation w/ rolling walker patient with loss of balance and feeling weak and about to pass out patient requiring Min assist to transfer back to bed  Vitals were stable orthostatics were negative  JADE Culver) made aware  Pt presents functioning below baseline and currently w/ overall mobility deficits 2* to:  Dizziness and positional change with possible mild nystagmus noted  generalized weakness/ deconditioning; decreased endurance; decreased activity tolerance; decreased coordination; impaired balance; gait deviations; decreased safety awareness; SOB/SUNG; fatigue; impaired safety and judgement; limited insight into current deficits; bed/ chair alarms; multiple lines; hearing impairment     Pt currently at risk for falls  (Please find additional objective findings from PT assessment regarding body systems outlined above ) Pt will continue to benefit from skilled PT interventions to address stated impairments; to maximize functional potential; for ongoing pt/ family training; and DME needs  Anticipate that with continued progress pt to d/c home with family support and OPPT when medically cleared  Options of w/c level at home for safety when alone was discussed for safety- pt was agreeable w/ plan but ultimately hopeful that she will be "able to walk on her own w/o falling as 1" goal   Pt/ family agreeable to plan and goals as stated on evaluation  The patient's AM-PAC Basic Mobility Inpatient Short Form Raw Score is 21, Standardized Score is 45 55  A standardized score greater than 42 9 suggests the patient may benefit from discharge to home  Please also refer to the recommendation of the Physical Therapist for safe discharge planning      Jess Peralta, PT

## 2021-08-16 NOTE — PROGRESS NOTES
Progress Note - Neurology   Rachelle Fitzgerald 55 y o  female [id number]  Unit/Bed#: [facility] 705/[facility] 705-01    Assessment:    * Fall with possible seizure-like activity [facility])  Assessment & Plan  55year old female with recent dx of CP angle tumor, hydrocephalus, and HTN presented to the ED on 8/14 after a fall at home with possible seizure activity  Patient stood up from couch then recalls falling   noted her to be lying on the ground with a blank stare, with quivering of her fingers, unable to respond to him  She then began moving spontaneously, he sat her up, and then she began to respond normally  Entire episode lasted about 2 minutes  CTH stable compared to prior imaging in regards to the CP angle tumor and hydrocephalus  Labs unremarkable  She has had several falls recently with position changes, associated with tunnel vision and lightheadedness, but never demonstrated tremors or impaired ability to speak  The description of bilateral hand tremors with preserved consciousness (patient able to hear what was going on but not respond) is not consistent with generalized tonic clonic or complex partial seizures  Regardless, CP angle tumor is not cortical in nature, so unlikely to cause seizures  The hydrocephalus however can cause elevated ICP  Patient is already on Diamox 125 mg BID  Unclear etiology at this time  Suspect possibly secondary to convulsive syncope? Also could consider POTS as patient's resting heart rate noted to be in the 60s-70s, but elevated to 120s when attempting to have patient walk  Will await further workup  Plan:  - Routine EEG pending   - Will defer AEDs at this time   - Cardiology consult  - PT/OT  - Orthostatic blood pressures pending   - Patient states she recently stopped driving due to fear of having an episode while driving  Consider submitting [employer] form pending further work up    - Monitor neuro exam; notify with any changes  - Fall precautions  - Seizure precautions  - Medical management and supportive care per primary team  Correction of any metabolic or infectious disturbances  Headache  Assessment & Plan  Patient has been experiencing headaches since February  Described as originating from the right side of her neck and radiating upward, associated with photophobia  Currently, reports headache improved from 10/10 this morning to 6/10 after receiving medications  Denies any photophobia, phonophobia, nausea/vomiting  Plan:  - Magnesium sulfate 2g IV daily   - Tizanidine 2 mg TID scheduled   · Reports some sedation with Flexeril (which is a home med)  Encouraged to use at bedtime only if tizanidine seems to be helpful during the day   - Depacon 1g daily x 3 days (today is day 2/3)  - Valium PRN severe spasms and/or vertigo     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HCC)  Assessment & Plan  - With associated hydrocephalus  - Suspect the off balance sensation/room spinning sensation that she experiences intermittently is attributed to this  - On Diamox 125 mg BID  - Will defer any dosing changes to neurosurgery team if needed  - Patient will be seen by Dr Deanie Dandy in the office on 8/20 to discuss surgical resection  Recommendations for outpatient neurological follow up have yet to be determined  Case and treatment plan reviewed with attending neurologist, Dr Small  Subjective:   Patient resting in bed  EEG techs at bedside placing leads on  Patient reports that she had a 10/10 headache earlier today, located in bilateral frontal head with radiation to right posterior neck  She describes the pain as pressure and states that it has improved to a 6/10 after receiving medications  Patient states that the episode prior to presentation was different from her prior episodes  This episode did not have the tunnel vision or lightheadedness like she usually does  She states that she was aware during the whole episode but was unable to respond verbally   She states that she has stopped driving recently due to fear that she may have an episode while driving  Past Medical History:   Diagnosis Date    Hypertension      History reviewed  No pertinent surgical history  History reviewed  No pertinent family history  Social History     Socioeconomic History    Marital status: /Civil Union     Spouse name: None    Number of children: None    Years of education: None    Highest education level: None   Occupational History    None   Tobacco Use    Smoking status: Former Smoker     Quit date: 2021     Years since quittin 6    Smokeless tobacco: Never Used   Vaping Use    Vaping Use: Never used   Substance and Sexual Activity    Alcohol use: Yes    Drug use: No    Sexual activity: Yes     Partners: Male   Other Topics Concern    None   Social History Narrative    None     Social Determinants of Health     Financial Resource Strain:     Difficulty of Paying Living Expenses:    Food Insecurity:     Worried About Running Out of Food in the Last Year:     [address] in the Last Year:    Transportation Needs:     Lack of Transportation (Medical):  Lack of Transportation (Non-Medical):    Physical Activity:     Days of Exercise per Week:     Minutes of Exercise per Session:    Stress:     Feeling of Stress :    Social Connections:     Frequency of Communication with Friends and Family:     Frequency of Social Gatherings with Friends and Family:     Attends Hindu Services:     Active Member of Clubs or Organizations:     Attends Club or Organization Meetings:     Marital Status:    Intimate Partner Violence:     Fear of Current or Ex-Partner:     Emotionally Abused:     Physically Abused:     Sexually Abused:          Medications:   All current active meds have been reviewed and current meds:  Scheduled Meds:  Current Facility-Administered Medications   Medication Dose Route Frequency Provider Last Rate    acetaminophen  650 mg Oral Q6H PRN Gillermina Ortez, DO      acetaZOLAMIDE  125 mg Oral BID Gillermina [facility], DO      cyclobenzaprine  10 mg Oral TID PRN [facility], DO      dexamethasone  3 mg Oral Q6H [place], [place]      diazepam  2 5 mg Intravenous Q6H PRN Pawel Peter PA-C      enoxaparin  40 mg Subcutaneous Daily Fatimahermina Ortez, DO      escitalopram  5 mg Oral Daily [place] Orte[place], DO      losartan  50 mg Oral Daily [facility], DO      magnesium sulfate  2 g Intravenous Q24H [facility] Maria Elena Pham PA-C      ondansetron  4 mg Intravenous Q6H PRN [place] Ortez, DO      oxyCODONE  5 mg Oral Q6H PRN [facility], DO      tiZANidine  2 mg Oral TID Maria Elena Pham PA-C      valproate (DEPACON) IVPB  1,000 mg Intravenous Q24H [facility] Maria Elena Pham PA-C 1,000 mg (08/16/21 1012)     Continuous Infusions:   PRN Meds:   acetaminophen    cyclobenzaprine    diazepam    ondansetron    oxyCODONE       ROS:   Review of Systems   Constitutional: Negative for fever  HENT: Negative for trouble swallowing  Eyes: Negative for photophobia and visual disturbance  Respiratory: Negative for shortness of breath  Cardiovascular: Negative for chest pain  Gastrointestinal: Negative for abdominal pain and nausea  Musculoskeletal: Positive for neck pain  Negative for arthralgias, back pain and myalgias  Skin: Negative for rash  Neurological: Positive for headaches  Negative for dizziness, tremors, seizures, syncope, facial asymmetry, speech difficulty, weakness, light-headedness and numbness  Psychiatric/Behavioral: Negative for confusion  All other systems reviewed and are negative  Vitals:   /83 Comment: sitting  Pulse 67   Temp 98 1 °F (36 7 °C)   Resp 18   Ht 5' 5 5" (1 664 m)   Wt 74 4 kg (164 lb 0 4 oz)   SpO2 94%   BMI 26 88 kg/m²       Physical Exam:   Physical Exam  Vitals and nursing note reviewed  Constitutional:       General: She is not in acute distress  Appearance: Normal appearance  She is not ill-appearing  HENT:      Head: Normocephalic  Mouth/Throat:      Mouth: Mucous membranes are moist       Pharynx: Oropharynx is clear  Eyes:      General: No scleral icterus  Right eye: No discharge  Left eye: No discharge  Extraocular Movements: Extraocular movements intact and EOM normal       Conjunctiva/sclera: Conjunctivae normal    Cardiovascular:      Rate and Rhythm: Normal rate  Pulmonary:      Effort: Pulmonary effort is normal  No respiratory distress  Musculoskeletal:         General: Normal range of motion  Cervical back: Normal range of motion  Skin:     General: Skin is warm and dry  Coloration: Skin is not jaundiced or pale  Neurological:      Mental Status: She is alert and oriented to person, place, and time  Coordination: Finger-Nose-Finger Test normal    Psychiatric:         Mood and Affect: Mood normal          Speech: Speech normal          Behavior: Behavior normal          Thought Content: Thought content normal          Judgment: Judgment normal        Neurologic Exam     Mental Status   Oriented to person, place, and time  Attention: normal  Concentration: normal    Speech: speech is normal   Level of consciousness: alert  Able to follow commands appropriately  No dysarthria noted  Cranial Nerves     CN II   Right visual field deficit: none  Left visual field deficit: none     CN III, IV, VI   Extraocular motions are normal      CN V   Facial sensation intact  CN VII   Facial expression full, symmetric       CN VIII   Hearing: intact    CN IX, X   Palate: symmetric    CN XI   CN XI normal      CN XII   CN XII normal      Motor Exam   Muscle bulk: normal  Bilateral UE strength 5/5 deltoids, biceps, triceps, hand   Bilateral LE strength 5/5 hip flexion, dorsiflexion, plantar flexion     Sensory Exam   Light touch normal      Gait, Coordination, and Reflexes     Coordination   Finger to nose coordination: normal    Tremor   Resting tremor: absent  Intention tremor: absent    Reflexes   Right plantar: equivocal  Left plantar: equivocal          Labs: I have personally reviewed pertinent reports  Recent Results (from the past 24 hour(s))   CBC    Collection Time: 08/16/21  5:57 AM   Result Value Ref Range    WBC 12 04 (H) 4 31 - 10 16 Thousand/uL    RBC 4 91 3 81 - 5 12 Million/uL    Hemoglobin 15 0 11 5 - 15 4 g/dL    Hematocrit 45 1 34 8 - 46 1 %    MCV 92 82 - 98 fL    MCH 30 5 26 8 - 34 3 pg    MCHC 33 3 31 4 - 37 4 g/dL    RDW 13 3 11 6 - 15 1 %    Platelets 593 730 - 875 Thousands/uL    MPV 10 4 8 9 - 12 7 fL   Basic metabolic panel    Collection Time: 08/16/21  5:58 AM   Result Value Ref Range    Sodium 137 136 - 145 mmol/L    Potassium 3 9 3 5 - 5 3 mmol/L    Chloride 110 (H) 100 - 108 mmol/L    CO2 20 (L) 21 - 32 mmol/L    ANION GAP 7 4 - 13 mmol/L    BUN 25 5 - 25 mg/dL    Creatinine 0 94 0 60 - 1 30 mg/dL    Glucose 113 65 - 140 mg/dL    Calcium 9 3 8 3 - 10 1 mg/dL    eGFR 69 ml/min/1 73sq m       Imaging: I have personally reviewed pertinent imaging in PACS, and I have personally reviewed PACS reports  EKG, Pathology, and Other Studies: I have personally reviewed pertinent reports  VTE Prophylaxis: Sequential compression device (Venodyne)  and Enoxaparin (Lovenox)        Total time spent today 29 minutes  Greater than 50% of total time was spent with the patient and / or family counseling and / or coordination of care  A description of the counseling / coordination of care: Patient seen and evaluated  Case reviewed with attending  Chart thoroughly reviewed including imaging and labs  Coordination of care with SLIM AP and RN  Discussion of EEG, ortho BPs, and imaging with patient

## 2021-08-16 NOTE — PLAN OF CARE
Problem: OCCUPATIONAL THERAPY ADULT  Goal: Performs self-care activities at highest level of function for planned discharge setting  See evaluation for individualized goals  Description: Treatment Interventions: Compensatory technique education, Energy conservation, Patient/family training          See flowsheet documentation for full assessment, interventions and recommendations  Outcome: Progressing  Note: Limitation: Decreased high-level ADLs, Decreased endurance  Prognosis: Good  Assessment: Pt is a 55 y o  female admitted to [facility] on 8/14/2021 with Seizure-like activity ([address] ), Hypertension, anxiety, cerebellopontine angle tumor, hydrocephalus, leukocytosis, and headache  Per neurosurgery consult, "No immediate need for neurosurgical intervention  Ultimate plan for surgical resection as joint case with neurosurgery and ENT  Surgery TBD "  PTA, Pt's baseline was Independent in ADLs and functional mobility (with use of RW)  Pt reports she does not drive anymore- manages her meds and finances independently  Pt lives at home with her , daughter, daughter's boyfriend, and grandson in a 3 story house with 1  GEORGES  Upon OT eval, Pt is currently Supervision with ADLs and functional mobility (with use of RW)  Pt's BP was taken supine (130/81), EOB (133/83), standing (114/89)- values reported to RN  Pt is demonstrating deficits including decreased high-level ADLs  Pt has supportive family/friends that can assist prn  Continue plan of care to increase independence and safety in ADLs/IADLs       OT Discharge Recommendation: No rehabilitation needs  OT - OK to Discharge: Yes (when medically cleared)

## 2021-08-16 NOTE — ASSESSMENT & PLAN NOTE
· Patient reported 3 falls since recent discharge  · The first one she reports falling onto her knees while going into the house after her ENT appointment Friday  · The second one she reports falling onto her knees while going into the bathroom at home Saturday  · The third one she reports falling forward and being able to hear those around her but was unable to respond  She reports this lasted for approximately 1 minute witnesses told her  Also report of bilateral hand shaking and possible bowel incontinence during the event  · Neurology following, appreciate their ongoing recommendations   Concern for POTS and convulsive syncope   · Discussed with Neurology regarding possible POTS, Cardiology consulted  · CT head this admission with the size of the ventricles not significantly changes when compared to a recent CT brain dated August 8, 2021 per the results report  · EEG Pending  · Continue seizure precautions  · Neurosurgery following  · PT/OT evaluations pending  · Fall precautions on board

## 2021-08-16 NOTE — PLAN OF CARE
Problem: PHYSICAL THERAPY ADULT  Goal: Performs mobility at highest level of function for planned discharge setting  See evaluation for individualized goals  Description: Treatment/Interventions: Functional transfer training, LE strengthening/ROM, Elevations, Therapeutic exercise, Endurance training, Patient/family training, Equipment eval/education, Bed mobility, Gait training, Spoke to nursing  Equipment Recommended: Gillian Francis       See flowsheet documentation for full assessment, interventions and recommendations  Note: Prognosis: Fair  Problem List: Decreased strength, Decreased range of motion, Decreased endurance, Impaired balance, Decreased mobility, Impaired judgement, Decreased safety awareness, Impaired sensation  Assessment: Pt is 55 y o  female seen for PT evaluation s/p admit to [facility] on 8/14/2021  Pt presenting s/p repeat falls w/ possible seizure-like activity  Patient reports at least 3 falls since recent discharge home  Originally presented on 8/8/21with headaches, vertigo and hearing loss (per tympanogram left ear hearing at 24%)  CT head was positive for hydrocephalus that was unchanged from prior exams  Patient has a past medical history consisting of hypertension, hydrocephalus, cerebellar pontine angle tumor, hearing loss and multiple falls  Comorbidities affecting pt's physical performance at time of assessment listed above Personal factors affecting pt at time of IE include: steps to enter environment, limited home support / spouse works,  past experience,  Loss of independence inability to perform IADLs, inability to perform ADLs, inability to ambulate household distances w/o external assist, ; frustration w/ current health condition and adjustment to disability recent fall(s)    Due to acute medical issues, ongoing medical workup for primary dx; pain, fall risk, increased reliance on more restrictive AD compared to baseline;  decreased activity tolerance compared to baseline, increased assistance needed from caregiver at current time, continuous  Monitoring, pending neuro surgical plan;  multiple lines, decline in overall functional mobility status; health management issues; note unstable clinical picture (high complexity)   Prior to admission, pt reports living w/ spouse varsha  3 SH w/ 1 GEORGES and 1st floor setup  Patient reports recently having to use rolling walker  Was recently discharged from the hospital and has experienced 3 or more falls since that time  Patient denies injury  Reports she was about to start outpatient physical therapy when she was readmitted to the hospital   Patient reports at baseline she is normally independent and is able to perform all ADLs independently    Currently pt  is requiring close supervision A for bed skills; close supervision for functional transfers and contact guard assist for ambulation w/ rolling walker patient with loss of balance and feeling weak and about to pass out patient requiring Min assist to transfer back to bed  Vitals were stable orthostatics were negative  JADE Otto) made aware  Pt presents functioning below baseline and currently w/ overall mobility deficits 2* to:  Dizziness and positional change with possible mild nystagmus noted  generalized weakness/ deconditioning; decreas  Barriers to Discharge: Inaccessible home environment, Decreased caregiver support  Barriers to Discharge Comments: falls/ alone at times while spouse works- w/c level as option for home discharge discussed 2* falls- pt is in agreement w/ plan and recommendation for w/c level when alone until progressed      PT Discharge Recommendation: Home with outpatient rehabilitation          See flowsheet documentation for full assessment  Problem: PHYSICAL THERAPY ADULT  Goal: Performs mobility at highest level of function for planned discharge setting  See evaluation for individualized goals    Description: Treatment/Interventions: ADL retraining, Functional transfer training, LE strengthening/ROM, Elevations, Therapeutic exercise, Endurance training, Cognitive reorientation, Patient/family training, Bed mobility, Equipment eval/education, Gait training, Compensatory technique education, Spoke to nursing, Spoke to case management  Equipment Recommended: Wheelchair       See flowsheet documentation for full assessment, interventions and recommendations  Note: Prognosis: Fair  Problem List: Decreased strength, Decreased range of motion, Decreased endurance, Impaired balance, Decreased mobility, Impaired judgement, Decreased safety awareness, Impaired sensation  Assessment: Pt is 55 y o  female seen for PT evaluation s/p admit to [facility]/14[facility]21  Pt presenting s/p repeat falls w/ possible seizure-like activity  Patient reports at least 3 falls since recent discharge home  Originally presented on 8/8/21with headaches, vertigo and hearing loss (per tympanogram left ear hearing at 24%)  CT head was positive for hydrocephalus that was unchanged from prior exams  Patient has a past medical history consisting of hypertension, hydrocephalus, cerebellar pontine angle tumor, hearing loss and multiple falls  Comorbidities affecting pt's physical performance at time of assessment listed above Personal factors affecting pt at time of IE include: steps to enter environment, limited home support / spouse works,  past experience,  Loss of independence inability to perform IADLs, inability to perform ADLs, inability to ambulate household distances w/o external assist, ; frustration w/ current health condition and adjustment to disability recent fall(s)    Due to acute medical issues, ongoing medical workup for primary dx; pain, fall risk, increased reliance on more restrictive AD compared to baseline;  decreased activity tolerance compared to baseline, increased assistance needed from caregiver at current time, continuous  Monitoring, pending neuro surgical plan;  multiple lines, decline in overall functional mobility status; health management issues; note unstable clinical picture (high complexity)   Prior to admission, pt reports living w/ spouse varsha  3 SH w/ 1 GEORGES and 1st floor setup  Patient reports recently having to use rolling walker  Was recently discharged from the hospital and has experienced 3 or more falls since that time  Patient denies injury  Reports she was about to start outpatient physical therapy when she was readmitted to the hospital   Patient reports at baseline she is normally independent and is able to perform all ADLs independently    Currently pt  is requiring close supervision A for bed skills; close supervision for functional transfers and contact guard assist for ambulation w/ rolling walker patient with loss of balance and feeling weak and about to pass out patient requiring Min assist to transfer back to bed  Vitals were stable orthostatics were negative  RN Sj Rader) made aware  Pt presents functioning below baseline and currently w/ overall mobility deficits 2* to:  Dizziness and positional change with possible mild nystagmus noted  generalized weakness/ deconditioning; decreas  Barriers to Discharge: Inaccessible home environment, Decreased caregiver support  Barriers to Discharge Comments: falls/ alone at times while spouse works- w/c level as option for home discharge discussed 2* falls- pt is in agreement w/ plan and recommendation for w/c level when alone until progressed      PT Discharge Recommendation: Home with outpatient rehabilitation          See flowsheet documentation for full assessment

## 2021-08-17 ENCOUNTER — ANESTHESIA EVENT (INPATIENT)
Dept: PERIOP | Facility: HOSPITAL | Age: 56
DRG: 025 | End: 2021-08-17
Payer: COMMERCIAL

## 2021-08-17 LAB
ANION GAP SERPL CALCULATED.3IONS-SCNC: 8 MMOL/L (ref 4–13)
BUN SERPL-MCNC: 23 MG/DL (ref 5–25)
CALCIUM SERPL-MCNC: 9.4 MG/DL (ref 8.3–10.1)
CHLORIDE SERPL-SCNC: 110 MMOL/L (ref 100–108)
CO2 SERPL-SCNC: 21 MMOL/L (ref 21–32)
CREAT SERPL-MCNC: 0.81 MG/DL (ref 0.6–1.3)
ERYTHROCYTE [DISTWIDTH] IN BLOOD BY AUTOMATED COUNT: 13.3 % (ref 11.6–15.1)
GFR SERPL CREATININE-BSD FRML MDRD: 82 ML/MIN/1.73SQ M
GLUCOSE SERPL-MCNC: 116 MG/DL (ref 65–140)
GLUCOSE SERPL-MCNC: 162 MG/DL (ref 65–140)
HCT VFR BLD AUTO: 43.8 % (ref 34.8–46.1)
HGB BLD-MCNC: 14.4 G/DL (ref 11.5–15.4)
MCH RBC QN AUTO: 30.3 PG (ref 26.8–34.3)
MCHC RBC AUTO-ENTMCNC: 32.9 G/DL (ref 31.4–37.4)
MCV RBC AUTO: 92 FL (ref 82–98)
PLATELET # BLD AUTO: 355 THOUSANDS/UL (ref 149–390)
PMV BLD AUTO: 10.3 FL (ref 8.9–12.7)
POTASSIUM SERPL-SCNC: 4.3 MMOL/L (ref 3.5–5.3)
RBC # BLD AUTO: 4.75 MILLION/UL (ref 3.81–5.12)
SODIUM SERPL-SCNC: 139 MMOL/L (ref 136–145)
WBC # BLD AUTO: 17.07 THOUSAND/UL (ref 4.31–10.16)

## 2021-08-17 PROCEDURE — NC001 PR NO CHARGE: Performed by: NURSE PRACTITIONER

## 2021-08-17 PROCEDURE — 99232 SBSQ HOSP IP/OBS MODERATE 35: CPT | Performed by: INTERNAL MEDICINE

## 2021-08-17 PROCEDURE — 97116 GAIT TRAINING THERAPY: CPT

## 2021-08-17 PROCEDURE — 82948 REAGENT STRIP/BLOOD GLUCOSE: CPT

## 2021-08-17 PROCEDURE — 99233 SBSQ HOSP IP/OBS HIGH 50: CPT | Performed by: NEUROLOGICAL SURGERY

## 2021-08-17 PROCEDURE — 80048 BASIC METABOLIC PNL TOTAL CA: CPT | Performed by: PHYSICIAN ASSISTANT

## 2021-08-17 PROCEDURE — 85027 COMPLETE CBC AUTOMATED: CPT | Performed by: PHYSICIAN ASSISTANT

## 2021-08-17 PROCEDURE — 30233N1 TRANSFUSION OF NONAUTOLOGOUS RED BLOOD CELLS INTO PERIPHERAL VEIN, PERCUTANEOUS APPROACH: ICD-10-PCS | Performed by: STUDENT IN AN ORGANIZED HEALTH CARE EDUCATION/TRAINING PROGRAM

## 2021-08-17 PROCEDURE — 99233 SBSQ HOSP IP/OBS HIGH 50: CPT | Performed by: PHYSICIAN ASSISTANT

## 2021-08-17 RX ORDER — CHLORHEXIDINE GLUCONATE 0.12 MG/ML
15 RINSE ORAL ONCE
Status: COMPLETED | OUTPATIENT
Start: 2021-08-18 | End: 2021-08-18

## 2021-08-17 RX ADMIN — SODIUM CHLORIDE 1000 MG: 9 INJECTION, SOLUTION INTRAVENOUS at 08:26

## 2021-08-17 RX ADMIN — DEXAMETHASONE 3 MG: 2 TABLET ORAL at 11:31

## 2021-08-17 RX ADMIN — OXYCODONE HYDROCHLORIDE 5 MG: 5 TABLET ORAL at 05:03

## 2021-08-17 RX ADMIN — DEXAMETHASONE 3 MG: 2 TABLET ORAL at 01:25

## 2021-08-17 RX ADMIN — TIZANIDINE 2 MG: 2 TABLET ORAL at 08:25

## 2021-08-17 RX ADMIN — DEXAMETHASONE 3 MG: 2 TABLET ORAL at 05:03

## 2021-08-17 RX ADMIN — ENOXAPARIN SODIUM 40 MG: 40 INJECTION SUBCUTANEOUS at 08:25

## 2021-08-17 RX ADMIN — ACETAZOLAMIDE 125 MG: 125 TABLET ORAL at 17:00

## 2021-08-17 RX ADMIN — DEXAMETHASONE 3 MG: 2 TABLET ORAL at 17:00

## 2021-08-17 RX ADMIN — ESCITALOPRAM 5 MG: 5 TABLET, FILM COATED ORAL at 08:25

## 2021-08-17 RX ADMIN — TIZANIDINE 2 MG: 2 TABLET ORAL at 21:06

## 2021-08-17 RX ADMIN — TIZANIDINE 2 MG: 2 TABLET ORAL at 15:16

## 2021-08-17 RX ADMIN — LOSARTAN POTASSIUM 50 MG: 50 TABLET, FILM COATED ORAL at 08:25

## 2021-08-17 RX ADMIN — ACETAZOLAMIDE 125 MG: 125 TABLET ORAL at 08:25

## 2021-08-17 RX ADMIN — MAGNESIUM SULFATE HEPTAHYDRATE 2 G: 40 INJECTION, SOLUTION INTRAVENOUS at 09:25

## 2021-08-17 RX ADMIN — CYCLOBENZAPRINE HYDROCHLORIDE 10 MG: 10 TABLET, FILM COATED ORAL at 09:25

## 2021-08-17 NOTE — PROGRESS NOTES
Progress Note - Neurology   Sylvia Hester 55 y o  female MRN: [id number]  [facility] Encounter: [id number]      Assessment/Plan   Fall with possible seizure-like activity [facility])  Assessment & Plan  Sylvia Hester is a 55 y o  female with recent diagnosis of CP angle tumor, hydrocephalus, and HTN presented to the ED on 8/14 after a fall at home with possible seizure activity  Patient stood up from couch then recalls falling   noted her to be lying on the ground with a blank stare, with quivering of her fingers, unable to respond to him  She then began moving spontaneously, he sat her up, and then she began to respond normally  Entire episode lasted about 2 minutes  CTH stable compared to prior imaging in regards to the CP angle tumor and hydrocephalus  Labs unremarkable  She has had several falls recently with position changes, associated with tunnel vision and lightheadedness, but never demonstrated tremors or impaired ability to speak  The description of bilateral hand tremors with preserved consciousness (patient able to hear what was going on but not respond) is not consistent with generalized tonic clonic or complex partial seizures  Regardless, CP angle tumor is not cortical in nature, so unlikely to cause seizures  Routine EEG unremarkable  The hydrocephalus however can cause elevated ICP, which may cause seizures  Patient is already on Diamox 125 mg BID  Unclear etiology at this time  Suspect possibly secondary to convulsive syncope, however cannot entirely rule out seizure  With concern for  POTS, cardiology consulted as patient's resting heart rate noted to be in the 60s-70s, but elevated to 120s when attempting to have patient walk  Cardiology reports syncopal episode not consistent with POTS and symptoms are likely not secondary to tachycardia       Plan:  - Will defer AEDs at this time   - Appreciate Cardiology recommendations  - PT/OT  - Patient states she recently stopped driving due to fear of having an episode while driving  Will defer submitting [employer] at this time per attending neurologist   - Monitor neuro exam; notify with any changes  - Fall precautions  - Seizure precautions  - Medical management and supportive care per primary team  Correction of any metabolic or infectious disturbances  Headache  Assessment & Plan  Patient has been experiencing headaches since February  Described as originating from the right side of her neck and radiating upward, associated with photophobia  Currently, reports headache improved from 10/10 this morning to 6/10 after receiving medications  Denies any photophobia, phonophobia, nausea/vomiting  Plan:  - Magnesium sulfate 2g IV daily   - Tizanidine 2 mg TID scheduled   · Reports some sedation with Flexeril (which is a home med)  Encouraged to use at bedtime only if tizanidine seems to be helpful during the day   - Depacon 1g daily x 3 days (today is day 2/3)  - Valium PRN severe spasms and/or vertigo     * 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HCC)  Assessment & Plan  - With associated hydrocephalus  - Suspect the off balance sensation/room spinning sensation that she experiences intermittently is attributed to this  - On Diamox 125 mg BID, plan to continue   - Will defer any dosing changes to neurosurgery team if needed  - Patient will be seen by Dr Pete Herrera in the office on 8/20 to discuss surgical resection  Carlos President will need follow up in in 4 weeks with general attending  She will not require outpatient neurological testing  Subjective:   Patient states she was told by neurosurgery that she would have the resection tomorrow  She states she has not had any recurrent episodes of syncope  She continue to lean to the left when she ambulates and continues to have the tingling in her extremities and face  She states the weakness she has been experiencing is unchanged, denying any new weakness   Denies CP, SOB, headache, dizziness, vision changes, N/V, abdominal pain, or numbness  ROS:  12 point ROS performed, as stated above, all others negative  Vitals: Blood pressure 135/74, pulse 87, temperature 98 4 °F (36 9 °C), resp  rate 16, height 5' 5 5" (1 664 m), weight 74 4 kg (164 lb 0 4 oz), SpO2 94 %, not currently breastfeeding  ,Body mass index is 26 88 kg/m²  Physical Exam  Vitals and nursing note reviewed  Constitutional:       General: She is not in acute distress  Appearance: Normal appearance  She is normal weight  She is not ill-appearing, toxic-appearing or diaphoretic  HENT:      Head: Normocephalic and atraumatic  Eyes:      General: No scleral icterus  Right eye: No discharge  Left eye: No discharge  Extraocular Movements: Extraocular movements intact  Conjunctiva/sclera: Conjunctivae normal    Musculoskeletal:         General: Normal range of motion  Cervical back: Normal range of motion and neck supple  Skin:     General: Skin is warm and dry  Coloration: Skin is not jaundiced or pale  Findings: No bruising, erythema, lesion or rash  Neurological:      Mental Status: She is alert  Deep Tendon Reflexes: Strength normal    Psychiatric:         Mood and Affect: Mood normal          Behavior: Behavior normal          Thought Content: Thought content normal          Judgment: Judgment normal        Neurologic Exam     Mental Status   Patient is alert, sitting up in bed, accompanied by family  Oriented x 3  No dysarthria or aphasia noted  Able to follow cental and appendicular commands  Answers all questions appropriately      Cranial Nerves     CN II   Visual fields full to confrontation       CN III, IV, VI   Nystagmus: none   Upgaze: normal  Downgaze: normal    CN VIII   Hearing: intact    CN XI   CN XI normal    Limited end gaze to the left, otherwise EOMs intact   Decreased sensation in bilateral V1-V3     Motor Exam   Muscle bulk: normal  Overall muscle tone: normal  Right arm pronator drift: absent  Left arm pronator drift: absent    Strength   Strength 5/5 throughout  Sensory Exam   Decreased sensation to light touch in BUE and BLE     Gait, Coordination, and Reflexes     Tremor   Resting tremor: absent  No ataxia or dysmetria noted in BUE finger to nose testing     Lab Results: I have personally reviewed pertinent reports  Recent Results (from the past 24 hour(s))   CBC    Collection Time: 08/17/21  4:54 AM   Result Value Ref Range    WBC 17 07 (H) 4 31 - 10 16 Thousand/uL    RBC 4 75 3 81 - 5 12 Million/uL    Hemoglobin 14 4 11 5 - 15 4 g/dL    Hematocrit 43 8 34 8 - 46 1 %    MCV 92 82 - 98 fL    MCH 30 3 26 8 - 34 3 pg    MCHC 32 9 31 4 - 37 4 g/dL    RDW 13 3 11 6 - 15 1 %    Platelets 685 800 - 779 Thousands/uL    MPV 10 3 8 9 - 12 7 fL   Basic metabolic panel    Collection Time: 08/17/21  4:54 AM   Result Value Ref Range    Sodium 139 136 - 145 mmol/L    Potassium 4 3 3 5 - 5 3 mmol/L    Chloride 110 (H) 100 - 108 mmol/L    CO2 21 21 - 32 mmol/L    ANION GAP 8 4 - 13 mmol/L    BUN 23 5 - 25 mg/dL    Creatinine 0 81 0 60 - 1 30 mg/dL    Glucose 116 65 - 140 mg/dL    Calcium 9 4 8 3 - 10 1 mg/dL    eGFR 82 ml/min/1 73sq m   ]  Imaging Studies: I have personally reviewed pertinent reports and I have personally reviewed pertinent films in PACS  EKG, Pathology, and Other Studies: I have personally reviewed pertinent reports  VTE Prophylaxis: Enoxaparin (Lovenox)    Counseling / Coordination of Care  Total time spent today 31 minutes  Greater than 50% of total time was spent with the patient and/or family counseling and/or coordination of care  A description of the counseling/coordination of care:  Patient was seen and evaluated  Discussed with attending  Chart reviewed thoroughly including laboratory and imaging studies  Plan of care discussed with patient and primary team  Discussed routine EEG results with patient and family   Discussed with patient there is no need to add AED

## 2021-08-17 NOTE — PLAN OF CARE
Problem: PHYSICAL THERAPY ADULT  Goal: Performs mobility at highest level of function for planned discharge setting  See evaluation for individualized goals  Description: Treatment/Interventions: Functional transfer training, LE strengthening/ROM, Elevations, Therapeutic exercise, Endurance training, Patient/family training, Equipment eval/education, Bed mobility, Gait training, Spoke to nursing  Equipment Recommended: Shanta Francis       See flowsheet documentation for full assessment, interventions and recommendations  Outcome: Progressing  Note: Prognosis: Good  Problem List: Decreased strength, Decreased endurance, Impaired balance, Decreased mobility  Assessment: Pt presents with decreased mobility, strength, balance, and activity tolerance  Pt demonstrated ability to perform bed mobility and functional transfers at supervision  Pt ambulated 40ft x2 with RW at [address]  Pt ambulates w/ very slow lyubov and wide [place] w/ heavy reliance on UE through RW  Pt demonstrates improvement mobility this session being able to ambulate further distances as compared to previous  Pt continues to benefit from skilled therapy to maximize functional independence  Recommendation at this time is rehab  Pt would benefit from continued ambulation, functional transfers, strength, balance, and activity tolerance  Barriers to Discharge: Decreased caregiver support  Barriers to Discharge Comments: falls/ alone at times while spouse works- w/c level as option for home discharge discussed 2* falls- pt is in agreement w/ plan and recommendation for w/c level when alone until progressed      PT Discharge Recommendation: Home with outpatient rehabilitation     PT - OK to Discharge: Yes    See flowsheet documentation for full assessment

## 2021-08-17 NOTE — ASSESSMENT & PLAN NOTE
· Patient was recently admitted 8/8/21-8/12/21  · She was seen by Neurosurgery during recent admission and was placed on steroid taper and Diamox  · Neurosurgery consult appreciated  · On Decadron taper per Neurosurgery  · Now off Diamox  · S/p in each guided left retromastoid craniotomy for debulking of CP angle mass 8/18/21  · Post-op MRI brain revealed, per the results report, "Patient is status post removal of the vast majority of the previously identified left CP angle tumor  There is some residual enhancing tumor remaining in the anterior aspect of the CP angle with improving mass effect upon the brainstem and brachium pontis  There is no restricted diffusion identified within the left posterior lateral fabian and brachium pontis consistent with acute ischemia  Persistent mild hydrocephalus involving the lateral ventricles and 3rd ventricle  Increased FLAIR signal adjacent to the surface of the lateral ventricles may represent transependymal flow of CSF, unchanged    4th ventricle is not enlarged and there is improving mass effect upon the 4th ventricle "

## 2021-08-17 NOTE — PHYSICAL THERAPY NOTE
PHYSICAL THERAPY NOTE  Patient Name: Lashawn Davis  YFJBJ'F Date: 8/17/2021 08/17/21 1348   PT Last Visit   PT Visit Date 08/17/21   Note Type   Note Type Treatment   Pain Assessment   Pain Assessment Tool 0-10   Pain Score 3   Pain Location/Orientation Location: Head   Hospital Pain Intervention(s) Repositioned; Ambulation/increased activity; Emotional support   Restrictions/Precautions   Weight Bearing Precautions Per Order No   Other Precautions Bed Alarm; Fall Risk   General   Chart Reviewed Yes   Response to Previous Treatment Patient with no complaints from previous session  Family/Caregiver Present No   Cognition   Overall Cognitive Status WFL   Arousal/Participation Alert; Responsive; Cooperative   Attention Within functional limits   Orientation Level Oriented X4   Memory Within functional limits   Following Commands Follows all commands and directions without difficulty   Comments Pt pleasant and cooperative to work w/ therapy   Subjective   Subjective "I'm getting surgery tomorrow"   Bed Mobility   Supine to Sit 5  Supervision   Additional items HOB elevated; Increased time required   Sit to Supine 5  Supervision   Additional items HOB elevated; Increased time required   Additional Comments Pt lying supine upon PT arrival  Pt returned lying supine at end of session w/ all needs within reach   Transfers   Sit to Stand 5  Supervision   Additional items Increased time required   Stand to Sit 5  Supervision   Additional items Increased time required   Additional Comments Transfers w/ RW   Ambulation/Elevation   Gait pattern Excessively slow; Short stride; Inconsistent lyubov; Wide [place]; Decreased foot clearance;Shuffling   Gait Assistance 4  Minimal assist   Additional items Assist x 1;Verbal cues   Assistive Device Rolling walker   Distance 40ft x2   Balance   Static Sitting Fair +   Dynamic Sitting Fair   Static Standing Fair Dynamic Standing Fair -   Ambulatory Poor +   Endurance Deficit   Endurance Deficit Yes   Endurance Deficit Description weakness, fatigue   Activity Tolerance   Activity Tolerance Patient limited by fatigue   Nurse Made Aware yes   Assessment   Prognosis Good   Problem List Decreased strength;Decreased endurance; Impaired balance;Decreased mobility   Assessment Pt presents with decreased mobility, strength, balance, and activity tolerance  Pt demonstrated ability to perform bed mobility and functional transfers at supervision  Pt ambulated 40ft x2 with RW at [address]  Pt ambulates w/ very slow lyubov and wide [place] w/ heavy reliance on UE through RW  Pt demonstrates improvement mobility this session being able to ambulate further distances as compared to previous  Pt continues to benefit from skilled therapy to maximize functional independence  Recommendation at this time is rehab  Pt would benefit from continued ambulation, functional transfers, strength, balance, and activity tolerance   Barriers to Discharge Decreased caregiver support   Goals   Patient Goals to have surgery   STG Expiration Date 08/26/21   PT Treatment Day 1   Plan   Treatment/Interventions Functional transfer training;LE strengthening/ROM; Endurance training;Patient/family training;Equipment eval/education; Bed mobility;Gait training;Spoke to nursing   Progress Progressing toward goals   PT Frequency   (3-5x/week)   Recommendation   PT Discharge Recommendation Home with outpatient rehabilitation   PT - OK to Discharge Yes   AM-PAC Basic Mobility Inpatient   Turning in Bed Without Bedrails 4   Lying on Back to Sitting on Edge of Flat Bed 4   Moving Bed to Chair 4   Standing Up From Chair 4   Walk in Room 3   Climb 3-5 Stairs 2   Basic Mobility Inpatient Raw Score 21   Basic Mobility Standardized Score 45 55     The patient's AM-PAC Basic Mobility Inpatient Short Form Raw Score is 21, Standardized Score is 45 55   A standardized score greater than 42 9 suggests the patient may benefit from discharge to home  Please also refer to the recommendation of the Physical Therapist for safe discharge planning      Hayley Lua, PT, DPT

## 2021-08-17 NOTE — ASSESSMENT & PLAN NOTE
· Patient reported 3 falls since recent discharge  · The first one she reports falling onto her knees while going into the house after her ENT appointment Friday  · The second one she reports falling onto her knees while going into the bathroom at home Saturday  · The third one she reports falling forward and being able to hear those around her but was unable to respond  She reports this lasted for approximately 1 minute witnesses told her   Also report of bilateral hand shaking and possible bowel incontinence during the event  · Neurology and Cardiology were following given oncern for POTS and convulsive syncope   · Cardiology consult appreciated  - they did not believe these syncopal episodes are consistent with POTS  · EEG 8/15 was normal  · Continue seizure precautions  · Neurosurgery following  · PT/OT evaluations  · Fall precautions

## 2021-08-17 NOTE — PLAN OF CARE
Problem: SAFETY ADULT  Goal: Maintain or return to baseline ADL function  Description: INTERVENTIONS:  -  Assess patient's ability to carry out ADLs; assess patient's baseline for ADL function and identify physical deficits which impact ability to perform ADLs (bathing, care of mouth/teeth, toileting, grooming, dressing, etc )  - Assess/evaluate cause of self-care deficits   - Assess range of motion  - Assess patient's mobility; develop plan if impaired  - Assess patient's need for assistive devices and provide as appropriate  - Encourage maximum independence but intervene and supervise when necessary  - Involve family in performance of ADLs  - Assess for home care needs following discharge   - Consider OT consult to assist with ADL evaluation and planning for discharge  - Provide patient education as appropriate  Outcome: Progressing     Problem: DISCHARGE PLANNING  Goal: Discharge to home or other facility with appropriate resources  Description: INTERVENTIONS:  - Identify barriers to discharge w/patient and caregiver  - Arrange for needed discharge resources and transportation as appropriate  - Identify discharge learning needs (meds, wound care, etc )  - Arrange for interpretive services to assist at discharge as needed  - Refer to Case Management Department for coordinating discharge planning if the patient needs post-hospital services based on physician/advanced practitioner order or complex needs related to functional status, cognitive ability, or social support system  Outcome: Progressing     Problem: Knowledge Deficit  Goal: Patient/family/caregiver demonstrates understanding of disease process, treatment plan, medications, and discharge instructions  Description: Complete learning assessment and assess knowledge base    Interventions:  - Provide teaching at level of understanding  - Provide teaching via preferred learning methods  Outcome: Progressing     Problem: MOBILITY - ADULT  Goal: Maintain or return to baseline ADL function  Description: INTERVENTIONS:  -  Assess patient's ability to carry out ADLs; assess patient's baseline for ADL function and identify physical deficits which impact ability to perform ADLs (bathing, care of mouth/teeth, toileting, grooming, dressing, etc )  - Assess/evaluate cause of self-care deficits   - Assess range of motion  - Assess patient's mobility; develop plan if impaired  - Assess patient's need for assistive devices and provide as appropriate  - Encourage maximum independence but intervene and supervise when necessary  - Involve family in performance of ADLs  - Assess for home care needs following discharge   - Consider OT consult to assist with ADL evaluation and planning for discharge  - Provide patient education as appropriate  Outcome: Progressing

## 2021-08-17 NOTE — PROGRESS NOTES
Patient seen and examined  Chart and images reviewed in detail  She is awake and alert with a House Brackman grade 3 FNP with good eye closure on the left  This is most obvious with exercise of the nerve  She also has a marked reduction of hearing on this side  She has major balance difficulties  Long talk about the planned surgery, alternatives to this surgery as well as the risks and possibility of complication  The risks, benefits and complications of surgery were explained in detail to [facility] and including hemorrhage, infection, CSF leakage, wound problems, pain, weakness, numbness, dysesthesiae, paralysis, coma, and death  Also, the possibility  of further surgery being required was emphasized  Other potential medical complications were outlined, including deep venous thrombosis, pulmonary embolism, pneumonia, urinary tract infection, myocardial infarction,  and stroke  The need for physical therapy, occupational therapy, and rehabilitation was also mentioned  The alternatives to surgery were discussed  [facility] and  and daughter asked relevant questions and asked that we proceed with arrangements for surgery       Plan:  Left image guided craniotomy for debulking of CT angle mass with intraoperative monitoring

## 2021-08-17 NOTE — PROGRESS NOTES
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[facility])  Active Problems:    Hydrocephalus ([facility] [address] )    Anxiety    Hypertension    Fall with possible seizure-like activity ([facility] [address] )    Leukocytosis    Headache      Hydrocephalus ([facility] [address] )  Assessment & Plan  · Continue with Diamox   · Neurology and neurosurgery following  · Plan as per above    * 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[facility] [address] )  Assessment & Plan  · Patient was recently admitted 8/8/21-8/12/21  · She was seen by Neurosurgery during recent admission and was placed on steroid taper and Diamox  · Neurosurgery consult appreciated  · Decadron was increased to 3 mg q 6 hours  · Continue current dose Diamox per neurosurgery  · plan for surgical resection as joint case between Neurosurgery and ENT on 08/17  · Patient followed up with ENT after recent discharge  · CT head this admission as above  · DVT prophylaxis:  Bilateral SCDs  Lovenox  · Lovenox held per Neurosurgery for procedure on 08/18  · They will restart Lovenox when CT head stable postop    Headache  Assessment & Plan  · Neurology and neurosurgery following  · CT head on admission as above  · Steroids were increased on admission per neurosurgery recommendations  · On Diamox as above  · On IV magnesium, tizanidine, Depacon  Valium also on board p r n      Leukocytosis  Assessment & Plan  · Suspect steroid-induced  · Monitor     Fall with possible seizure-like activity ([facility] [address] )  Assessment & Plan  · Patient reported 3 falls since recent discharge  · The first one she reports falling onto her knees while going into the house after her ENT appointment Friday  · The second one she reports falling onto her knees while going into the bathroom at home Saturday  · The third one she reports falling forward and being able to hear those around her but was unable to respond  She reports this lasted for approximately 1 minute witnesses told her  Also report of bilateral hand shaking and possible bowel incontinence during the event  · Neurology following, appreciate their ongoing recommendations  Concern for POTS and convulsive syncope   · Cardiology consulted, they did not believe these syncopal episodes are consistent with POTS and recommend follow-up as an outpatient after resolution of  · CT head this admission with the size of the ventricles not significantly changes when compared to a recent CT brain dated 2021 per the results report  · EEG 8/15 was normal  · Continue seizure precautions  · Neurosurgery following  · PT/OT evaluations  ·  no rehab needed per OT  · Fall precautions    Hypertension  Assessment & Plan  · Continue losartan 50 mg daily with hold parameters  · Losartan discontinued for surgery on  and can be restarted immediately afterwards per neurosurgery AP  · Monitor blood pressure per protocol      Anxiety  Assessment & Plan  · Continue Lexapro      Disposition:  Plan for joint surgery tomorrow with Neurosurgery and ENT    SUBJECTIVE     Patient seen and examined  No acute events overnight  Patient reports headache this morning but was given pain medicine just prior to being examined  She stated that the medication was starting to work  Patient does report some dizziness       OBJECTIVE     Vitals:    21 1059 21 1517 21 2159 21 0739   BP: 133/83 121/77 117/76 135/74   Pulse:  64 66 87   Resp:  18 20 16   Temp:  97 7 °F (36 5 °C) 98 °F (36 7 °C) 98 4 °F (36 9 °C)   TempSrc:       SpO2:  95% 97% 94%   Weight:       Height:          Temperature:   Temp (24hrs), Av °F (36 7 °C), Min:97 7 °F (36 5 °C), Max:98 4 °F (36 9 °C)    Temperature: 98 4 °F (36 9 °C)  Intake & Output:  I/O       08/15 0701 - 08/16 0700 08/16 0701 - 08/17 0700 08/17 0701 - 08/18 0700    P  O  120 220     Total Intake(mL/kg) 120 (1 6) 220 (3)     Net +120 +220                Weights:   IBW (Ideal Body Weight): 58 15 kg    Body mass index is 26 88 kg/m²  Weight (last 2 days)     Date/Time   Weight    08/15/21 17:08:01   74 4 (164 02)            Physical Exam  Vitals reviewed  Constitutional:       General: She is not in acute distress  Appearance: Normal appearance  HENT:      Head: Normocephalic  Eyes:      Extraocular Movements: Extraocular movements intact  Pupils: Pupils are equal, round, and reactive to light  Cardiovascular:      Rate and Rhythm: Normal rate and regular rhythm  Pulses: Normal pulses  Heart sounds: Normal heart sounds  Pulmonary:      Effort: Pulmonary effort is normal       Breath sounds: Normal breath sounds  Abdominal:      General: Abdomen is flat  There is no distension  Palpations: Abdomen is soft  Tenderness: There is no abdominal tenderness  Musculoskeletal:         General: Normal range of motion  Right lower leg: No edema  Left lower leg: No edema  Skin:     General: Skin is warm and dry  Neurological:      Mental Status: She is alert and oriented to person, place, and time  Cranial Nerves: Cranial nerve deficit present  No facial asymmetry  Motor: No weakness  Coordination: Coordination normal  Finger-Nose-Finger Test and Heel to Jennifer Fox Test normal       Gait: Gait is intact  Comments: Diminished left-sided hearing  Bilateral facial numbness in V1, V2, V3 distribution  Gait slowed due to patient feeling unsteady   Psychiatric:         Mood and Affect: Mood normal          Behavior: Behavior normal        LABORATORY DATA     Labs: I have personally reviewed pertinent reports    Results from last 7 days   Lab Units 08/17/21  0454 08/16/21  0557 08/15/21  0511 08/14/21  2108   WBC Thousand/uL 17 07* 12 04* 10 33* 14 53*   HEMOGLOBIN g/dL 14 4 15 0 13 9 14 4   HEMATOCRIT % 43 8 45 1 41 4 43 4   PLATELETS Thousands/uL 355 357 355 377   NEUTROS PCT %  --   --   --  70   MONOS PCT %  --   --   --  15*      Results from last 7 days   Lab Units 08/17/21  0454 08/16/21  0558 08/15/21  0511   POTASSIUM mmol/L 4 3 3 9 4 7   CHLORIDE mmol/L 110* 110* 111*   CO2 mmol/L 21 20* 19*   BUN mg/dL 23 25 25   CREATININE mg/dL 0 81 0 94 0 87   CALCIUM mg/dL 9 4 9 3 9 4                            IMAGING & DIAGNOSTIC TESTING     Radiology Results: I have personally reviewed pertinent reports  CT head without contrast    Result Date: 8/14/2021  Impression: Moderate hydrocephalus with the size of the ventricles not significantly changed when compared to a recent CT brain dated August 8, 2021  Again, the findings are likely obstructive in nature or secondary to a 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 dated August 5, 2021  Workstation performed: [id number]     Other Diagnostic Testing: I have personally reviewed pertinent reports      ACTIVE MEDICATIONS     Current Facility-Administered Medications   Medication Dose Route Frequency    acetaminophen (TYLENOL) tablet 650 mg  650 mg Oral Q6H PRN    acetaZOLAMIDE (DIAMOX) tablet 125 mg  125 mg Oral BID    [START ON 8/18/2021] chlorhexidine (PERIDEX) 0 12 % oral rinse 15 mL  15 mL Swish & Spit Once    cyclobenzaprine (FLEXERIL) tablet 10 mg  10 mg Oral TID PRN    dexamethasone (DECADRON) tablet 3 mg  3 mg Oral Q6H [facility]    diazepam (VALIUM) injection 2 5 mg  2 5 mg Intravenous Q6H PRN    escitalopram (LEXAPRO) tablet 5 mg  5 mg Oral Daily    ondansetron (ZOFRAN) injection 4 mg  4 mg Intravenous Q6H PRN    oxyCODONE (ROXICODONE) IR tablet 5 mg  5 mg Oral Q6H PRN    tiZANidine (ZANAFLEX) tablet 2 mg  2 mg Oral TID       VTE Pharmacologic Prophylaxis: Enoxaparin (Lovenox)  VTE Mechanical Prophylaxis: sequential compression device    Portions of the record may have been created with voice recognition software  Occasional wrong word or "sound a like" substitutions may have occurred due to the inherent limitations of voice recognition software    Read the chart carefully and recognize, using context, where substitutions have occurred   ==  Ladi  Internal Medicine Residency MS4

## 2021-08-17 NOTE — RESTORATIVE TECHNICIAN NOTE
Restorative Technician Note      Patient Name: Mojgan Gonsalez     Note Type: Mobility  Patient Position Upon Consult: Standing  Activity Performed: Ambulated;Dangled;Stood  Assistive Device: Roller walker  Education Provided: Yes (Educated/encouraged pt to ambulate with assistance 3-4 x's/day )  Patient Position at End of Consult: Supine; All needs within reach;Bed/Chair alarm activated      Crystal BAH, Restorative Technician, [employer]

## 2021-08-17 NOTE — ASSESSMENT & PLAN NOTE
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  · Originally presented on 8/8/21with headaches, vertigo and hearing loss (per tympanogram left ear hearing at 24%)  · Presented again 8/14 with transient LOC after fall with questionable seizure-like activity  · House Brackman grade 3 facial nerve palsy  Some left rapid horizontal nystagmus  Slight right tongue deviation  Imaging:   · CT head without 8/14/21: Moderate hydrocephalus with the size of the ventricles not significantly changed when compared to a recent CT brain dated August 8, 2021  Again, the findings are likely obstructive in nature or secondary to a 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 dated August 5, 2021    Plan:   · Continue monitor neurological exam  · STAT CT head with any neurological decline including drop GCS of 2pts within 1 hr   · Decadron 3 mg q 6h   · Continue Diamox 125mg BID in attempt to decrease CSF production to improve her symptom profile  · Medical management and pain control per primary team  · Routine EEG unremarkable  · Neurology following, input appreciated - will follow outpatient  · Depacon discontinued in setting of negative EEG  · Mobilize with PT/OT  · DVT prophylaxis:  Bilateral SCDs  Lovenox  · Plan for surgical resection/craniotomy for debulking of tumor Wednesday 8/16/21 with Dr John Kidd  Neurosurgery will continue to follow closely, call with any further questions or concerns

## 2021-08-17 NOTE — RESTORATIVE TECHNICIAN NOTE
Restorative Technician Note      Patient Name: Massimo Brewster     Note Type: Mobility  Patient Position Upon Consult: Supine  Activity Performed: Ambulated;Dangled;Stood  Assistive Device: Roller walker  Education Provided: Yes (Educated/encouraged pt to ambulate with assistance 3-4 x's/day )  Patient Position at End of Consult: Supine; All needs within reach;Bed/Chair alarm activated    Wilfred BAH, Restorative Technician, [employer]

## 2021-08-17 NOTE — UTILIZATION REVIEW
Notification of Discharge   This is a Notification of Discharge from our facility [address]  Please be advised that this patient has been discharge from our facility  Below you will find the admission and discharge date and time including the patients disposition  UTILIZATION REVIEW CONTACT:  Joselyn Cancer  Utilization   Network Utilization Review Department  Phone: [phone] x carefully listen to the prompts  All voicemails are confidential   Email: [email or web address]     PHYSICIAN ADVISORY SERVICES:  FOR JXBE-CH-ZUXF REVIEW - MEDICAL NECESSITY DENIAL  Phone: [phone]  Fax: [phone]  Email: [email or web address]     PRESENTATION DATE: 8/8/2021 10:35 AM  OBERVATION ADMISSION DATE:   INPATIENT ADMISSION DATE: 8/10/21  2:50 PM   DISCHARGE DATE: 8/12/2021  6:12 PM  DISPOSITION: PRA - Home PRA - Home      IMPORTANT INFORMATION:  Send all requests for admission clinical reviews, approved or denied determinations and any other requests to dedicated fax number below belonging to the campus where the patient is receiving treatment   List of dedicated fax numbers:  [address] DENIALS (Administrative/Medical Necessity) [phone]   [address] (Maternity/NICU/Pediatrics) [phone]   Porter Rooney [phone]   Major Rife [phone]   [facility] [phone]   Marianonaila Heayl [facility] [address] [place] [address] [phone]   [facility]  [phone]   [address] [place] [address], [facility]  [address] [facility] [address] [facility] [phone]

## 2021-08-17 NOTE — PROGRESS NOTES
[facility]  Progress Note - Misti Ziegler 1965, 55 y o  female MRN: [id number]  Unit/Bed#: [facility] 705-01 Encounter: [id number]  Primary Care Provider: Dillon Friend MD   Date and time admitted to hospital: 8/14/2021  7:56 PM    Fall with possible seizure-like activity ([address] )  Assessment & Plan  · Routine EEG negative  · See above plan    Hydrocephalus [facility])  Assessment & Plan  · Evident on MRI brain and CT head  On repeat CT head hydrocephalus appears stable  · No intervention at this time as it appears to be communicating rather than obstructive  · Continue Diamox 125mg BID at this time  * 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[facility])  Assessment & Plan  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  · Originally presented on 8/8/21with headaches, vertigo and hearing loss (per tympanogram left ear hearing at 24%)  · Presented again 8/14 with transient LOC after fall with questionable seizure-like activity  · House Brackman grade 3 facial nerve palsy  Some left rapid horizontal nystagmus  Slight right tongue deviation  Imaging:   · CT head without 8/14/21: Moderate hydrocephalus with the size of the ventricles not significantly changed when compared to a recent CT brain dated August 8, 2021  Again, the findings are likely obstructive in nature or secondary to a 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 dated August 5, 2021    Plan:   · Continue monitor neurological exam  · STAT CT head with any neurological decline including drop GCS of 2pts within 1 hr   · Decadron 3 mg q 6h   · Continue Diamox 125mg BID in attempt to decrease CSF production to improve her symptom profile  · Medical management and pain control per primary team  · Routine EEG unremarkable  · Neurology following, input appreciated - will follow outpatient  · Depacon discontinued in setting of negative EEG  · Mobilize with PT/OT    · DVT prophylaxis:  Bilateral SCDs  Lovenox  · Plan for surgical resection/craniotomy for debulking of tumor Wednesday 8/16/21 with Dr Alia Lynn  Neurosurgery will continue to follow closely, call with any further questions or concerns  Subjective/Objective   Chief Complaint: "I'm trying to rest before tomorrow "    Subjective:  Use to endorse some numbness and tingling to her bilateral fingertips  Some left-sided facial numbness  Unchanged left sided hearing loss  Denies any nausea vomiting  Continues to endorse headache  Balance issues unchanged  Objective:  NAD  Right tongue deviation  Left facial weakness  I/O       08/15 0701 - 08/16 0700 08/16 0701 - 08/17 0700 08/17 0701 - 08/18 0700    P  O  120 220 480    Total Intake(mL/kg) 120 (1 6) 220 (3) 480 (6 5)    Net +120 +220 +480           Unmeasured Urine Occurrence   2 x    Unmeasured Stool Occurrence   0 x          Invasive Devices     Peripheral Intravenous Line            Peripheral IV 08/17/21 Right Antecubital <1 day                Physical Exam:  Vitals: Blood pressure 135/74, pulse 87, temperature 98 4 °F (36 9 °C), resp  rate 16, height 5' 5 5" (1 664 m), weight 74 4 kg (164 lb 0 4 oz), SpO2 94 %, not currently breastfeeding  ,Body mass index is 26 88 kg/m²          General appearance: alert, appears stated age, cooperative and no distress  Head: Normocephalic, without obvious abnormality, atraumatic  Eyes: EOMI, PERRL  Neck: supple, symmetrical, trachea midline and NT  Back: no kyphosis present, no tenderness to percussion or palpation  Lungs: non labored breathing  Heart: regular heart rate  Neurologic:   Mental status: Alert, oriented x3, thought content appropriate  Cranial nerves: grossly intact (Cranial nerves II-XII), right tongue deviation, left facial weakness  Sensory: normal to light touch  Motor: moving all extremities without focal weakness, strength grossly 5/5  Reflexes: 2+ and symmetric, no Ye's or clonus  Coordination:  Slight bilateral dysmetria    Lab Results:  Results from last 7 days   Lab Units 08/17/21  0454 08/16/21  0557 08/15/21  0511 08/14/21  2108   WBC Thousand/uL 17 07* 12 04* 10 33* 14 53*   HEMOGLOBIN g/dL 14 4 15 0 13 9 14 4   HEMATOCRIT % 43 8 45 1 41 4 43 4   PLATELETS Thousands/uL 355 357 355 377   NEUTROS PCT %  --   --   --  70   MONOS PCT %  --   --   --  15*     Results from last 7 days   Lab Units 08/17/21  0454 08/16/21  0558 08/15/21  0511   POTASSIUM mmol/L 4 3 3 9 4 7   CHLORIDE mmol/L 110* 110* 111*   CO2 mmol/L 21 20* 19*   BUN mg/dL 23 25 25   CREATININE mg/dL 0 81 0 94 0 87   CALCIUM mg/dL 9 4 9 3 9 4                 No results found for: TROPONINT  ABG:No results found for: PHART, SJH6EWU, PO2ART, WRC0SHB, W1QPFSNH, BEART, SOURCE    Imaging Studies: I have personally reviewed pertinent reports  and I have personally reviewed pertinent films in PACS    CT head without contrast    Result Date: 8/14/2021  Impression: Moderate hydrocephalus with the size of the ventricles not significantly changed when compared to a recent CT brain dated August 8, 2021  Again, the findings are likely obstructive in nature or secondary to a 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

## 2021-08-18 ENCOUNTER — APPOINTMENT (INPATIENT)
Dept: RADIOLOGY | Facility: HOSPITAL | Age: 56
DRG: 025 | End: 2021-08-18
Payer: COMMERCIAL

## 2021-08-18 ENCOUNTER — ANESTHESIA (INPATIENT)
Dept: PERIOP | Facility: HOSPITAL | Age: 56
DRG: 025 | End: 2021-08-18
Payer: COMMERCIAL

## 2021-08-18 LAB
ABO GROUP BLD: NORMAL
ABO GROUP BLD: NORMAL
ANION GAP SERPL CALCULATED.3IONS-SCNC: 7 MMOL/L (ref 4–13)
APTT PPP: 23 SECONDS (ref 23–37)
BASE EXCESS BLDA CALC-SCNC: -4 MMOL/L (ref -2–3)
BASOPHILS # BLD AUTO: 0.04 THOUSANDS/ΜL (ref 0–0.1)
BASOPHILS NFR BLD AUTO: 0 % (ref 0–1)
BLD GP AB SCN SERPL QL: NEGATIVE
BUN SERPL-MCNC: 21 MG/DL (ref 5–25)
CA-I BLD-SCNC: 1.18 MMOL/L (ref 1.12–1.32)
CALCIUM SERPL-MCNC: 9.2 MG/DL (ref 8.3–10.1)
CHLORIDE SERPL-SCNC: 112 MMOL/L (ref 100–108)
CO2 SERPL-SCNC: 19 MMOL/L (ref 21–32)
CREAT SERPL-MCNC: 0.76 MG/DL (ref 0.6–1.3)
EOSINOPHIL # BLD AUTO: 0 THOUSAND/ΜL (ref 0–0.61)
EOSINOPHIL NFR BLD AUTO: 0 % (ref 0–6)
ERYTHROCYTE [DISTWIDTH] IN BLOOD BY AUTOMATED COUNT: 13.5 % (ref 11.6–15.1)
GFR SERPL CREATININE-BSD FRML MDRD: 89 ML/MIN/1.73SQ M
GLUCOSE SERPL-MCNC: 116 MG/DL (ref 65–140)
GLUCOSE SERPL-MCNC: 120 MG/DL (ref 65–140)
GLUCOSE SERPL-MCNC: 99 MG/DL (ref 65–140)
HCO3 BLDA-SCNC: 21.1 MMOL/L (ref 22–28)
HCT VFR BLD AUTO: 42.1 % (ref 34.8–46.1)
HCT VFR BLD CALC: 36 % (ref 34.8–46.1)
HGB BLD-MCNC: 13.8 G/DL (ref 11.5–15.4)
HGB BLDA-MCNC: 12.2 G/DL (ref 11.5–15.4)
IMM GRANULOCYTES # BLD AUTO: 0.19 THOUSAND/UL (ref 0–0.2)
IMM GRANULOCYTES NFR BLD AUTO: 1 % (ref 0–2)
INR PPP: 0.92 (ref 0.84–1.19)
LYMPHOCYTES # BLD AUTO: 1.06 THOUSANDS/ΜL (ref 0.6–4.47)
LYMPHOCYTES NFR BLD AUTO: 6 % (ref 14–44)
MCH RBC QN AUTO: 30.7 PG (ref 26.8–34.3)
MCHC RBC AUTO-ENTMCNC: 32.8 G/DL (ref 31.4–37.4)
MCV RBC AUTO: 94 FL (ref 82–98)
MONOCYTES # BLD AUTO: 0.81 THOUSAND/ΜL (ref 0.17–1.22)
MONOCYTES NFR BLD AUTO: 5 % (ref 4–12)
NEUTROPHILS # BLD AUTO: 14.64 THOUSANDS/ΜL (ref 1.85–7.62)
NEUTS SEG NFR BLD AUTO: 88 % (ref 43–75)
NRBC BLD AUTO-RTO: 0 /100 WBCS
PCO2 BLD: 22 MMOL/L (ref 21–32)
PCO2 BLD: 37.5 MM HG (ref 36–44)
PH BLD: 7.36 [PH] (ref 7.35–7.45)
PLATELET # BLD AUTO: 334 THOUSANDS/UL (ref 149–390)
PMV BLD AUTO: 10.7 FL (ref 8.9–12.7)
PO2 BLD: 206 MM HG (ref 75–129)
POTASSIUM BLD-SCNC: 3.4 MMOL/L (ref 3.5–5.3)
POTASSIUM SERPL-SCNC: 3.9 MMOL/L (ref 3.5–5.3)
PROTHROMBIN TIME: 12.4 SECONDS (ref 11.6–14.5)
RBC # BLD AUTO: 4.5 MILLION/UL (ref 3.81–5.12)
RH BLD: POSITIVE
RH BLD: POSITIVE
SAO2 % BLD FROM PO2: 100 % (ref 60–85)
SODIUM BLD-SCNC: 146 MMOL/L (ref 136–145)
SODIUM SERPL-SCNC: 138 MMOL/L (ref 136–145)
SPECIMEN EXPIRATION DATE: NORMAL
SPECIMEN SOURCE: ABNORMAL
WBC # BLD AUTO: 16.74 THOUSAND/UL (ref 4.31–10.16)

## 2021-08-18 PROCEDURE — 88331 PATH CONSLTJ SURG 1 BLK 1SPC: CPT | Performed by: PATHOLOGY

## 2021-08-18 PROCEDURE — 88307 TISSUE EXAM BY PATHOLOGIST: CPT | Performed by: PATHOLOGY

## 2021-08-18 PROCEDURE — 88342 IMHCHEM/IMCYTCHM 1ST ANTB: CPT | Performed by: PATHOLOGY

## 2021-08-18 PROCEDURE — G1004 CDSM NDSC: HCPCS

## 2021-08-18 PROCEDURE — 84132 ASSAY OF SERUM POTASSIUM: CPT

## 2021-08-18 PROCEDURE — 99291 CRITICAL CARE FIRST HOUR: CPT | Performed by: INTERNAL MEDICINE

## 2021-08-18 PROCEDURE — 86900 BLOOD TYPING SEROLOGIC ABO: CPT | Performed by: NURSE PRACTITIONER

## 2021-08-18 PROCEDURE — 86920 COMPATIBILITY TEST SPIN: CPT

## 2021-08-18 PROCEDURE — 61530 REMOVAL OF BRAIN LESION: CPT | Performed by: NEUROLOGICAL SURGERY

## 2021-08-18 PROCEDURE — 86850 RBC ANTIBODY SCREEN: CPT | Performed by: NURSE PRACTITIONER

## 2021-08-18 PROCEDURE — 69990 MICROSURGERY ADD-ON: CPT | Performed by: NEUROLOGICAL SURGERY

## 2021-08-18 PROCEDURE — 85025 COMPLETE CBC W/AUTO DIFF WBC: CPT | Performed by: STUDENT IN AN ORGANIZED HEALTH CARE EDUCATION/TRAINING PROGRAM

## 2021-08-18 PROCEDURE — 85014 HEMATOCRIT: CPT

## 2021-08-18 PROCEDURE — 84295 ASSAY OF SERUM SODIUM: CPT

## 2021-08-18 PROCEDURE — 80048 BASIC METABOLIC PNL TOTAL CA: CPT | Performed by: STUDENT IN AN ORGANIZED HEALTH CARE EDUCATION/TRAINING PROGRAM

## 2021-08-18 PROCEDURE — 88341 IMHCHEM/IMCYTCHM EA ADD ANTB: CPT | Performed by: PATHOLOGY

## 2021-08-18 PROCEDURE — 71045 X-RAY EXAM CHEST 1 VIEW: CPT

## 2021-08-18 PROCEDURE — 82803 BLOOD GASES ANY COMBINATION: CPT

## 2021-08-18 PROCEDURE — 00BC0ZZ EXCISION OF CEREBELLUM, OPEN APPROACH: ICD-10-PCS | Performed by: NEUROLOGICAL SURGERY

## 2021-08-18 PROCEDURE — 61781 SCAN PROC CRANIAL INTRA: CPT | Performed by: NEUROLOGICAL SURGERY

## 2021-08-18 PROCEDURE — 70450 CT HEAD/BRAIN W/O DYE: CPT

## 2021-08-18 PROCEDURE — 82330 ASSAY OF CALCIUM: CPT

## 2021-08-18 PROCEDURE — 85730 THROMBOPLASTIN TIME PARTIAL: CPT | Performed by: PHYSICIAN ASSISTANT

## 2021-08-18 PROCEDURE — 88333 PATH CONSLTJ SURG CYTO XM 1: CPT | Performed by: PATHOLOGY

## 2021-08-18 PROCEDURE — NC001 PR NO CHARGE: Performed by: INTERNAL MEDICINE

## 2021-08-18 PROCEDURE — C1713 ANCHOR/SCREW BN/BN,TIS/BN: HCPCS | Performed by: NEUROLOGICAL SURGERY

## 2021-08-18 PROCEDURE — 85610 PROTHROMBIN TIME: CPT | Performed by: PHYSICIAN ASSISTANT

## 2021-08-18 PROCEDURE — 02HV33Z INSERTION OF INFUSION DEVICE INTO SUPERIOR VENA CAVA, PERCUTANEOUS APPROACH: ICD-10-PCS | Performed by: INTERNAL MEDICINE

## 2021-08-18 PROCEDURE — 86901 BLOOD TYPING SEROLOGIC RH(D): CPT | Performed by: NURSE PRACTITIONER

## 2021-08-18 PROCEDURE — 32551 INSERTION OF CHEST TUBE: CPT | Performed by: INTERNAL MEDICINE

## 2021-08-18 PROCEDURE — 0W9B30Z DRAINAGE OF LEFT PLEURAL CAVITY WITH DRAINAGE DEVICE, PERCUTANEOUS APPROACH: ICD-10-PCS | Performed by: INTERNAL MEDICINE

## 2021-08-18 PROCEDURE — 82948 REAGENT STRIP/BLOOD GLUCOSE: CPT

## 2021-08-18 PROCEDURE — 82947 ASSAY GLUCOSE BLOOD QUANT: CPT

## 2021-08-18 DEVICE — IMPLANTABLE DEVICE
Type: IMPLANTABLE DEVICE | Site: CRANIAL | Status: FUNCTIONAL
Brand: THINFLAP

## 2021-08-18 RX ORDER — ONDANSETRON 2 MG/ML
4 INJECTION INTRAMUSCULAR; INTRAVENOUS ONCE AS NEEDED
Status: DISCONTINUED | OUTPATIENT
Start: 2021-08-18 | End: 2021-08-18 | Stop reason: HOSPADM

## 2021-08-18 RX ORDER — ONDANSETRON 2 MG/ML
INJECTION INTRAMUSCULAR; INTRAVENOUS AS NEEDED
Status: DISCONTINUED | OUTPATIENT
Start: 2021-08-18 | End: 2021-08-18

## 2021-08-18 RX ORDER — FENTANYL CITRATE 50 UG/ML
50 INJECTION, SOLUTION INTRAMUSCULAR; INTRAVENOUS ONCE
Status: COMPLETED | OUTPATIENT
Start: 2021-08-18 | End: 2021-08-18

## 2021-08-18 RX ORDER — DEXAMETHASONE 4 MG/1
4 TABLET ORAL EVERY 8 HOURS SCHEDULED
Status: DISCONTINUED | OUTPATIENT
Start: 2021-08-20 | End: 2021-08-19

## 2021-08-18 RX ORDER — DEXMEDETOMIDINE 100 UG/ML
.1-.4 INJECTION, SOLUTION, CONCENTRATE INTRAVENOUS
Status: DISCONTINUED | OUTPATIENT
Start: 2021-08-19 | End: 2021-08-20

## 2021-08-18 RX ORDER — SODIUM CHLORIDE, SODIUM LACTATE, POTASSIUM CHLORIDE, CALCIUM CHLORIDE 600; 310; 30; 20 MG/100ML; MG/100ML; MG/100ML; MG/100ML
INJECTION, SOLUTION INTRAVENOUS CONTINUOUS PRN
Status: DISCONTINUED | OUTPATIENT
Start: 2021-08-18 | End: 2021-08-18

## 2021-08-18 RX ORDER — LEVETIRACETAM 500 MG/5ML
INJECTION, SOLUTION, CONCENTRATE INTRAVENOUS AS NEEDED
Status: DISCONTINUED | OUTPATIENT
Start: 2021-08-18 | End: 2021-08-18

## 2021-08-18 RX ORDER — FENTANYL CITRATE 50 UG/ML
INJECTION, SOLUTION INTRAMUSCULAR; INTRAVENOUS
Status: COMPLETED
Start: 2021-08-18 | End: 2021-08-18

## 2021-08-18 RX ORDER — MAGNESIUM HYDROXIDE 1200 MG/15ML
LIQUID ORAL AS NEEDED
Status: DISCONTINUED | OUTPATIENT
Start: 2021-08-18 | End: 2021-08-18 | Stop reason: HOSPADM

## 2021-08-18 RX ORDER — FENTANYL CITRATE 50 UG/ML
INJECTION, SOLUTION INTRAMUSCULAR; INTRAVENOUS
Status: DISPENSED
Start: 2021-08-18 | End: 2021-08-19

## 2021-08-18 RX ORDER — GLYCOPYRROLATE 0.2 MG/ML
INJECTION INTRAMUSCULAR; INTRAVENOUS AS NEEDED
Status: DISCONTINUED | OUTPATIENT
Start: 2021-08-18 | End: 2021-08-18

## 2021-08-18 RX ORDER — LIDOCAINE HYDROCHLORIDE 10 MG/ML
INJECTION, SOLUTION EPIDURAL; INFILTRATION; INTRACAUDAL; PERINEURAL
Status: COMPLETED
Start: 2021-08-18 | End: 2021-08-18

## 2021-08-18 RX ORDER — CEFAZOLIN SODIUM 1 G/50ML
1000 SOLUTION INTRAVENOUS ONCE
Status: COMPLETED | OUTPATIENT
Start: 2021-08-18 | End: 2021-08-18

## 2021-08-18 RX ORDER — EPHEDRINE SULFATE 50 MG/ML
INJECTION INTRAVENOUS AS NEEDED
Status: DISCONTINUED | OUTPATIENT
Start: 2021-08-18 | End: 2021-08-18

## 2021-08-18 RX ORDER — DEXAMETHASONE 2 MG/1
2 TABLET ORAL EVERY 6 HOURS SCHEDULED
Status: DISCONTINUED | OUTPATIENT
Start: 2021-08-22 | End: 2021-08-19

## 2021-08-18 RX ORDER — DEXAMETHASONE 2 MG/1
2 TABLET ORAL EVERY 12 HOURS SCHEDULED
Status: DISCONTINUED | OUTPATIENT
Start: 2021-08-26 | End: 2021-08-19

## 2021-08-18 RX ORDER — FENTANYL CITRATE/PF 50 MCG/ML
50 SYRINGE (ML) INJECTION
Status: DISCONTINUED | OUTPATIENT
Start: 2021-08-18 | End: 2021-08-18 | Stop reason: HOSPADM

## 2021-08-18 RX ORDER — FENTANYL CITRATE 50 UG/ML
INJECTION, SOLUTION INTRAMUSCULAR; INTRAVENOUS AS NEEDED
Status: DISCONTINUED | OUTPATIENT
Start: 2021-08-18 | End: 2021-08-18

## 2021-08-18 RX ORDER — MANNITOL 250 MG/ML
INJECTION, SOLUTION INTRAVENOUS AS NEEDED
Status: DISCONTINUED | OUTPATIENT
Start: 2021-08-18 | End: 2021-08-18

## 2021-08-18 RX ORDER — SODIUM CHLORIDE 9 MG/ML
INJECTION, SOLUTION INTRAVENOUS CONTINUOUS PRN
Status: DISCONTINUED | OUTPATIENT
Start: 2021-08-18 | End: 2021-08-18

## 2021-08-18 RX ORDER — HYDROMORPHONE HCL/PF 1 MG/ML
0.5 SYRINGE (ML) INJECTION
Status: DISCONTINUED | OUTPATIENT
Start: 2021-08-18 | End: 2021-08-19

## 2021-08-18 RX ORDER — SUCCINYLCHOLINE/SOD CL,ISO/PF 100 MG/5ML
SYRINGE (ML) INTRAVENOUS AS NEEDED
Status: DISCONTINUED | OUTPATIENT
Start: 2021-08-18 | End: 2021-08-18

## 2021-08-18 RX ORDER — DEXMEDETOMIDINE HYDROCHLORIDE 100 UG/ML
INJECTION, SOLUTION INTRAVENOUS AS NEEDED
Status: DISCONTINUED | OUTPATIENT
Start: 2021-08-18 | End: 2021-08-18

## 2021-08-18 RX ORDER — OXYCODONE HYDROCHLORIDE 10 MG/1
10 TABLET ORAL EVERY 4 HOURS PRN
Status: DISCONTINUED | OUTPATIENT
Start: 2021-08-18 | End: 2021-09-01 | Stop reason: HOSPADM

## 2021-08-18 RX ORDER — HEPARIN SODIUM 5000 [USP'U]/ML
5000 INJECTION, SOLUTION INTRAVENOUS; SUBCUTANEOUS EVERY 8 HOURS SCHEDULED
Status: DISCONTINUED | OUTPATIENT
Start: 2021-08-19 | End: 2021-08-19

## 2021-08-18 RX ORDER — LABETALOL 20 MG/4 ML (5 MG/ML) INTRAVENOUS SYRINGE
10 EVERY 4 HOURS PRN
Status: DISCONTINUED | OUTPATIENT
Start: 2021-08-18 | End: 2021-09-01 | Stop reason: HOSPADM

## 2021-08-18 RX ORDER — MIDAZOLAM HYDROCHLORIDE 2 MG/2ML
INJECTION, SOLUTION INTRAMUSCULAR; INTRAVENOUS AS NEEDED
Status: DISCONTINUED | OUTPATIENT
Start: 2021-08-18 | End: 2021-08-18

## 2021-08-18 RX ORDER — SCOLOPAMINE TRANSDERMAL SYSTEM 1 MG/1
1 PATCH, EXTENDED RELEASE TRANSDERMAL ONCE
Status: DISCONTINUED | OUTPATIENT
Start: 2021-08-18 | End: 2021-08-19

## 2021-08-18 RX ORDER — DEXAMETHASONE SODIUM PHOSPHATE 10 MG/ML
INJECTION, SOLUTION INTRAMUSCULAR; INTRAVENOUS AS NEEDED
Status: DISCONTINUED | OUTPATIENT
Start: 2021-08-18 | End: 2021-08-18

## 2021-08-18 RX ORDER — LIDOCAINE HYDROCHLORIDE 10 MG/ML
INJECTION, SOLUTION EPIDURAL; INFILTRATION; INTRACAUDAL; PERINEURAL AS NEEDED
Status: DISCONTINUED | OUTPATIENT
Start: 2021-08-18 | End: 2021-08-18 | Stop reason: HOSPADM

## 2021-08-18 RX ORDER — DEXAMETHASONE 2 MG/1
2 TABLET ORAL EVERY 8 HOURS SCHEDULED
Status: DISCONTINUED | OUTPATIENT
Start: 2021-08-25 | End: 2021-08-19

## 2021-08-18 RX ORDER — DEXAMETHASONE 2 MG/1
2 TABLET ORAL
Status: DISCONTINUED | OUTPATIENT
Start: 2021-08-29 | End: 2021-08-19

## 2021-08-18 RX ORDER — PROPOFOL 10 MG/ML
INJECTION, EMULSION INTRAVENOUS AS NEEDED
Status: DISCONTINUED | OUTPATIENT
Start: 2021-08-18 | End: 2021-08-18

## 2021-08-18 RX ORDER — CEFAZOLIN SODIUM 1 G/3ML
INJECTION, POWDER, FOR SOLUTION INTRAMUSCULAR; INTRAVENOUS AS NEEDED
Status: DISCONTINUED | OUTPATIENT
Start: 2021-08-18 | End: 2021-08-18

## 2021-08-18 RX ORDER — CEFAZOLIN SODIUM 2 G/50ML
2000 SOLUTION INTRAVENOUS EVERY 8 HOURS
Status: COMPLETED | OUTPATIENT
Start: 2021-08-18 | End: 2021-08-19

## 2021-08-18 RX ORDER — DEXAMETHASONE 4 MG/1
4 TABLET ORAL EVERY 6 HOURS SCHEDULED
Status: DISCONTINUED | OUTPATIENT
Start: 2021-08-18 | End: 2021-08-19

## 2021-08-18 RX ORDER — OXYCODONE HYDROCHLORIDE 5 MG/1
5 TABLET ORAL EVERY 4 HOURS PRN
Status: DISCONTINUED | OUTPATIENT
Start: 2021-08-18 | End: 2021-09-01 | Stop reason: HOSPADM

## 2021-08-18 RX ORDER — HYDROMORPHONE HCL IN WATER/PF 6 MG/30 ML
0.2 PATIENT CONTROLLED ANALGESIA SYRINGE INTRAVENOUS
Status: DISCONTINUED | OUTPATIENT
Start: 2021-08-18 | End: 2021-08-18 | Stop reason: HOSPADM

## 2021-08-18 RX ORDER — ACETAMINOPHEN 325 MG/1
975 TABLET ORAL EVERY 8 HOURS SCHEDULED
Status: DISCONTINUED | OUTPATIENT
Start: 2021-08-18 | End: 2021-09-01 | Stop reason: HOSPADM

## 2021-08-18 RX ORDER — HYDROMORPHONE HCL/PF 1 MG/ML
0.5 SYRINGE (ML) INJECTION ONCE
Status: COMPLETED | OUTPATIENT
Start: 2021-08-18 | End: 2021-08-18

## 2021-08-18 RX ORDER — LIDOCAINE HYDROCHLORIDE AND EPINEPHRINE 10; 10 MG/ML; UG/ML
INJECTION, SOLUTION INFILTRATION; PERINEURAL AS NEEDED
Status: DISCONTINUED | OUTPATIENT
Start: 2021-08-18 | End: 2021-08-18 | Stop reason: HOSPADM

## 2021-08-18 RX ORDER — SODIUM CHLORIDE 9 MG/ML
100 INJECTION, SOLUTION INTRAVENOUS CONTINUOUS
Status: DISCONTINUED | OUTPATIENT
Start: 2021-08-18 | End: 2021-08-19

## 2021-08-18 RX ORDER — BUPIVACAINE HYDROCHLORIDE AND EPINEPHRINE 5; 5 MG/ML; UG/ML
INJECTION, SOLUTION EPIDURAL; INTRACAUDAL; PERINEURAL AS NEEDED
Status: DISCONTINUED | OUTPATIENT
Start: 2021-08-18 | End: 2021-08-18 | Stop reason: HOSPADM

## 2021-08-18 RX ORDER — FUROSEMIDE 10 MG/ML
INJECTION INTRAMUSCULAR; INTRAVENOUS AS NEEDED
Status: DISCONTINUED | OUTPATIENT
Start: 2021-08-18 | End: 2021-08-18

## 2021-08-18 RX ORDER — PROPOFOL 10 MG/ML
INJECTION, EMULSION INTRAVENOUS CONTINUOUS PRN
Status: DISCONTINUED | OUTPATIENT
Start: 2021-08-18 | End: 2021-08-18

## 2021-08-18 RX ADMIN — DEXAMETHASONE 3 MG: 2 TABLET ORAL at 00:10

## 2021-08-18 RX ADMIN — SCOPALAMINE 1 PATCH: 1 PATCH, EXTENDED RELEASE TRANSDERMAL at 08:29

## 2021-08-18 RX ADMIN — FUROSEMIDE 10 MG: 10 INJECTION, SOLUTION INTRAMUSCULAR; INTRAVENOUS at 10:45

## 2021-08-18 RX ADMIN — HYDROMORPHONE HYDROCHLORIDE 0.5 MG: 1 INJECTION, SOLUTION INTRAMUSCULAR; INTRAVENOUS; SUBCUTANEOUS at 21:18

## 2021-08-18 RX ADMIN — FENTANYL CITRATE 25 MCG: 50 INJECTION INTRAMUSCULAR; INTRAVENOUS at 13:03

## 2021-08-18 RX ADMIN — GLYCOPYRROLATE 0.2 MG: 0.2 INJECTION, SOLUTION INTRAMUSCULAR; INTRAVENOUS at 09:19

## 2021-08-18 RX ADMIN — HYDROMORPHONE HYDROCHLORIDE 0.5 MG: 1 INJECTION, SOLUTION INTRAMUSCULAR; INTRAVENOUS; SUBCUTANEOUS at 23:56

## 2021-08-18 RX ADMIN — PROPOFOL 200 MG: 10 INJECTION, EMULSION INTRAVENOUS at 08:41

## 2021-08-18 RX ADMIN — FENTANYL CITRATE 50 MCG: 50 INJECTION, SOLUTION INTRAMUSCULAR; INTRAVENOUS at 21:18

## 2021-08-18 RX ADMIN — HYDROMORPHONE HYDROCHLORIDE 0.5 MG: 1 INJECTION, SOLUTION INTRAMUSCULAR; INTRAVENOUS; SUBCUTANEOUS at 19:01

## 2021-08-18 RX ADMIN — SODIUM CHLORIDE: 0.9 INJECTION, SOLUTION INTRAVENOUS at 12:45

## 2021-08-18 RX ADMIN — LEVETIRACETAM 500 MG: 100 INJECTION, SOLUTION INTRAVENOUS at 08:59

## 2021-08-18 RX ADMIN — SODIUM CHLORIDE, SODIUM LACTATE, POTASSIUM CHLORIDE, AND CALCIUM CHLORIDE: .6; .31; .03; .02 INJECTION, SOLUTION INTRAVENOUS at 10:54

## 2021-08-18 RX ADMIN — PROPOFOL 80 MCG/KG/MIN: 10 INJECTION, EMULSION INTRAVENOUS at 08:59

## 2021-08-18 RX ADMIN — FENTANYL CITRATE 100 MCG: 50 INJECTION INTRAMUSCULAR; INTRAVENOUS at 08:41

## 2021-08-18 RX ADMIN — MIDAZOLAM 1 MG: 1 INJECTION INTRAMUSCULAR; INTRAVENOUS at 08:29

## 2021-08-18 RX ADMIN — REMIFENTANIL HYDROCHLORIDE 0.2 MCG/KG/MIN: 1 INJECTION, POWDER, LYOPHILIZED, FOR SOLUTION INTRAVENOUS at 08:59

## 2021-08-18 RX ADMIN — CEFAZOLIN SODIUM 2000 MG: 2 SOLUTION INTRAVENOUS at 23:59

## 2021-08-18 RX ADMIN — LIDOCAINE HYDROCHLORIDE 50 MG: 10 INJECTION, SOLUTION EPIDURAL; INFILTRATION; INTRACAUDAL; PERINEURAL at 19:01

## 2021-08-18 RX ADMIN — DEXMEDETOMIDINE 4 MCG: 100 INJECTION, SOLUTION, CONCENTRATE INTRAVENOUS at 09:09

## 2021-08-18 RX ADMIN — ONDANSETRON 4 MG: 2 INJECTION INTRAMUSCULAR; INTRAVENOUS at 16:06

## 2021-08-18 RX ADMIN — DEXAMETHASONE SODIUM PHOSPHATE 10 MG: 10 INJECTION, SOLUTION INTRAMUSCULAR; INTRAVENOUS at 09:22

## 2021-08-18 RX ADMIN — SODIUM CHLORIDE 100 ML/HR: 0.9 INJECTION, SOLUTION INTRAVENOUS at 17:59

## 2021-08-18 RX ADMIN — SODIUM CHLORIDE, SODIUM LACTATE, POTASSIUM CHLORIDE, AND CALCIUM CHLORIDE: .6; .31; .03; .02 INJECTION, SOLUTION INTRAVENOUS at 08:26

## 2021-08-18 RX ADMIN — SODIUM CHLORIDE: 0.9 INJECTION, SOLUTION INTRAVENOUS at 08:59

## 2021-08-18 RX ADMIN — CEFAZOLIN SODIUM 1000 MG: 1 INJECTION, POWDER, FOR SOLUTION INTRAMUSCULAR; INTRAVENOUS at 13:58

## 2021-08-18 RX ADMIN — LIDOCAINE HYDROCHLORIDE 50 ML: 10 INJECTION, SOLUTION EPIDURAL; INFILTRATION; INTRACAUDAL; PERINEURAL at 08:41

## 2021-08-18 RX ADMIN — ACETAMINOPHEN 650 MG: 325 TABLET, FILM COATED ORAL at 06:09

## 2021-08-18 RX ADMIN — EPHEDRINE SULFATE 5 MG: 50 INJECTION, SOLUTION INTRAVENOUS at 08:49

## 2021-08-18 RX ADMIN — CHLORHEXIDINE GLUCONATE 15 ML: 1.2 SOLUTION ORAL at 06:09

## 2021-08-18 RX ADMIN — MIDAZOLAM 1 MG: 1 INJECTION INTRAMUSCULAR; INTRAVENOUS at 08:32

## 2021-08-18 RX ADMIN — FENTANYL CITRATE 50 MCG: 50 INJECTION INTRAMUSCULAR; INTRAVENOUS at 21:18

## 2021-08-18 RX ADMIN — CEFAZOLIN SODIUM 1000 MG: 1 SOLUTION INTRAVENOUS at 10:02

## 2021-08-18 RX ADMIN — DEXAMETHASONE 3 MG: 2 TABLET ORAL at 06:09

## 2021-08-18 RX ADMIN — Medication 100 MG: at 08:41

## 2021-08-18 RX ADMIN — DEXMEDETOMIDINE 0.1 MCG/KG/HR: 100 INJECTION, SOLUTION, CONCENTRATE INTRAVENOUS at 08:59

## 2021-08-18 RX ADMIN — MANNITOL 50 G: 12.5 INJECTION, SOLUTION INTRAVENOUS at 10:41

## 2021-08-18 RX ADMIN — FENTANYL CITRATE 75 MCG: 50 INJECTION INTRAMUSCULAR; INTRAVENOUS at 16:29

## 2021-08-18 RX ADMIN — PHENYLEPHRINE HYDROCHLORIDE 20 MCG/MIN: 10 INJECTION INTRAVENOUS at 12:32

## 2021-08-18 RX ADMIN — HYDROMORPHONE HYDROCHLORIDE 0.5 MG: 1 INJECTION, SOLUTION INTRAMUSCULAR; INTRAVENOUS; SUBCUTANEOUS at 20:17

## 2021-08-18 NOTE — PROCEDURES
Chest Tube Insertion    Date/Time: 8/18/2021 7:27 PM  Performed by: Rae De La Torre MD  Authorized by: Rae De La Torre MD     Patient location:  Bedside  Other Assisting Provider: Yes (comment) (Dr Joey Morales )    Consent:     Consent obtained:  Emergent situation  Universal protocol:     Patient identity confirmed:  Verbally with patient and hospital-assigned identification number  Pre-procedure details:     Skin preparation:  ChloraPrep    Preparation: Patient was prepped and draped in the usual sterile fashion    Indications:     Indications: pneumothroax    Sedation:     Sedation type: Anxiolysis  Anesthesia (see MAR for exact dosages): Anesthesia method:  Local infiltration    Local anesthetic:  Lidocaine 1% w/o epi  Procedure details:     Placement location:  Lateral    Laterality:  Left    Approach:  Percutaneous    Tube connected to:  Suction    Drainage characteristics:  Air only    Suture material:  2-0 silk    Dressing:  4x4 sterile gauze and Xeroform gauze  Post-procedure details:     Post-insertion x-ray findings: tube repositioned      Patient tolerance of procedure:   Tolerated well, no immediate complications

## 2021-08-18 NOTE — RESTORATIVE TECHNICIAN NOTE
Restorative Technician Note      Patient Name: Cammie Patel     Note Type: Mobility (Attempted to see pt, pt is currently off the unit at the OR )    Arlene Glover BS, Restorative Technician, [employer]

## 2021-08-18 NOTE — PROGRESS NOTES
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  She was started on Diamox and steroids and discharged on 08/12 8/14 readmitted complaining of frequent falls and possible seizure activity  Taken to the OR with Neurosurgery 8/19 for left retromastoid craniotomy and debulking of CP angle mass  Admitted to ICU postop for further management  Post-op pt agitated, left sided gaze preference, leaning to left side, and only moaning  Neurosurgery aware and pt went for STAT CTH  Findings as mentioned below, neurosurg aware  Pain treated however pt still required restraints for safety and started on precedex gtt  Date of service: 8/18/2021    History obtained from chart review and family    -------------------------------------------------------------------------------------------------------------  Assessment and Plan:    Neuro:    Diagnosis: Cerebellopontine angle tumor, obstructive hydrocephalus  o Plan: Status post image guided left retromastoid craniotomy for debulking of CP angle mass 8/18  o Post op CTH showed small amount of subarachnoid hemorrhage at the craniocervical junction extending into the upper cervical spine, postoperative intracranial and extracranial pneumocephalus  o Follow-up a m   CT head  o Q 1 hour neuro assessments  o Continue Decadron taper and Diamox  o Maintain seizure precautions  o Follow-up MRI brain   Diagnosis: acute pain, delirium   o Plan:  Low-dose Precedex drip  o Scheduled Tylenol  o P r n  Valium, Flexeril, Dilaudid, oxycodone  o Scheduled Lexapro      CV:    Diagnosis:  Hypertension  o Plan:  P r n  Labetalol      Pulm:   Diagnosis: Left pneumothorax post left subclavian catheter placement  o Plan:  Status post left chest tube placement  o Follow-up a m  Chest x-ray      GI:    Diagnosis:  Risk for opiate induced constipation  o Plan:  Maintain bowel regimen  o NPO overnight due to mental status, consider speech evaluation tomorrow      :    Diagnosis:  No acute issues  o Plan: Morales in place  o Strict I&Os    F/E/N:    Plan: NS   Replete lytes as needed   NPO      Heme/Onc:    Diagnosis:  DVT prophylaxis  o Plan:  Hold chemical prophylaxis overnight, restart tomorrow per Neurosurgery  o Maintain SCDs       Endo:    Diagnosis:  No acute issues  o Plan:  Glucose checks due to Decadron use      ID:    Diagnosis:  No acute issues  o Plan:  Continue Ancef for surgical prophylaxis  o Fever curve and WBC      MSK/Skin:    Diagnosis:  Ambulatory dysfunction secondary to tumor resection  o Plan:  PT/OT when able    Disposition: Admit to Critical Care   Code Status: Level 1 - Full Code  --------------------------------------------------------------------------------------------------------------  Review of Systems   Unable to perform ROS: Mental status change       Review of systems was unable to be performed secondary to AMS    Physical Exam  Vitals and nursing note reviewed  Constitutional:       Appearance: She is ill-appearing  HENT:      Head:      Comments: Posterior incision CDI      Right Ear: External ear normal       Left Ear: External ear normal       Nose: Nose normal       Mouth/Throat:      Mouth: Mucous membranes are moist    Eyes:      Pupils: Pupils are equal, round, and reactive to light  Comments: Left gaze preference   Cardiovascular:      Rate and Rhythm: Normal rate  Pulses: Normal pulses     Pulmonary:      Effort: Pulmonary effort is normal  Breath sounds: Normal breath sounds  Comments: Left chest tube in place  Abdominal:      General: Bowel sounds are normal  There is no distension  Palpations: Abdomen is soft  Tenderness: There is no abdominal tenderness  Genitourinary:     Comments: Morales in place  Musculoskeletal:      Cervical back: Normal range of motion  Right lower leg: No edema  Left lower leg: No edema  Skin:     General: Skin is warm and dry  Capillary Refill: Capillary refill takes less than 2 seconds  Neurological:      Comments: Opens eyes spontaneously  Purposeful all extremities  Moans   Psychiatric:      Comments: Restless        --------------------------------------------------------------------------------------------------------------  Vitals:   Vitals:    08/18/21 1730 08/18/21 1745 08/18/21 1800 08/18/21 2116   BP: 154/90 160/86 150/88 161/83   Pulse: 90 90 92 66   Resp: 22 22 20    Temp: (!) 97 4 °F (36 3 °C) 97 5 °F (36 4 °C)  98 3 °F (36 8 °C)   TempSrc:       SpO2: 95% 94% 96% 99%   Weight:       Height:         Temp  Min: 96 9 °F (36 1 °C)  Max: 98 4 °F (36 9 °C)  IBW (Ideal Body Weight): 58 15 kg  Height: 5' 5 5" (166 4 cm)  Body mass index is 26 88 kg/m²    N/A    Laboratory and Diagnostics:  Results from last 7 days   Lab Units 08/18/21  1257 08/18/21  0533 08/17/21  0454 08/16/21  0557 08/15/21  0511 08/14/21  2108   WBC Thousand/uL  --  16 74* 17 07* 12 04* 10 33* 14 53*   HEMOGLOBIN g/dL  --  13 8 14 4 15 0 13 9 14 4   I STAT HEMOGLOBIN g/dl 12 2  --   --   --   --   --    HEMATOCRIT %  --  42 1 43 8 45 1 41 4 43 4   HEMATOCRIT, ISTAT % 36  --   --   --   --   --    PLATELETS Thousands/uL  --  334 355 357 355 377   NEUTROS PCT %  --  88*  --   --   --  70   MONOS PCT %  --  5  --   --   --  15*     Results from last 7 days   Lab Units 08/18/21  1257 08/18/21  0533 08/17/21  0454 08/16/21  0558 08/15/21  0511 08/14/21  2108   SODIUM mmol/L  --  138 139 137 136 137   POTASSIUM mmol/L  --  3 9 4 3 3 9 4 7 3 7   CHLORIDE mmol/L  --  112* 110* 110* 111* 111*   CO2 mmol/L  --  19* 21 20* 19* 18*   CO2, I-STAT mmol/L 22  --   --   --   --   --    ANION GAP mmol/L  --  7 8 7 6 8   BUN mg/dL  --  21 23 25 25 27*   CREATININE mg/dL  --  0 76 0 81 0 94 0 87 1 05   CALCIUM mg/dL  --  9 2 9 4 9 3 9 4 9 2   GLUCOSE RANDOM mg/dL  --  99 116 113 105 87          Results from last 7 days   Lab Units 21  0745   INR  0 92   PTT seconds 23              ABG:    VBG:          Micro:        EKG:   Imaging: I have personally reviewed pertinent reports  Historical Information   Past Medical History:   Diagnosis Date    Hypertension      History reviewed  No pertinent surgical history  Social History   Social History     Substance and Sexual Activity   Alcohol Use Yes     Social History     Substance and Sexual Activity   Drug Use No     Social History     Tobacco Use   Smoking Status Former Smoker    Quit date: 2021    Years since quittin 6   Smokeless Tobacco Never Used     Exercise History:   Family History:   History reviewed  No pertinent family history    I have reviewed this patient's family history and commented on sigificant items within the HPI      Medications:  Current Facility-Administered Medications   Medication Dose Route Frequency    acetaminophen (TYLENOL) tablet 650 mg  650 mg Oral Q6H PRN    acetaminophen (TYLENOL) tablet 975 mg  975 mg Oral Q8H [address]    acetaZOLAMIDE (DIAMOX) tablet 125 mg  125 mg Oral BID    ceFAZolin (ANCEF) IVPB (premix in dextrose) 2,000 mg 50 mL  2,000 mg Intravenous Q8H    cyclobenzaprine (FLEXERIL) tablet 10 mg  10 mg Oral TID PRN    dexamethasone (DECADRON) tablet 4 mg  4 mg Oral Q6H [address]    Followed by   Deny Flores ON 2021] dexamethasone (DECADRON) tablet 4 mg  4 mg Oral Q8H [address]    Followed by   Deny Flores ON 2021] dexamethasone (DECADRON) tablet 2 mg  2 mg Oral Q6H [address]    Followed by   Deny Flores ON 2021] dexamethasone (DECADRON) tablet 2 mg  2 mg Oral Q8H [facility]    Followed by   Carver Claude ON 8/26/2021] dexamethasone (DECADRON) tablet 2 mg  2 mg Oral Q12H [facility]    Followed by   Carver Claude ON 8/29/2021] dexamethasone (DECADRON) tablet 2 mg  2 mg Oral Q24H RONNIE    dexamethasone (DECADRON) tablet 3 mg  3 mg Oral Q6H [facility]    dexmedetomidine (PRECEDEX) 400 mcg in sodium chloride 0 9% 100 ml IVPB  0 1-0 4 mcg/kg/hr Intravenous Titrated    diazepam (VALIUM) injection 2 5 mg  2 5 mg Intravenous Q6H PRN    escitalopram (LEXAPRO) tablet 5 mg  5 mg Oral Daily    fentanyl citrate (PF) (FOR EMS ONLY) 100 mcg/2 mL injection **ADS Override Pull**        heparin (porcine) subcutaneous injection 5,000 Units  5,000 Units Subcutaneous Q8H [facility]    HYDROmorphone (DILAUDID) injection 0 5 mg  0 5 mg Intravenous Q1H PRN    Labetalol HCl (NORMODYNE) injection 10 mg  10 mg Intravenous Q4H PRN    naloxone (NARCAN) 0 04 mg/mL syringe 0 04 mg  0 04 mg Intravenous Q1MIN PRN    ondansetron (ZOFRAN) injection 4 mg  4 mg Intravenous Q6H PRN    oxyCODONE (ROXICODONE) IR tablet 10 mg  10 mg Oral Q4H PRN    oxyCODONE (ROXICODONE) IR tablet 5 mg  5 mg Oral Q6H PRN    oxyCODONE (ROXICODONE) IR tablet 5 mg  5 mg Oral Q4H PRN    scopolamine (TRANSDERM-SCOP) 1 5 mg/3 days TD 72 hr patch 1 patch  1 patch Transdermal Once    sodium chloride 0 9 % infusion  100 mL/hr Intravenous Continuous    tiZANidine (ZANAFLEX) tablet 2 mg  2 mg Oral TID     Home medications:  Prior to Admission Medications   Prescriptions Last Dose Informant Patient Reported?  Taking?   acetaZOLAMIDE (DIAMOX) 125 mg tablet   No No   Sig: Take 1 tablet (125 mg total) by mouth 2 (two) times a day   cyclobenzaprine (FLEXERIL) 10 mg tablet   No No   Sig: Take 1 tablet (10 mg total) by mouth 3 (three) times a day as needed for muscle spasms (headache)   dexamethasone (DECADRON) 2 mg tablet   No No   Sig: Take 4 mg tonight 8/12, 4 mg every 8 hours 8/13, followed by 2 mg every 6 hours x2 days, 2 mg every 8 hours x2 days, 2 mg every 12 hours x2 days   escitalopram (LEXAPRO) 5 mg tablet   Yes No   losartan (COZAAR) 50 mg tablet  Self Yes No   Sig: Take 50 mg by mouth daily   oxyCODONE (ROXICODONE) 5 mg immediate release tablet   No No   Sig: Take 1 tablet (5 mg total) by mouth every 6 (six) hours as needed for severe pain for up to 10 dosesMax Daily Amount: 20 mg      Facility-Administered Medications: None     Allergies:  No Known Allergies  ------------------------------------------------------------------------------------------------------------  Advance Directive and Living Will:      Power of :    POLST:    ------------------------------------------------------------------------------------------------------------  Anticipated Length of Stay is > 2 midnights    Care Time Delivered:   No Critical Care time spent       ONELIA Perez        Portions of the record may have been created with voice recognition software  Occasional wrong word or "sound a like" substitutions may have occurred due to the inherent limitations of voice recognition software    Read the chart carefully and recognize, using context, where substitutions have occurred

## 2021-08-18 NOTE — PHYSICAL THERAPY NOTE
Physical Therapy Cancellation Note       08/18/21 1825   PT Last Visit   PT Visit Date 08/18/21   Note Type   Note Type Treatment   Cancel Reasons Patient to operating room     Orders received and chart reviewed  Pt noted to be off the unit at OR w/ neurosx  Will continue to follow and attempt to see pt as able      Shana Galvez, PT, DPT

## 2021-08-18 NOTE — ANESTHESIA PROCEDURE NOTES
Central Line Insertion  Performed by: Dariel Ramsey DO  Authorized by: Dariel Ramsey DO     Date/Time: 8/18/2021 8:57 AM  Catheter Type:  triple lumen  Consent: Written consent obtained  Risks and benefits: risks, benefits and alternatives were discussed  Consent given by: patient  Time out: Immediately prior to procedure a "time out" was called to verify the correct patient, procedure, equipment, support staff and site/side marked as required    Indications: vascular access  Catheter size: 9 Fr  Patient position: Trendelenburg  Assessment: blood return through all ports and free fluid flow  Ultrasound guidance: yes  sterile gel and probe cover used in ultrasound-guided central venous catheter insertionNumber of attempts: 1  Successful placement: yes  Post-procedure: chlorhexidine patch applied,  dressing applied and line sutured

## 2021-08-18 NOTE — PERIOPERATIVE NURSING NOTE
1800 Dr Liseth Elizabeth here to evaluate pt, will transfer to ICU BED 5 for CT, Dr Liseth Elizabeth spoke to pt's family  Dr Arley Cullen notifief by Dr Almendarez Flurry concerning pneumo, Pt's SATS > =94%, no acute respiratory difficulty

## 2021-08-18 NOTE — ANESTHESIA PROCEDURE NOTES
Arterial Line Insertion  Performed by: Maria Del Rosario Schaffer CRNA  Authorized by: Rajani Walton DO   Consent: Verbal consent obtained  Written consent obtained  Risks and benefits: risks, benefits and alternatives were discussed  Consent given by: patient  Patient identity confirmed: arm band  Time out: Immediately prior to procedure a "time out" was called to verify the correct patient, procedure, equipment, support staff and site/side marked as required  Preparation: Patient was prepped and draped in the usual sterile fashion    Indications: hemodynamic monitoring  Orientation:  Right  Location: radial artery  Procedure Details:  Number of attempts: 1    Post-procedure:  Post-procedure: dressing applied  Waveform: good waveform  Patient tolerance: patient tolerated the procedure well with no immediate complications

## 2021-08-18 NOTE — OP NOTE
OPERATIVE REPORT  PATIENT NAME: Cammie Patel    :  1965  MRN: [id number]  Pt Location: BE OR ROOM 17    SURGERY DATE: 2021    Surgeon(s) and Role:     * Lilliam Dominguez MD - Primary     * Earnestine Lopez MD - Assisting    Preop Diagnosis:  Fall at home, subsequent encounter [H63  XXXD, F09 131]  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]  Obstructive hydrocephalus ([address] ) [G91 1]  Seizure-like activity ([address] ) [R56 9]    Post-Op Diagnosis Codes:     * Fall at home, subsequent encounter [W19  XXXD, R00 340]     * 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]     * Obstructive hydrocephalus ([address] ) [G91 1]     * Seizure-like activity ([address] ) [R56 9]    Procedure(s) (LRB):  Image guided left retromastoid craniotomy for debulking of CP angle mass; intraoperative monitoring (Left)    Specimen(s):  ID Type Source Tests Collected by Time Destination   1 : Left cerebellopontine angle mass Tissue Brain TISSUE Arcadio Both Lilliam Dominguez MD 2021 1122    2 : Left cerebellopontine angle mass Tissue Brain TISSUE EXAM Lilliam Dominguez MD 2021 1507        Estimated Blood Loss:   Minimal    Drains:  Urethral Catheter Non-latex 16 Fr  (Active)   Collection Container Standard drainage bag 21 0910   Number of days: 0       Anesthesia Type:   General    Operative Indications:  Fall at home, subsequent encounter [W19  XXXD, D51 268]  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]  Obstructive hydrocephalus ([address] ) [G91 1]  Seizure-like activity ([address] ) [R56 9]    Operative Findings:  Vestibular schwannoma    Complications:   None    Procedure and Technique:  After adequate general endotracheal anesthesia the patient was placed the lateral position left side up  The head was placed into 3 point head fixation device and a stealth neuro navigational system was registered and calibrated    This system was used throughout the procedure to optimize trajectories to the tumor as well as to plot out vascular landmarks so as to allow for optimal exposure with minimal risk to the patient  The hair was clipped in the left retromastoid region in the scalp was prepped with Betadine soap then DuraPrep  Double layer drapes were placed in normal fashion and a 3M Betadine impregnated sticky drape was placed over these  A time-out was called and all parameters a time-out were followed  Curvilinear incision in the left retromastoid region extending to the lower pole of the mastoid tip and above the pinna of the ear was injected with 1% lidocaine with 1 100,000 epinephrine  A 15 blade was used to incise the skin  Bovie and bipolar were used throughout the procedure to maintain hemostasis  The Bovie and a cutting setting was used to divide the tissues to the galea  This was then incised  Dissection was carried out through the capitis muscles and then dissected from the underlying bone  A cerebellar style weitlander retractor was used to maintain operative exposure  Dissection was then carried out to elevate the muscle and fascia from the underlying bone  Image guidance was utilized to [facility] the posterior aspect of the sigmoid sinus and inferior aspect of the transverse sinus  A trough was drilled at these points  A full craniotomy flap was then turned  The dura was not breached this maneuver  The dura was then opened in a curvilinear fashion and 4 0 Nurolon sutures were used to maintain operative exposure  A moistened Telfa was utilized to maintain the dura under good moisture content and prevented from drying  Next a rubber dam and chondroid were introduced into the region of the foramen magnum in cerebral spinal fluid was allowed to egress  This allowed for the cerebellum to decompress nicely  Anesthesia was asked to give mannitol and Lasix to aid in the decompression    The intraoperative microscope was used at various degrees of magnification to aid in the identification, illumination, and microdissection necessary to perform this procedure  The procedure began by introducing a chondroid into the region of the IAC  The tumor was immediately identified was quite large  The arachnoid was released circumferentially around the mass  The most dorsal aspect of the tumor was then identified and coagulated  This was incised and the Cavitron ultrasonic aspirated was utilized to decompress the substance of the tumor  Portions of this were sent to the laboratory and was a returned as being consistent with a spindle cell neoplasm  I inspected these images via tele pathology and there pathology was consistent with a vestibular schwannoma  A tedious dissection then ensued  The tumor along the brainstem was very adherent and the medial aspect of the 7th nerve was easily dissected from the tumor but the lateral aspect was very adherent and in fact splayed with incorporation of tumor between the fascicles of the nerve  Next the internal auditory canal was identified with a 18  Rotund dissector  The dura overlying this was coagulated and then a 11  Blade was used to incise this  Was flapped over the nerve  A Midas-Uriel drill was then utilized to drill out the IAC  Tumor was debulked from within this however there was more tumor which was very for medial to the extent of dissection of the IAC  With continuous monitoring in intermittent stimulation of the 7th nerve a dissection and debulking of the tumor was achieved  On the surface of the 7th nerve I kept in place a small 1 cm by 0 5 mm by 8 mm cuffed of tumor  I felt that the risk of resecting this tumor especially in the setting of the patient needing SRS because the tumor in the medial IAC would not warrant the potential benefit her  Therefore I kept this in place  The dissection for this case was extensive and was made more difficult because of the adhesions and the size of the tumor which was in excess of 3 cm       Copious quantities of irrigation were then used to cleanse out the region  Surgicel was placed on the exposed surfaces of the cerebellum in the region of the dissection  The dura was then approximated with interrupted then running 4-0 Nurolon suture Valsalva produced no CSF leaks  DuraSeal was placed over this  The bone flap was replaced with the Biomet bone fixation system  DuraSeal was then placed circumferentially around this as was FloSeal   The muscle was approximated with interrupted 2 0 Vicryl suture  The fascia was closed with interrupted then running 2 0 Vicryl suture  Subcutaneous tissues were approximated with interrupted inverted 3-0 Vicryl suture  The skin was run with a 4-0 Monocryl  Histoacryl was then placed over this  The region was injected with 0 5% bupivacaine with 1 to [id number] epinephrine  An assistant was necessary for this case given tedious nature of the dissection and the size of the tumor with a difficulty and retracting and dissecting the tissues of the region  Seventh nerve stimulated at 0 1 milliamps at the brainstem following the terminus of the resection  There were no alterations in the motor-evoked potentials or somatosensory-evoked potentials  Brainstem auditory evoked responses were not able to be measured at the beginning of the case on the left side and or normal and without change on the right        I was present for the entire procedure    Patient Disposition:  PACU     SIGNATURE: Eather Curling, MD  DATE: August 18, 2021  TIME: 4:49 PM

## 2021-08-18 NOTE — PROGRESS NOTES
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[address] )  Active Problems:    Anxiety    Hydrocephalus ([facility] [address] )    Hypertension    Fall with possible seizure-like activity ([facility] [address] )    Leukocytosis    Headache      Headache  Assessment & Plan  · Neurology and neurosurgery following  · CT head on admission as above  · Steroids were increased on admission per neurosurgery recommendations  · On Diamox as above  · On IV magnesium, tizanidine, Depacon  Valium also on board p r n  Leukocytosis  Assessment & Plan  · Suspect steroid-induced  · Monitor     Fall with possible seizure-like activity ([facility] [address] )  Assessment & Plan  · Patient reported 3 falls since recent discharge  · The first one she reports falling onto her knees while going into the house after her ENT appointment Friday  · The second one she reports falling onto her knees while going into the bathroom at home Saturday  · The third one she reports falling forward and being able to hear those around her but was unable to respond  She reports this lasted for approximately 1 minute witnesses told her  Also report of bilateral hand shaking and possible bowel incontinence during the event  · Neurology following, appreciate their ongoing recommendations   Concern for POTS and convulsive syncope   · Cardiology consulted, they did not believe these syncopal episodes are consistent with POTS and recommend follow-up as an outpatient after resolution of  · CT head this admission with the size of the ventricles not significantly changes when compared to a recent CT brain dated August 8, 2021 per the results report  · EEG 8/15 was normal  · Continue seizure precautions  · Neurosurgery following  · PT/OT evaluations  ·  no rehab needed per OT  · Fall precautions    Hypertension  Assessment & Plan  · Continue losartan 50 mg daily with hold parameters  · Losartan discontinued for surgery on  and can be restarted immediately afterwards per neurosurgery AP  · Monitor blood pressure per protocol      Hydrocephalus ([facility] [address] )  Assessment & Plan  · Continue with Diamox   · Neurology and neurosurgery following  · Plan as per above    Anxiety  Assessment & Plan  · Continue Lexapro    * 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[address] )  Assessment & Plan  · Patient was recently admitted 21-21  · She was seen by Neurosurgery during recent admission and was placed on steroid taper and Diamox  · Neurosurgery consult appreciated  · Decadron was increased to 3 mg q 6 hours  · Continue current dose Diamox per neurosurgery  · plan for surgical resection as joint case between Neurosurgery and ENT on   · Patient followed up with ENT after recent discharge  · CT head this admission as above  · DVT prophylaxis:  Bilateral SCDs  Lovenox  · Lovenox held per Neurosurgery for procedure on   · They will restart Lovenox when CT head stable postop      Disposition: continue inpatient care, patient scheduled for surgery today      SUBJECTIVE     Patient chart and vital signs reviewed  Patient not seen or examined this morning as she was already taken down to the OR  No acute events overnight per her nurse  Patient will likely be transferred to ICU post procedure  ROS not obtained       OBJECTIVE     Vitals:    21 1513 21 2230 21 0756 21 0807   BP: 118/71 120/72     Pulse:       Resp:  18     Temp: 98 °F (36 7 °C) 97 9 °F (36 6 °C)  (!) 96 9 °F (36 1 °C)   TempSrc:    Tympanic   SpO2:   96%    Weight:       Height:          Temperature:   Temp (24hrs), Av 6 °F (36 4 °C), Min:96 9 °F (36 1 °C), Max:98 °F (36 7 °C)    Temperature: (!) 96 9 °F (36 1 °C)  Intake & Output:  I/O       08/16 0701 - 08/17 0700 08/17 0701 - 08/18 0700 08/18 0701 - 08/19 0700    P  O  220 480     Total Intake(mL/kg) 220 (3) 480 (6 5)     Net +220 +480            Unmeasured Urine Occurrence  2 x     Unmeasured Stool Occurrence  0 x         Weights:   IBW (Ideal Body Weight): 58 15 kg    Body mass index is 26 88 kg/m²  Weight (last 2 days)     None        Physical Exam  Vitals reviewed  - no physical exam performed   LABORATORY DATA     Labs: I have personally reviewed pertinent reports  Results from last 7 days   Lab Units 08/18/21  0533 08/17/21  0454 08/16/21  0557 08/14/21  2108   WBC Thousand/uL 16 74* 17 07* 12 04* 14 53*   HEMOGLOBIN g/dL 13 8 14 4 15 0 14 4   HEMATOCRIT % 42 1 43 8 45 1 43 4   PLATELETS Thousands/uL 334 355 357 377   NEUTROS PCT % 88*  --   --  70   MONOS PCT % 5  --   --  15*      Results from last 7 days   Lab Units 08/18/21  0533 08/17/21  0454 08/16/21  0558   POTASSIUM mmol/L 3 9 4 3 3 9   CHLORIDE mmol/L 112* 110* 110*   CO2 mmol/L 19* 21 20*   BUN mg/dL 21 23 25   CREATININE mg/dL 0 76 0 81 0 94   CALCIUM mg/dL 9 2 9 4 9 3              Results from last 7 days   Lab Units 08/18/21  0745   INR  0 92   PTT seconds 23               IMAGING & DIAGNOSTIC TESTING     Radiology Results: I have personally reviewed pertinent reports  CT head without contrast    Result Date: 8/14/2021  Impression: Moderate hydrocephalus with the size of the ventricles not significantly changed when compared to a recent CT brain dated August 8, 2021  Again, the findings are likely obstructive in nature or secondary to a 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 dated August 5, 2021  Workstation performed: [id number]     Other Diagnostic Testing: I have personally reviewed pertinent reports      ACTIVE MEDICATIONS     Current Facility-Administered Medications   Medication Dose Route Frequency    [MAR Hold] acetaminophen (TYLENOL) tablet 650 mg  650 mg Oral Q6H PRN    [MAR Hold] acetaZOLAMIDE (DIAMOX) tablet 125 mg  125 mg Oral BID    ceFAZolin (ANCEF) IVPB (premix in dextrose) 1,000 mg 50 mL  1,000 mg Intravenous Once    [MAR Hold] cyclobenzaprine (FLEXERIL) tablet 10 mg  10 mg Oral TID PRN    [MAR Hold] dexamethasone (DECADRON) tablet 3 mg  3 mg Oral Q6H [facility]    [MAR Hold] diazepam (VALIUM) injection 2 5 mg  2 5 mg Intravenous Q6H PRN    [MAR Hold] escitalopram (LEXAPRO) tablet 5 mg  5 mg Oral Daily    lidocaine (PF) (XYLOCAINE-MPF) 1 % injection    PRN    neomycin-polymyxin B (NEOSPORIN-) 1 mL in sodium chloride 0 9 % 1,000 mL irrigation   Irrigation Once    [MAR Hold] ondansetron (ZOFRAN) injection 4 mg  4 mg Intravenous Q6H PRN    [MAR Hold] oxyCODONE (ROXICODONE) IR tablet 5 mg  5 mg Oral Q6H PRN    scopolamine (TRANSDERM-SCOP) 1 5 mg/3 days TD 72 hr patch 1 patch  1 patch Transdermal Once    [MAR Hold] tiZANidine (ZANAFLEX) tablet 2 mg  2 mg Oral TID     Facility-Administered Medications Ordered in Other Encounters   Medication Dose Route Frequency    dexamethasone (PF) (DECADRON) injection   Intravenous PRN    dexmedetomidine (PRECEDEX) 200 mcg in sodium chloride 0 9 % 50 mL infusion   Intravenous Continuous PRN    dexmedetomidine (PRECEDEX) injection   Intravenous PRN    fentanyl citrate (PF) 100 MCG/2ML   Intravenous PRN    glycopyrrolate (ROBINUL) injection   Intravenous PRN    levETIRAcetam (KEPPRA) injection   Intravenous PRN    midazolam (VERSED) injection   Intravenous PRN    propofol (DIPRIVAN) 1000 mg in 100 mL infusion (premix)   Intravenous Continuous PRN    propofol (DIPRIVAN) 200 MG/20ML bolus injection   Intravenous PRN    remifentanil (ULTIVA) 50 mcg/mL in sodium chloride 0 9 % 20 mL   Intravenous Continuous PRN    Succinylcholine Chloride 100 mg/5 mL syringe   Intravenous PRN       VTE Pharmacologic Prophylaxis: Lovenox held for surgery 8/18  VTE Mechanical Prophylaxis: sequential compression device    Portions of the record may have been created with voice recognition software  Occasional wrong word or "sound a like" substitutions may have occurred due to the inherent limitations of voice recognition software    Read the chart carefully and recognize, using context, where substitutions have occurred   ==  [address] [facility]  Internal Medicine MS3

## 2021-08-18 NOTE — PLAN OF CARE
Problem: PAIN - ADULT  Goal: Verbalizes/displays adequate comfort level or baseline comfort level  Description: Interventions:  - Encourage patient to monitor pain and request assistance  - Assess pain using appropriate pain scale  - Administer analgesics based on type and severity of pain and evaluate response  - Implement non-pharmacological measures as appropriate and evaluate response  - Consider cultural and social influences on pain and pain management  - Notify physician/advanced practitioner if interventions unsuccessful or patient reports new pain  Outcome: Progressing     Problem: DISCHARGE PLANNING  Goal: Discharge to home or other facility with appropriate resources  Description: INTERVENTIONS:  - Identify barriers to discharge w/patient and caregiver  - Arrange for needed discharge resources and transportation as appropriate  - Identify discharge learning needs (meds, wound care, etc )  - Arrange for interpretive services to assist at discharge as needed  - Refer to Case Management Department for coordinating discharge planning if the patient needs post-hospital services based on physician/advanced practitioner order or complex needs related to functional status, cognitive ability, or social support system  Outcome: Progressing     Problem: Knowledge Deficit  Goal: Patient/family/caregiver demonstrates understanding of disease process, treatment plan, medications, and discharge instructions  Description: Complete learning assessment and assess knowledge base    Interventions:  - Provide teaching at level of understanding  - Provide teaching via preferred learning methods  Outcome: Progressing     Problem: MOBILITY - ADULT  Goal: Maintain or return to baseline ADL function  Description: INTERVENTIONS:  -  Assess patient's ability to carry out ADLs; assess patient's baseline for ADL function and identify physical deficits which impact ability to perform ADLs (bathing, care of mouth/teeth, toileting, grooming, dressing, etc )  - Assess/evaluate cause of self-care deficits   - Assess range of motion  - Assess patient's mobility; develop plan if impaired  - Assess patient's need for assistive devices and provide as appropriate  - Encourage maximum independence but intervene and supervise when necessary  - Involve family in performance of ADLs  - Assess for home care needs following discharge   - Consider OT consult to assist with ADL evaluation and planning for discharge  - Provide patient education as appropriate  Outcome: Progressing  Goal: Maintains/Returns to pre admission functional level  Description: INTERVENTIONS:  - Perform BMAT or MOVE assessment daily    - Set and communicate daily mobility goal to care team and patient/family/caregiver     - Collaborate with rehabilitation services on mobility goals if consulted  - Out of bed for toileting  - Record patient progress and toleration of activity level   Outcome: Progressing

## 2021-08-18 NOTE — ANESTHESIA PREPROCEDURE EVALUATION
Procedure:  Left image guided craniotomy for debulking of CP angle mass with intraoperative monitoring (Left Head)    Relevant Problems   CARDIO   (+) Hypertension      NEURO/PSYCH   (+) Anxiety   (+) Headache      Other   (+) 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[address] )   (+) Fall with possible seizure-like activity (HCC)   (+) Hydrocephalus (HCC)             Anesthesia Plan  ASA Score- 2     Anesthesia Type- general with ASA Monitors  Additional Monitors: arterial line  Airway Plan: ETT  Plan Factors-    Chart reviewed  Imaging results reviewed  Existing labs reviewed  Induction- intravenous  Postoperative Plan- Plan for postoperative opioid use  Planned trial extubation    Informed Consent- Anesthetic plan and risks discussed with patient  I personally reviewed this patient with the CRNA  Discussed and agreed on the Anesthesia Plan with the CRNA  Francia Mills

## 2021-08-18 NOTE — PLAN OF CARE
Problem: PAIN - ADULT  Goal: Verbalizes/displays adequate comfort level or baseline comfort level  Description: Interventions:  - Encourage patient to monitor pain and request assistance  - Assess pain using appropriate pain scale  - Administer analgesics based on type and severity of pain and evaluate response  - Implement non-pharmacological measures as appropriate and evaluate response  - Consider cultural and social influences on pain and pain management  - Notify physician/advanced practitioner if interventions unsuccessful or patient reports new pain  Outcome: Progressing     Problem: SAFETY ADULT  Goal: Patient will remain free of falls  Description: INTERVENTIONS:  - Educate patient/family on patient safety including physical limitations  - Instruct patient to call for assistance with activity   - Consult OT/PT to assist with strengthening/mobility   - Keep Call bell within reach  - Keep bed low and locked with side rails adjusted as appropriate  - Keep care items and personal belongings within reach  - Initiate and maintain comfort rounds  - Make Fall Risk Sign visible to staff  - Apply yellow socks and bracelet for high fall risk patients  - Consider moving patient to room near nurses station  Outcome: Progressing  Goal: Maintain or return to baseline ADL function  Description: INTERVENTIONS:  -  Assess patient's ability to carry out ADLs; assess patient's baseline for ADL function and identify physical deficits which impact ability to perform ADLs (bathing, care of mouth/teeth, toileting, grooming, dressing, etc )  - Assess/evaluate cause of self-care deficits   - Assess range of motion  - Assess patient's mobility; develop plan if impaired  - Assess patient's need for assistive devices and provide as appropriate  - Encourage maximum independence but intervene and supervise when necessary  - Involve family in performance of ADLs  - Assess for home care needs following discharge   - Consider OT consult to assist with ADL evaluation and planning for discharge  - Provide patient education as appropriate  Outcome: Progressing  Goal: Maintains/Returns to pre admission functional level  Description: INTERVENTIONS:  - Perform BMAT or MOVE assessment daily    - Set and communicate daily mobility goal to care team and patient/family/caregiver  - Collaborate with rehabilitation services on mobility goals if consulted  - Out of bed for toileting  - Record patient progress and toleration of activity level   Outcome: Progressing     Problem: DISCHARGE PLANNING  Goal: Discharge to home or other facility with appropriate resources  Description: INTERVENTIONS:  - Identify barriers to discharge w/patient and caregiver  - Arrange for needed discharge resources and transportation as appropriate  - Identify discharge learning needs (meds, wound care, etc )  - Arrange for interpretive services to assist at discharge as needed  - Refer to Case Management Department for coordinating discharge planning if the patient needs post-hospital services based on physician/advanced practitioner order or complex needs related to functional status, cognitive ability, or social support system  Outcome: Progressing     Problem: Knowledge Deficit  Goal: Patient/family/caregiver demonstrates understanding of disease process, treatment plan, medications, and discharge instructions  Description: Complete learning assessment and assess knowledge base    Interventions:  - Provide teaching at level of understanding  - Provide teaching via preferred learning methods  Outcome: Progressing     Problem: MOBILITY - ADULT  Goal: Maintain or return to baseline ADL function  Description: INTERVENTIONS:  -  Assess patient's ability to carry out ADLs; assess patient's baseline for ADL function and identify physical deficits which impact ability to perform ADLs (bathing, care of mouth/teeth, toileting, grooming, dressing, etc )  - Assess/evaluate cause of self-care deficits   - Assess range of motion  - Assess patient's mobility; develop plan if impaired  - Assess patient's need for assistive devices and provide as appropriate  - Encourage maximum independence but intervene and supervise when necessary  - Involve family in performance of ADLs  - Assess for home care needs following discharge   - Consider OT consult to assist with ADL evaluation and planning for discharge  - Provide patient education as appropriate  Outcome: Progressing  Goal: Maintains/Returns to pre admission functional level  Description: INTERVENTIONS:  - Perform BMAT or MOVE assessment daily    - Set and communicate daily mobility goal to care team and patient/family/caregiver     - Collaborate with rehabilitation services on mobility goals if consulted  - Stand patient 2 times a day  - Ambulate patient 2 times a day  - Out of bed for toileting  - Record patient progress and toleration of activity level   Outcome: Progressing     Problem: Potential for Falls  Goal: Patient will remain free of falls  Description: INTERVENTIONS:  - Educate patient/family on patient safety including physical limitations  - Instruct patient to call for assistance with activity   - Consult OT/PT to assist with strengthening/mobility   - Keep Call bell within reach  - Keep bed low and locked with side rails adjusted as appropriate  - Keep care items and personal belongings within reach  - Initiate and maintain comfort rounds  - Make Fall Risk Sign visible to staff  - Apply yellow socks and bracelet for high fall risk patients  - Consider moving patient to room near nurses station  Outcome: Progressing

## 2021-08-18 NOTE — ANESTHESIA POSTPROCEDURE EVALUATION
Post-Op Assessment Note    CV Status:  Stable  Pain Score: 0    Pain management: adequate     Mental Status:  Sleepy   Hydration Status:  Euvolemic   PONV Controlled:  Controlled   Airway Patency:  Patent      Post Op Vitals Reviewed: Yes      Staff: CRNA         No complications documented      BP   128/76   Temp 97 5   Pulse 98   Resp 12   SpO2 93

## 2021-08-19 ENCOUNTER — APPOINTMENT (INPATIENT)
Dept: RADIOLOGY | Facility: HOSPITAL | Age: 56
DRG: 025 | End: 2021-08-19
Payer: COMMERCIAL

## 2021-08-19 ENCOUNTER — TELEPHONE (OUTPATIENT)
Dept: RADIOLOGY | Facility: HOSPITAL | Age: 56
End: 2021-08-19

## 2021-08-19 PROBLEM — J93.9 PNEUMOTHORAX: Status: ACTIVE | Noted: 2021-08-19

## 2021-08-19 LAB
ANION GAP SERPL CALCULATED.3IONS-SCNC: 3 MMOL/L (ref 4–13)
APTT PPP: 22 SECONDS (ref 23–37)
ATRIAL RATE: 61 BPM
BASOPHILS # BLD AUTO: 0.03 THOUSANDS/ΜL (ref 0–0.1)
BASOPHILS NFR BLD AUTO: 0 % (ref 0–1)
BUN SERPL-MCNC: 22 MG/DL (ref 5–25)
CALCIUM SERPL-MCNC: 8.9 MG/DL (ref 8.3–10.1)
CHLORIDE SERPL-SCNC: 112 MMOL/L (ref 100–108)
CO2 SERPL-SCNC: 28 MMOL/L (ref 21–32)
CREAT SERPL-MCNC: 0.68 MG/DL (ref 0.6–1.3)
EOSINOPHIL # BLD AUTO: 0 THOUSAND/ΜL (ref 0–0.61)
EOSINOPHIL NFR BLD AUTO: 0 % (ref 0–6)
ERYTHROCYTE [DISTWIDTH] IN BLOOD BY AUTOMATED COUNT: 13.5 % (ref 11.6–15.1)
GFR SERPL CREATININE-BSD FRML MDRD: 99 ML/MIN/1.73SQ M
GLUCOSE SERPL-MCNC: 119 MG/DL (ref 65–140)
HCT VFR BLD AUTO: 37.6 % (ref 34.8–46.1)
HGB BLD-MCNC: 12.2 G/DL (ref 11.5–15.4)
IMM GRANULOCYTES # BLD AUTO: 0.22 THOUSAND/UL (ref 0–0.2)
IMM GRANULOCYTES NFR BLD AUTO: 1 % (ref 0–2)
INR PPP: 1 (ref 0.84–1.19)
LYMPHOCYTES # BLD AUTO: 0.6 THOUSANDS/ΜL (ref 0.6–4.47)
LYMPHOCYTES NFR BLD AUTO: 3 % (ref 14–44)
MCH RBC QN AUTO: 30.6 PG (ref 26.8–34.3)
MCHC RBC AUTO-ENTMCNC: 32.4 G/DL (ref 31.4–37.4)
MCV RBC AUTO: 94 FL (ref 82–98)
MONOCYTES # BLD AUTO: 1.93 THOUSAND/ΜL (ref 0.17–1.22)
MONOCYTES NFR BLD AUTO: 9 % (ref 4–12)
NEUTROPHILS # BLD AUTO: 19.48 THOUSANDS/ΜL (ref 1.85–7.62)
NEUTS SEG NFR BLD AUTO: 87 % (ref 43–75)
NRBC BLD AUTO-RTO: 0 /100 WBCS
P AXIS: 79 DEGREES
PLATELET # BLD AUTO: 259 THOUSANDS/UL (ref 149–390)
PMV BLD AUTO: 10.2 FL (ref 8.9–12.7)
POTASSIUM SERPL-SCNC: 3.9 MMOL/L (ref 3.5–5.3)
PR INTERVAL: 121 MS
PROTHROMBIN TIME: 13.2 SECONDS (ref 11.6–14.5)
QRS AXIS: 37 DEGREES
QRSD INTERVAL: 138 MS
QT INTERVAL: 417 MS
QTC INTERVAL: 420 MS
RBC # BLD AUTO: 3.99 MILLION/UL (ref 3.81–5.12)
SODIUM SERPL-SCNC: 143 MMOL/L (ref 136–145)
T WAVE AXIS: 209 DEGREES
TROPONIN I SERPL-MCNC: <0.02 NG/ML
VENTRICULAR RATE: 61 BPM
WBC # BLD AUTO: 22.26 THOUSAND/UL (ref 4.31–10.16)

## 2021-08-19 PROCEDURE — 99233 SBSQ HOSP IP/OBS HIGH 50: CPT | Performed by: INTERNAL MEDICINE

## 2021-08-19 PROCEDURE — G1004 CDSM NDSC: HCPCS

## 2021-08-19 PROCEDURE — 92610 EVALUATE SWALLOWING FUNCTION: CPT

## 2021-08-19 PROCEDURE — 85730 THROMBOPLASTIN TIME PARTIAL: CPT | Performed by: PHYSICIAN ASSISTANT

## 2021-08-19 PROCEDURE — 93010 ELECTROCARDIOGRAM REPORT: CPT | Performed by: INTERNAL MEDICINE

## 2021-08-19 PROCEDURE — 80048 BASIC METABOLIC PNL TOTAL CA: CPT | Performed by: STUDENT IN AN ORGANIZED HEALTH CARE EDUCATION/TRAINING PROGRAM

## 2021-08-19 PROCEDURE — 84484 ASSAY OF TROPONIN QUANT: CPT | Performed by: STUDENT IN AN ORGANIZED HEALTH CARE EDUCATION/TRAINING PROGRAM

## 2021-08-19 PROCEDURE — 93005 ELECTROCARDIOGRAM TRACING: CPT

## 2021-08-19 PROCEDURE — 85610 PROTHROMBIN TIME: CPT | Performed by: PHYSICIAN ASSISTANT

## 2021-08-19 PROCEDURE — 85025 COMPLETE CBC W/AUTO DIFF WBC: CPT | Performed by: STUDENT IN AN ORGANIZED HEALTH CARE EDUCATION/TRAINING PROGRAM

## 2021-08-19 PROCEDURE — 99024 POSTOP FOLLOW-UP VISIT: CPT | Performed by: NEUROLOGICAL SURGERY

## 2021-08-19 PROCEDURE — 70553 MRI BRAIN STEM W/O & W/DYE: CPT

## 2021-08-19 PROCEDURE — 71045 X-RAY EXAM CHEST 1 VIEW: CPT

## 2021-08-19 PROCEDURE — A9585 GADOBUTROL INJECTION: HCPCS | Performed by: PHYSICIAN ASSISTANT

## 2021-08-19 PROCEDURE — 70450 CT HEAD/BRAIN W/O DYE: CPT

## 2021-08-19 RX ORDER — HEPARIN SODIUM 5000 [USP'U]/ML
5000 INJECTION, SOLUTION INTRAVENOUS; SUBCUTANEOUS EVERY 8 HOURS SCHEDULED
Status: DISCONTINUED | OUTPATIENT
Start: 2021-08-19 | End: 2021-09-01 | Stop reason: HOSPADM

## 2021-08-19 RX ORDER — HYDROMORPHONE HCL/PF 1 MG/ML
0.5 SYRINGE (ML) INJECTION EVERY 4 HOURS PRN
Status: DISCONTINUED | OUTPATIENT
Start: 2021-08-19 | End: 2021-09-01 | Stop reason: HOSPADM

## 2021-08-19 RX ORDER — LIDOCAINE 50 MG/G
1 PATCH TOPICAL DAILY
Status: DISCONTINUED | OUTPATIENT
Start: 2021-08-19 | End: 2021-09-01 | Stop reason: HOSPADM

## 2021-08-19 RX ORDER — DEXAMETHASONE 4 MG/1
4 TABLET ORAL EVERY 6 HOURS SCHEDULED
Status: DISPENSED | OUTPATIENT
Start: 2021-08-19 | End: 2021-08-21

## 2021-08-19 RX ORDER — LOSARTAN POTASSIUM 50 MG/1
50 TABLET ORAL DAILY
Status: DISCONTINUED | OUTPATIENT
Start: 2021-08-19 | End: 2021-09-01 | Stop reason: HOSPADM

## 2021-08-19 RX ORDER — DEXAMETHASONE SODIUM PHOSPHATE 4 MG/ML
4 INJECTION, SOLUTION INTRA-ARTICULAR; INTRALESIONAL; INTRAMUSCULAR; INTRAVENOUS; SOFT TISSUE ONCE
Status: COMPLETED | OUTPATIENT
Start: 2021-08-19 | End: 2021-08-19

## 2021-08-19 RX ADMIN — HYDROMORPHONE HYDROCHLORIDE 0.5 MG: 1 INJECTION, SOLUTION INTRAMUSCULAR; INTRAVENOUS; SUBCUTANEOUS at 06:07

## 2021-08-19 RX ADMIN — DEXAMETHASONE 4 MG: 4 TABLET ORAL at 12:00

## 2021-08-19 RX ADMIN — HYDROMORPHONE HYDROCHLORIDE 0.5 MG: 1 INJECTION, SOLUTION INTRAMUSCULAR; INTRAVENOUS; SUBCUTANEOUS at 10:51

## 2021-08-19 RX ADMIN — ONDANSETRON 4 MG: 2 INJECTION INTRAMUSCULAR; INTRAVENOUS at 20:11

## 2021-08-19 RX ADMIN — ACETAMINOPHEN 975 MG: 325 TABLET, FILM COATED ORAL at 22:16

## 2021-08-19 RX ADMIN — DEXAMETHASONE SODIUM PHOSPHATE 4 MG: 4 INJECTION INTRA-ARTICULAR; INTRALESIONAL; INTRAMUSCULAR; INTRAVENOUS; SOFT TISSUE at 02:00

## 2021-08-19 RX ADMIN — ESCITALOPRAM 5 MG: 5 TABLET, FILM COATED ORAL at 08:14

## 2021-08-19 RX ADMIN — GADOBUTROL 7 ML: 604.72 INJECTION INTRAVENOUS at 21:32

## 2021-08-19 RX ADMIN — CEFAZOLIN SODIUM 2000 MG: 2 SOLUTION INTRAVENOUS at 12:01

## 2021-08-19 RX ADMIN — DIAZEPAM 2.5 MG: 5 INJECTION, SOLUTION INTRAMUSCULAR; INTRAVENOUS at 20:11

## 2021-08-19 RX ADMIN — DEXAMETHASONE 4 MG: 4 TABLET ORAL at 17:36

## 2021-08-19 RX ADMIN — HYDROMORPHONE HYDROCHLORIDE 0.5 MG: 1 INJECTION, SOLUTION INTRAMUSCULAR; INTRAVENOUS; SUBCUTANEOUS at 07:36

## 2021-08-19 RX ADMIN — CYCLOBENZAPRINE HYDROCHLORIDE 10 MG: 10 TABLET, FILM COATED ORAL at 10:50

## 2021-08-19 RX ADMIN — DIAZEPAM 2.5 MG: 5 INJECTION, SOLUTION INTRAMUSCULAR; INTRAVENOUS at 07:37

## 2021-08-19 RX ADMIN — OXYCODONE HYDROCHLORIDE 10 MG: 10 TABLET ORAL at 13:39

## 2021-08-19 RX ADMIN — LOSARTAN POTASSIUM 50 MG: 50 TABLET, FILM COATED ORAL at 10:51

## 2021-08-19 RX ADMIN — DEXAMETHASONE SODIUM PHOSPHATE 4 MG: 4 INJECTION INTRA-ARTICULAR; INTRALESIONAL; INTRAMUSCULAR; INTRAVENOUS; SOFT TISSUE at 05:59

## 2021-08-19 RX ADMIN — CEFAZOLIN SODIUM 2000 MG: 2 SOLUTION INTRAVENOUS at 05:58

## 2021-08-19 RX ADMIN — HYDROMORPHONE HYDROCHLORIDE 0.5 MG: 1 INJECTION, SOLUTION INTRAMUSCULAR; INTRAVENOUS; SUBCUTANEOUS at 22:34

## 2021-08-19 RX ADMIN — ONDANSETRON 4 MG: 2 INJECTION INTRAMUSCULAR; INTRAVENOUS at 07:36

## 2021-08-19 RX ADMIN — SODIUM CHLORIDE 0.2 MCG/KG/HR: 9 INJECTION, SOLUTION INTRAVENOUS at 00:05

## 2021-08-19 RX ADMIN — HEPARIN SODIUM 5000 UNITS: 5000 INJECTION INTRAVENOUS; SUBCUTANEOUS at 22:17

## 2021-08-19 RX ADMIN — OXYCODONE HYDROCHLORIDE 10 MG: 10 TABLET ORAL at 17:35

## 2021-08-19 RX ADMIN — ACETAMINOPHEN 975 MG: 325 TABLET, FILM COATED ORAL at 13:39

## 2021-08-19 RX ADMIN — ACETAZOLAMIDE 125 MG: 125 TABLET ORAL at 08:14

## 2021-08-19 RX ADMIN — OXYCODONE HYDROCHLORIDE 10 MG: 10 TABLET ORAL at 09:11

## 2021-08-19 RX ADMIN — ONDANSETRON 4 MG: 2 INJECTION INTRAMUSCULAR; INTRAVENOUS at 13:39

## 2021-08-19 RX ADMIN — TIZANIDINE 2 MG: 2 TABLET ORAL at 09:12

## 2021-08-19 RX ADMIN — LIDOCAINE 1 PATCH: 50 PATCH TOPICAL at 13:37

## 2021-08-19 RX ADMIN — CYCLOBENZAPRINE HYDROCHLORIDE 10 MG: 10 TABLET, FILM COATED ORAL at 20:11

## 2021-08-19 NOTE — PROGRESS NOTES
Pastoral Care Progress Note    2021  Patient: Bryanna Kyle : 1965  Admission Date & Time: 2021  MRN: [id number] CSN: [id number]       visited with patient and spouse while rounding on unit  Both stated that they are not in need of pastoral support at this time         21 1200   Clinical Encounter Type   Visited With Patient   Routine Visit Introduction   Crisis Visit Critical Care

## 2021-08-19 NOTE — PROGRESS NOTES
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[facility])  Assessment & Plan  POD1 Image guided left retromastoid craniotomy for debulking of CP angle mass  · Originally presented on 8/8/21 with headaches, vertigo and hearing loss (per tympanogram left ear hearing at 24%)  · Presented again 8/14 with transient LOC after fall with questionable seizure-like activity  · Preop [facility] Brackman grade 3 facial nerve palsy  Some left rapid horizontal nystagmus  Slight right tongue deviation  Imaging:   · CT head without 8/18/2021: Postoperative change status post left retromastoid craniotomy for resection of CP angle mass  There is a moderate amount of air within the extra-axial spaces of both the CP angle and supratentorial brain parenchyma, improving compared to the prior examination  Small, mildly complex extra-axial collection of fluid within the lateral aspect of the posterior fossa and CP angle, grossly unchanged in size with slight mass effect  Stable mild ventriculomegaly of the lateral ventricles and 3rd ventricle  · CT head wo 8/19/2021: Left retromastoid, infra sigmoid surgery for left CP angle mass resection  Persistent deformity of the left brainstem and cerebellum with mild compression of the 4th ventricle  Stable lateral and 3rd ventriculomegaly  Small amount of subarachnoid hemorrhage at the craniocervical junction extending into the upper cervical spine  Postoperative intracranial and extracranial pneumocephalus  · MRI brain ordered - adjusted to include IAC protocol    Plan:   · Continue monitor neurological exam  · STAT CT head with any neurological decline including drop GCS of 2pts within 1 hr   · Continue decadron 4mg Q 6H - taper ordered  · Currently on Diamox 125mg BID - can wean to off  · Monitor BMP - Na 143, plan repeat on POD 3  · Medical management and pain control per primary team  · Patient requires formal speech eval prior to any oral intake  Ordered placed  · Mobilize with PT/OT  · DVT prophylaxis:  Bilateral SCDs  May resume HSQ as  Pharm dvt ppx later tonight  Neurosurgery will continue to follow closely, call with any further questions or concerns  Plan of care d/w primary team          Pneumothorax  Assessment & Plan  Patient with left tension PTX s/p left CT placement   Repeat CXR with reexpansion of left lung  Management per primary team  DC CT when able per primary    Headache  Assessment & Plan  monitor  Pain medications as ordered    Leukocytosis  Assessment & Plan  Likely steroid induced  No fevers  Trend WBC      Hydrocephalus (HCC)  Assessment & Plan  · Evident on MRI brain and CT head  On repeat CT head hydrocephalus appears stable  · No intervention at this time as it appears to be communicating rather than obstructive  · Continues Diamox 125mg BID at this time - may wean to off    Subjective/Objective   Chief Complaint:     Subjective:  Patient is postop the one from left retromastoid craniotomy for resection of a CP angle mass  Patient developed a left tension pneumothorax operatively and has chest tube placed  Repeat x-rays shows re-expansion of left long  Patient states difficulty overnight secondary to pain and lack of sleep  At this time she denies any significant head or incisional pain  She does complain of chest pain for which she had workup including troponins and EKG  She denies any change in facial sensation or strength  Objective:  Lying in bed  NAD    I/O       08/17 0701 - 08/18 0700 08/18 0701 - 08/19 0700 08/19 0701 - 08/20 0700    P  O  480      I V  (mL/kg)  4804 7 (64 6)     IV Piggyback  50 50    Total Intake(mL/kg) 480 (6 5) 4854 7 (65 3) 50 (0 7)    Urine (mL/kg/hr)  4300 (2 4) Chest Tube  0     Total Output  4300     Net +480 +554 7 +50           Unmeasured Urine Occurrence 2 x      Unmeasured Stool Occurrence 0 x            Invasive Devices     Central Venous Catheter Line            CVC Central Lines 08/18/21 Triple 1 day          Peripheral Intravenous Line            Peripheral IV 08/17/21 Right Antecubital 1 day    Peripheral IV 08/18/21 Dorsal (posterior); Left Wrist 1 day          Drain            Urethral Catheter Non-latex 16 Fr  1 day    Chest Tube Left <1 day                Physical Exam:  Vitals: Blood pressure 137/79, pulse 74, temperature 98 °F (36 7 °C), resp  rate 18, height 5' 5 5" (1 664 m), weight 74 4 kg (164 lb 0 4 oz), SpO2 95 %, not currently breastfeeding  ,Body mass index is 26 88 kg/m²  Hemodynamic Monitoring: MAP: Arterial Line MAP (mmHg): 126 mmHg    General appearance:  Drowsy, appears stated age, cooperative and no distress  Head: Normocephalic, without obvious abnormality  Eyes:  Conjugate gaze, PERRL  Neck: supple, symmetrical, trachea midline   Lungs: non labored breathing  Heart: regular heart rate  Neurologic:   Mental status: Alert, oriented x3 but required multiple choice to correctly identify President, thought content appropriate, intermittent inaccurate word substitution  Good repetition  Able to identify three of three objects correctly  Follows commands    Cranial nerves: grossly intact (Cranial nerves II-XII) except decreased sensation most noted left V2, left hearing loss and slurred speech  Sensory: normal to LT BUE/BLE  Motor: moving all extremities without focal weakness   Coordination:  Deferred secondary to significant chest pain with raising arms    Lab Results:  Results from last 7 days   Lab Units 08/19/21  0544 08/18/21  1257 08/18/21  0533 08/17/21  0454 08/14/21  2108   WBC Thousand/uL 22 26*  --  16 74* 17 07* 14 53*   HEMOGLOBIN g/dL 12 2  --  13 8 14 4 14 4   I STAT HEMOGLOBIN g/dl  --  12 2  --   --   --    HEMATOCRIT % 37 6 --  42 1 43 8 43 4   HEMATOCRIT, ISTAT %  --  36  --   --   --    PLATELETS Thousands/uL 259  --  334 355 377   NEUTROS PCT % 87*  --  88*  --  70   MONOS PCT % 9  --  5  --  15*     Results from last 7 days   Lab Units 08/19/21  0542 08/18/21  1257 08/18/21  0533 08/17/21  0454   POTASSIUM mmol/L 3 9  --  3 9 4 3   CHLORIDE mmol/L 112*  --  112* 110*   CO2 mmol/L 28  --  19* 21   CO2, I-STAT mmol/L  --  22  --   --    BUN mg/dL 22  --  21 23   CREATININE mg/dL 0 68  --  0 76 0 81   CALCIUM mg/dL 8 9  --  9 2 9 4   GLUCOSE, ISTAT mg/dl  --  116  --   --              Results from last 7 days   Lab Units 08/19/21  0542 08/18/21  0745   INR  1 00 0 92   PTT seconds 22* 23     No results found for: TROPONINT  ABG:No results found for: PHART, VHX2KTA, PO2ART, RPI8JBP, F3UJBACW, BEART, SOURCE    Imaging Studies: I have personally reviewed pertinent reports  and I have personally reviewed pertinent films in PACS    CT head wo contrast    Result Date: 8/19/2021  Impression: Postoperative change status post left retromastoid craniotomy for resection of CP angle mass  There is a moderate amount of air within the extra-axial spaces of both the CP angle and supratentorial brain parenchyma, improving compared to the prior examination  Small, mildly complex extra-axial collection of fluid within the lateral aspect of the posterior fossa and CP angle, grossly unchanged in size with slight mass effect  Stable mild ventriculomegaly of the lateral ventricles and 3rd ventricle  Workstation performed: [id number]     CT head wo contrast    Result Date: 8/18/2021  Impression: Left retromastoid, infra sigmoid surgery for left CP angle mass resection  Persistent deformity of the left brainstem and cerebellum with mild compression of the 4th ventricle  Stable lateral and 3rd ventriculomegaly  Small amount of subarachnoid hemorrhage at the craniocervical junction extending into the upper cervical spine   Postoperative intracranial and extracranial pneumocephalus  I personally discussed this study with Marie James on 8/18/2021 at 9:07 PM  Workstation performed: [id number]     CT head without contrast    Result Date: 8/14/2021  Impression: Moderate hydrocephalus with the size of the ventricles not significantly changed when compared to a recent CT brain dated August 8, 2021  Again, the findings are likely obstructive in nature or secondary to a 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 dated August 5, 2021  Workstation performed: [id number]     X-ray chest 1 view    Result Date: 8/18/2021  Impression: Left subclavian central line tip is in the upper SVC  Left side tension pneumothorax  I personally reviewed this study with Dr Anais Vick  on 8/18/2021 at 5:52 PM  Workstation performed: [id number]     XR chest portable ICU    Result Date: 8/19/2021  Impression: 1  Reexpansion of the left lung following chest tube placement with no evidence of residual pneumothorax  2   Subsegmental atelectasis and/or consolidation in the base of the left lower lobe  Workstation performed: [id number]     XR chest portable ICU    Result Date: 8/19/2021  Impression: Left chest tube pulled back with question trace left apical pneumothorax  Mild left base atelectasis   Workstation performed: [id number]       EKG, Pathology, and Other Studies: none    VTE Pharmacologic Prophylaxis: Heparin - tonight    VTE Mechanical Prophylaxis: sequential compression device

## 2021-08-19 NOTE — SPEECH THERAPY NOTE
Speech Language/Pathology  Speech/Language Pathology  Assessment    Patient Name: Radha Mg  FVNKM'H Date: 8/19/2021     Problem List  Principal Problem:    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[facility] [address] )  Active Problems:    Anxiety    Hydrocephalus ([facility] [address] )    Hypertension    Fall with possible seizure-like activity ([facility] [address] )    Leukocytosis    Headache    Pneumothorax    Past Medical History  Past Medical History:   Diagnosis Date    Hypertension      Past Surgical History  Past Surgical History:   Procedure Laterality Date    CRANIOTOMY Left 8/18/2021    Procedure: Image guided left retromastoid craniotomy for debulking of CP angle mass; intraoperative monitoring;  Surgeon: Cherie Stockton MD;  Location: [facility];  Service: Neurosurgery        Bedside Swallow Evaluation:    Summary:  Pt presents w/ functional oropharyngeal swallow to continue w/ a regular diet  Mild c/o difficulty opening for the tsp/fork but no other dysfunction  Recommendations:  Diet: regular  Liquid: thin   Meds: as desired   Supervision:please assist for now  Positioning:Upright  Strategies: Pt to take PO/Meds only when fully alert and upright  Oral care: often   Eval only, No f/u tx indicated  Consider consult w/:  Nutrition    78-year-old female with recent diagnosis of cerebellopontine angle brain mass with hydrocephalus presents to emergency room 8/14/2021 for transient loss of consciousness after a fall with seizure-like activity  Approximately 1 hour prior to arrival patient was getting up from sitting on the couch and felt her legs give out  She fell forward onto the carpet and was unable to move or speak for approximately 1 minute   was in adjoining room and arrived when he heard the fall  He reports that patient was lying in the prone position with head turned to the side  Patient was nonresponsive with eyes open with bilateral shaking of both upper extremities with elbows flexed    Shaking lasted approximately 45 seconds to 1 minute  Patient was then picked up and placed in the sitting position  After 1 minute patient was verbalizing normally  Patient states she did not lose consciousness during this event  She was able to hear everything but was unable to respond  After regaining to the sitting position patient reports feeling disoriented and unable to express the words she wanted to say  Entire event lasted approximately 2 minutes before patient was fully responsive  Denies tongue biting, urinary incontinence, numbness, tingling, lightheadedness, palpitations, vertigo, or weakness in the extremities  Denies head strike, or injury to any part of her body from the fall  Denies headache, fevers, or history of seizures    Underwent  Image guided left retromastoid craniotomy for debulking of CP angle mass; intraoperative monitoring (Left) 8/18/2021  CXR- Left chest tube pulled back with question trace left apical pneumothorax    Mild left base atelectasis            Reason for consult:  R/o aspiration  Determine safest and least restrictive diet    Precautions:  Fall   Current diet:  Regular w/ thin   Premorbid diet[de-identified]  Regular w/ thin   Previous VBS:  -  O2 requirement:  nc  Voice/Speech:  Clear, intelligible   Social:  Home w/ family, independent   Follows commands:                   Yes        Cognitive Status:  A&Ox3  Oral mech exam:  Dentition: natural   Labial strength and ROM: wfl  Lingual strength and ROM: wfl  Mandibular strength and ROM:wfl  Velum: -  Secretion management: wfl   Volitional cough: mildly reduced, c/o pain in chest tube  Volitional swallow: wfl      Items administered:  Puree, soft solid,  thin liquids  Liquids were taken by straw       Oral stage:  Lip closure: wfl  Mastication: mildly slow but wfl   Bolus formation: wfl   Bolus control: wfl   Transfer: timely   Oral residue: none noted   Pocketing: none     Pharyngeal stage:  Swallow promptness: +  Laryngeal rise: wfl   Wet voice: none noted Throat clear: none   Cough: no coughing   Secondary swallows: intermittently       Esophageal stage:  No s/s reported    Results d/w:  Pt, nursing, family

## 2021-08-19 NOTE — PROGRESS NOTES
Images and events noted    CT with good albeit early post op results    MRI pending    I anticipate the ventriculomegaly to correct over days to weeks

## 2021-08-19 NOTE — ASSESSMENT & PLAN NOTE
· Evident on MRI brain and CT head  On repeat CT head hydrocephalus appears stable  · No intervention at this time as it appears to be communicating rather than obstructive     · Continues Diamox 125mg BID at this time - may wean to off

## 2021-08-19 NOTE — ASSESSMENT & PLAN NOTE
POD1 Image guided left retromastoid craniotomy for debulking of CP angle mass  · Originally presented on 8/8/21 with headaches, vertigo and hearing loss (per tympanogram left ear hearing at 24%)  · Presented again 8/14 with transient LOC after fall with questionable seizure-like activity  · Preop House Brackman grade 3 facial nerve palsy  Some left rapid horizontal nystagmus  Slight right tongue deviation  Imaging:   · CT head without 8/18/2021: Postoperative change status post left retromastoid craniotomy for resection of CP angle mass  There is a moderate amount of air within the extra-axial spaces of both the CP angle and supratentorial brain parenchyma, improving compared to the prior examination  Small, mildly complex extra-axial collection of fluid within the lateral aspect of the posterior fossa and CP angle, grossly unchanged in size with slight mass effect  Stable mild ventriculomegaly of the lateral ventricles and 3rd ventricle  · CT head wo 8/19/2021: Left retromastoid, infra sigmoid surgery for left CP angle mass resection  Persistent deformity of the left brainstem and cerebellum with mild compression of the 4th ventricle  Stable lateral and 3rd ventriculomegaly  Small amount of subarachnoid hemorrhage at the craniocervical junction extending into the upper cervical spine  Postoperative intracranial and extracranial pneumocephalus  · MRI brain ordered - adjusted to include IAC protocol    Plan:   · Continue monitor neurological exam  · STAT CT head with any neurological decline including drop GCS of 2pts within 1 hr   · Continue decadron 4mg Q 6H - taper ordered  · Currently on Diamox 125mg BID - can wean to off  · Monitor BMP - Na 143, plan repeat on POD 3  · Medical management and pain control per primary team  · Patient requires formal speech eval prior to any oral intake  Ordered placed  · Mobilize with PT/OT  · DVT prophylaxis:  Bilateral SCDs   May resume HSQ as  Pharm dvt ppx later tonight  Neurosurgery will continue to follow closely, call with any further questions or concerns   Plan of care d/w primary team

## 2021-08-19 NOTE — UTILIZATION REVIEW
Continued Stay Review    Date: 8/19/21                       Current Patient Class: Inpatient Current Level of Care: Critical Care    HPI:56 y o  female initially admitted on 8/15/21  To OR  8/18 for left retromastoid craniotomy and debulking of CP angle mass  Left pneumothorax post left subclavian catheter placement with placement of chest tube  Admitted to ICU postop  Agitated post-op, placed on Precedex gtt  Stat post op CTH showed small amount of subarachnoid hemorrhage at the craniocervical junction extending into the upper cervical spine, post operative intracranial and extracranial pneumocephalus  8/19/21   Neurosurgery: POD1 Image guided left retromastoid craniotomy for debulking of CP angle mass  Patient developed a left tension pneumothorax operatively and has chest tube placed  Repeat x-rays shows re-expansion of left long  Patient states difficulty overnight secondary to pain and lack of sleep  At this time she denies any significant head or incisional pain  Continue monitor neurological exam  STAT CT head with any neurological decline including drop GCS of 2pts within 1 hr  Continue decadron 4mg Q 6H - taper ordered  Currently on Diamox 125mg BID - can wean to off  Monitor BMP - Na 143, plan repeat on POD 3  Medical management and pain control per primary team  Patient requires formal speech eval prior to any oral intake  Mobilize with PT/OT  DVT prophylaxis:  Bilateral SCDs  May resume HSQ as  Pharm dvt ppx later tonight  Critical Care: Patient c/o generalized pain an localized chest pain near central line  CT Head this AM - Post operative changes  Continue neuro checks  Left chest tube with air leak present, CXR this a m  NPO pending Speech eval  WBC 22 2 likely secondary to steroid use and reactive, continue to monitor      Vital Signs:     Date/Time  Temp  Pulse  Resp  BP  MAP (mmHg)  Arterial Line BP  MAP  SpO2  Calculated FIO2 (%) - Nasal Cannula  Nasal Cannula O2 Flow Rate (L/min) O2 Device    08/19/21 08:49:43  --  --  --  137/79  98  --  --  --  --  --  --    08/19/21 07:39:58  98 °F (36 7 °C)  --  18  122/57  79  --  --  --  --  --  --    08/19/21 0653  --  74  --  111/54  74  --  --  95 %  --  --  --    08/19/21 0553  --  60  --  145/72  109  --  --  96 %  --  --  --    08/19/21 0453  98 2 °F (36 8 °C)  64  --  150/74  115  --  --  97 %  --  --  None (Room air)    08/19/21 04:52:32  --  --  --  152/80  104  --  --  --  --  --  --    08/19/21 0353  --  72  --  138/63  92  --  --  96 %  --  --  --    08/19/21 0253  --  62  12  132/70  102  --  --  96 %  --  --  --    08/19/21 0153  --  56  13  131/72  107  --  --  95 %  --  --  --    08/19/21 0053  --  78  12  143/94  114  --  --  98 %  --  --  --    08/19/21 00:52:20  98 6 °F (37 °C)  74  --  108/78  88  --  --  --  --  --  --    08/19/21 0000  98 7 °F (37 1 °C)  --  --  --  --  --  --  --  --  --  --    08/18/21 2353  --  106Abnormal   22  167/88  126  --  --  96 %  --  --  --    08/18/21 2253  --  64  12  142/82  122  --  --  100 %  --  --  --    08/18/21 2153  --  68  14  --  --  --  --  99 %  --  --  --    08/18/21 21:16:52  98 3 °F (36 8 °C)  66  --  161/83  109  --  --  99 %  --  --  --    08/18/21 1953  98 6 °F (37 °C)  94  16  162/89  116  --  --  98 %  28  2 L/min  Nasal cannula    08/18/21 1800  --  92  20  150/88  --  156/104  126 mmHg  96 %  --  --  --    08/18/21 1745  97 5 °F (36 4 °C)  90  22  160/86  --  144/94  114 mmHg  94 %  32  3 L/min  Nasal cannula    08/18/21 1730  97 4 °F (36 3 °C)Abnormal   90  22  154/90  --  146/88  112 mmHg  95 %  32  3 L/min  Nasal cannula    08/18/21 1715  --  96  22  149/95  --  144/98  120 mmHg  95 %  --  --  --    08/18/21 1700  --  98  22  123/83  --  122/96  108 mmHg  97 %  --  --  --    08/18/21 1645  97 6 °F (36 4 °C)  98  14  128/76  --  --  --  94 %  36  4 L/min  Nasal cannula    08/18/21 0807  96 9 °F (36 1 °C)Abnormal   --  --  --  --  --  --  --  --  --  --    08/18/21 0756  --  -- --  --  --  --  --  96 %  --  --  None (Room air)    08/17/21 22:30:39  97 9 °F (36 6 °C)  --  18  120/72  88  --  --  --  --  --  --    08/17/21 2000  --  --  --  --  --  --  --  --  --  --  None (Room air)    08/17/21 15:13:01  98 °F (36 7 °C)  --  18  118/71  87  --  --  --  --  --  --    08/17/21 07:39:16  98 4 °F (36 9 °C)  87  16  135/74  94  --  --  94 %  --  --  --        Date and Time Eye Opening Best Verbal Response Best Motor Response Thomas Coma Scale Score   08/19/21 0600 4 5 6 15   08/19/21 0500 4 5 6 15   08/19/21 0400 4 5 6 15   08/19/21 0300 4 4 6 14   08/19/21 0200 4 4 6 14   08/19/21 0100 4 4 6 14   08/19/21 0000 4 4 6 14   08/18/21 2300 4 4 6 14   08/18/21 2200 4 4 6 14   08/18/21 2100 3 4 5 12   08/18/21 2000 3 4 5 12   08/18/21 1900 3 4 5 12   08/18/21 1800 3 4 6 13   08/18/21 1745 3 4 6 13   08/18/21 1730 3 4 6 13   08/18/21 0756 4 5 6 15   08/18/21 0400 4 5 6 15   08/18/21 0000 4 5 6 15   08/17/21 2000 4 5 6 15   08/17/21 1600 4 5 6 15   08/17/21 1200 4 5 6 15   08/17/21 0715 4 5 6 15   08/17/21 0400 4 5 6 15   08/17/21 0000 4 5 6 15   08/16/21 2000 4 5 6 15           Pertinent Labs/Diagnostic Results:     8/19 CXR: Left chest tube pulled back with question trace left apical pneumothorax      8/19 CT head: Postoperative change status post left retromastoid craniotomy for resection of CP angle mass  There is a moderate amount of air within the extra-axial spaces of both the CP angle and supratentorial brain parenchyma, improving compared to the prior   examination  Small, mildly complex extra-axial collection of fluid within the lateral aspect of the posterior fossa and CP angle, grossly unchanged in size with slight mass effect  Stable mild ventriculomegaly of the lateral ventricles and 3rd ventricle  8/18 CT head:  Left retromastoid, infra sigmoid surgery for left CP angle mass resection   Persistent deformity of the left brainstem and cerebellum with mild compression of the 4th ventricle  Stable lateral and 3rd ventriculomegaly  Small amount of subarachnoid hemorrhage at the craniocervical junction extending into the upper cervical spine  Postoperative intracranial and extracranial pneumocephalus  8/18 CXR:    Reexpansion of the left lung following chest tube placement with no evidence of residual pneumothorax  8/18 Operative Report:       Operative Indications:  Fall at home, subsequent encounter [W19  XXXD, J72 755]  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]  Obstructive hydrocephalus ([address] ) [G91 1]  Seizure-like activity ([address] ) [R56 9]    Procedure(s) (LRB): Image guided left retromastoid craniotomy for debulking of CP angle mass; intraoperative monitoring (Left)    Anesthesia Type: General    Operative Findings: Vestibular schwannoma         Results from last 7 days   Lab Units 08/19/21  0544 08/18/21  1257 08/18/21  0533 08/17/21  0454 08/16/21  0557 08/14/21  2108   WBC Thousand/uL 22 26*  --  16 74* 17 07* 12 04* 14 53*   HEMOGLOBIN g/dL 12 2  --  13 8 14 4 15 0 14 4   I STAT HEMOGLOBIN g/dl  --  12 2  --   --   --   --    HEMATOCRIT % 37 6  --  42 1 43 8 45 1 43 4   HEMATOCRIT, ISTAT %  --  36  --   --   --   --    PLATELETS Thousands/uL 259  --  334 355 357 377   NEUTROS ABS Thousands/µL 19 48*  --  14 64*  --   --  10 27*         Results from last 7 days   Lab Units 08/19/21  0542 08/18/21  1257 08/18/21  0533 08/17/21  0454 08/16/21  0558 08/15/21  0511   SODIUM mmol/L 143  --  138 139 137 136   POTASSIUM mmol/L 3 9  --  3 9 4 3 3 9 4 7   CHLORIDE mmol/L 112*  --  112* 110* 110* 111*   CO2 mmol/L 28  --  19* 21 20* 19*   CO2, I-STAT mmol/L  --  22  --   --   --   --    ANION GAP mmol/L 3*  --  7 8 7 6   BUN mg/dL 22  --  21 23 25 25   CREATININE mg/dL 0 68  --  0 76 0 81 0 94 0 87   EGFR ml/min/1 73sq m 99  --  89 82 69 75   CALCIUM mg/dL 8 9  --  9 2 9 4 9 3 9 4   CALCIUM, IONIZED, ISTAT mmol/L  --  1 18  --   --   --   --          Results from last 7 days   Lab Units 08/18/21  1651 08/17/21  1603   POC GLUCOSE mg/dl 120 162*     Results from last 7 days   Lab Units 08/19/21  0542 08/18/21  0533 08/17/21  0454 08/16/21  0558 08/15/21  0511 08/14/21  2108   GLUCOSE RANDOM mg/dL 119 99 116 113 105 87               Results from last 7 days   Lab Units 08/18/21  1257   I STAT BASE EXC mmol/L -4*   I STAT O2 SAT % 100*   ISTAT PH ART  7 359   I STAT ART PCO2 mm HG 37 5   I STAT ART PO2 mm  0*   I STAT ART HCO3 mmol/L 21 1*         Results from last 7 days   Lab Units 08/19/21  0757   TROPONIN I ng/mL <0 02         Results from last 7 days   Lab Units 08/19/21  0542 08/18/21  0745   PROTIME seconds 13 2 12 4   INR  1 00 0 92   PTT seconds 22* 23                             Medications:   Scheduled Medications:      acetaminophen, 975 mg, Oral, Q8H RONNIE  cefazolin, 2,000 mg, Intravenous, Q8H  dexamethasone, 4 mg, Oral, Q6H [address]   Followed by  Kadie Bowen ON 8/20/2021] dexamethasone, 4 mg, Oral, Q8H [address]   Followed by  Kadie Bowen ON 8/22/2021] dexamethasone, 2 mg, Oral, Q6H [address]   Followed by  Kadie Bowen ON 8/25/2021] dexamethasone, 2 mg, Oral, Q8H [address]   Followed by  Kadie Bowen ON 8/26/2021] dexamethasone, 2 mg, Oral, Q12H [address]   Followed by  Kadie Bowen ON 8/29/2021] dexamethasone, 2 mg, Oral, Q24H [address]  escitalopram, 5 mg, Oral, Daily      Continuous IV Infusions:      dexmedetomidine, 0 1-0 4 mcg/kg/hr, Intravenous, Titrated  sodium chloride, 100 mL/hr, Intravenous, Continuous      PRN Meds:      cyclobenzaprine, 10 mg, Oral, TID PRN  diazepam, 2 5 mg, Intravenous, Q6H PRN x 1 dose 8/19 @ 073  HYDROmorphone, 0 5 mg, Intravenous, Q4H PRN x 2 doses 8/19   Labetalol HCl, 10 mg, Intravenous, Q4H PRN  naloxone, 0 04 mg, Intravenous, Q1MIN PRN  ondansetron, 4 mg, Intravenous, Q6H PRN x 1 dose 8/19  oxyCODONE, 10 mg, Oral, Q4H PRN x 1 dose 8/19  oxyCODONE, 5 mg, Oral, Q4H PRN        Discharge Plan: TBD          Network Utilization Review Department  ATTENTION: Please call with any questions or concerns to [phone] and carefully listen to the prompts so that you are directed to the right person  All voicemails are confidential   [facility] all requests for admission clinical reviews, approved or denied determinations and any other requests to dedicated fax number below belonging to the campus where the patient is receiving treatment   List of dedicated fax numbers for the Facilities:  [address] DENIALS (Administrative/Medical Necessity) [phone]   [address] (Maternity/NICU/Pediatrics) [phone]   [address] [place] [address] [id number] [place] [address] [address] Charlette Ack[address] [place] [address] [phone]

## 2021-08-19 NOTE — ASSESSMENT & PLAN NOTE
Patient with left tension PTX s/p left CT placement   Repeat CXR with reexpansion of left lung       Management per primary team  DC CT when able per primary

## 2021-08-19 NOTE — PROGRESS NOTES
Daily Progress Note - Critical Care   Neal Licea 55 y o  female MRN: [id number]  Unit/Bed#: ICU 05 Encounter: [id number]        ----------------------------------------------------------------------------------------  HPI/24hr events:  Alvin Waters is a 61-year-old female past medical history of hypertension  She was admitted to the hospital on 08/08 a progressive neurological symptoms starting in February including headaches, nausea/vomiting, hearing impairment, and progressive imbalance  Outpatient MRI C-spine showed incidental 3 cm left cerebellar-pontine angle mass  MRI brain IAC showed a large left CP angle mass extending into the internal auditory canal   CT had during that admission showed marked hydrocephalus likely related to the mass compressing the 4th ventricle and brainstem  Imaging was most consistent with a large vestibular schwannoma  Neurosurgery started the patient on Diamox and steroid and was discharged on 08/12  The patient was readmitted on 08/14 for frequent falls and possible seizure activity  Taken to the OR with Neurosurgery 8/18 for left retromastoid craniotomy and debulking of CP angle mass  Admitted to ICU postop for further management  Postop the patient experience a significant amount of agitation with left-sided gaze preference, leaning to the left side, and moaning  Post-Op agitation  Left sided gaze preference with AMS  Stat CT Head - Small subarachnoid hemorrhage at craniocervical junction extending into the upper cervical spine, postoperative intracranial and extracranial pneumocephalus  ---------------------------------------------------------------------------------------  SUBJECTIVE  Patient reports generalized discomfort  She is also complaining of localized chest pain near her central line  Denies any dizziness or headache  Review of Systems   Constitutional: Negative  HENT: Negative  Cardiovascular: Positive for chest pain     Gastrointestinal: Negative  Genitourinary: Negative  Musculoskeletal: Positive for arthralgias  Neurological: Negative for dizziness, facial asymmetry and speech difficulty  Psychiatric/Behavioral: The patient is nervous/anxious  Review of systems was reviewed and negative unless stated above in HPI/24-hour events   ---------------------------------------------------------------------------------------  Assessment and Plan:  Neuro:   · Diagnosis: Cerebellopontine angle tumor, obstructive hydrocephalus  ? S/p image guided left retromastoid craniotomy for debulking of CP angle mass 8/18  ? Post op CTH - Persistent deformity of the left brainstem and cerebellum with mild compression of the 4th ventricle  Stable lateral and 3rd ventriculomegaly  Small amount of subarachnoid hemorrhage at the craniocervical junction extending into the upper cervical spine  Post operative intracranial and extracranial pneumocephalus  ? Continue Q1 hour neuro checks  ? Continue Decadron taper  D/C Acetazolamide  ? Maintain seizure precautions  Routine EEG was negative  ? Stat CT head for any GCS drop of 2 points within 1 hour   ? CT Head this AM - Post operative changes  Moderate amount of air within the extra-axial spaces of both the CP angle and supratentorial brain parenchyma, improving compared to prior exam  Small, mildly complex fluid collection within lateral aspect of posterior fossa and CP angle, grossly unchanged in size with slight mass effect  ? Follow-up MRI brain  · Diagnosis: acute pain, delirium   ? Precedex Stopped  ? Scheduled Tylenol  ? P r n  Valium, Flexeril, Dilaudid, oxycodone    CV:   · Diagnosis:  Hypertension  ? Plan:  P r n  Labetalol  ? Restarting Losartan 50 mg  ? Normotensive goal    Pulm:  · Diagnosis: Left pneumothorax post left subclavian catheter placement  ? Status post left chest tube placement  Air leak present  ?  Chest x-ray this is AM shows left chest tube pulled back with questionable trace left apical pneumothorax  Mild left base atelectasis      GI:   · Diagnosis:  Risk for opiate induced constipation  ? Plan:  Maintain bowel regimen    :   · Diagnosis:  No acute issues  ? Plan: Morales in place  ? Strict I&Os  ? In 4 9 L Out 4 3 L     F/E/N:   · Plan: NS at 100 mL per hour  · Replete lytes as needed  · NPO overnight due to altered mental status, Speech and Swallow        Heme/Onc:   · Diagnosis:  DVT prophylaxis  ? Hold Heparin per Neurosurgery  ? Continue SCDs         Endo:   · Diagnosis:  No acute issues  ? Plan:  Glucose checks due to Decadron use        ID:   · Diagnosis:  No acute issues  ? Continue Ancef for surgical prophylaxis  ? Patient remains afebrile  ? WBC 22 2 from 16 7 likely secondary to steroid use and reactive  ? Continue to monitor fever curve and WBC        MSK/Skin:   · Diagnosis:  Ambulatory dysfunction secondary to tumor resection  ? Plan:  PT/OT when able    Patient appropriate for transfer out of the ICU today?: No  Disposition: Continue Critical Care   Code Status: Level 1 - Full Code  ---------------------------------------------------------------------------------------  ICU CORE MEASURES    Prophylaxis   VTE Pharmacologic Prophylaxis: Enoxaparin (Lovenox) currently held  VTE Mechanical Prophylaxis: sequential compression device  Stress Ulcer Prophylaxis: Held    ABCDE Protocol (if indicated)  Plan to perform spontaneous awakening trial today? Not applicable  Plan to perform spontaneous breathing trial today? Not applicable  Obvious barriers to extubation? Not applicable  CAM-ICU: Negative    Invasive Devices Review  Invasive Devices     Central Venous Catheter Line            CVC Central Lines 08/18/21 Triple <1 day          Peripheral Intravenous Line            Peripheral IV 08/17/21 Right Antecubital 1 day    Peripheral IV 08/18/21 Dorsal (posterior); Left Wrist <1 day          Drain            Chest Tube Left <1 day    Urethral Catheter Non-latex 16 Fr  <1 day Can any invasive devices be discontinued today? No  ---------------------------------------------------------------------------------------  OBJECTIVE    Vitals   Vitals:    21 0353 21 0452 21 0453 21 0553   BP: 138/63 152/80 150/74 145/72   Pulse: 72  64 60   Resp:       Temp:   98 2 °F (36 8 °C)    TempSrc:   Oral    SpO2: 96%  97% 96%   Weight:       Height:         Temp (24hrs), Av °F (36 7 °C), Min:96 9 °F (36 1 °C), Max:98 7 °F (37 1 °C)  Current: Temperature: 98 2 °F (36 8 °C)  HR:   BP: 108-167/54-88  RR: 12-22  SpO2:     Respiratory:  SpO2 Activity: SpO2 Activity: At Rest  Nasal Cannula O2 Flow Rate (L/min): 2 L/min    Invasive/non-invasive ventilation settings   Respiratory    Lab Data (Last 4 hours)    None         O2/Vent Data (Last 4 hours)    None                Physical Exam  Constitutional:       Appearance: Normal appearance  HENT:      Head: Normocephalic  Comments: Posterior incision C/D/I     Nose: Nose normal       Mouth/Throat:      Mouth: Mucous membranes are moist       Pharynx: Oropharynx is clear  Eyes:      Extraocular Movements: Extraocular movements intact  Pupils: Pupils are equal, round, and reactive to light  Cardiovascular:      Rate and Rhythm: Normal rate and regular rhythm  Pulses: Normal pulses  Heart sounds: Normal heart sounds  Pulmonary:      Effort: Pulmonary effort is normal       Breath sounds: Normal breath sounds  Abdominal:      General: Bowel sounds are normal       Palpations: Abdomen is soft  Skin:     General: Skin is warm  Neurological:      Mental Status: She is alert and oriented to person, place, and time  Cranial Nerves: No cranial nerve deficit  Sensory: No sensory deficit        Comments: Preferred leftward gaze  EOM tracking left resets to midline    Psychiatric:         Mood and Affect: Mood normal          Behavior: Behavior normal          Laboratory and Diagnostics:  Results from last 7 days   Lab Units 08/19/21  0544 08/18/21  1257 08/18/21  0533 08/17/21  0454 08/16/21  0557 08/15/21  0511 08/14/21  2108   WBC Thousand/uL 22 26*  --  16 74* 17 07* 12 04* 10 33* 14 53*   HEMOGLOBIN g/dL 12 2  --  13 8 14 4 15 0 13 9 14 4   I STAT HEMOGLOBIN g/dl  --  12 2  --   --   --   --   --    HEMATOCRIT % 37 6  --  42 1 43 8 45 1 41 4 43 4   HEMATOCRIT, ISTAT %  --  36  --   --   --   --   --    PLATELETS Thousands/uL 259  --  334 355 357 355 377   NEUTROS PCT % 87*  --  88*  --   --   --  70   MONOS PCT % 9  --  5  --   --   --  15*     Results from last 7 days   Lab Units 08/18/21  1257 08/18/21  0533 08/17/21  0454 08/16/21  0558 08/15/21  0511 08/14/21  2108   SODIUM mmol/L  --  138 139 137 136 137   POTASSIUM mmol/L  --  3 9 4 3 3 9 4 7 3 7   CHLORIDE mmol/L  --  112* 110* 110* 111* 111*   CO2 mmol/L  --  19* 21 20* 19* 18*   CO2, I-STAT mmol/L 22  --   --   --   --   --    ANION GAP mmol/L  --  7 8 7 6 8   BUN mg/dL  --  21 23 25 25 27*   CREATININE mg/dL  --  0 76 0 81 0 94 0 87 1 05   CALCIUM mg/dL  --  9 2 9 4 9 3 9 4 9 2   GLUCOSE RANDOM mg/dL  --  99 116 113 105 87          Results from last 7 days   Lab Units 08/19/21  0542 08/18/21  0745   INR  1 00 0 92   PTT seconds 22* 23              Micro          Imaging:  I have personally reviewed pertinent films in PACS    Intake and Output  I/O       08/17 0701 - 08/18 0700 08/18 0701 - 08/19 0700    P  O  480     I V  (mL/kg)  4804 7 (64 6)    IV Piggyback  50    Total Intake(mL/kg) 480 (6 5) 4854 7 (65 3)    Urine (mL/kg/hr)  4300 (2 4)    Chest Tube  0    Total Output  4300    Net +480 +554 7          Unmeasured Urine Occurrence 2 x     Unmeasured Stool Occurrence 0 x             Height and Weights   Height: 5' 5 5" (166 4 cm)  IBW (Ideal Body Weight): 58 15 kg  Body mass index is 26 88 kg/m²    Weight (last 2 days)     None            Nutrition       Diet Orders   (From admission, onward)             Start Ordered    08/18/21 1954  Diet Regular; Regular House  Diet effective now     Question Answer Comment   Diet Type Regular    Regular Regular House    RD to adjust diet per protocol?  Yes        08/18/21 1953                Active Medications  Scheduled Meds:  Current Facility-Administered Medications   Medication Dose Route Frequency Provider Last Rate    acetaminophen  650 mg Oral Q6H PRN ONELIA Greer      acetaminophen  975 mg Oral Q8H [facility] Sravanthi Francis PA-C      acetaZOLAMIDE  125 mg Oral BID ONELIA Greer      cefazolin  2,000 mg Intravenous Q8H Sravanthi Francis PA-C 2,000 mg (08/19/21 0558)    cyclobenzaprine  10 mg Oral TID PRN ONELIA Greer      dexamethasone  4 mg Oral Q6H [facility] Sravanthi Francis PA-C      Followed by   Wilfredo Alas ON 8/20/2021] dexamethasone  4 mg Oral Q8H [facility] Sravanthi Francis PA-C      Followed by   Wilfredo Alas ON 8/22/2021] dexamethasone  2 mg Oral Q6H [facility] Sravanthi Francis PA-C      Followed by   Wilfredo Alas ON 8/25/2021] dexamethasone  2 mg Oral Q8H [facility] Sravanthi Francis PA-C      Followed by   Wilfredo Alas ON 8/26/2021] dexamethasone  2 mg Oral Q12H [facility] Sravanthi Francis PA-C      Followed by   Wilfredo Alas ON 8/29/2021] dexamethasone  2 mg Oral Q24H [facility] Snehal Wnog PA-C      dexmedetomidine  0 1-0 4 mcg/kg/hr Intravenous Titrated ONELIA Greer 0 1 mcg/kg/hr (08/19/21 0234)    diazepam  2 5 mg Intravenous Q6H PRN ONELIA Greer      escitalopram  5 mg Oral Daily ONELIA Greer      fentanyl citrate (PF) (FOR EMS ONLY)           heparin (porcine)  5,000 Units Subcutaneous [facility] Sravanthi Francis PA-C      HYDROmorphone  0 5 mg Intravenous Q1H PRN Snehal Wong PA-C      Labetalol HCl  10 mg Intravenous Q4H PRN ONELIA Greer      naloxone  0 04 mg Intravenous Q1MIN PRN Snehal Wong PA-C      ondansetron  4 mg Intravenous Q6H PRN ONELIA Greer      oxyCODONE  10 mg Oral Q4H PRN Snehal Wong PA-C      oxyCODONE  5 mg Oral Q6H PRN Mable ONELIA Vazquez      oxyCODONE  5 mg Oral Q4H PRN Tri Andre PA-C      scopolamine  1 patch Transdermal Once Chantale Lange,       sodium chloride  100 mL/hr Intravenous Continuous Sravanthi Francis PA-C 100 mL/hr (08/18/21 1759)    tiZANidine  2 mg Oral TID ONELIA Greer       Continuous Infusions:  dexmedetomidine, 0 1-0 4 mcg/kg/hr, Last Rate: 0 1 mcg/kg/hr (08/19/21 0234)  sodium chloride, 100 mL/hr, Last Rate: 100 mL/hr (08/18/21 1759)      PRN Meds:   acetaminophen, 650 mg, Q6H PRN  cyclobenzaprine, 10 mg, TID PRN  diazepam, 2 5 mg, Q6H PRN  HYDROmorphone, 0 5 mg, Q1H PRN  Labetalol HCl, 10 mg, Q4H PRN  naloxone, 0 04 mg, Q1MIN PRN  ondansetron, 4 mg, Q6H PRN  oxyCODONE, 10 mg, Q4H PRN  oxyCODONE, 5 mg, Q6H PRN  oxyCODONE, 5 mg, Q4H PRN        Allergies   No Known Allergies

## 2021-08-20 ENCOUNTER — APPOINTMENT (INPATIENT)
Dept: NON INVASIVE DIAGNOSTICS | Facility: HOSPITAL | Age: 56
DRG: 025 | End: 2021-08-20
Payer: COMMERCIAL

## 2021-08-20 ENCOUNTER — APPOINTMENT (INPATIENT)
Dept: RADIOLOGY | Facility: HOSPITAL | Age: 56
DRG: 025 | End: 2021-08-20
Payer: COMMERCIAL

## 2021-08-20 LAB
ANION GAP SERPL CALCULATED.3IONS-SCNC: 4 MMOL/L (ref 4–13)
ANION GAP SERPL CALCULATED.3IONS-SCNC: 7 MMOL/L (ref 4–13)
BASOPHILS # BLD AUTO: 0.02 THOUSANDS/ΜL (ref 0–0.1)
BASOPHILS NFR BLD AUTO: 0 % (ref 0–1)
BUN SERPL-MCNC: 20 MG/DL (ref 5–25)
BUN SERPL-MCNC: 20 MG/DL (ref 5–25)
CA-I BLD-SCNC: 1.2 MMOL/L (ref 1.12–1.32)
CALCIUM SERPL-MCNC: 8.8 MG/DL (ref 8.3–10.1)
CALCIUM SERPL-MCNC: 9.1 MG/DL (ref 8.3–10.1)
CHLORIDE SERPL-SCNC: 108 MMOL/L (ref 100–108)
CHLORIDE SERPL-SCNC: 108 MMOL/L (ref 100–108)
CO2 SERPL-SCNC: 23 MMOL/L (ref 21–32)
CO2 SERPL-SCNC: 27 MMOL/L (ref 21–32)
CREAT SERPL-MCNC: 0.77 MG/DL (ref 0.6–1.3)
CREAT SERPL-MCNC: 0.78 MG/DL (ref 0.6–1.3)
EOSINOPHIL # BLD AUTO: 0 THOUSAND/ΜL (ref 0–0.61)
EOSINOPHIL NFR BLD AUTO: 0 % (ref 0–6)
ERYTHROCYTE [DISTWIDTH] IN BLOOD BY AUTOMATED COUNT: 13.2 % (ref 11.6–15.1)
GFR SERPL CREATININE-BSD FRML MDRD: 86 ML/MIN/1.73SQ M
GFR SERPL CREATININE-BSD FRML MDRD: 87 ML/MIN/1.73SQ M
GLUCOSE SERPL-MCNC: 134 MG/DL (ref 65–140)
GLUCOSE SERPL-MCNC: 140 MG/DL (ref 65–140)
HCT VFR BLD AUTO: 37.2 % (ref 34.8–46.1)
HGB BLD-MCNC: 12.3 G/DL (ref 11.5–15.4)
IMM GRANULOCYTES # BLD AUTO: 0.19 THOUSAND/UL (ref 0–0.2)
IMM GRANULOCYTES NFR BLD AUTO: 1 % (ref 0–2)
LYMPHOCYTES # BLD AUTO: 0.67 THOUSANDS/ΜL (ref 0.6–4.47)
LYMPHOCYTES NFR BLD AUTO: 4 % (ref 14–44)
MAGNESIUM SERPL-MCNC: 2.5 MG/DL (ref 1.6–2.6)
MAGNESIUM SERPL-MCNC: 2.6 MG/DL (ref 1.6–2.6)
MCH RBC QN AUTO: 30.4 PG (ref 26.8–34.3)
MCHC RBC AUTO-ENTMCNC: 33.1 G/DL (ref 31.4–37.4)
MCV RBC AUTO: 92 FL (ref 82–98)
MONOCYTES # BLD AUTO: 1.05 THOUSAND/ΜL (ref 0.17–1.22)
MONOCYTES NFR BLD AUTO: 6 % (ref 4–12)
NEUTROPHILS # BLD AUTO: 16.92 THOUSANDS/ΜL (ref 1.85–7.62)
NEUTS SEG NFR BLD AUTO: 89 % (ref 43–75)
NRBC BLD AUTO-RTO: 0 /100 WBCS
PHOSPHATE SERPL-MCNC: 2.6 MG/DL (ref 2.7–4.5)
PHOSPHATE SERPL-MCNC: 3.5 MG/DL (ref 2.7–4.5)
PLATELET # BLD AUTO: 236 THOUSANDS/UL (ref 149–390)
PMV BLD AUTO: 10.2 FL (ref 8.9–12.7)
POTASSIUM SERPL-SCNC: 3.8 MMOL/L (ref 3.5–5.3)
POTASSIUM SERPL-SCNC: 4.2 MMOL/L (ref 3.5–5.3)
RBC # BLD AUTO: 4.04 MILLION/UL (ref 3.81–5.12)
SODIUM SERPL-SCNC: 138 MMOL/L (ref 136–145)
SODIUM SERPL-SCNC: 139 MMOL/L (ref 136–145)
WBC # BLD AUTO: 18.85 THOUSAND/UL (ref 4.31–10.16)

## 2021-08-20 PROCEDURE — 97129 THER IVNTJ 1ST 15 MIN: CPT

## 2021-08-20 PROCEDURE — 85025 COMPLETE CBC W/AUTO DIFF WBC: CPT | Performed by: STUDENT IN AN ORGANIZED HEALTH CARE EDUCATION/TRAINING PROGRAM

## 2021-08-20 PROCEDURE — 32551 INSERTION OF CHEST TUBE: CPT | Performed by: EMERGENCY MEDICINE

## 2021-08-20 PROCEDURE — 82330 ASSAY OF CALCIUM: CPT | Performed by: STUDENT IN AN ORGANIZED HEALTH CARE EDUCATION/TRAINING PROGRAM

## 2021-08-20 PROCEDURE — 80048 BASIC METABOLIC PNL TOTAL CA: CPT | Performed by: STUDENT IN AN ORGANIZED HEALTH CARE EDUCATION/TRAINING PROGRAM

## 2021-08-20 PROCEDURE — 71045 X-RAY EXAM CHEST 1 VIEW: CPT

## 2021-08-20 PROCEDURE — 99233 SBSQ HOSP IP/OBS HIGH 50: CPT | Performed by: INTERNAL MEDICINE

## 2021-08-20 PROCEDURE — 84100 ASSAY OF PHOSPHORUS: CPT | Performed by: STUDENT IN AN ORGANIZED HEALTH CARE EDUCATION/TRAINING PROGRAM

## 2021-08-20 PROCEDURE — 93005 ELECTROCARDIOGRAM TRACING: CPT

## 2021-08-20 PROCEDURE — 83735 ASSAY OF MAGNESIUM: CPT | Performed by: STUDENT IN AN ORGANIZED HEALTH CARE EDUCATION/TRAINING PROGRAM

## 2021-08-20 PROCEDURE — 99024 POSTOP FOLLOW-UP VISIT: CPT | Performed by: PHYSICIAN ASSISTANT

## 2021-08-20 PROCEDURE — 97168 OT RE-EVAL EST PLAN CARE: CPT

## 2021-08-20 PROCEDURE — 93306 TTE W/DOPPLER COMPLETE: CPT

## 2021-08-20 PROCEDURE — NC001 PR NO CHARGE: Performed by: EMERGENCY MEDICINE

## 2021-08-20 PROCEDURE — 97164 PT RE-EVAL EST PLAN CARE: CPT

## 2021-08-20 RX ORDER — POTASSIUM CHLORIDE 20 MEQ/1
40 TABLET, EXTENDED RELEASE ORAL ONCE
Status: COMPLETED | OUTPATIENT
Start: 2021-08-20 | End: 2021-08-20

## 2021-08-20 RX ORDER — ACETAZOLAMIDE 125 MG/1
125 TABLET ORAL EVERY 12 HOURS SCHEDULED
Status: DISCONTINUED | OUTPATIENT
Start: 2021-08-20 | End: 2021-08-20

## 2021-08-20 RX ORDER — FENTANYL CITRATE 50 UG/ML
50 INJECTION, SOLUTION INTRAMUSCULAR; INTRAVENOUS ONCE
Status: COMPLETED | OUTPATIENT
Start: 2021-08-20 | End: 2021-08-20

## 2021-08-20 RX ORDER — DEXAMETHASONE 2 MG/1
2 TABLET ORAL DAILY
Status: COMPLETED | OUTPATIENT
Start: 2021-08-27 | End: 2021-08-28

## 2021-08-20 RX ORDER — DEXAMETHASONE 2 MG/1
2 TABLET ORAL EVERY 6 HOURS SCHEDULED
Status: COMPLETED | OUTPATIENT
Start: 2021-08-23 | End: 2021-08-25

## 2021-08-20 RX ORDER — ACETAZOLAMIDE 250 MG/1
250 TABLET ORAL EVERY 12 HOURS SCHEDULED
Status: CANCELLED | OUTPATIENT
Start: 2021-08-20

## 2021-08-20 RX ORDER — DEXAMETHASONE 2 MG/1
2 TABLET ORAL EVERY 12 HOURS SCHEDULED
Status: COMPLETED | OUTPATIENT
Start: 2021-08-25 | End: 2021-08-26

## 2021-08-20 RX ORDER — LIDOCAINE HYDROCHLORIDE AND EPINEPHRINE 10; 10 MG/ML; UG/ML
5 INJECTION, SOLUTION INFILTRATION; PERINEURAL ONCE
Status: COMPLETED | OUTPATIENT
Start: 2021-08-20 | End: 2021-08-20

## 2021-08-20 RX ORDER — DEXAMETHASONE 4 MG/1
4 TABLET ORAL EVERY 8 HOURS SCHEDULED
Status: COMPLETED | OUTPATIENT
Start: 2021-08-21 | End: 2021-08-22

## 2021-08-20 RX ORDER — MIDAZOLAM HYDROCHLORIDE 2 MG/2ML
2 INJECTION, SOLUTION INTRAMUSCULAR; INTRAVENOUS ONCE
Status: COMPLETED | OUTPATIENT
Start: 2021-08-20 | End: 2021-08-20

## 2021-08-20 RX ORDER — ACETAZOLAMIDE 125 MG/1
125 TABLET ORAL DAILY
Status: DISCONTINUED | OUTPATIENT
Start: 2021-08-20 | End: 2021-08-22

## 2021-08-20 RX ORDER — LIDOCAINE HYDROCHLORIDE 10 MG/ML
10 INJECTION, SOLUTION EPIDURAL; INFILTRATION; INTRACAUDAL; PERINEURAL ONCE
Status: COMPLETED | OUTPATIENT
Start: 2021-08-20 | End: 2021-08-20

## 2021-08-20 RX ADMIN — HEPARIN SODIUM 5000 UNITS: 5000 INJECTION INTRAVENOUS; SUBCUTANEOUS at 22:11

## 2021-08-20 RX ADMIN — DEXAMETHASONE 4 MG: 4 TABLET ORAL at 05:05

## 2021-08-20 RX ADMIN — FENTANYL CITRATE 50 MCG: 50 INJECTION INTRAMUSCULAR; INTRAVENOUS at 20:30

## 2021-08-20 RX ADMIN — HEPARIN SODIUM 5000 UNITS: 5000 INJECTION INTRAVENOUS; SUBCUTANEOUS at 13:15

## 2021-08-20 RX ADMIN — DEXAMETHASONE 4 MG: 4 TABLET ORAL at 17:40

## 2021-08-20 RX ADMIN — LIDOCAINE HYDROCHLORIDE,EPINEPHRINE BITARTRATE 5 ML: 10; .01 INJECTION, SOLUTION INFILTRATION; PERINEURAL at 20:00

## 2021-08-20 RX ADMIN — ACETAMINOPHEN 975 MG: 325 TABLET, FILM COATED ORAL at 05:05

## 2021-08-20 RX ADMIN — DEXAMETHASONE 4 MG: 4 TABLET ORAL at 12:40

## 2021-08-20 RX ADMIN — MIDAZOLAM 2 MG: 1 INJECTION INTRAMUSCULAR; INTRAVENOUS at 20:00

## 2021-08-20 RX ADMIN — ACETAMINOPHEN 975 MG: 325 TABLET, FILM COATED ORAL at 13:14

## 2021-08-20 RX ADMIN — LIDOCAINE HYDROCHLORIDE 10 ML: 10 INJECTION, SOLUTION EPIDURAL; INFILTRATION; INTRACAUDAL; PERINEURAL at 20:00

## 2021-08-20 RX ADMIN — ACETAMINOPHEN 975 MG: 325 TABLET, FILM COATED ORAL at 22:09

## 2021-08-20 RX ADMIN — POTASSIUM CHLORIDE 40 MEQ: 1500 TABLET, EXTENDED RELEASE ORAL at 08:19

## 2021-08-20 RX ADMIN — ACETAZOLAMIDE 125 MG: 125 TABLET ORAL at 12:40

## 2021-08-20 RX ADMIN — LIDOCAINE 1 PATCH: 50 PATCH TOPICAL at 08:19

## 2021-08-20 RX ADMIN — HEPARIN SODIUM 5000 UNITS: 5000 INJECTION INTRAVENOUS; SUBCUTANEOUS at 05:04

## 2021-08-20 RX ADMIN — CYCLOBENZAPRINE HYDROCHLORIDE 10 MG: 10 TABLET, FILM COATED ORAL at 15:02

## 2021-08-20 RX ADMIN — LOSARTAN POTASSIUM 50 MG: 50 TABLET, FILM COATED ORAL at 08:20

## 2021-08-20 RX ADMIN — ESCITALOPRAM 5 MG: 5 TABLET, FILM COATED ORAL at 08:18

## 2021-08-20 RX ADMIN — DEXAMETHASONE 4 MG: 4 TABLET ORAL at 00:05

## 2021-08-20 RX ADMIN — FENTANYL CITRATE 50 MCG: 50 INJECTION INTRAMUSCULAR; INTRAVENOUS at 20:00

## 2021-08-20 NOTE — PROGRESS NOTES
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[facility])  Assessment & Plan  POD2 Image guided left retromastoid craniotomy for debulking of CP angle mass  · Originally presented on 8/8/21 with headaches, vertigo and hearing loss (per tympanogram left ear hearing at 24%)  · Presented again 8/14 with transient LOC after fall with questionable seizure-like activity  · Preop House Brackman grade 3 facial nerve palsy  Some left rapid horizontal nystagmus  Slight right tongue deviation  Imaging:   · CT head without 8/18/2021: Postoperative change status post left retromastoid craniotomy for resection of CP angle mass  There is a moderate amount of air within the extra-axial spaces of both the CP angle and supratentorial brain parenchyma, improving compared to the prior examination  Small, mildly complex extra-axial collection of fluid within the lateral aspect of the posterior fossa and CP angle, grossly unchanged in size with slight mass effect  Stable mild ventriculomegaly of the lateral ventricles and 3rd ventricle  · CT head wo 8/19/2021: Left retromastoid, infra sigmoid surgery for left CP angle mass resection  Persistent deformity of the left brainstem and cerebellum with mild compression of the 4th ventricle  Stable lateral and 3rd ventriculomegaly  Small amount of subarachnoid hemorrhage at the craniocervical junction extending into the upper cervical spine  Postoperative intracranial and extracranial pneumocephalus  · MRI brain IAC w/wo 8/19/2021:  Status post resection of majority of the left CP angle tumor with small residual   Improving mass effect upon the brainstem and 4th ventricle    New diffusion restricted noted in the left posterior lateral fabian   Persistent mild hydrocephalus  Plan:   · Continue monitor neurological exam    · STAT CT head with any neurological decline including drop GCS of 2pts within 1 hr   · Continue decadron 4mg Q 6H - rapid taper ordered  · Monitor BMP - Na 143, plan repeat tomorrow  · Medical management and pain control per primary team  · Mobilize with PT/OT  Cleared ST for regular diet  · DVT prophylaxis:  Bilateral SCDs  HSQ  Neurosurgery will continue to follow closely, call with any further questions or concerns  Plan of care d/w primary team          Pneumothorax  Assessment & Plan  Patient with left tension PTX s/p left CT placement   Repeat CXR with reexpansion of left lung  Management per primary team  DC CT when able per primary    Headache  Assessment & Plan  monitor  Pain medications as ordered    Leukocytosis  Assessment & Plan  Likely steroid induced  No fevers  Trend WBC      Hydrocephalus (HCC)  Assessment & Plan  · Evident on MRI brain and CT head  On repeat CT head hydrocephalus appears stable  · Expect to slowly improve with time  Improved mass effect on 4th ventricle  Subjective/Objective   Chief Complaint: I am better today    Subjective: Patient complaining of pain associated with chest tube  She has mild incisional pain  Her speech is returned to baseline  Patient continues with difficulty focusing her vision and difficulty at times  Denies any difficulty with speech or swallowing  Denies any new facial weakness  Denies any change in facial sensation but continues to feels other spider webs bilaterally on her face  Denies any focal weakness or sensation changes of her extremities   states patient c/o room appearing upside when she opens her eye     Objective: sitting up in chair  NAD  I/O       08/18 0701 - 08/19 0700 08/19 0701 - 08/20 0700 08/20 0701 - 08/21 0700    P  O   2165     I V  (mL/kg) 4804 7 (64 6) 800 (10 8)     IV Piggyback 50 100     Total Intake(mL/kg) 4854 7 (65 3) 3065 (41 2)     Urine (mL/kg/hr) 4300 (2 4) 2655 (1 5) 1290 (4)    Chest Tube 0 0     Total Output 4300 2655 1290    Net +554 7 +410 -1290                 Invasive Devices     Central Venous Catheter Line            CVC Central Lines 08/18/21 Triple 2 days          Drain            Chest Tube Left 1 day                Physical Exam:  Vitals: Blood pressure 149/90, pulse 88, temperature 98 2 °F (36 8 °C), resp  rate 18, height 5' 5 5" (1 664 m), weight 74 4 kg (164 lb 0 4 oz), SpO2 96 %, not currently breastfeeding  ,Body mass index is 26 88 kg/m²  Hemodynamic Monitoring: MAP: Arterial Line MAP (mmHg): 126 mmHg    General appearance: alert, appears stated age, cooperative and no distress  Head: Normocephalic, without obvious abnormality, CDI with expected ecchymosis  Eyes: EOM with nystagmus, PERRL  Neck: symmetrical, trachea midline   Lungs: non labored breathing  Heart: regular heart rate  Neurologic:   Mental status: Alert, oriented x3, thought content appropriate, able to ID objects  Good repetition  Accurate calculations    Cranial nerves: grossly intact (Cranial nerves II-XII) except facial sensation, left hearing loss but able to hear some finger rub, EOM  Sensory: normal to LT  Motor: moving all extremities without focal weakness  Coordination: finger to nose abnormal bilaterally L>R, no drift bilaterally    Lab Results:  Results from last 7 days   Lab Units 08/20/21  0513 08/19/21  0544 08/18/21  1257 08/18/21  0533   WBC Thousand/uL 18 85* 22 26*  --  16 74*   HEMOGLOBIN g/dL 12 3 12 2  --  13 8   I STAT HEMOGLOBIN g/dl  --   --  12 2  --    HEMATOCRIT % 37 2 37 6  --  42 1   HEMATOCRIT, ISTAT %  --   --  36  --    PLATELETS Thousands/uL 236 259  --  334   NEUTROS PCT % 89* 87*  --  88*   MONOS PCT % 6 9  --  5     Results from last 7 days   Lab Units 08/20/21  0513 08/19/21  0542 08/18/21  1257 08/18/21  0533   POTASSIUM mmol/L 3 8 3 9  --  3 9   CHLORIDE mmol/L 108 112*  -- 112*   CO2 mmol/L 27 28  --  19*   CO2, I-STAT mmol/L  --   --  22  --    BUN mg/dL 20 22  --  21   CREATININE mg/dL 0 77 0 68  --  0 76   CALCIUM mg/dL 8 8 8 9  --  9 2   GLUCOSE, ISTAT mg/dl  --   --  116  --      Results from last 7 days   Lab Units 08/20/21  0513   MAGNESIUM mg/dL 2 5     Results from last 7 days   Lab Units 08/20/21  0513   PHOSPHORUS mg/dL 3 5     Results from last 7 days   Lab Units 08/19/21  0542 08/18/21  0745   INR  1 00 0 92   PTT seconds 22* 23     No results found for: TROPONINT  ABG:No results found for: PHART, MDD8TXL, PO2ART, KMI7KFF, F9RAGQOI, BEART, SOURCE    Imaging Studies: I have personally reviewed pertinent reports  and I have personally reviewed pertinent films in PACS    CT head wo contrast    Result Date: 8/19/2021  Impression: Postoperative change status post left retromastoid craniotomy for resection of CP angle mass  There is a moderate amount of air within the extra-axial spaces of both the CP angle and supratentorial brain parenchyma, improving compared to the prior examination  Small, mildly complex extra-axial collection of fluid within the lateral aspect of the posterior fossa and CP angle, grossly unchanged in size with slight mass effect  Stable mild ventriculomegaly of the lateral ventricles and 3rd ventricle  Workstation performed: [id number]     CT head wo contrast    Result Date: 8/18/2021  Impression: Left retromastoid, infra sigmoid surgery for left CP angle mass resection  Persistent deformity of the left brainstem and cerebellum with mild compression of the 4th ventricle  Stable lateral and 3rd ventriculomegaly  Small amount of subarachnoid hemorrhage at the craniocervical junction extending into the upper cervical spine  Postoperative intracranial and extracranial pneumocephalus    I personally discussed this study with Jerman Owens on 8/18/2021 at 9:07 PM  Workstation performed: [id number]     CT head without contrast    Result Date: 8/14/2021  Impression: Moderate hydrocephalus with the size of the ventricles not significantly changed when compared to a recent CT brain dated August 8, 2021  Again, the findings are likely obstructive in nature or secondary to a 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 dated August 5, 2021  Workstation performed: [id number]     X-ray chest 1 view    Result Date: 8/18/2021  Impression: Left subclavian central line tip is in the upper SVC  Left side tension pneumothorax  I personally reviewed this study with Dr Shy Willingham  on 8/18/2021 at 5:52 PM  Workstation performed: [id number]     XR chest portable ICU    Result Date: 8/19/2021  Impression: 1  Reexpansion of the left lung following chest tube placement with no evidence of residual pneumothorax  2   Subsegmental atelectasis and/or consolidation in the base of the left lower lobe  Workstation performed: [id number]     XR chest portable ICU    Result Date: 8/19/2021  Impression: Left chest tube pulled back with question trace left apical pneumothorax  Mild left base atelectasis  Workstation performed: [id number]     MRI brain IAC wo and w contrast    Result Date: 8/20/2021  Impression: Patient is status post removal of the vast majority of the previously identified left CP angle tumor  There is some residual enhancing tumor remaining in the anterior aspect of the CP angle with improving mass effect upon the brainstem and brachium pontis  There is no restricted diffusion identified within the left posterior lateral fabian and brachium pontis consistent with acute ischemia  Persistent mild hydrocephalus involving the lateral ventricles and 3rd ventricle  Increased FLAIR signal adjacent to the surface of the lateral ventricles may represent transependymal flow of CSF, unchanged  4th ventricle is not enlarged and there is improving mass effect upon the 4th ventricle   This examination was marked "immediate notification" in Epic in order to begin the standard process by which the radiology reading room liaison alerts the referring practitioner  Workstation performed: [id number]       EKG, Pathology, and Other Studies: I have personally reviewed pertinent reports        VTE Pharmacologic Prophylaxis: Heparin    VTE Mechanical Prophylaxis: sequential compression device

## 2021-08-20 NOTE — PROGRESS NOTES
Daily Progress Note - Critical Care   Mayte Medellin 55 y o  female MRN: [id number]  Unit/Bed#: ICU 05 Encounter: [id number]        ----------------------------------------------------------------------------------------  HPI/24hr events: Sada Yost is a 50-year-old female past medical history of hypertension  She was admitted to the hospital on 08/08 a progressive neurological symptoms starting in February including headaches, nausea/vomiting, hearing impairment, and progressive imbalance  Outpatient MRI C-spine showed incidental 3 cm left cerebellar-pontine angle mass  MRI brain IAC showed a large left CP angle mass extending into the internal auditory canal   CT had during that admission showed marked hydrocephalus likely related to the mass compressing the 4th ventricle and brainstem  Imaging was most consistent with a large vestibular schwannoma  Neurosurgery started the patient on Diamox and steroid and was discharged on 08/12  The patient was readmitted on 08/14 for frequent falls and possible seizure activity  Taken to the OR with Neurosurgery 8/18 for left retromastoid craniotomy and debulking of CP angle mass  Admitted to ICU postop for further management  Postop the patient experience a significant amount of agitation with left-sided gaze preference, leaning to the left side, and moaning  CT head showed a small subarachnoid hemorrhage at the craniocervical junction extending into the upper cervical spine, postoperative intracranial and extracranial pneumocephalus  MRI brain  Dysconjugate gaze  ---------------------------------------------------------------------------------------  SUBJECTIVE  Sada Yost was seen resting comfortably in bed this morning  She complained of pain near her chest tube site  She denied any other specific complaints expressed a desire to get up and out of bed  Review of Systems   HENT: Negative      Eyes:        Intermittent double vision worse in right eye Respiratory: Negative  Cardiovascular: Negative  Gastrointestinal: Negative  Endocrine: Negative  Genitourinary: Negative  Musculoskeletal: Negative  Skin: Negative  Neurological: Negative for dizziness, tremors, weakness, numbness and headaches  Hematological: Negative  Psychiatric/Behavioral: Negative  Review of systems was reviewed and negative unless stated above in HPI/24-hour events   ---------------------------------------------------------------------------------------  Assessment and Plan:  Neuro:   · Diagnosis: Cerebellopontine angle tumor, obstructive hydrocephalus  ? S/p image guided left retromastoid craniotomy for debulking of CP angle mass 8/18  ? Post op CTH - Persistent deformity of the left brainstem and cerebellum with mild compression of the 4th ventricle  Stable lateral and 3rd ventriculomegaly  Small amount of subarachnoid hemorrhage at the craniocervical junction extending into the upper cervical spine  Post operative intracranial and extracranial pneumocephalus  ? Q2 Neuro Checks  ? Stop Decadron taper  Continue Decadron 4 mg q 6  ? Diamox taper 125 Daily   ? Maintain seizure precautions  Routine EEG was negative  ? Stat CT head for any GCS drop of 2 points within 1 hour   ? MRI Brain 8/19 Pending read  · Diagnosis: acute pain, delirium   ? Scheduled Tylenol  ? P r n  Valium, Flexeril, Dilaudid, oxycodone     CV:   · Diagnosis:  Hypertension  ? Maintain normotension  ? Home losartan 50 mg, p r n  Labetalol     Pulm:  · Diagnosis: Left pneumothorax post left subclavian catheter placement  ? Status post left chest tube placement  Air leak present  Switched to water seal  Plan to get repeat X-ray in the morning   ? Chest x-ray this AM improved      GI:   · Diagnosis:  Risk for opiate induced constipation  ? Plan:  Maintain bowel regimen     :   · Diagnosis:  No acute issues  ? Plan:  Morales removed  ? Strict I&Os  ?  In 3 L Out 2 7 L     F/E/N:   · Not on maintenance fluids  · Replete lytes as needed  · Regular house diet        Heme/Onc:   · Diagnosis:  DVT prophylaxis  ? Heparin restarted  ? Continue SCDs         Endo:   · Diagnosis:  No acute issues  ? Plan:  Glucose checks due to Decadron use        ID:   · Diagnosis:  No acute issues  ? Patient remains afebrile  ? WBC 18 9 from 22 2 likely secondary to steroid use and reactive  ? Continue to monitor fever curve and WBC    Patient appropriate for transfer out of the ICU today?: Patient does not meet criteria for referral to the ICU Follow-Up Clinic; referral has not been made  Disposition: Admit to Stepdown Level 1  Code Status: Level 1 - Full Code  ---------------------------------------------------------------------------------------  ICU CORE MEASURES    Prophylaxis   VTE Pharmacologic Prophylaxis: Heparin  VTE Mechanical Prophylaxis: sequential compression device  Stress Ulcer Prophylaxis: Held    ABCDE Protocol (if indicated)  Plan to perform spontaneous awakening trial today? Not applicable  Plan to perform spontaneous breathing trial today? Not applicable  Obvious barriers to extubation? Not applicable  CAM-ICU: Negative    Invasive Devices Review  Invasive Devices     Central Venous Catheter Line            CVC Central Lines 21 Triple 1 day          Drain            Chest Tube Left 1 day              Can any invasive devices be discontinued today?  Yes  ---------------------------------------------------------------------------------------  OBJECTIVE    Vitals   Vitals:    21 0400 21 0500 21 0600 21 0602   BP: 118/78 144/86  118/70   BP Location: Right arm      Pulse: 72 74 80    Resp: 18 13 18    Temp: 98 7 °F (37 1 °C)      TempSrc: Oral      SpO2: 92% 93% 95%    Weight:       Height:         Temp (24hrs), Av 2 °F (36 8 °C), Min:97 5 °F (36 4 °C), Max:98 7 °F (37 1 °C)  Current: Temperature: 98 7 °F (37 1 °C)  HR:   BP: 118-162/70-87  RR: 8-31  SpO2: 91-96    Respiratory:  SpO2 Activity: SpO2 Activity: At Rest  Nasal Cannula O2 Flow Rate (L/min): 2 L/min    Invasive/non-invasive ventilation settings   Respiratory    Lab Data (Last 4 hours)    None         O2/Vent Data (Last 4 hours)    None                Physical Exam  HENT:      Head: Normocephalic  Comments: Incision clean dry and intact     Right Ear: External ear normal       Left Ear: External ear normal       Nose: Nose normal       Mouth/Throat:      Mouth: Mucous membranes are moist       Pharynx: Oropharynx is clear  Eyes:      Extraocular Movements: Extraocular movements intact  Pupils: Pupils are equal, round, and reactive to light  Cardiovascular:      Rate and Rhythm: Normal rate and regular rhythm  Pulses: Normal pulses  Heart sounds: Normal heart sounds  Pulmonary:      Effort: Pulmonary effort is normal       Breath sounds: Normal breath sounds  Abdominal:      General: Bowel sounds are normal       Palpations: Abdomen is soft  Musculoskeletal:         General: Normal range of motion  Skin:     General: Skin is warm and dry  Capillary Refill: Capillary refill takes less than 2 seconds  Neurological:      General: No focal deficit present  Mental Status: She is alert and oriented to person, place, and time        Comments: Decreased hearing left ear  Dysconjugate gaze with left eye slightly resetting to midline when holding eyes to the right   Psychiatric:         Mood and Affect: Mood normal          Behavior: Behavior normal          Laboratory and Diagnostics:  Results from last 7 days   Lab Units 08/20/21  0513 08/19/21  0544 08/18/21  1257 08/18/21  0533 08/17/21  0454 08/16/21  0557 08/15/21  0511 08/14/21  2108   WBC Thousand/uL 18 85* 22 26*  --  16 74* 17 07* 12 04* 10 33* 14 53*   HEMOGLOBIN g/dL 12 3 12 2  --  13 8 14 4 15 0 13 9 14 4   I STAT HEMOGLOBIN g/dl  --   --  12 2  --   --   --   --   --    HEMATOCRIT % 37 2 37 6  --  42 1 43 8 45 1 41 4 43 4   HEMATOCRIT, ISTAT %  --   --  36  --   --   --   --   --    PLATELETS Thousands/uL 236 259  --  334 355 357 355 377   NEUTROS PCT % 89* 87*  --  88*  --   --   --  70   MONOS PCT % 6 9  --  5  --   --   --  15*     Results from last 7 days   Lab Units 08/20/21  0513 08/19/21  0542 08/18/21  1257 08/18/21  0533 08/17/21  0454 08/16/21  0558 08/15/21  0511 08/14/21  2108   SODIUM mmol/L 139 143  --  138 139 137 136 137   POTASSIUM mmol/L 3 8 3 9  --  3 9 4 3 3 9 4 7 3 7   CHLORIDE mmol/L 108 112*  --  112* 110* 110* 111* 111*   CO2 mmol/L 27 28  --  19* 21 20* 19* 18*   CO2, I-STAT mmol/L  --   --  22  --   --   --   --   --    ANION GAP mmol/L 4 3*  --  7 8 7 6 8   BUN mg/dL 20 22  --  21 23 25 25 27*   CREATININE mg/dL 0 77 0 68  --  0 76 0 81 0 94 0 87 1 05   CALCIUM mg/dL 8 8 8 9  --  9 2 9 4 9 3 9 4 9 2   GLUCOSE RANDOM mg/dL 134 119  --  99 116 113 105 87     Results from last 7 days   Lab Units 08/20/21  0513   MAGNESIUM mg/dL 2 5   PHOSPHORUS mg/dL 3 5      Results from last 7 days   Lab Units 08/19/21  0542 08/18/21  0745   INR  1 00 0 92   PTT seconds 22* 23      Results from last 7 days   Lab Units 08/19/21  0757   TROPONIN I ng/mL <0 02         ABG:    VBG:          Micro          Imaging:  I have personally reviewed pertinent films in PACS   CT Head 8/19 - Post operative changes  Moderate amount of air within the extra-axial spaces of both the CP angle and supratentorial brain parenchyma, improving compared to prior exam  Small, mildly complex fluid collection within lateral aspect of posterior fossa and CP angle, grossly unchanged in size with slight mass effect  Chest x-ray 8/19 shows left chest tube pulled back with questionable trace left apical pneumothorax  Mild left base atelectasis     Intake and Output  I/O       08/18 0701 - 08/19 0700 08/19 0701 - 08/20 0700    P  O   2165    I V  (mL/kg) 4804 7 (64 6) 800 (10 8)    IV Piggyback 50 100    Total Intake(mL/kg) 4854 7 (65 3) 3065 (41 2)    Urine (mL/kg/hr) 4300 (2 4) 2655 (1 5)    Chest Tube 0 0    Total Output 4300 2655    Net +554 7 +410                  Height and Weights   Height: 5' 5 5" (166 4 cm)  IBW (Ideal Body Weight): 58 15 kg  Body mass index is 26 88 kg/m²  Weight (last 2 days)     None            Nutrition       Diet Orders   (From admission, onward)             Start     Ordered    08/18/21 1954  Diet Regular; Regular House  Diet effective now     Question Answer Comment   Diet Type Regular    Regular Regular House    RD to adjust diet per protocol?  Yes        08/18/21 1953                    Active Medications  Scheduled Meds:  Current Facility-Administered Medications   Medication Dose Route Frequency Provider Last Rate    acetaminophen  975 mg Oral Q8H [address] Sravanthi Francis PA-C      cyclobenzaprine  10 mg Oral TID PRN ONELIA Copeland      dexamethasone  4 mg Oral Q6H [address] Estelita Saeed MD      dexmedetomidine  0 1-0 4 mcg/kg/hr Intravenous Titrated ONELIA Greer 0 1 mcg/kg/hr (08/19/21 0234)    diazepam  2 5 mg Intravenous Q6H PRN ONELIA Greer      escitalopram  5 mg Oral Daily ONELIA Greer      heparin (porcine)  5,000 Units Subcutaneous [facility] Estelita Saeed MD      HYDROmorphone  0 5 mg Intravenous Q4H PRN Ashely Csaey DO      Labetalol HCl  10 mg Intravenous Q4H PRN ONELIA Greer      lidocaine  1 patch Topical Daily Roland Ortega,       losartan  50 mg Oral Daily Ashely Casey DO      naloxone  0 04 mg Intravenous Q1MIN PRN Jai Verdin PA-C      ondansetron  4 mg Intravenous Q6H PRN ONELIA Greer      oxyCODONE  10 mg Oral Q4H PRN aJi Verdin PA-C      oxyCODONE  5 mg Oral Q4H PRN Sravanthi Francis PA-C       Continuous Infusions:  dexmedetomidine, 0 1-0 4 mcg/kg/hr, Last Rate: 0 1 mcg/kg/hr (08/19/21 0234)      PRN Meds:   cyclobenzaprine, 10 mg, TID PRN  diazepam, 2 5 mg, Q6H PRN  HYDROmorphone, 0 5 mg, Q4H PRN  Labetalol HCl, 10 mg, Q4H PRN  naloxone, 0 04 mg, Q1MIN PRN  ondansetron, 4 mg, Q6H PRN  oxyCODONE, 10 mg, Q4H PRN  oxyCODONE, 5 mg, Q4H PRN        Allergies   No Known Allergies

## 2021-08-20 NOTE — ASSESSMENT & PLAN NOTE
· Cerebellopontine angle tumor, obstructive hydrocephalus s/p bulk resection POD 4  ? 8/18 - s/p image guided left retromastoid craniotomy for debulking of CP angle mass  ? 8/19 - post-op CTH - Persistent deformity of the left brainstem and cerebellum with mild compression of the 4th ventricle  Stable lateral and 3rd ventriculomegaly  Small amount of subarachnoid hemorrhage at the craniocervical junction extending into the upper cervical spine  Post operative intracranial and extracranial pneumocephalus    ? 8/19 MRI brain - s/p vast majority of the previously identified left CP angle tumor   Some residual enhancing tumor remaining in the anterior aspect of the CP angle with improving mass effect upon the brainstem and brachium pontis   Persistent mild hydrocephalus involving the lateral ventricles and 3rd ventricle   Improving mass effect on the 4th ventricle  ? Neurosurgery following  ? Continue closely monitor neuro exam  § q4 Neuro Checks  § Repeat stat CT head with any acute decline in exam or GCS change > 2 within 1 hr  ? Continue Decadron taper   ? D/C Diamox  ? Continue pain control  ?  Local wound care to incision site

## 2021-08-20 NOTE — PLAN OF CARE
Problem: PHYSICAL THERAPY ADULT  Goal: Performs mobility at highest level of function for planned discharge setting  See evaluation for individualized goals  Description: Treatment/Interventions: ADL retraining, Functional transfer training, LE strengthening/ROM, Elevations, Therapeutic exercise, Endurance training, Cognitive reorientation, Patient/family training, Bed mobility, Equipment eval/education, Gait training, Compensatory technique education, Spoke to nursing, Spoke to case management  Equipment Recommended: Wheelchair       See flowsheet documentation for full assessment, interventions and recommendations  Outcome: Progressing  Note: Prognosis: Good  Problem List: Decreased strength, Decreased endurance, Impaired balance, Decreased mobility, Decreased safety awareness, Impaired judgement, Decreased cognition  Assessment: PT COMPLETED RE-EVALUATION OF 55YEAR OLD FEMALE ADMITTED TO [facility] ON 8/14/2021 AFTER 3 FALLS AND 3RD FALL WITH POSSIBLE SEIZURE LIKE ACTIVITY  PATIENT WAS RECENTLY AT [facility] (8/8-8/12) FOR EVALUATION OF PROGRESSIVE NEUROLOGICAL SYMPTOMS AND WAS FOUND TO WITH HYDROCEPHALUS AND 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  PATIENT WAS EVALUATED BY NEUROSURGERY AT THAT TIME WITH PLAN FOR OPERATIVE INTERVENTION IN THE FUTURE  PATIENT HAD REPORTED 3 FALLS SINCE D/C HOME ON 8/12  DURING THIS ADMISSION, PATIENT WAS EVALUATED BY PT ON 8/16/2021 DURING WHICH SHE AMBULATED 25 FEET WITH RW MIN-AX1 AND WAS RECOMMENDED FOR HOME WITH OUTPATIENT PT  PATIENT IS RE-EVALUATION TODAY SECONDARY TO UNDERGOING THE FOLLOW NEUROSURGICAL PROCEDURE AND ADMISSION TO ICU POST UP:  IMAGE GUIDED RETROMASTOID CRANIOTOMY FOR DEBULKING OF CP ANGLE MASS ON 8/18/2021  POST-OP PATIENT ALSO HAD L CHEST TUBE PLACED SECONDARY TO PNEUMOTHORAX  DURING RE-EVALUATION TODAY, PATIENT WAS RECEIVED OOB IN CHAIR  SHE PRESENTS WITH GROSSLY 5/5 STRENGTH IN B/L LE AND INTACT LE COORDINATION   REPORTS INTERMITTENT DIPOPLIA AND PRESENTS WITH COGNITIVE DEFICITS (IMPULSIVE)  SHE WAS ABLE TO PERFORM SIT<-->STAND TRANSFER AND AMBULATION MIN-AX1 W/ VC  SHE AMBULATED 3 FEET FORWARD + 3 FEET BACKWARDS (LIMITED BY CHEST TUBE TO SUCTION) WITH USE OF RW  THIS PATIENT PRESENTS WITH BOTH COGNITIVE AND PHYSICAL DEFICITS POST-OP AND IS RECOMMENDED FOR REHAB UPON D/C  PATIENT WILL BENEFIT FROM CONTINUED SKILLED PT THIS ADMISSION TO ACHIEVE MAXIMAL FUNCTION AND SAFETY  Barriers to Discharge: Decreased caregiver support  Barriers to Discharge Comments: falls/ alone at times while spouse works- w/c level as option for home discharge discussed 2* falls- pt is in agreement w/ plan and recommendation for w/c level when alone until progressed      PT Discharge Recommendation: (S) Post acute rehabilitation services (PHYSIATRY CONSULT)     PT - OK to Discharge: Yes (TO REHAB WHEN MED MELISSA )    See flowsheet documentation for full assessment

## 2021-08-20 NOTE — PLAN OF CARE
Problem: OCCUPATIONAL THERAPY ADULT  Goal: Performs self-care activities at highest level of function for planned discharge setting  See evaluation for individualized goals  Description: Treatment Interventions: ADL retraining, Visual perceptual retraining, Functional transfer training, UE strengthening/ROM, Endurance training, Cognitive reorientation, Equipment evaluation/education, Patient/family training, Compensatory technique education, Continued evaluation, Energy conservation, Activityengagement          See flowsheet documentation for full assessment, interventions and recommendations  Note: Limitation: Decreased ADL status, Decreased Safe judgement during ADL, Decreased cognition, Decreased endurance, Decreased self-care trans, Decreased high-level ADLs  Prognosis: Fair  Assessment: Pt is a 54 y/o female seen for OT re-reval s/p the following procedure "Image guided left retromastoid craniotomy for debulking of CP angle mass; intraoperative monitoring (Left)" performed on   Pt initially admitted for seizure like activity; seen for initial OT evaluation on  and was performing @ a S level; OT signed off w/ no further inpt acute needs  Pt currently performing @ a level of S UB ADLS, Min A LB ADLS, Min A transfers and functional mobility w/ RW, VC for safety  Pt also s/p L CT to suction placement 2/2 development of pneumothorax s/p above mentioned procedure  Pt remains limited 2* pain, fatigue, activity tolerance, functional mobility, forward functional reach, endurance, impulsivity, decreased safety awareness and understanding of deficits, visual deficits and decreased I w/ functional tasks  Recommend STR upon D/C, when medically stable  Pt continues to benefit from immediate inpatient skilled OT services 3-5x/wk   Will continue to follow to address goals stated below to  within the next 10-14 days:  Recommendation: (S) [address]  OT Discharge Recommendation: Post acute rehabilitation services  OT - OK to Discharge: Yes (when medically stable)     Christi Chaudhary MS, OTR/L

## 2021-08-20 NOTE — ASSESSMENT & PLAN NOTE
· Evident on MRI brain and CT head  On repeat CT head hydrocephalus appears stable  · Expect to slowly improve with time  Improved mass effect on 4th ventricle

## 2021-08-20 NOTE — ASSESSMENT & PLAN NOTE
Pre-hospital hypertension  · Per neurosurgery maintain normotension  · Continue home losartan 50 mg  · P r n   Labetalol

## 2021-08-20 NOTE — ASSESSMENT & PLAN NOTE
Pneumothorax post left subclavian central line placement  8/20 Chest tube fell out, redevelopment of moderate to large left pneumothorax  New chest tube placed  8/21 left apical pneumothorax remaining  Now s/p IR apical chest tube placement  Continues to have subcutaneous emphysema  ? 8/18 - left-sided percutaneous chest tube placed after large pneumothorax noted postoperatively, likely iatrogenic from subclavian central line placement  ? 8/20 - chest tube placed to water seal, but patient pulled out the chest tube per self  ? 8/20 - repeat chest x-ray revealed moderate left-sided pneumothorax  ? 8/20 - 2nd left-sided, percutaneous 16 [place] chest tube placed  § Noted to have some subcutaneous air to the left chest wall and bilateral neck  § Continue pain control and close monitoring  § Negative air leak  § Removed by thoracic surgery 8/22   ? 8/21 - 3rd chest tube placed by IR for persistent left apical pneumothorax  § + Air leak  § Maintain to wall suction  ?  Continue to closely monitor respiratory status, on RA saturating 98%

## 2021-08-20 NOTE — PLAN OF CARE
Problem: PHYSICAL THERAPY ADULT  Goal: Performs mobility at highest level of function for planned discharge setting  See evaluation for individualized goals  Description: Treatment/Interventions: ADL retraining, Functional transfer training, LE strengthening/ROM, Elevations, Therapeutic exercise, Endurance training, Cognitive reorientation, Patient/family training, Bed mobility, Equipment eval/education, Gait training, Compensatory technique education, Spoke to nursing, Spoke to case management  Equipment Recommended: Wheelchair       See flowsheet documentation for full assessment, interventions and recommendations  Outcome: Progressing  Note: Prognosis: Good  Problem List: Decreased strength, Decreased endurance, Impaired balance, Decreased mobility, Decreased safety awareness, Impaired judgement, Decreased cognition  Assessment: PT COMPLETED RE-EVALUATION OF 55YEAR OLD FEMALE ADMITTED TO [facility] ON 8/14/2021 AFTER 3 FALLS AND 3RD FALL WITH POSSIBLE SEIZURE LIKE ACTIVITY  PATIENT WAS RECENTLY AT [facility] (8/8-8/12) FOR EVALUATION OF PROGRESSIVE NEUROLOGICAL SYMPTOMS AND WAS FOUND TO WITH HYDROCEPHALUS AND 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  PATIENT WAS EVALUATED BY NEUROSURGERY AT THAT TIME WITH PLAN FOR OPERATIVE INTERVENTION IN THE FUTURE  PATIENT HAD REPORTED 3 FALLS SINCE D/C HOME ON 8/12  DURING THIS ADMISSION, PATIENT WAS EVALUATED BY PT ON 8/16/2021 DURING WHICH SHE AMBULATED 25 FEET WITH RW MIN-AX1 AND WAS RECOMMENDED FOR HOME WITH OUTPATIENT PT  PATIENT IS RE-EVALUATION TODAY SECONDARY TO UNDERGOING THE FOLLOW NEUROSURGICAL PROCEDURE AND ADMISSION TO ICU POST UP:  IMAGE GUIDED RETROMASTOID CRANIOTOMY FOR DEBULKING OF CP ANGLE MASS ON 8/18/2021  POST-OP PATIENT ALSO HAD L CHEST TUBE PLACED SECONDARY TO PNEUMOTHORAX  DURING RE-EVALUATION TODAY, PATIENT WAS RECEIVED OOB IN CHAIR  SHE PRESENTS WITH GROSSLY 5/5 STRENGTH IN B/L LE AND INTACT LE COORDINATION   REPORTS INTERMITTENT DIPOPLIA AND PRESENTS WITH COGNITIVE DEFICITS (IMPULSIVE)  SHE WAS ABLE TO PERFORM SIT<-->STAND TRANSFER AND AMBULATION MIN-AX1 W/ VC  SHE AMBULATED 3 FEET FORWARD + 3 FEET BACKWARDS (LIMITED BY CHEST TUBE TO SUCTION) WITH USE OF RW  THIS PATIENT PRESENTS WITH BOTH COGNITIVE AND PHYSICAL DEFICITS POST-OP AND IS RECOMMENDED FOR REHAB UPON D/C  PATIENT WILL BENEFIT FROM CONTINUED SKILLED PT THIS ADMISSION TO ACHIEVE MAXIMAL FUNCTION AND SAFETY  Barriers to Discharge: Decreased caregiver support  Barriers to Discharge Comments: falls/ alone at times while spouse works- w/c level as option for home discharge discussed 2* falls- pt is in agreement w/ plan and recommendation for w/c level when alone until progressed      PT Discharge Recommendation: (S) Post acute rehabilitation services (PHYSIATRY CONSULT)     PT - OK to Discharge: Yes (TO REHAB WHEN MED MELISSA )    See flowsheet documentation for full assessment

## 2021-08-20 NOTE — OCCUPATIONAL THERAPY NOTE
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[address] )  Active Problems:    Anxiety    Hydrocephalus ([address] )    Hypertension    Fall with possible seizure-like activity ([facility] [address] )    Leukocytosis    Headache    Pneumothorax      @hPMHl@    Past Surgical History:   Procedure Laterality Date    CRANIOTOMY Left 8/18/2021    Procedure: Image guided left retromastoid craniotomy for debulking of CP angle mass; intraoperative monitoring;  Surgeon: Ric Cruz MD;  Location: [facility];  Service: Neurosurgery          08/20/21 0948   OT Last Visit   OT Visit Date 08/20/21   Note Type   Note type Re-Evaluation   Restrictions/Precautions   Weight Bearing Precautions Per Order No   Other Precautions Impulsive;Cognitive; Chair Alarm;Multiple lines;Telemetry; Fall Risk;Pain  (L CT to suction)   Pain Assessment   Pain Assessment Tool FLACC   Pain Rating: FLACC (Rest) - Face 1   Pain Rating: FLACC (Rest) - Legs 0   Pain Rating: FLACC (Rest) - Activity 0   Pain Rating: FLACC (Rest) - Cry 1   Pain Rating: FLACC (Rest) - Consolability 0   Score: FLACC (Rest) 2   Pain Rating: FLACC (Activity) - Face 1   Pain Rating: FLACC (Activity) - Legs 0   Pain Rating: FLACC (Activity) - Activity 0   Pain Rating: FLACC (Activity) - Cry 1   Pain Rating: FLACC (Activity) - Consolability 0   Score: FLACC (Activity) 2   Home Living   Additional Comments see initial OT eval   Prior Function   Comments see initial OT eval   Lifestyle   Autonomy independent in ADLs, walker use for functional mobility PTA   Reciprocal Relationships supportive family    Service to Others unemployed   [address] enjoys spending time with family   Psychosocial   Psychosocial (WDL) WDL   Length of Time/Family Visitation 16-30 min  (spouse)   ADL   Eating Assistance 5  Supervision/Setup   Grooming Assistance 5  Supervision/Setup   UB Bathing Assistance 5  Supervision/Setup   LB Bathing Assistance 4  Minimal Assistance   UB Dressing Assistance 5  Supervision/Setup   LB [place] Assistance  4  Minimal Assistance   Functional Assistance 4  Minimal Assistance   Functional Deficit Steadying;Verbal cueing;Supervision/safety; Increased time to complete   Bed Mobility   Supine to Sit Unable to assess   Sit to Supine Unable to assess   Additional Comments Pt seated up OOB in chair prior to and end of session   Transfers   Sit to Stand 4  Minimal assistance   Additional items Assist x 1; Increased time required;Verbal cues   Stand to Sit 4  Minimal assistance   Additional items Assist x 1; Increased time required;Verbal cues   Additional Comments HHA used into standing; VC for hand placement, assist for line management   Functional Mobility   Functional Mobility 4  Minimal assistance   Additional Comments Pt took few small steps in room w/ min A x1; RW for support  Limited by CT to wall suction  Needed increased time; limited by pain/fatigue   Additional items Rolling walker   Balance   Static Sitting Fair +   Dynamic Sitting Fair   Static Standing Fair -   Dynamic Standing Poor +   Ambulatory Poor +   Activity Tolerance   Activity Tolerance Patient limited by fatigue;Patient limited by pain   Medical Staff Made Aware PT, RN   Nurse Made Aware yes, Mary Elaine Assessment   RUE Assessment WFL   LUE Assessment   LUE Assessment WFL   Hand Function   Gross Motor Coordination Functional   Fine Motor Coordination Functional   Sensation   Light Touch No apparent deficits   Proprioception   Proprioception No apparent deficits   Vision - Complex Assessment   Additional Comments Pt initially reported she was intermittently experiencing dipoplia to which pt was educated on use of eye patch  Later pt reported that it was not dipoplia but rather she was seeing the world upside down     Cognition   Overall Cognitive Status Impaired   Arousal/Participation Responsive; Cooperative   Attention Attends with cues to redirect   Orientation Level Oriented X4   Memory Decreased recall of precautions;Decreased recall of recent events;Decreased short term memory   Following Commands Follows one step commands without difficulty   Comments Pt is pleasant and cooperative; completed the [facility] and scored 16 of out 30 indicating moderate cognitive impairment  Results are listed below  Cognition Assessment Tools MOCA   Score 16   Assessment   Limitation Decreased ADL status; Decreased Safe judgement during ADL;Decreased cognition;Decreased endurance;Decreased self-care trans;Decreased high-level ADLs   Prognosis Fair   Assessment Pt is a 56 y/o female seen for OT re-reval s/p the following procedure "Image guided left retromastoid craniotomy for debulking of CP angle mass; intraoperative monitoring (Left)" performed on   Pt initially admitted for seizure like activity; seen for initial OT evaluation on  and was performing @ a S level; OT signed off w/ no further inpt acute needs  Pt currently performing @ a level of S UB ADLS, Min A LB ADLS, Min A transfers and functional mobility w/ RW, VC for safety  Pt also s/p L CT to suction placement 2/2 development of pneumothorax s/p above mentioned procedure  Pt remains limited 2* pain, fatigue, activity tolerance, functional mobility, forward functional reach, endurance, impulsivity, decreased safety awareness and understanding of deficits, visual deficits and decreased I w/ functional tasks  Recommend STR upon D/C, when medically stable  Pt continues to benefit from immediate inpatient skilled OT services 3-5x/wk   Will continue to follow to address goals stated below to  within the next 10-14 days:   Goals   Patient Goals to be independent again   LTG Time Frame 10-14   Long Term Goal #1 see below listed goals   Plan   Treatment Interventions ADL retraining;Visual perceptual retraining;Functional transfer training;UE strengthening/ROM; Endurance training;Cognitive reorientation;Equipment evaluation/education;Patient/family training; Compensatory technique education;Continued evaluation; Energy conservation; Activityengagement   Goal Expiration Date 09/03/21   OT Frequency 3-5x/wk   Additional Treatment Session   Start Time 1059   End Time 1113   Treatment Assessment Pt seen for follow up tx to complete formal cognitive assessment  Pt completed the [facility] [place] and scored a 16/30 indicating moderate cognitive impairment  Pt scored most poorly in the areas of visuospatial/executive functioning, attention, language and delayed recall  Pt wore glasses for up close reading  Denies current diplopia but reports seeing the world upside  Needs re-direction to assessment 2/2 reportedly hearing like her spouse was speaking to her  Pt appeared to have some decreased dexterity and fine motor skills when completing writing portion of [address] [place]  Pt overall has decreased understanding of deficits, safety awareness and mild impulsivity  Recommend continued monitoring of cognitive status  Will continue to treat to work towards achieving below listed goals: Additional Treatment Day 1   Recommendation   Recommendation Physiatry Consult   OT Discharge Recommendation Post acute rehabilitation services   OT - OK to Discharge Yes  (when medically stable)   Additional Comments  The patient's raw score on the AM-PAC Daily Activity inpatient short form is 21, standardized score is 44 27, greater than 39 4  Patients at this level are likely to benefit from discharge to home  Please refer to the recommendation of the Occupational Therapist for safe discharge planning  However pt presenting w/ ongoing cognitive deficits and requiring Ax1 for functional tasks   Would benefit from STR prior to D/C home   Additional Comments 2 Pt seen as a [id number] due to the patient's co-morbidities, clinically unstable presentation, and present impairments which are a regression from the patient's baseline     AM-PAC Daily Activity Inpatient   Lower Body Dressing 3   Bathing 3   Toileting 3   Upper Body Dressing 4   Grooming 4   Eating 4   Daily Activity Raw Score 21   Daily Activity Standardized Score (Calc for Raw Score >=11) 44 27   AM-PAC Applied Cognition Inpatient   Following a Speech/Presentation 2   Understanding Ordinary Conversation 3   Taking Medications 3   Remembering Where Things Are Placed or Put Away 3   Remembering List of 4-5 Errands 2   Taking Care of Complicated Tasks 2   Applied Cognition Raw Score 15   Applied Cognition Standardized Score 33 54       GOALS    1) Pt will improve activity tolerance to G for 30 min txment sessions for increase engagement in functional tasks  2) Pt will complete UB/LB dressing/self care w/ mod I using adaptive device and DME as needed  3) Pt will complete bathing w/ Mod I w/ use of AE and DME as needed  4) Pt will complete toileting w/ mod I w/ G hygiene/thoroughness using DME as needed  5) Pt will improve functional transfers to Mod I on/off all surfaces using DME as needed w/ G balance/safety   6) Pt will improve functional mobility during ADL/IADL/leisure tasks to Mod I using DME as needed w/ G balance/safety   7) Pt will participate in simulated IADL management task to increase independence to Mod I w/ G safety and endurance  8) Pt will be attentive 100% of the time during ongoing functional/formal cognitive assessment w/ G participation to assist w/ safe d/c planning/recommendations  9) Pt will demonstrate G carryover of pt/caregiver education and training as appropriate w/o cues w/ good tolerance to increase safety during functional tasks  10) Pt will demonstrate 100% carryover of energy conservation techniques t/o functional I/ADL/leisure tasks w/o cues s/p skilled education to increase endurance during functional tasks       Cathy Lobo MS, OTR/L

## 2021-08-20 NOTE — ASSESSMENT & PLAN NOTE
POD2 Image guided left retromastoid craniotomy for debulking of CP angle mass  · Originally presented on 8/8/21 with headaches, vertigo and hearing loss (per tympanogram left ear hearing at 24%)  · Presented again 8/14 with transient LOC after fall with questionable seizure-like activity  · Preop [facility] grade 3 facial nerve palsy  Some left rapid horizontal nystagmus  Slight right tongue deviation  Imaging:   · CT head without 8/18/2021: Postoperative change status post left retromastoid craniotomy for resection of CP angle mass  There is a moderate amount of air within the extra-axial spaces of both the CP angle and supratentorial brain parenchyma, improving compared to the prior examination  Small, mildly complex extra-axial collection of fluid within the lateral aspect of the posterior fossa and CP angle, grossly unchanged in size with slight mass effect  Stable mild ventriculomegaly of the lateral ventricles and 3rd ventricle  · CT head wo 8/19/2021: Left retromastoid, infra sigmoid surgery for left CP angle mass resection  Persistent deformity of the left brainstem and cerebellum with mild compression of the 4th ventricle  Stable lateral and 3rd ventriculomegaly  Small amount of subarachnoid hemorrhage at the craniocervical junction extending into the upper cervical spine  Postoperative intracranial and extracranial pneumocephalus  · MRI brain IAC w/wo 8/19/2021:  Status post resection of majority of the left CP angle tumor with small residual   Improving mass effect upon the brainstem and 4th ventricle  New diffusion restricted noted in the left posterior lateral fabian  Persistent mild hydrocephalus  Plan:   · Continue monitor neurological exam    · STAT CT head with any neurological decline including drop GCS of 2pts within 1 hr   · Continue decadron 4mg Q 6H - rapid taper ordered  · Monitor BMP - Na 143, plan repeat tomorrow    · Medical management and pain control per primary team  · Mobilize with PT/OT  Cleared ST for regular diet  · DVT prophylaxis:  Bilateral SCDs  HSQ  Neurosurgery will continue to follow closely, call with any further questions or concerns   Plan of care d/w primary team

## 2021-08-20 NOTE — PLAN OF CARE
Problem: PAIN - ADULT  Goal: Verbalizes/displays adequate comfort level or baseline comfort level  Description: Interventions:  - Encourage patient to monitor pain and request assistance  - Assess pain using appropriate pain scale  - Administer analgesics based on type and severity of pain and evaluate response  - Implement non-pharmacological measures as appropriate and evaluate response  - Consider cultural and social influences on pain and pain management  - Notify physician/advanced practitioner if interventions unsuccessful or patient reports new pain  Outcome: Progressing     Problem: SAFETY ADULT  Goal: Patient will remain free of falls  Description: INTERVENTIONS:  - Educate patient/family on patient safety including physical limitations  - Instruct patient to call for assistance with activity   - Consult OT/PT to assist with strengthening/mobility   - Keep Call bell within reach  - Keep bed low and locked with side rails adjusted as appropriate  - Keep care items and personal belongings within reach  - Initiate and maintain comfort rounds  - Make Fall Risk Sign visible to staff  - Apply yellow socks and bracelet for high fall risk patients  - Consider moving patient to room near nurses station  Outcome: Progressing  Goal: Maintain or return to baseline ADL function  Description: INTERVENTIONS:  -  Assess patient's ability to carry out ADLs; assess patient's baseline for ADL function and identify physical deficits which impact ability to perform ADLs (bathing, care of mouth/teeth, toileting, grooming, dressing, etc )  - Assess/evaluate cause of self-care deficits   - Assess range of motion  - Assess patient's mobility; develop plan if impaired  - Assess patient's need for assistive devices and provide as appropriate  - Encourage maximum independence but intervene and supervise when necessary  - Involve family in performance of ADLs  - Assess for home care needs following discharge   - Consider OT consult to assist with ADL evaluation and planning for discharge  - Provide patient education as appropriate  Outcome: Progressing  Goal: Maintains/Returns to pre admission functional level  Description: INTERVENTIONS:  - Perform BMAT or MOVE assessment daily    - Set and communicate daily mobility goal to care team and patient/family/caregiver  - Collaborate with rehabilitation services on mobility goals if consulted  - Out of bed for toileting  - Record patient progress and toleration of activity level   Outcome: Progressing     Problem: DISCHARGE PLANNING  Goal: Discharge to home or other facility with appropriate resources  Description: INTERVENTIONS:  - Identify barriers to discharge w/patient and caregiver  - Arrange for needed discharge resources and transportation as appropriate  - Identify discharge learning needs (meds, wound care, etc )  - Arrange for interpretive services to assist at discharge as needed  - Refer to Case Management Department for coordinating discharge planning if the patient needs post-hospital services based on physician/advanced practitioner order or complex needs related to functional status, cognitive ability, or social support system  Outcome: Progressing     Problem: Knowledge Deficit  Goal: Patient/family/caregiver demonstrates understanding of disease process, treatment plan, medications, and discharge instructions  Description: Complete learning assessment and assess knowledge base    Interventions:  - Provide teaching at level of understanding  - Provide teaching via preferred learning methods  Outcome: Progressing     Problem: MOBILITY - ADULT  Goal: Maintain or return to baseline ADL function  Description: INTERVENTIONS:  -  Assess patient's ability to carry out ADLs; assess patient's baseline for ADL function and identify physical deficits which impact ability to perform ADLs (bathing, care of mouth/teeth, toileting, grooming, dressing, etc )  - Assess/evaluate cause of self-care deficits   - Assess range of motion  - Assess patient's mobility; develop plan if impaired  - Assess patient's need for assistive devices and provide as appropriate  - Encourage maximum independence but intervene and supervise when necessary  - Involve family in performance of ADLs  - Assess for home care needs following discharge   - Consider OT consult to assist with ADL evaluation and planning for discharge  - Provide patient education as appropriate  Outcome: Progressing  Goal: Maintains/Returns to pre admission functional level  Description: INTERVENTIONS:  - Perform BMAT or MOVE assessment daily    - Set and communicate daily mobility goal to care team and patient/family/caregiver     - Collaborate with rehabilitation services on mobility goals if consulted    - Out of bed for toileting  - Record patient progress and toleration of activity level   Outcome: Progressing     Problem: Potential for Falls  Goal: Patient will remain free of falls  Description: INTERVENTIONS:  - Educate patient/family on patient safety including physical limitations  - Instruct patient to call for assistance with activity   - Consult OT/PT to assist with strengthening/mobility   - Keep Call bell within reach  - Keep bed low and locked with side rails adjusted as appropriate  - Keep care items and personal belongings within reach  - Initiate and maintain comfort rounds  - Make Fall Risk Sign visible to staff    - Apply yellow socks and bracelet for high fall risk patients  - Consider moving patient to room near nurses station  Outcome: Progressing     Problem: Prexisting or High Potential for Compromised Skin Integrity  Goal: Skin integrity is maintained or improved  Description: INTERVENTIONS:  - Identify patients at risk for skin breakdown  - Assess and monitor skin integrity  - Assess and monitor nutrition and hydration status  - Monitor labs   - Assess for incontinence   - Turn and reposition patient  - Assist with mobility/ambulation  - Relieve pressure over bony prominences  - Avoid friction and shearing  - Provide appropriate hygiene as needed including keeping skin clean and dry  - Evaluate need for skin moisturizer/barrier cream  - Collaborate with interdisciplinary team   - Patient/family teaching  - Consider wound care consult   Outcome: Progressing     Problem: Nutrition/Hydration-ADULT  Goal: Nutrient/Hydration intake appropriate for improving, restoring or maintaining nutritional needs  Description: Monitor and assess patient's nutrition/hydration status for malnutrition  Collaborate with interdisciplinary team and initiate plan and interventions as ordered  Monitor patient's weight and dietary intake as ordered or per policy  Utilize nutrition screening tool and intervene as necessary  Determine patient's food preferences and provide high-protein, high-caloric foods as appropriate       INTERVENTIONS:  - Monitor oral intake, urinary output, labs, and treatment plans  - Assess nutrition and hydration status and recommend course of action  - Evaluate amount of meals eaten  - Assist patient with eating if necessary   - Allow adequate time for meals  - Recommend/ encourage appropriate diets, oral nutritional supplements, and vitamin/mineral supplements  - Order, calculate, and assess calorie counts as needed  - Recommend, monitor, and adjust tube feedings and TPN/PPN based on assessed needs  - Assess need for intravenous fluids  - Provide specific nutrition/hydration education as appropriate  - Include patient/family/caregiver in decisions related to nutrition  Outcome: Progressing

## 2021-08-20 NOTE — ASSESSMENT & PLAN NOTE
- Elevated white count likely secondary to demargination secondary to Decadron, reactive  - Continues to downtrend  - Afebrile  - Continue to trend fever curve and monitor wbcs

## 2021-08-21 ENCOUNTER — APPOINTMENT (INPATIENT)
Dept: RADIOLOGY | Facility: HOSPITAL | Age: 56
DRG: 025 | End: 2021-08-21
Payer: COMMERCIAL

## 2021-08-21 LAB
ABO GROUP BLD BPU: NORMAL
ALBUMIN SERPL BCP-MCNC: 3.2 G/DL (ref 3.5–5)
ALP SERPL-CCNC: 52 U/L (ref 46–116)
ALT SERPL W P-5'-P-CCNC: 37 U/L (ref 12–78)
ANION GAP SERPL CALCULATED.3IONS-SCNC: 4 MMOL/L (ref 4–13)
ANION GAP SERPL CALCULATED.3IONS-SCNC: 7 MMOL/L (ref 4–13)
AST SERPL W P-5'-P-CCNC: 36 U/L (ref 5–45)
ATRIAL RATE: 97 BPM
ATRIAL RATE: 99 BPM
BASOPHILS # BLD AUTO: 0.02 THOUSANDS/ΜL (ref 0–0.1)
BASOPHILS NFR BLD AUTO: 0 % (ref 0–1)
BILIRUB SERPL-MCNC: 0.35 MG/DL (ref 0.2–1)
BPU ID: NORMAL
BUN SERPL-MCNC: 18 MG/DL (ref 5–25)
BUN SERPL-MCNC: 21 MG/DL (ref 5–25)
CA-I BLD-SCNC: 1.2 MMOL/L (ref 1.12–1.32)
CALCIUM ALBUM COR SERPL-MCNC: 10.1 MG/DL (ref 8.3–10.1)
CALCIUM SERPL-MCNC: 9.1 MG/DL (ref 8.3–10.1)
CALCIUM SERPL-MCNC: 9.5 MG/DL (ref 8.3–10.1)
CHLORIDE SERPL-SCNC: 105 MMOL/L (ref 100–108)
CHLORIDE SERPL-SCNC: 105 MMOL/L (ref 100–108)
CO2 SERPL-SCNC: 25 MMOL/L (ref 21–32)
CO2 SERPL-SCNC: 27 MMOL/L (ref 21–32)
CREAT SERPL-MCNC: 0.7 MG/DL (ref 0.6–1.3)
CREAT SERPL-MCNC: 0.74 MG/DL (ref 0.6–1.3)
CROSSMATCH: NORMAL
EOSINOPHIL # BLD AUTO: 0 THOUSAND/ΜL (ref 0–0.61)
EOSINOPHIL NFR BLD AUTO: 0 % (ref 0–6)
ERYTHROCYTE [DISTWIDTH] IN BLOOD BY AUTOMATED COUNT: 13.5 % (ref 11.6–15.1)
GFR SERPL CREATININE-BSD FRML MDRD: 91 ML/MIN/1.73SQ M
GFR SERPL CREATININE-BSD FRML MDRD: 98 ML/MIN/1.73SQ M
GLUCOSE SERPL-MCNC: 109 MG/DL (ref 65–140)
GLUCOSE SERPL-MCNC: 125 MG/DL (ref 65–140)
HCG SERPL QL: NEGATIVE
HCT VFR BLD AUTO: 41.9 % (ref 34.8–46.1)
HGB BLD-MCNC: 13.6 G/DL (ref 11.5–15.4)
IMM GRANULOCYTES # BLD AUTO: 0.18 THOUSAND/UL (ref 0–0.2)
IMM GRANULOCYTES NFR BLD AUTO: 1 % (ref 0–2)
INR PPP: 0.91 (ref 0.84–1.19)
LYMPHOCYTES # BLD AUTO: 0.95 THOUSANDS/ΜL (ref 0.6–4.47)
LYMPHOCYTES NFR BLD AUTO: 5 % (ref 14–44)
MAGNESIUM SERPL-MCNC: 2.4 MG/DL (ref 1.6–2.6)
MCH RBC QN AUTO: 30.2 PG (ref 26.8–34.3)
MCHC RBC AUTO-ENTMCNC: 32.5 G/DL (ref 31.4–37.4)
MCV RBC AUTO: 93 FL (ref 82–98)
MONOCYTES # BLD AUTO: 0.77 THOUSAND/ΜL (ref 0.17–1.22)
MONOCYTES NFR BLD AUTO: 4 % (ref 4–12)
NEUTROPHILS # BLD AUTO: 18.26 THOUSANDS/ΜL (ref 1.85–7.62)
NEUTS SEG NFR BLD AUTO: 90 % (ref 43–75)
NRBC BLD AUTO-RTO: 0 /100 WBCS
P AXIS: 58 DEGREES
P AXIS: 60 DEGREES
PHOSPHATE SERPL-MCNC: 3.5 MG/DL (ref 2.7–4.5)
PLATELET # BLD AUTO: 265 THOUSANDS/UL (ref 149–390)
PMV BLD AUTO: 10.2 FL (ref 8.9–12.7)
POTASSIUM SERPL-SCNC: 3.6 MMOL/L (ref 3.5–5.3)
POTASSIUM SERPL-SCNC: 4.1 MMOL/L (ref 3.5–5.3)
PR INTERVAL: 113 MS
PR INTERVAL: 117 MS
PROT SERPL-MCNC: 8.3 G/DL (ref 6.4–8.2)
PROTHROMBIN TIME: 12.3 SECONDS (ref 11.6–14.5)
QRS AXIS: 51 DEGREES
QRS AXIS: 51 DEGREES
QRSD INTERVAL: 129 MS
QRSD INTERVAL: 133 MS
QT INTERVAL: 363 MS
QT INTERVAL: 367 MS
QTC INTERVAL: 462 MS
QTC INTERVAL: 471 MS
RBC # BLD AUTO: 4.51 MILLION/UL (ref 3.81–5.12)
SODIUM SERPL-SCNC: 136 MMOL/L (ref 136–145)
SODIUM SERPL-SCNC: 137 MMOL/L (ref 136–145)
T WAVE AXIS: 236 DEGREES
T WAVE AXIS: 244 DEGREES
UNIT DISPENSE STATUS: NORMAL
UNIT PRODUCT CODE: NORMAL
UNIT PRODUCT VOLUME: 350 ML
UNIT RH: NORMAL
VENTRICULAR RATE: 97 BPM
VENTRICULAR RATE: 99 BPM
WBC # BLD AUTO: 20.18 THOUSAND/UL (ref 4.31–10.16)

## 2021-08-21 PROCEDURE — 0W9B30Z DRAINAGE OF LEFT PLEURAL CAVITY WITH DRAINAGE DEVICE, PERCUTANEOUS APPROACH: ICD-10-PCS | Performed by: RADIOLOGY

## 2021-08-21 PROCEDURE — 32557 INSERT CATH PLEURA W/ IMAGE: CPT

## 2021-08-21 PROCEDURE — 93010 ELECTROCARDIOGRAM REPORT: CPT | Performed by: INTERNAL MEDICINE

## 2021-08-21 PROCEDURE — 82330 ASSAY OF CALCIUM: CPT | Performed by: STUDENT IN AN ORGANIZED HEALTH CARE EDUCATION/TRAINING PROGRAM

## 2021-08-21 PROCEDURE — 80053 COMPREHEN METABOLIC PANEL: CPT | Performed by: NURSE PRACTITIONER

## 2021-08-21 PROCEDURE — 99291 CRITICAL CARE FIRST HOUR: CPT | Performed by: INTERNAL MEDICINE

## 2021-08-21 PROCEDURE — 84703 CHORIONIC GONADOTROPIN ASSAY: CPT | Performed by: NURSE PRACTITIONER

## 2021-08-21 PROCEDURE — 99024 POSTOP FOLLOW-UP VISIT: CPT | Performed by: NEUROLOGICAL SURGERY

## 2021-08-21 PROCEDURE — NC001 PR NO CHARGE: Performed by: RADIOLOGY

## 2021-08-21 PROCEDURE — 99152 MOD SED SAME PHYS/QHP 5/>YRS: CPT | Performed by: RADIOLOGY

## 2021-08-21 PROCEDURE — 85610 PROTHROMBIN TIME: CPT | Performed by: NURSE PRACTITIONER

## 2021-08-21 PROCEDURE — 32557 INSERT CATH PLEURA W/ IMAGE: CPT | Performed by: RADIOLOGY

## 2021-08-21 PROCEDURE — NC001 PR NO CHARGE: Performed by: THORACIC SURGERY (CARDIOTHORACIC VASCULAR SURGERY)

## 2021-08-21 PROCEDURE — 71045 X-RAY EXAM CHEST 1 VIEW: CPT

## 2021-08-21 PROCEDURE — C1729 CATH, DRAINAGE: HCPCS

## 2021-08-21 PROCEDURE — 99152 MOD SED SAME PHYS/QHP 5/>YRS: CPT

## 2021-08-21 PROCEDURE — 80048 BASIC METABOLIC PNL TOTAL CA: CPT | Performed by: STUDENT IN AN ORGANIZED HEALTH CARE EDUCATION/TRAINING PROGRAM

## 2021-08-21 PROCEDURE — 83735 ASSAY OF MAGNESIUM: CPT | Performed by: STUDENT IN AN ORGANIZED HEALTH CARE EDUCATION/TRAINING PROGRAM

## 2021-08-21 PROCEDURE — 84100 ASSAY OF PHOSPHORUS: CPT | Performed by: STUDENT IN AN ORGANIZED HEALTH CARE EDUCATION/TRAINING PROGRAM

## 2021-08-21 PROCEDURE — C1769 GUIDE WIRE: HCPCS

## 2021-08-21 PROCEDURE — 93306 TTE W/DOPPLER COMPLETE: CPT | Performed by: INTERNAL MEDICINE

## 2021-08-21 PROCEDURE — 85025 COMPLETE CBC W/AUTO DIFF WBC: CPT | Performed by: NURSE PRACTITIONER

## 2021-08-21 RX ORDER — SODIUM CHLORIDE, SODIUM LACTATE, POTASSIUM CHLORIDE, CALCIUM CHLORIDE 600; 310; 30; 20 MG/100ML; MG/100ML; MG/100ML; MG/100ML
125 INJECTION, SOLUTION INTRAVENOUS CONTINUOUS
Status: DISCONTINUED | OUTPATIENT
Start: 2021-08-21 | End: 2021-08-21

## 2021-08-21 RX ORDER — MIDAZOLAM HYDROCHLORIDE 2 MG/2ML
INJECTION, SOLUTION INTRAMUSCULAR; INTRAVENOUS CODE/TRAUMA/SEDATION MEDICATION
Status: COMPLETED | OUTPATIENT
Start: 2021-08-21 | End: 2021-08-21

## 2021-08-21 RX ORDER — LIDOCAINE WITH 8.4% SOD BICARB 0.9%(10ML)
SYRINGE (ML) INJECTION CODE/TRAUMA/SEDATION MEDICATION
Status: COMPLETED | OUTPATIENT
Start: 2021-08-21 | End: 2021-08-21

## 2021-08-21 RX ORDER — FENTANYL CITRATE 50 UG/ML
50 INJECTION, SOLUTION INTRAMUSCULAR; INTRAVENOUS ONCE
Status: COMPLETED | OUTPATIENT
Start: 2021-08-21 | End: 2021-08-20

## 2021-08-21 RX ORDER — SODIUM CHLORIDE 9 MG/ML
75 INJECTION, SOLUTION INTRAVENOUS CONTINUOUS
Status: DISCONTINUED | OUTPATIENT
Start: 2021-08-21 | End: 2021-08-21

## 2021-08-21 RX ORDER — CEFAZOLIN SODIUM 2 G/50ML
2000 SOLUTION INTRAVENOUS ONCE
Status: DISCONTINUED | OUTPATIENT
Start: 2021-08-21 | End: 2021-08-22

## 2021-08-21 RX ORDER — FENTANYL CITRATE 50 UG/ML
INJECTION, SOLUTION INTRAMUSCULAR; INTRAVENOUS CODE/TRAUMA/SEDATION MEDICATION
Status: COMPLETED | OUTPATIENT
Start: 2021-08-21 | End: 2021-08-21

## 2021-08-21 RX ADMIN — OXYCODONE HYDROCHLORIDE 10 MG: 10 TABLET ORAL at 03:06

## 2021-08-21 RX ADMIN — LOSARTAN POTASSIUM 50 MG: 50 TABLET, FILM COATED ORAL at 08:53

## 2021-08-21 RX ADMIN — FENTANYL CITRATE 50 MCG: 50 INJECTION INTRAMUSCULAR; INTRAVENOUS at 10:40

## 2021-08-21 RX ADMIN — DEXAMETHASONE 4 MG: 4 TABLET ORAL at 13:39

## 2021-08-21 RX ADMIN — ACETAMINOPHEN 975 MG: 325 TABLET, FILM COATED ORAL at 13:40

## 2021-08-21 RX ADMIN — Medication 10 ML: at 10:40

## 2021-08-21 RX ADMIN — MIDAZOLAM 0.5 MG: 1 INJECTION INTRAMUSCULAR; INTRAVENOUS at 10:40

## 2021-08-21 RX ADMIN — FENTANYL CITRATE 25 MCG: 50 INJECTION INTRAMUSCULAR; INTRAVENOUS at 10:45

## 2021-08-21 RX ADMIN — HEPARIN SODIUM 5000 UNITS: 5000 INJECTION INTRAVENOUS; SUBCUTANEOUS at 13:40

## 2021-08-21 RX ADMIN — ESCITALOPRAM 5 MG: 5 TABLET, FILM COATED ORAL at 08:54

## 2021-08-21 RX ADMIN — MIDAZOLAM 0.5 MG: 1 INJECTION INTRAMUSCULAR; INTRAVENOUS at 10:35

## 2021-08-21 RX ADMIN — FENTANYL CITRATE 25 MCG: 50 INJECTION INTRAMUSCULAR; INTRAVENOUS at 10:35

## 2021-08-21 RX ADMIN — DEXAMETHASONE 4 MG: 4 TABLET ORAL at 00:00

## 2021-08-21 RX ADMIN — CYCLOBENZAPRINE HYDROCHLORIDE 10 MG: 10 TABLET, FILM COATED ORAL at 22:52

## 2021-08-21 RX ADMIN — MIDAZOLAM 0.5 MG: 1 INJECTION INTRAMUSCULAR; INTRAVENOUS at 10:45

## 2021-08-21 RX ADMIN — ACETAMINOPHEN 975 MG: 325 TABLET, FILM COATED ORAL at 06:24

## 2021-08-21 RX ADMIN — ACETAMINOPHEN 975 MG: 325 TABLET, FILM COATED ORAL at 21:00

## 2021-08-21 RX ADMIN — DEXAMETHASONE 4 MG: 4 TABLET ORAL at 06:24

## 2021-08-21 RX ADMIN — DEXAMETHASONE 4 MG: 4 TABLET ORAL at 21:00

## 2021-08-21 RX ADMIN — HEPARIN SODIUM 5000 UNITS: 5000 INJECTION INTRAVENOUS; SUBCUTANEOUS at 21:00

## 2021-08-21 RX ADMIN — HEPARIN SODIUM 5000 UNITS: 5000 INJECTION INTRAVENOUS; SUBCUTANEOUS at 06:23

## 2021-08-21 RX ADMIN — ACETAZOLAMIDE 125 MG: 125 TABLET ORAL at 08:54

## 2021-08-21 NOTE — CONSULTS
e-Consult ([facility])  - Interventional Radiology  Leonora Novoa 55 y o  female MRN: [id number]  Unit/Bed#: ICU 05 Encounter: [id number]    IR has been consulted to evaluate the patient, determine the appropriate procedure, and whether or not a procedure can and should be performed regarding the care of Leonora Novoa  We were consulted by Surgery concerning patient's left pneumothorax, and to possibly perform a chest tube placement if medically appropriate for the patient  Consults  08/21/21      Assessment/Recommendation:   50-year-old female with left pneumothorax status post chest tube placement  Patient found to have persistent left apical pneumothorax which require additional chest tube placement  - plan for left apical chest tube placement today  - please keep patient NPO      Total time spent in review of data, discussion with requesting provider and rendering advice was 5 min  Thank you for allowing Interventional Radiology to participate in the care of Leonora Novoa  Please don't hesitate to call or TigerText us with any questions       Lucie Membreno MD

## 2021-08-21 NOTE — CONSULTS
Consultation - Thoracic Surgery   Lashawn Davis 55 y o  female MRN: [id number]  Unit/Bed#: ICU 05 Encounter: [id number]      Assessment/Plan     Assessment:  56 yo F with iatrogenic left sided pneumothorax  The chest tube fell out yesterday and replaced with a new chest tube  CXR still showed apical pneumothorax  Patient has intermittent airleak  Vitals are stable with 0 5L NC  Plan:  IR consulted for apical chest tube placement  Maintain chest tube on suction, decreased to -20 mmHg  NPO for procedure  Other care per primary    History of Present Illness   Reason for Consult / Principal Problem: Iatrogenic L sided pneumothorax    HPI: Lashawn Davis is a 55y o  year old female who has been hospitalized due to neurological symptoms due to brain mass s/p left retromastoid craniotomy and debulking of CP angle mass on 8/18  She developed left sided pneumothorax after left subclavian CVC placement and left chest tube was placed on 8/18  However, the chest tube fell out on 8/20 and CXR showed recurrent L PTX  A new chest tube was placed on the same day but patient developed subcutaneous pneumothorax  Therefore thoracic surgery was consulted for management  Consults    Review of Systems   Constitutional: Negative  HENT: Negative  Eyes: Negative  Respiratory: Positive for chest tightness  Gastrointestinal: Negative  Endocrine: Negative  Genitourinary: Negative  Musculoskeletal: Negative  All other systems reviewed and are negative        Historical Information   Past Medical History:   Diagnosis Date    Hypertension      Past Surgical History:   Procedure Laterality Date    CRANIOTOMY Left 8/18/2021    Procedure: Image guided left retromastoid craniotomy for debulking of CP angle mass; intraoperative monitoring;  Surgeon: Rabia Roth MD;  Location: [facility];  Service: Neurosurgery     Social History     Substance and Sexual Activity   Alcohol Use Yes     Social History Substance and Sexual Activity   Drug Use No     E-Cigarette/Vaping    E-Cigarette Use Never User      E-Cigarette/Vaping Substances    Nicotine No     Flavoring No      Social History     Tobacco Use   Smoking Status Former Smoker    Quit date: 2021    Years since quittin 6   Smokeless Tobacco Never Used     Family History: non-contributory    Meds/Allergies   all current active meds have been reviewed  No Known Allergies    Objective   Vitals: Blood pressure 155/88, pulse 76, temperature 97 6 °F (36 4 °C), temperature source Oral, resp  rate 18, height 5' 5 5" (1 664 m), weight 74 4 kg (164 lb 0 4 oz), SpO2 97 %, not currently breastfeeding  Invasive Devices     Peripheral Intravenous Line            Peripheral IV 21 Right Forearm <1 day          Drain            Chest Tube Left <1 day                Physical Exam  Constitutional:       Appearance: She is normal weight  HENT:      Head: Normocephalic and atraumatic  Right Ear: External ear normal       Left Ear: External ear normal       Nose: Nose normal       Mouth/Throat:      Mouth: Mucous membranes are moist       Pharynx: Oropharynx is clear  Eyes:      Extraocular Movements: Extraocular movements intact  Conjunctiva/sclera: Conjunctivae normal       Pupils: Pupils are equal, round, and reactive to light  Cardiovascular:      Rate and Rhythm: Normal rate and regular rhythm  Pulses: Normal pulses  Heart sounds: Normal heart sounds  Pulmonary:      Effort: Pulmonary effort is normal       Breath sounds: Normal breath sounds  Comments: Subcutaneous emphysema on anterior chest wall to neck  Left chest tube in place, + AL  Abdominal:      General: Abdomen is flat  Bowel sounds are normal       Palpations: Abdomen is soft  Musculoskeletal:         General: Normal range of motion  Cervical back: Normal range of motion and neck supple  Skin:     General: Skin is warm        Capillary Refill: Capillary refill takes less than 2 seconds  Neurological:      General: No focal deficit present  Mental Status: She is alert and oriented to person, place, and time  Mental status is at baseline  Psychiatric:         Mood and Affect: Mood normal          Behavior: Behavior normal          Thought Content: Thought content normal          Judgment: Judgment normal          Lab Results: I have personally reviewed pertinent reports  Imaging Studies: I have personally reviewed pertinent reports  EKG, Pathology, and Other Studies: I have personally reviewed pertinent reports  VTE Prophylaxis: Sequential compression device (Venodyne)  and Heparin    Code Status: Level 1 - Full Code  Advance Directive and Living Will:      Power of :    POLST:      Counseling / Coordination of Care  Total floor / unit time spent today 30 minutes  Greater than 50% of total time was spent with the patient and / or family counseling and / or coordination of care   A description of the counseling / coordination of care:

## 2021-08-21 NOTE — PROGRESS NOTES
Progress Note - Neurosurgery   Jovi Marks 55 y o  female MRN: [id number]  Unit/Bed#: ICU 05 Encounter: [id number]    Assessment:  Postop day 3 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  Pneumothorax secondary to central line placement with dislodgement of the chest tube last evening replacement with a new chest to removal of this chest to and placement of an IR apical drainage to  Patient is now doing very well complains of no pain or shortness of breath    A total of 20 min was spent with the patient, of which more than 50% was spent in direct counseling coordination of care  Plan:  Continue present management  Chest tube management per ICU team  Mobilize as is possible    VTE Prophylaxis: Sequential compression device (Venodyne)     Subjective/Objective   Chief Complaint:  I feel better  Chest to came out inadvertently last evening  This was replaced and the patient had subcutaneous emphysema and shortness of breath  IR did a apical 2 placement and now the patient feels much improved  Her inverted vision reported yesterday has improved  She has no headaches or incisional pain her vertigo was also improved  Vitals: Blood pressure 155/70, pulse 76, temperature 97 6 °F (36 4 °C), temperature source Oral, resp  rate 17, height 5' 5 5" (1 664 m), weight 74 4 kg (164 lb 0 4 oz), SpO2 99 %, not currently breastfeeding  ,Body mass index is 26 88 kg/m²      Hemodynamic Monitoring: MAP: Arterial Line MAP (mmHg): 126 mmHg    Physical Exam:   Awake and alert  Retromastoid incision is clean and dry and healing well  Her power is 5/5 bilaterally  She is resting comfortably in bed  She denies any shortness of breath    Intake/Output                 08/21/21 0701 - 08/22/21 0700     [id number] Total              Intake    Total Intake -- -- --       Output    Urine  500  -- 500    Urine 500 -- 500    Total Output 500 -- 500       Net I/O     -500 -- -500          Invasive Devices Peripheral Intravenous Line            Peripheral IV 08/21/21 Right Antecubital <1 day          Drain            Chest Tube 1 Left Second intercostal space 10 Fr  <1 day    Chest Tube Left <1 day                          Lab Results:   Lab Results   Component Value Date    WBC 20 18 (H) 08/21/2021    HGB 13 6 08/21/2021    HCT 41 9 08/21/2021    MCV 93 08/21/2021     08/21/2021    MCH 30 2 08/21/2021    MCHC 32 5 08/21/2021    RDW 13 5 08/21/2021    MPV 10 2 08/21/2021    NRBC 0 08/21/2021    SODIUM 136 08/21/2021     08/21/2021    CO2 27 08/21/2021    BUN 18 08/21/2021    CREATININE 0 74 08/21/2021    CALCIUM 9 5 08/21/2021    AST 36 08/21/2021    ALT 37 08/21/2021    ALKPHOS 52 08/21/2021    EGFR 91 08/21/2021    INR 0 91 08/21/2021

## 2021-08-21 NOTE — PROGRESS NOTES
Daily Progress Note - Critical Care   Lashawn Davis 55 y o  female MRN: [id number]  Unit/Bed#: ICU 05 Encounter: [id number]        ----------------------------------------------------------------------------------------  HPI/24hr events:  Yesterday evening, the patient pulled out her chest tube that was to water seal at that time  Chest x-ray after the tube came out revealed a moderate left-sided pneumothorax  The patient then had a 12 Russian percutaneous left-sided chest tube replaced overnight last night  A few hours later, the patient was complaining of some neck pain  Upon evaluation, patient noted to have some neck swelling and subcutaneous air  Repeat chest x-ray revealed the chest tube was still appropriately placed  Tube still to -40 of suction  Air leak noted in the atrium  Patient in no respiratory distress with O2 sat of % on 2 L nasal cannula  Patient given some pain medication and will continue to closely monitor     ---------------------------------------------------------------------------------------  SUBJECTIVE  Patient seen and examined this morning on rounds  Patient states that her neck pain has improved this morning  Patient denies any difficulty breathing and shortness of breath  Patient states this chest tube was much more comfortable  Patient does admit to sound a little bit more nasally, but states that she was feeling congested and that she thinks she sounded like this yesterday  Review of Systems  Review of systems was reviewed and negative unless stated above in HPI/24-hour events   ---------------------------------------------------------------------------------------  Assessment and Plan:    Neuro:   · Diagnosis: Cerebellopontine angle tumor, obstructive hydrocephalus  ? 8/18 - s/p image guided left retromastoid craniotomy for debulking of CP angle mass  ?  8/19 - post-op CTH - Persistent deformity of the left brainstem and cerebellum with mild compression of the 4th ventricle  Stable lateral and 3rd ventriculomegaly  Small amount of subarachnoid hemorrhage at the craniocervical junction extending into the upper cervical spine  Post operative intracranial and extracranial pneumocephalus    ? 8/19 MRI brain - s/p vast majority of the previously identified left CP angle tumor  Some residual enhancing tumor remaining in the anterior aspect of the CP angle with improving mass effect upon the brainstem and brachium pontis  Persistent mild hydrocephalus involving the lateral ventricles and 3rd ventricle  Improving mass effect on the 4th ventricle  ? nsgy following  ? Continue closely monitor neuro exam  ? q2 Neuro Checks  ? Repeat stat CT head with any acute decline in exam  ? Continue Decadron taper   ? Diamox 125 mg q d   ? Continue pain control  ? PT/OT  ? Local wound care to incision site  · Diagnosis:  Hx of depression  · Continue on Lexapro 5 mg q d  · Diagnosis:  Pain and sedation  ? Scheduled Tylenol, lidocaine patch  ? P r n  Valium, Flexeril, Dilaudid, oxycodone     CV:   · Diagnosis:  hx of HTN   ? Maintain normotension  ? Continue home losartan 50 mg  ? P r n  Labetalol 10 mg q 4 hours  ? Continue to closely monitor in telemetry     Pulm:  · Diagnosis: L PTX s/p subclavian CVC placement in OR   ? 8/18 - left-sided percutaneous chest tube placed after large pneumothorax noted postoperatively, likely iatrogenic from subclavian central line placement  ? 8/20 - chest tube placed to water seal, but patient pulled out the chest tube per self  ? 8/20 - repeat chest x-ray revealed moderate left-sided pneumothorax  ? 8/20 - 2nd left-sided, percutaneous 16 Lao chest tube placed  ? Noted to have some subcutaneous air to the left chest wall and bilateral neck  ? Continue pain control and close monitoring  ? Maintain chest tube to wall suction  ? Repeat chest x-ray tomorrow morning  ? Continue pain control  ?  Continue to closely monitor respiratory status  · Diagnosis:  Acute respiratory insufficiency  · Likely secondary to pneumothorax and possibly some atelectasis from lying in bed  · Currently on 2 L nasal cannula  · Wean as tolerated  · Maintain SaO2 > 92%  · Maintain chest tube to suction  · Encourage continued good pulmonary hygiene, frequent incentive spirometry and flutter valve    GI:   · Diagnosis:  No acute issues  · Bowel regimen:  Order as needed     Renal/:   · Diagnosis:  No acute issues  ? Morales removed  ? Strict I&Os  ? Trend daily BMP     F/E/N:   · Fluids - continue to monitor off maintenance IV fluids  · Electrolytes - trend and replete as needed  · Nutrition - continue regular diet  · Replete lytes as needed  · Regular house diet     Heme/Onc:   · Diagnosis:  No acute issues  · DVT ppx: SCDs, heparin      Endo:   · Diagnosis:  No acute issues     ID:   · Diagnosis:  Leukocytosis  ? Patient remains afebrile  ? WBC 18 9 from 22 2 likely secondary to steroid use and reactive  ? Continue to monitor fever curve and WBC    MSK:   · Diagnosis: no acute issues   · PT/OT as able   · Encourage up and out of bed as tolerated     Patient appropriate for transfer out of the ICU today?: No  Disposition: Continue Critical Care   Code Status: Level 1 - Full Code  ---------------------------------------------------------------------------------------  ICU CORE MEASURES    Prophylaxis   VTE Pharmacologic Prophylaxis: Heparin  VTE Mechanical Prophylaxis: sequential compression device  Stress Ulcer Prophylaxis: Prophylaxis Not Indicated     ABCDE Protocol (if indicated)  Plan to perform spontaneous awakening trial today? Not applicable  Plan to perform spontaneous breathing trial today? Not applicable  Obvious barriers to extubation?  Not applicable  CAM-ICU: Negative    Invasive Devices Review  Invasive Devices     Peripheral Intravenous Line            Peripheral IV 08/20/21 Right Forearm <1 day          Drain            Chest Tube Left <1 day Can any invasive devices be discontinued today? No  ---------------------------------------------------------------------------------------  OBJECTIVE    Vitals   Vitals:    21 2215 21 2315 21 0015 21 0115   BP: 125/76 112/78 136/85 151/88   BP Location:       Pulse: 92 74 82 66   Resp: 18 (!) 24 16 12   Temp:   97 8 °F (36 6 °C)    TempSrc:   Oral    SpO2: 98% 96% 97% 99%   Weight:       Height:         Temp (24hrs), Av 9 °F (36 6 °C), Min:97 6 °F (36 4 °C), Max:98 2 °F (36 8 °C)  Current: Temperature: 97 8 °F (36 6 °C)    Respiratory:  SpO2: SpO2: 99 %, SpO2 Device: O2 Device: Non-rebreather mask  Nasal Cannula O2 Flow Rate (L/min): 10 L/min    Invasive/non-invasive ventilation settings   Respiratory    Lab Data (Last 4 hours)    None         O2/Vent Data (Last 4 hours)    None                Physical Exam  Constitutional:       General: She is not in acute distress  Appearance: She is normal weight  She is not ill-appearing or toxic-appearing  HENT:      Head: Normocephalic and atraumatic  Nose: Nose normal  No congestion or rhinorrhea  Mouth/Throat:      Mouth: Mucous membranes are moist       Pharynx: Oropharynx is clear  No oropharyngeal exudate or posterior oropharyngeal erythema  Eyes:      Extraocular Movements: Extraocular movements intact  Conjunctiva/sclera: Conjunctivae normal       Pupils: Pupils are equal, round, and reactive to light  Neck:      Comments: Mild edema and tenderness appreciated to bilateral an anterior neck  Subcutaneous air palpated  Surgical scar to the right posterior neck clean, dry, intact  Cardiovascular:      Rate and Rhythm: Normal rate and regular rhythm  Pulses: Normal pulses  Heart sounds: No murmur heard  Pulmonary:      Effort: Pulmonary effort is normal  No respiratory distress  Breath sounds: No wheezing, rhonchi or rales        Comments: Breath sounds mildly diminished on the left, but present  No wheezes, rhonchi, rales bilaterally  No respiratory distress on 2 L nasal cannula  Left-sided chest tube in place and to wall suction  Abdominal:      General: Abdomen is flat  There is no distension  Palpations: Abdomen is soft  Tenderness: There is no abdominal tenderness  Musculoskeletal:         General: No tenderness or signs of injury  Cervical back: Normal range of motion  No rigidity  Right lower leg: No edema  Left lower leg: No edema  Skin:     General: Skin is warm and dry  Capillary Refill: Capillary refill takes less than 2 seconds  Findings: No lesion or rash  Neurological:      General: No focal deficit present  Mental Status: She is alert and oriented to person, place, and time  Cranial Nerves: No cranial nerve deficit  Sensory: No sensory deficit  Motor: No weakness  Coordination: Coordination normal       Comments: No appreciated focal neuro deficits   Psychiatric:         Behavior: Behavior normal          Thought Content:  Thought content normal          Judgment: Judgment normal       Comments: Demonstrates mild anxiety       Laboratory and Diagnostics:  Results from last 7 days   Lab Units 08/20/21  0513 08/19/21  0544 08/18/21  1257 08/18/21  0533 08/17/21  0454 08/16/21  0557 08/15/21  0511 08/14/21  2108   WBC Thousand/uL 18 85* 22 26*  --  16 74* 17 07* 12 04* 10 33* 14 53*   HEMOGLOBIN g/dL 12 3 12 2  --  13 8 14 4 15 0 13 9 14 4   I STAT HEMOGLOBIN g/dl  --   --  12 2  --   --   --   --   --    HEMATOCRIT % 37 2 37 6  --  42 1 43 8 45 1 41 4 43 4   HEMATOCRIT, ISTAT %  --   --  36  --   --   --   --   --    PLATELETS Thousands/uL 236 259  --  334 355 357 355 377   NEUTROS PCT % 89* 87*  --  88*  --   --   --  70   MONOS PCT % 6 9  --  5  --   --   --  15*     Results from last 7 days   Lab Units 08/20/21  1501 08/20/21  0513 08/19/21  0542 08/18/21  1257 08/18/21  0533 08/17/21  0454 08/16/21  0558 08/15/21  3470 SODIUM mmol/L 138 139 143  --  138 139 137 136   POTASSIUM mmol/L 4 2 3 8 3 9  --  3 9 4 3 3 9 4 7   CHLORIDE mmol/L 108 108 112*  --  112* 110* 110* 111*   CO2 mmol/L 23 27 28  --  19* 21 20* 19*   CO2, I-STAT mmol/L  --   --   --  22  --   --   --   --    ANION GAP mmol/L 7 4 3*  --  7 8 7 6   BUN mg/dL 20 20 22  --  21 23 25 25   CREATININE mg/dL 0 78 0 77 0 68  --  0 76 0 81 0 94 0 87   CALCIUM mg/dL 9 1 8 8 8 9  --  9 2 9 4 9 3 9 4   GLUCOSE RANDOM mg/dL 140 134 119  --  99 116 113 105     Results from last 7 days   Lab Units 08/20/21  1501 08/20/21  0513   MAGNESIUM mg/dL 2 6 2 5   PHOSPHORUS mg/dL 2 6* 3 5      Results from last 7 days   Lab Units 08/19/21  0542 08/18/21  0745   INR  1 00 0 92   PTT seconds 22* 23      Results from last 7 days   Lab Units 08/19/21  0757   TROPONIN I ng/mL <0 02         ABG:    VBG:          Micro      Imaging: I have personally reviewed pertinent reports  Intake and Output  I/O       08/19 0701 - 08/20 0700 08/20 0701 - 08/21 0700    P  O  2165 1230    I V  (mL/kg) 800 (10 8) 20 (0 3)    IV Piggyback 100     Total Intake(mL/kg) 3065 (41 2) 1250 (16 8)    Urine (mL/kg/hr) 2655 (1 5) 2860 (1 6)    Chest Tube 0 0    Total Output 2655 2860    Net +410 -1610              Height and Weights   Height: 5' 5 5" (166 4 cm)  IBW (Ideal Body Weight): 58 15 kg  Body mass index is 26 88 kg/m²  Weight (last 2 days)     None        Nutrition       Diet Orders   (From admission, onward)             Start     Ordered    08/18/21 1954  Diet Regular; Regular House  Diet effective now     Question Answer Comment   Diet Type Regular    Regular Regular House    RD to adjust diet per protocol?  Yes        08/18/21 1953                Active Medications  Scheduled Meds:  Current Facility-Administered Medications   Medication Dose Route Frequency Provider Last Rate    acetaminophen  975 mg Oral Q8H [address] Sravanthi Francis PA-C      acetaZOLAMIDE  125 mg Oral Daily Jeanie Howell DO      cyclobenzaprine  10 mg Oral TID PRN ONELIA Walters      dexamethasone  4 mg Oral Q8H [address] Susi Reed PA-C      Followed by   Gian Saleh ON 8/23/2021] dexamethasone  2 mg Oral Q6H [address] Susitwan Reed PA-C      Followed by   Gian Saleh ON 8/25/2021] dexamethasone  2 mg Oral Q12H [address] Susi Reed PA-C      Followed by   Gian Saleh ON 8/27/2021] dexamethasone  2 mg Oral Daily Susi Reed PA-C      diazepam  2 5 mg Intravenous Q6H PRN ONELIA Lopez      escitalopram  5 mg Oral Daily ONELIA Lopez      heparin (porcine)  5,000 Units Subcutaneous [facility] Marco A Cho MD      HYDROmorphone  0 5 mg Intravenous Q4H PRN Barrera Dominguez, DO      Labetalol HCl  10 mg Intravenous Q4H PRN ONELIA Lopez      lidocaine  1 patch Topical Daily Roland Ortega, DO      losartan  50 mg Oral Daily Barrera Dominguez, DO      naloxone  0 04 mg Intravenous Q1MIN PRN Noam José PA-C      ondansetron  4 mg Intravenous Q6H PRN ONELIA Lopez      oxyCODONE  10 mg Oral Q4H PRN Noam José PA-C      oxyCODONE  5 mg Oral Q4H PRN Sravanthi Francis PA-C       Continuous Infusions:     PRN Meds:   cyclobenzaprine, 10 mg, TID PRN  diazepam, 2 5 mg, Q6H PRN  HYDROmorphone, 0 5 mg, Q4H PRN  Labetalol HCl, 10 mg, Q4H PRN  naloxone, 0 04 mg, Q1MIN PRN  ondansetron, 4 mg, Q6H PRN  oxyCODONE, 10 mg, Q4H PRN  oxyCODONE, 5 mg, Q4H PRN      Allergies   No Known Allergies  ---------------------------------------------------------------------------------------  Advance Directive and Living Will:      Power of Attorney:    POLST:    ---------------------------------------------------------------------------------------  Care Time Delivered:   No Critical Care time spent     Marian Pearson PA-C      Portions of the record may have been created with voice recognition software    Occasional wrong word or "sound a like" substitutions may have occurred due to the inherent limitations of voice recognition software    Read the chart carefully and recognize, using context, where substitutions have occurred

## 2021-08-21 NOTE — BRIEF OP NOTE (RAD/CATH)
INTERVENTIONAL RADIOLOGY PROCEDURE NOTE    Date: 8/21/2021    Procedure: Left apical chest tube placement    Preoperative diagnosis:   1  Pneumothorax    2  Fall at home, subsequent encounter    3  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[address] )    4  Obstructive hydrocephalus ([address] )    5  Fall with possible seizure-like activity ([address] )    6  POTS (postural orthostatic tachycardia syndrome)    7  Seizure-like activity ([address] )    8  S/P craniotomy         Postoperative diagnosis: Same  Surgeon: Lamont Walter MD     Assistant: None  No qualified resident was available  Blood loss: 1 mL    Specimens: None     Findings: Successful left apical chest tube placement using 10 Fr catheter  Complications: None immediate      Anesthesia: conscious sedation and local

## 2021-08-21 NOTE — PROCEDURES
Chest Tube Insertion    Date/Time: 8/20/2021 8:39 PM  Performed by: Ramon Jules MD  Authorized by: Ramon Jules MD     Patient location:  Bedside  Other Assisting Provider: Yes (comment) (Dr Juana Key)    Consent:     Consent obtained:  Verbal    Consent given by:  Patient    Risks discussed:  Bleeding, damage to surrounding structures, incomplete drainage, infection, nerve damage and pain    Alternatives discussed:  No treatment  Universal protocol:     Procedure explained and questions answered to patient or proxy's satisfaction: yes      Radiology Images displayed and confirmed  If images not available, report reviewed: yes      Site/side marked: yes      Immediately prior to procedure a time out was called: yes      Patient identity confirmed:  Verbally with patient, arm band and hospital-assigned identification number  Pre-procedure details:     Skin preparation:  ChloraPrep    Preparation: Patient was prepped and draped in the usual sterile fashion    Indications:     Indications: pneumothroax    Sedation:     Sedation type: Anxiolysis  Anesthesia (see MAR for exact dosages): Anesthesia method:  Local infiltration    Local anesthetic:  Lidocaine 1% WITH epi and lidocaine 1% w/o epi  Procedure details:     Laterality:  Left    Approach:  Percutaneous    Scalpel size:  10    Thal-Quick Chest Tube Kit:  16 Fr    Tube size (Fr):  16    Ultrasound guidance: no      Tension pneumothorax: no      Needle Decompression: no      Tube connected to:  Suction    Drainage characteristics:  Air only    Suture material:  0 silk    Dressing:  4x4 sterile gauze and Xeroform gauze  Post-procedure details:     Post-insertion x-ray findings: tube in good position      Patient tolerance of procedure:   Tolerated well, no immediate complications

## 2021-08-21 NOTE — UTILIZATION REVIEW
Continued Stay Review    Date: 8/21/21                          Current Patient Class: inpatient  Current Level of Care: ICU  HPI:56 y o  female initially admitted on 8/15/21   8/18 - s/p image guided left retromastoid craniotomy for debulking of CP angle mass  8/19 - post-op CTH - Persistent deformity of the left brainstem and cerebellum with mild compression of the 4th ventricle  Stable lateral and 3rd ventriculomegaly  Small amount of subarachnoid hemorrhage at the craniocervical junction extending into the upper cervical spine  Post operative intracranial and extracranial pneumocephalus    8/19 MRI brain - s/p vast majority of the previously identified left CP angle tumor  Some residual enhancing tumor remaining in the anterior aspect of the CP angle with improving mass effect upon the brainstem and brachium pontis  Persistent mild hydrocephalus involving the lateral ventricles and 3rd ventricle  Improving mass effect on the 4th ventricle  Assessment/Plan:   POD#3: 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  Retromastoid incision is clean and dry and healing well  Pneumothorax secondary to central line placement   Chest tube came out inadvertently last evening  This was replaced and the patient had subcutaneous emphysema and shortness of breath  IR did an apical 2 placement with improvement  Her inverted vision reported yesterday has improved  She has no headaches or incisional pain her vertigo was also improved      Vital Signs: /83   Pulse 96   Temp (!) 97 1 °F (36 2 °C)   Resp (!) 28   Ht 5' 5 5" (1 664 m)   Wt 74 4 kg (164 lb 0 4 oz)   SpO2 100%   BMI 26 88 kg/m²     Pertinent Labs/Diagnostic Results:   Results from last 7 days   Lab Units 08/21/21  1021 08/20/21  0513 08/19/21  0544 08/18/21  1257 08/18/21  0533   WBC Thousand/uL 20 18* 18 85* 22 26*  --  16 74*   HEMOGLOBIN g/dL 13 6 12 3 12 2  --  13 8   I STAT HEMOGLOBIN g/dl  --   --   --  12 2  --    HEMATOCRIT % 41 9 37 2 37 6  --  42 1   HEMATOCRIT, ISTAT %  --   --   --  36  --    PLATELETS Thousands/uL 265 236 259  --  334   NEUTROS ABS Thousands/µL 18 26* 16 92* 19 48*  --  14 64*     Results from last 7 days   Lab Units 08/21/21  1021 08/21/21  0615 08/20/21  1501 08/20/21  0513 08/19/21  0542 08/18/21  1257   SODIUM mmol/L 136 137 138 139 143  --    POTASSIUM mmol/L 3 6 4 1 4 2 3 8 3 9  --    CHLORIDE mmol/L 105 105 108 108 112*  --    CO2 mmol/L 27 25 23 27 28  --    CO2, I-STAT mmol/L  --   --   --   --   --  22   ANION GAP mmol/L 4 7 7 4 3*  --    BUN mg/dL 18 21 20 20 22  --    CREATININE mg/dL 0 74 0 70 0 78 0 77 0 68  --    EGFR ml/min/1 73sq m 91 98 86 87 99  --    CALCIUM mg/dL 9 5 9 1 9 1 8 8 8 9  --    CALCIUM, IONIZED mmol/L  --  1 20  --  1 20  --   --    CALCIUM, IONIZED, ISTAT mmol/L  --   --   --   --   --  1 18   MAGNESIUM mg/dL  --  2 4 2 6 2 5  --   --    PHOSPHORUS mg/dL  --  3 5 2 6* 3 5  --   --      Results from last 7 days   Lab Units 08/21/21  1021   AST U/L 36   ALT U/L 37   ALK PHOS U/L 52   TOTAL PROTEIN g/dL 8 3*   ALBUMIN g/dL 3 2*   TOTAL BILIRUBIN mg/dL 0 35     Results from last 7 days   Lab Units 08/18/21  1651 08/17/21  1603   POC GLUCOSE mg/dl 120 162*     Results from last 7 days   Lab Units 08/21/21  1021 08/21/21  0615 08/20/21  1501 08/20/21  0513 08/19/21  0542 08/18/21  0533 08/17/21  0454 08/16/21  0558 08/15/21  0511 08/14/21  2108   GLUCOSE RANDOM mg/dL 109 125 140 134 119 99 116 113 105 87     Results from last 7 days   Lab Units 08/18/21  1257   I STAT BASE EXC mmol/L -4*   I STAT O2 SAT % 100*   ISTAT PH ART  7 359   I STAT ART PCO2 mm HG 37 5   I STAT ART PO2 mm  0*   I STAT ART HCO3 mmol/L 21 1*     Results from last 7 days   Lab Units 08/19/21  0757   TROPONIN I ng/mL <0 02         Results from last 7 days   Lab Units 08/21/21  1021 08/19/21  0542 08/18/21  0745   PROTIME seconds 12 3 13 2 12 4   INR  0 91 1 00 0 92   PTT seconds  --  22* 23     Medications: acetaminophen, 975 mg, Oral, Q8H [address]  acetaZOLAMIDE, 125 mg, Oral, Daily  dexamethasone, 4 mg, Oral, Q8H RONNIE  escitalopram, 5 mg, Oral, Daily  heparin (porcine), 5,000 Units, Subcutaneous, Q8H RONNIE  lidocaine, 1 patch, Topical, Daily  losartan, 50 mg, Oral, Daily    Continuous IV Infusions:  sodium chloride, 75 mL/hr, Intravenous, Continuous    PRN Meds:  cyclobenzaprine, 10 mg, Oral, TID PRN  diazepam, 2 5 mg, Intravenous, Q6H PRN  HYDROmorphone, 0 5 mg, Intravenous, Q4H PRN  Labetalol HCl, 10 mg, Intravenous, Q4H PRN  naloxone, 0 04 mg, Intravenous, Q1MIN PRN  ondansetron, 4 mg, Intravenous, Q6H PRN  oxyCODONE, 10 mg, Oral, Q4H PRN  oxyCODONE, 5 mg, Oral, Q4H PRN    Discharge Plan: d    Network Utilization Review Department  ATTENTION: Please call with any questions or concerns to [phone] and carefully listen to the prompts so that you are directed to the right person  All voicemails are confidential   Frederich Ing all requests for admission clinical reviews, approved or denied determinations and any other requests to dedicated fax number below belonging to the campus where the patient is receiving treatment   List of dedicated fax numbers for the Facilities:  [address] DENIALS (Administrative/Medical Necessity) [phone]   [address] (Maternity/NICU/Pediatrics) [phone]   [address] [address] [place] [address] [address] [facility] [place] [address] [phone]

## 2021-08-21 NOTE — NURSING NOTE
Patient back from IR, 2 chest tubes present to -20cm suction, patient denies pain, no repiratory distress, breakfast warmed up and given to patient,  at the bedside

## 2021-08-21 NOTE — QUICK NOTE
Patient's  was updated at the bedside  All questions were answered and all concerns were addressed to the best of my ability and to his satisfaction      Asa Cobian, DO

## 2021-08-21 NOTE — SEDATION DOCUMENTATION
Chest tube placement performed by Dr Hien Centeno, without complication  Patient tolerated well and vss   IR Procedure Bedrest Start Time is 1110  Patient transported back to ICU and report given to Primary RN

## 2021-08-22 ENCOUNTER — APPOINTMENT (INPATIENT)
Dept: RADIOLOGY | Facility: HOSPITAL | Age: 56
DRG: 025 | End: 2021-08-22
Payer: COMMERCIAL

## 2021-08-22 PROCEDURE — 71045 X-RAY EXAM CHEST 1 VIEW: CPT

## 2021-08-22 PROCEDURE — 99222 1ST HOSP IP/OBS MODERATE 55: CPT | Performed by: THORACIC SURGERY (CARDIOTHORACIC VASCULAR SURGERY)

## 2021-08-22 PROCEDURE — 99233 SBSQ HOSP IP/OBS HIGH 50: CPT | Performed by: INTERNAL MEDICINE

## 2021-08-22 PROCEDURE — NC001 PR NO CHARGE: Performed by: INTERNAL MEDICINE

## 2021-08-22 PROCEDURE — 99024 POSTOP FOLLOW-UP VISIT: CPT | Performed by: NEUROLOGICAL SURGERY

## 2021-08-22 RX ADMIN — ACETAMINOPHEN 975 MG: 325 TABLET, FILM COATED ORAL at 21:18

## 2021-08-22 RX ADMIN — HEPARIN SODIUM 5000 UNITS: 5000 INJECTION INTRAVENOUS; SUBCUTANEOUS at 21:18

## 2021-08-22 RX ADMIN — DEXAMETHASONE 4 MG: 4 TABLET ORAL at 21:18

## 2021-08-22 RX ADMIN — DEXAMETHASONE 4 MG: 4 TABLET ORAL at 05:54

## 2021-08-22 RX ADMIN — ESCITALOPRAM 5 MG: 5 TABLET, FILM COATED ORAL at 08:56

## 2021-08-22 RX ADMIN — ACETAMINOPHEN 975 MG: 325 TABLET, FILM COATED ORAL at 13:47

## 2021-08-22 RX ADMIN — LOSARTAN POTASSIUM 50 MG: 50 TABLET, FILM COATED ORAL at 08:55

## 2021-08-22 RX ADMIN — DEXAMETHASONE 4 MG: 4 TABLET ORAL at 13:47

## 2021-08-22 RX ADMIN — DIAZEPAM 2.5 MG: 5 INJECTION, SOLUTION INTRAMUSCULAR; INTRAVENOUS at 17:50

## 2021-08-22 RX ADMIN — HEPARIN SODIUM 5000 UNITS: 5000 INJECTION INTRAVENOUS; SUBCUTANEOUS at 13:47

## 2021-08-22 RX ADMIN — HEPARIN SODIUM 5000 UNITS: 5000 INJECTION INTRAVENOUS; SUBCUTANEOUS at 05:54

## 2021-08-22 RX ADMIN — ACETAMINOPHEN 975 MG: 325 TABLET, FILM COATED ORAL at 05:53

## 2021-08-22 RX ADMIN — CYCLOBENZAPRINE HYDROCHLORIDE 10 MG: 10 TABLET, FILM COATED ORAL at 08:55

## 2021-08-22 NOTE — PROGRESS NOTES
Progress Note - Neurosurgery   Britni Fofana 55 y o  female MRN: [id number]  Unit/Bed#: ICU 05 Encounter: [id number]    Assessment:  Postop day for suboccipital resection of large vestibular schwannoma  She is feeling better today with minimal pain at the mid axillary chest tube site  She slept well last night    A total of 20 min was spent with the patient, of which more than 50% was spent in direct counseling coordination of care  Plan:  Continue Decadron wean  Chest to is to be evaluated by critical care  Patient will get out of bed to a chair today possibly discharge to step-down    VTE Prophylaxis: Sequential compression device (Venodyne)  Lovenox    Subjective/Objective   Chief Complaint:  I am doing okay  She complains of inverted vision for only minutes in the beginning of the day and then this does not returned for the rest of the day  He has no headache does complain of a little bit of numbness around her year  She has some pain in the lower chest tube  She is moving all 4s well  She ate well as no other complaints    Vitals: Blood pressure 140/84, pulse 58, temperature 98 1 °F (36 7 °C), resp  rate 16, height 5' 5 5" (1 664 m), weight 74 4 kg (164 lb 0 4 oz), SpO2 99 %, not currently breastfeeding  ,Body mass index is 26 88 kg/m²      Hemodynamic Monitoring: MAP: Arterial Line MAP (mmHg): 126 mmHg    Physical Exam:   Awake alert  Speech is clear and comprehensible  Her power is 5/5 bilaterally  Her incision is clean and dry with some ecchymosis around the  Her extraocular movements are intact  Face is symmetric in grimace and at rest        Invasive Devices     Peripheral Intravenous Line            Peripheral IV 08/21/21 Right Antecubital <1 day          Drain            Chest Tube Left 1 day    Chest Tube 1 Left Second intercostal space 10 Fr  <1 day                          Lab Results:   Lab Results   Component Value Date    WBC 20 18 (H) 08/21/2021    HGB 13 6 08/21/2021    HCT 41 9 08/21/2021 MCV 93 08/21/2021     08/21/2021    MCH 30 2 08/21/2021    MCHC 32 5 08/21/2021    RDW 13 5 08/21/2021    MPV 10 2 08/21/2021    NRBC 0 08/21/2021    SODIUM 136 08/21/2021     08/21/2021    CO2 27 08/21/2021    BUN 18 08/21/2021    CREATININE 0 74 08/21/2021    CALCIUM 9 5 08/21/2021    AST 36 08/21/2021    ALT 37 08/21/2021    ALKPHOS 52 08/21/2021    EGFR 91 08/21/2021    INR 0 91 08/21/2021       Imaging Studies: I have personally reviewed pertinent films in PACS

## 2021-08-22 NOTE — PROGRESS NOTES
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[address] )  Active Problems:    Fall with possible seizure-like activity ([facility] [address] )    Anxiety    Hydrocephalus ([facility] [address] )    Hypertension    Leukocytosis    Headache    Pneumothorax  Resolved Problems:    * No resolved hospital problems  *    Fall with possible seizure-like activity [facility]  Assessment & Plan  Routine EEG performed with no detection of seizure activity  · Continue with decadron taper  · Continue to monitor    Pneumothorax  Assessment & Plan  Pneumothorax post left subclavian central line placement  8/20 Chest tube fell out, redevelopment of moderate to large left pneumothorax  New chest tube placed  8/21 left apical pneumothorax remaining  Now s/p IR apical chest tube placement  Continues to have subcutaneous emphysema  ? 8/18 - left-sided percutaneous chest tube placed after large pneumothorax noted postoperatively, likely iatrogenic from subclavian central line placement  ? 8/20 - chest tube placed to water seal, but patient pulled out the chest tube per self  ? 8/20 - repeat chest x-ray revealed moderate left-sided pneumothorax  ? 8/20 - 2nd left-sided, percutaneous 16 [place] chest tube placed  § Noted to have some subcutaneous air to the left chest wall and bilateral neck  § Continue pain control and close monitoring  § Negative air leak  § Removed by thoracic surgery 8/22   ? 8/21 - 3rd chest tube placed by IR for persistent left apical pneumothorax  § + Air leak  § Maintain to wall suction  ? Continue to closely monitor respiratory status, on RA saturating 98%    Headache  Assessment & Plan  Acute pain   · Scheduled Tylenol  · P r n   Valium, Flexeril, Dilaudid, oxycodone    Leukocytosis  Assessment & Plan  - Elevated white count likely secondary to demargination secondary to Decadron, reactive  - Continues to downtrend  - Afebrile  - Continue to trend fever curve and monitor wbcs    Hypertension  Assessment & Plan  Pre-hospital hypertension  · Per neurosurgery maintain normotension  · Continue home losartan 50 mg  · P r n  Labetalol    Hydrocephalus (HCC)  Assessment & Plan  Related to CPA tumor  Patient s/p resection  Improving  · Refer to CPA tumor for management  Anxiety  Assessment & Plan  Hx of anxiety  Continue Lexapro 5 mg    * 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HCC)  Assessment & Plan  · Cerebellopontine angle tumor, obstructive hydrocephalus s/p bulk resection POD 4  ? 8/18 - s/p image guided left retromastoid craniotomy for debulking of CP angle mass  ? 8/19 - post-op CTH - Persistent deformity of the left brainstem and cerebellum with mild compression of the 4th ventricle  Stable lateral and 3rd ventriculomegaly  Small amount of subarachnoid hemorrhage at the craniocervical junction extending into the upper cervical spine  Post operative intracranial and extracranial pneumocephalus    ? 8/19 MRI brain - s/p vast majority of the previously identified left CP angle tumor   Some residual enhancing tumor remaining in the anterior aspect of the CP angle with improving mass effect upon the brainstem and brachium pontis   Persistent mild hydrocephalus involving the lateral ventricles and 3rd ventricle   Improving mass effect on the 4th ventricle  ? Neurosurgery following  ? Continue closely monitor neuro exam  § q4 Neuro Checks  § Repeat stat CT head with any acute decline in exam or GCS change > 2 within 1 hr  ? Continue Decadron taper   ? D/C Diamox  ? Continue pain control  ?  Local wound care to incision site      Consultants:   Neurosurgery  Thoracic surgery  Interventional Radiology  Cardiology  Neurology    History of Present Illness:   "Sada Yost is a 50-year-old female past medical history of hypertension  Jyoti Vargas was admitted to the hospital on 08/08 a progressive neurological symptoms starting in February including headaches, nausea/vomiting, hearing impairment, and progressive imbalance   Outpatient MRI C-spine showed incidental 3 cm left cerebellar-pontine angle mass   MRI brain IAC showed a large left CP angle mass extending into the internal auditory canal   CT had during that admission showed marked hydrocephalus likely related to the mass compressing the 4th ventricle and brainstem   Imaging was most consistent with a large vestibular schwannoma   Neurosurgery started the patient on Diamox and steroid and was discharged on 08/12   The patient was readmitted on 08/14 for frequent falls and possible seizure activity   Taken to the OR with Neurosurgery 8/18 for left retromastoid craniotomy and debulking of CP angle mass  Admitted to ICU postop for further management   Postop the patient experience a significant amount of agitation with left-sided gaze preference, leaning to the left side, and moaning   CT head showed a small subarachnoid hemorrhage at the craniocervical junction extending into the upper cervical spine, postoperative intracranial and extracranial pneumocephalus  Patient dislodged her chest tube on 08/20  A chest x-ray showed new moderate left-sided pneumothorax  Chest tube was replaced overnight  Patient then found to have significant amount of neck swelling and subcutaneous air  Interventional Radiology placed left apical chest tube  " per [facility] [place]    Summary of clinical course:   8/18 post-op CTH- s/p left CP angle mass resection  Persistent deformity of the left brainstem and cerebellum with mild compression of the 4th ventricle  Stable lateral and 3rd ventriculomegaly  Small amount of subarachnoid hemorrhage at the craniocervical junction extending into the upper cervical spine  Postoperative intracranial and extracranial pneumocephalus  Chest tube placed for pneumothorax 2/2 left subclavian central line placement      8/19 MRI Brain - Patient is status post removal of the vast majority of the previously identified left CP angle tumor  There is some residual enhancing tumor remaining in the anterior aspect of the CP angle with improving mass effect upon the brainstem and brachium pontis  There is no restricted diffusion identified within the left posterior lateral fabian and brachium pontis consistent with acute ischemia      Persistent mild hydrocephalus involving the lateral ventricles and 3rd ventricle  Increased FLAIR signal adjacent to the surface of the lateral ventricles may represent transependymal flow of CSF, unchanged  4th ventricle is not enlarged and there is improving mass effect upon the 4th ventricle  8/19 Chest Xray - left chest tube pulled back with question trace left apical pneumothorax  Mild left base atelectasis    8/20 Repeat chest tube at the bedside    8/21 Chest X-ray: Persistent small left apical pneumothorax with stable positioning of left chest tube    8/21 Apical chest tube with IR    8/22 L lower chest tube removed by thoracic surgery  Left apical remains in place  Diamox discontinued      Recent or scheduled procedures:   n/a    Outstanding/pending diagnostics:   n/a    Cultures:   n/a       Mobilization Plan:   As tolerated    Nutrition Plan:   Regular diet    Invasive Devices Review  Invasive Devices     Peripheral Intravenous Line            Peripheral IV 08/21/21 Right Antecubital 1 day          Drain            Chest Tube 1 Left Second intercostal space 10 Fr  1 day    Chest Tube Left 1 day                Rationale for remaining devices: Apical chest tube in place to suction 2/2 apical pneumothorax    VTE Pharmacologic Prophylaxis: Heparin  VTE Mechanical Prophylaxis: sequential compression device    Discharge Plan:   Patient should be ready for discharge to post acute rehab after deemed medically stable for discharge    Initial Physical Therapy Recommendations: Post acute rehab  Initial Occupational Therapy Recommendations: Post acute rehab  Initial /Plan: pending    Home medications that are not reordered and reason why:   n/a    Spoke with Dr Mu López regarding transfer  Please contact critical care via [employer] with any questions or concerns  Portions of the record may have been created with voice recognition software  Occasional wrong word or "sound a like" substitutions may have occurred due to the inherent limitations of voice recognition software  Read the chart carefully and recognize, using context, where substitutions have occurred

## 2021-08-22 NOTE — PROGRESS NOTES
Daily Progress Note - Critical Care   [facility] 55 y o  female MRN: [id number]  Unit/Bed#: ICU 05 Encounter: [id number]        ----------------------------------------------------------------------------------------  HPI/24hr events: Aileen Orourke is a 43-year-old female past medical history of hypertension  Whick Bridges was admitted to the hospital on 08/08 a progressive neurological symptoms starting in February including headaches, nausea/vomiting, hearing impairment, and progressive imbalance   Outpatient MRI C-spine showed incidental 3 cm left cerebellar-pontine angle mass   MRI brain IAC showed a large left CP angle mass extending into the internal auditory canal   CT had during that admission showed marked hydrocephalus likely related to the mass compressing the 4th ventricle and brainstem   Imaging was most consistent with a large vestibular schwannoma   Neurosurgery started the patient on Diamox and steroid and was discharged on 08/12   The patient was readmitted on 08/14 for frequent falls and possible seizure activity   Taken to the OR with Neurosurgery 8/18 for left retromastoid craniotomy and debulking of CP angle mass  Admitted to ICU postop for further management   Postop the patient experience a significant amount of agitation with left-sided gaze preference, leaning to the left side, and moaning  CT head showed a small subarachnoid hemorrhage at the craniocervical junction extending into the upper cervical spine, postoperative intracranial and extracranial pneumocephalus  Patient dislodged her chest tube on 08/20  A chest x-ray showed new moderate left-sided pneumothorax  Chest tube was replaced overnight  Patient then found to have significant amount of neck swelling and subcutaneous air  Interventional Radiology placed left apical chest tube      L Apical Chest tube placed by IR  ---------------------------------------------------------------------------------------  JUSTYN  Aileen Field was seen resting comfortably in bed this morning  She did not have any complaints  Review of Systems   Constitutional: Negative  HENT: Positive for voice change  Eyes: Positive for visual disturbance  Intermittent double vision     Respiratory: Negative for shortness of breath  Cardiovascular: Negative  Gastrointestinal: Negative  Genitourinary: Negative  Musculoskeletal: Negative  Neurological: Negative  Intermittent L facial numbness   Hematological: Negative  Psychiatric/Behavioral: Negative  Review of systems was reviewed and negative unless stated above in HPI/24-hour events   ---------------------------------------------------------------------------------------  Assessment and Plan:  · Diagnosis: Cerebellopontine angle tumor, obstructive hydrocephalus  ? 8/18 - s/p image guided left retromastoid craniotomy for debulking of CP angle mass  ? 8/19 - post-op CTH - Persistent deformity of the left brainstem and cerebellum with mild compression of the 4th ventricle  Stable lateral and 3rd ventriculomegaly  Small amount of subarachnoid hemorrhage at the craniocervical junction extending into the upper cervical spine  Post operative intracranial and extracranial pneumocephalus    ? 8/19 MRI brain - s/p vast majority of the previously identified left CP angle tumor  Some residual enhancing tumor remaining in the anterior aspect of the CP angle with improving mass effect upon the brainstem and brachium pontis  Persistent mild hydrocephalus involving the lateral ventricles and 3rd ventricle  Improving mass effect on the 4th ventricle  ? Neurosurgery following  ? Continue closely monitor neuro exam  § q4 Neuro Checks  § Repeat stat CT head with any acute decline in exam  ? Continue Decadron taper   ? D/C Diamox  ? Continue pain control  ? Local wound care to incision site  · Diagnosis:  Hx of depression  § Continue continue home Lexapro 5 mg q d    · Diagnosis:  Pain and sedation  ? Scheduled Tylenol, lidocaine patch for chest tube site  ? P r n  Valium, Flexeril, Dilaudid, oxycodone     CV:   · Diagnosis:  hx of HTN   ? Maintain normotension  ? Continue home losartan 50 mg  ? P r n  Labetalol 10 mg q 4 hours  ? Continue to closely monitor in telemetry     Pulm:  · Diagnosis: L PTX s/p subclavian CVC placement in OR   ? 8/18 - left-sided percutaneous chest tube placed after large pneumothorax noted postoperatively, likely iatrogenic from subclavian central line placement  ? 8/20 - chest tube placed to water seal, but patient pulled out the chest tube per self  ? 8/20 - repeat chest x-ray revealed moderate left-sided pneumothorax  ? 8/20 - 2nd left-sided, percutaneous 12 French chest tube placed  § Noted to have some subcutaneous air to the left chest wall and bilateral neck  § Continue pain control and close monitoring  § Negative air leak  § Removed by thoracic surgery 8/22   ? 8/21 - 3rd chest tube placed by IR for persistent left apical pneumothorax  ? + Air leak  ? Maintain to wall suction  ? Continue to closely monitor respiratory status  · Diagnosis:  Acute respiratory insufficiency  § Likely secondary to pneumothorax and possibly some atelectasis from lying in bed  § NC 4L  Continue to wean  § Maintain oxygen saturation greater than 92%  § Encourage incentive spirometry  GI:   · Diagnosis:  No acute issues  · Bowel regimen:  Order as needed     Renal/:   · Diagnosis:  No acute issues  ? Continue to trend BMP  ? Strict I's and o's     F/E/N:   · Fluids - fluids held  · Electrolytes -  replete as needed  · Nutrition - continue regular diet     Heme/Onc:   · Diagnosis:  No acute issues  · DVT ppx: SCDs, heparin      Endo:   · Diagnosis:  No acute issues     ID:   · Diagnosis:  Leukocytosis  ? Patient remains afebrile  ? Lab holiday today  ? Continue to monitor fever curve and WBC     MSK:   · Diagnosis: no acute issues   § Continue PT/OT    § Encourage up and out of bed as tolerated   Patient appropriate for transfer out of the ICU today?: No  Disposition: Continue Critical Care   Code Status: Level 1 - Full Code  ---------------------------------------------------------------------------------------  ICU CORE MEASURES    Prophylaxis   VTE Pharmacologic Prophylaxis: Heparin  VTE Mechanical Prophylaxis: sequential compression device  Stress Ulcer Prophylaxis: Prophylaxis Not Indicated     ABCDE Protocol (if indicated)  Plan to perform spontaneous awakening trial today? Not applicable  Plan to perform spontaneous breathing trial today? Not applicable  Obvious barriers to extubation? Not applicable  CAM-ICU: Negative    Invasive Devices Review  Invasive Devices     Peripheral Intravenous Line            Peripheral IV 21 Right Antecubital <1 day          Drain            Chest Tube Left 1 day    Chest Tube 1 Left Second intercostal space 10 Fr  <1 day              Can any invasive devices be discontinued today? No  ---------------------------------------------------------------------------------------  OBJECTIVE    Vitals   Vitals:    21 0030 21 0100 21 0130 21 0530   BP: 141/86 140/80 140/77 140/81   Pulse: 62 60 62 68   Resp: 16 16 16 (!) 11   Temp: 97 7 °F (36 5 °C)   98 1 °F (36 7 °C)   TempSrc: Oral      SpO2: 99% 99% 100% 99%   Weight:       Height:         Temp (24hrs), Av 8 °F (36 6 °C), Min:97 1 °F (36 2 °C), Max:99 1 °F (37 3 °C)  Current: Temperature: 98 1 °F (36 7 °C)  HR:  60-90  BP:  109-162/66-97  RR:  10-35  SpO2:   Nasal canula 4L    Respiratory:  SpO2: SpO2: 99 %, SpO2 Activity: SpO2 Activity: At Rest  Nasal Cannula O2 Flow Rate (L/min): 4 L/min    Invasive/non-invasive ventilation settings   Respiratory    Lab Data (Last 4 hours)    None         O2/Vent Data (Last 4 hours)    None                Physical Exam  Constitutional:       Appearance: Normal appearance     HENT:      Right Ear: External ear normal       Left Ear: External ear normal       Mouth/Throat:      Mouth: Mucous membranes are moist       Pharynx: Oropharynx is clear  Eyes:      Extraocular Movements: Extraocular movements intact  Pupils: Pupils are equal, round, and reactive to light  Cardiovascular:      Rate and Rhythm: Normal rate and regular rhythm  Pulses: Normal pulses  Heart sounds: Normal heart sounds  Pulmonary:      Effort: Pulmonary effort is normal       Breath sounds: Normal breath sounds  Comments: Crepitus felt around neck and left chest   Abdominal:      General: Abdomen is flat  Palpations: Abdomen is soft  Musculoskeletal:         General: Normal range of motion  Cervical back: Normal range of motion and neck supple  Skin:     General: Skin is warm and dry  Capillary Refill: Capillary refill takes less than 2 seconds  Neurological:      Mental Status: She is alert and oriented to person, place, and time  Cranial Nerves: No cranial nerve deficit        Comments: EOM baseline nystagmus   Psychiatric:         Mood and Affect: Mood normal          Behavior: Behavior normal          Laboratory and Diagnostics:  Results from last 7 days   Lab Units 08/21/21  1021 08/20/21  0513 08/19/21  0544 08/18/21  1257 08/18/21  0533 08/17/21  0454 08/16/21  0557   WBC Thousand/uL 20 18* 18 85* 22 26*  --  16 74* 17 07* 12 04*   HEMOGLOBIN g/dL 13 6 12 3 12 2  --  13 8 14 4 15 0   I STAT HEMOGLOBIN g/dl  --   --   --  12 2  --   --   --    HEMATOCRIT % 41 9 37 2 37 6  --  42 1 43 8 45 1   HEMATOCRIT, ISTAT %  --   --   --  36  --   --   --    PLATELETS Thousands/uL 265 236 259  --  334 355 357   NEUTROS PCT % 90* 89* 87*  --  88*  --   --    MONOS PCT % 4 6 9  --  5  --   --      Results from last 7 days   Lab Units 08/21/21  1021 08/21/21  0615 08/20/21  1501 08/20/21  0513 08/19/21  0542 08/18/21  1257 08/18/21  0533 08/17/21  0454   SODIUM mmol/L 136 137 138 139 143  --  138 139   POTASSIUM mmol/L 3 6 4 1 4 2 3 8 3 9  -- 3 9 4 3   CHLORIDE mmol/L 105 105 108 108 112*  --  112* 110*   CO2 mmol/L 27 25 23 27 28  --  19* 21   CO2, I-STAT mmol/L  --   --   --   --   --  22  --   --    ANION GAP mmol/L 4 7 7 4 3*  --  7 8   BUN mg/dL 18 21 20 20 22  --  21 23   CREATININE mg/dL 0 74 0 70 0 78 0 77 0 68  --  0 76 0 81   CALCIUM mg/dL 9 5 9 1 9 1 8 8 8 9  --  9 2 9 4   GLUCOSE RANDOM mg/dL 109 125 140 134 119  --  99 116   ALT U/L 37  --   --   --   --   --   --   --    AST U/L 36  --   --   --   --   --   --   --    ALK PHOS U/L 52  --   --   --   --   --   --   --    ALBUMIN g/dL 3 2*  --   --   --   --   --   --   --    TOTAL BILIRUBIN mg/dL 0 35  --   --   --   --   --   --   --      Results from last 7 days   Lab Units 08/21/21  0615 08/20/21  1501 08/20/21  0513   MAGNESIUM mg/dL 2 4 2 6 2 5   PHOSPHORUS mg/dL 3 5 2 6* 3 5      Results from last 7 days   Lab Units 08/21/21  1021 08/19/21  0542 08/18/21  0745   INR  0 91 1 00 0 92   PTT seconds  --  22* 23      Results from last 7 days   Lab Units 08/19/21  0757   TROPONIN I ng/mL <0 02           Micro          Imaging:  I have personally reviewed pertinent films in PACS   CT head wo contrast     Result Date: 8/19/2021  Impression: Postoperative change status post left retromastoid craniotomy for resection of CP angle mass  There is a moderate amount of air within the extra-axial spaces of both the CP angle and supratentorial brain parenchyma, improving compared to the prior examination  Small, mildly complex extra-axial collection of fluid within the lateral aspect of the posterior fossa and CP angle, grossly unchanged in size with slight mass effect  Stable mild ventriculomegaly of the lateral ventricles and 3rd ventricle  Workstation performed: [id number]      CT head wo contrast     Result Date: 8/18/2021  Impression: Left retromastoid, infra sigmoid surgery for left CP angle mass resection   Persistent deformity of the left brainstem and cerebellum with mild compression of the 4th ventricle  Stable lateral and 3rd ventriculomegaly  Small amount of subarachnoid hemorrhage at the craniocervical junction extending into the upper cervical spine  Postoperative intracranial and extracranial pneumocephalus  I personally discussed this study with Marie James on 8/18/2021 at 9:07 PM  Workstation performed: [id number]      CT head without contrast     Result Date: 8/14/2021  Impression: Moderate hydrocephalus with the size of the ventricles not significantly changed when compared to a recent CT brain dated August 8, 2021  Again, the findings are likely obstructive in nature or secondary to a 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 dated August 5, 2021  Workstation performed: [id number]      X-ray chest 1 view     Result Date: 8/18/2021  Impression: Left subclavian central line tip is in the upper SVC  Left side tension pneumothorax  I personally reviewed this study with Dr Anais Vick  on 8/18/2021 at 5:52 PM  Workstation performed: [id number]      XR chest portable ICU     Result Date: 8/19/2021  Impression: 1  Reexpansion of the left lung following chest tube placement with no evidence of residual pneumothorax  2   Subsegmental atelectasis and/or consolidation in the base of the left lower lobe  Workstation performed: [id number]      XR chest portable ICU     Result Date: 8/19/2021  Impression: Left chest tube pulled back with question trace left apical pneumothorax  Mild left base atelectasis  Workstation performed: [id number]      MRI brain IAC wo and w contrast     Result Date: 8/20/2021  Impression: Patient is status post removal of the vast majority of the previously identified left CP angle tumor  There is some residual enhancing tumor remaining in the anterior aspect of the CP angle with improving mass effect upon the brainstem and brachium pontis   There is no restricted diffusion identified within the left posterior lateral fabian and brachium pontis consistent with acute ischemia  Persistent mild hydrocephalus involving the lateral ventricles and 3rd ventricle  Increased FLAIR signal adjacent to the surface of the lateral ventricles may represent transependymal flow of CSF, unchanged  4th ventricle is not enlarged and there is improving mass effect upon the 4th ventricle  This examination was marked "immediate notification" in Epic in order to begin the standard process by which the radiology reading room liaison alerts the referring practitioner  Workstation performed: [id number]       Intake and Output  I/O       08/20 0701 - 08/21 0700 08/21 0701 - 08/22 0700    P  O  1830 900    I V  (mL/kg) 20 (0 3) 353 8 (4 8)    Total Intake(mL/kg) 1850 (24 9) 1253 8 (16 9)    Urine (mL/kg/hr) 3885 (2 2) 1875 (1 1)    Chest Tube 0     Total Output 3885 1875    Net -2035 -621 3          Unmeasured Urine Occurrence  1 x          Height and Weights   Height: 5' 5 5" (166 4 cm)  IBW (Ideal Body Weight): 58 15 kg  Body mass index is 26 88 kg/m²  Weight (last 2 days)     None            Nutrition       Diet Orders   (From admission, onward)             Start     Ordered    08/21/21 1229  Diet Regular; Regular House  Diet effective now     Question Answer Comment   Diet Type Regular    Regular Regular House    RD to adjust diet per protocol?  Yes        08/21/21 1228                Active Medications  Scheduled Meds:  Current Facility-Administered Medications   Medication Dose Route Frequency Provider Last Rate    acetaminophen  975 mg Oral Q8H [address] Sravanthi Francis PA-C      acetaZOLAMIDE  125 mg Oral Daily Jermaine Terrazas DO      cefazolin  2,000 mg Intravenous Once Pamela Barnett MD      cyclobenzaprine  10 mg Oral TID PRN ONELIA Tomas      dexamethasone  4 mg Oral Q8H [address] Susi Reed PA-C      Followed by   Анна Watson ON 8/23/2021] dexamethasone  2 mg Oral Q6H [address] Susi Reed PA-C      Followed by   Анна Watson ON 8/25/2021] dexamethasone  2 mg Oral Q12H [facility] Susi Reed PA-C      Followed by   Alex Royal ON 8/27/2021] dexamethasone  2 mg Oral Daily Susi Reed PA-C      diazepam  2 5 mg Intravenous Q6H PRN ONELIA Willis      escitalopram  5 mg Oral Daily ONELIA Willis      heparin (porcine)  5,000 Units Subcutaneous [facility] Sadia Smith MD      HYDROmorphone  0 5 mg Intravenous Q4H PRN Jeanie Howell,       Labetalol HCl  10 mg Intravenous Q4H PRN ONELIA Willis      lidocaine  1 patch Topical Daily Roland Ortega, DO      losartan  50 mg Oral Daily Jeanie Howell, DO      naloxone  0 04 mg Intravenous Q1MIN PRN Yesy Trujillo PA-C      ondansetron  4 mg Intravenous Q6H PRN ONELIA Willis      oxyCODONE  10 mg Oral Q4H PRN Yesy Trujillo, MAGDA      oxyCODONE  5 mg Oral Q4H PRN Sravanthi Francis PA-C       Continuous Infusions:     PRN Meds:   cyclobenzaprine, 10 mg, TID PRN  diazepam, 2 5 mg, Q6H PRN  HYDROmorphone, 0 5 mg, Q4H PRN  Labetalol HCl, 10 mg, Q4H PRN  naloxone, 0 04 mg, Q1MIN PRN  ondansetron, 4 mg, Q6H PRN  oxyCODONE, 10 mg, Q4H PRN  oxyCODONE, 5 mg, Q4H PRN        Allergies   No Known Allergies

## 2021-08-22 NOTE — QUICK NOTE
L CT removed in routine fashion without incident  Procedure well tolerated  Sterile dressing applied  PLAN:  - portable CXR

## 2021-08-22 NOTE — ASSESSMENT & PLAN NOTE
Routine EEG performed with no detection of seizure activity    · Continue with decadron taper  · Continue to monitor

## 2021-08-22 NOTE — PROGRESS NOTES
Progress Note - Thoracic Surgery   [place] 55 y o  female MRN: [id number]  Unit/Bed#: ICU 05 Encounter: [id number]    Assessment:  56 yo F with iatrogenic left sided pneumothorax  IR L apical chest tube was placed yesterday  CXR showed resolution of pneumothorax  Less subcutaneous emphysema    L CT no AL no output  L IR CT +AL, no output    Plan:   Diet as tolerated   Maintain chest tubes to wall suction   Consider removing previous chest tube   Other care per primary    Subjective/Objective     Subjective:   Swelling is better, feeling better    Objective:    Blood pressure 140/81, pulse 68, temperature 98 1 °F (36 7 °C), resp  rate (!) 11, height 5' 5 5" (1 664 m), weight 74 4 kg (164 lb 0 4 oz), SpO2 99 %, not currently breastfeeding  ,Body mass index is 26 88 kg/m²        Intake/Output Summary (Last 24 hours) at 8/22/2021 0647  Last data filed at 8/22/2021 0500  Gross per 24 hour   Intake 1253 75 ml   Output 1875 ml   Net -621 25 ml       Invasive Devices     Peripheral Intravenous Line            Peripheral IV 08/21/21 Right Antecubital <1 day          Drain            Chest Tube Left 1 day    Chest Tube 1 Left Second intercostal space 10 Fr  <1 day                Physical Exam:   Gen:  NAD  CV:  warm, well-perfused  Lungs: nl effort, CT x 2 in place in left  Abd:  soft, NT/ND  Ext:  no CCE  Neuro: A&Ox3     Results from last 7 days   Lab Units 08/21/21  1021 08/20/21  0513 08/19/21  0544   WBC Thousand/uL 20 18* 18 85* 22 26*   HEMOGLOBIN g/dL 13 6 12 3 12 2   HEMATOCRIT % 41 9 37 2 37 6   PLATELETS Thousands/uL 265 236 259     Results from last 7 days   Lab Units 08/21/21  1021 08/21/21  0615 08/20/21  1501 08/18/21  1257   POTASSIUM mmol/L 3 6 4 1 4 2  --    CHLORIDE mmol/L 105 105 108  --    CO2 mmol/L 27 25 23  --    CO2, I-STAT mmol/L  --   --   --  22   BUN mg/dL 18 21 20  --    CREATININE mg/dL 0 74 0 70 0 78  --    GLUCOSE, ISTAT mg/dl  --   --   --  116   CALCIUM mg/dL 9 5 9 1 9 1  -- Results from last 7 days   Lab Units 08/21/21  1021 08/19/21  0542 08/18/21  0745   INR  0 91 1 00 0 92   PTT seconds  --  22* 23

## 2021-08-23 PROCEDURE — 99232 SBSQ HOSP IP/OBS MODERATE 35: CPT | Performed by: PHYSICIAN ASSISTANT

## 2021-08-23 PROCEDURE — 99024 POSTOP FOLLOW-UP VISIT: CPT | Performed by: PHYSICIAN ASSISTANT

## 2021-08-23 PROCEDURE — 99232 SBSQ HOSP IP/OBS MODERATE 35: CPT | Performed by: THORACIC SURGERY (CARDIOTHORACIC VASCULAR SURGERY)

## 2021-08-23 RX ADMIN — DEXAMETHASONE 2 MG: 2 TABLET ORAL at 05:13

## 2021-08-23 RX ADMIN — ACETAMINOPHEN 975 MG: 325 TABLET, FILM COATED ORAL at 13:23

## 2021-08-23 RX ADMIN — ACETAMINOPHEN 975 MG: 325 TABLET, FILM COATED ORAL at 05:14

## 2021-08-23 RX ADMIN — DEXAMETHASONE 2 MG: 2 TABLET ORAL at 17:27

## 2021-08-23 RX ADMIN — DEXAMETHASONE 2 MG: 2 TABLET ORAL at 13:23

## 2021-08-23 RX ADMIN — ACETAMINOPHEN 975 MG: 325 TABLET, FILM COATED ORAL at 21:44

## 2021-08-23 RX ADMIN — HEPARIN SODIUM 5000 UNITS: 5000 INJECTION INTRAVENOUS; SUBCUTANEOUS at 21:44

## 2021-08-23 RX ADMIN — LOSARTAN POTASSIUM 50 MG: 50 TABLET, FILM COATED ORAL at 08:31

## 2021-08-23 RX ADMIN — DEXAMETHASONE 2 MG: 2 TABLET ORAL at 23:55

## 2021-08-23 RX ADMIN — HEPARIN SODIUM 5000 UNITS: 5000 INJECTION INTRAVENOUS; SUBCUTANEOUS at 13:23

## 2021-08-23 RX ADMIN — ESCITALOPRAM 5 MG: 5 TABLET, FILM COATED ORAL at 08:30

## 2021-08-23 RX ADMIN — OXYCODONE HYDROCHLORIDE 10 MG: 10 TABLET ORAL at 10:29

## 2021-08-23 RX ADMIN — HEPARIN SODIUM 5000 UNITS: 5000 INJECTION INTRAVENOUS; SUBCUTANEOUS at 05:13

## 2021-08-23 NOTE — ASSESSMENT & PLAN NOTE
· Per record review, patient developed an iatrogenic pneumothorax s/p left subclavian central line placement and had a chest tube placed but the chest tube, and she had a new chest tube placed    The patient then had another chest tube placed by IR and the other chest tube was removed by Thoracic Surgery who is now following  · Chest tube management per Thoracic Surgery

## 2021-08-23 NOTE — PROGRESS NOTES
[address] [facility]  Progress Note - Daphney Jackson 1965, 55 y o  female MRN: [id number]  Unit/Bed#: [facility] 624-01 Encounter: [id number]  Primary Care Provider: Doc Arellano MD   Date and time admitted to hospital: 8/14/2021  7:56 PM    Fall with possible seizure-like activity [facility])  Assessment & Plan  · Patient reported 3 falls since recent discharge  · The first one she reports falling onto her knees while going into the house after her ENT appointment Friday  · The second one she reports falling onto her knees while going into the bathroom at home Saturday  · The third one she reports falling forward and being able to hear those around her but was unable to respond  She reports this lasted for approximately 1 minute witnesses told her  Also report of bilateral hand shaking and possible bowel incontinence during the event  · Neurology and Cardiology were following given oncern for POTS and convulsive syncope   · Cardiology consult appreciated  - they did not believe these syncopal episodes are consistent with POTS  · EEG 8/15 was normal  · Continue seizure precautions  · Neurosurgery following  · PT/OT evaluations  · Fall precautions    * 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[facility] [address] )  Assessment & Plan  · Patient was recently admitted 8/8/21-8/12/21  · She was seen by Neurosurgery during recent admission and was placed on steroid taper and Diamox  · Neurosurgery consult appreciated  · On Decadron taper per Neurosurgery  · Now off Diamox  · S/p in each guided left retromastoid craniotomy for debulking of CP angle mass 8/18/21  · Post-op MRI brain revealed, per the results report, "Patient is status post removal of the vast majority of the previously identified left CP angle tumor  There is some residual enhancing tumor remaining in the anterior aspect of the CP angle with improving mass effect upon the brainstem and brachium pontis   There is no restricted diffusion identified within the left posterior lateral fabian and brachium pontis consistent with acute ischemia  Persistent mild hydrocephalus involving the lateral ventricles and 3rd ventricle  Increased FLAIR signal adjacent to the surface of the lateral ventricles may represent transependymal flow of CSF, unchanged  4th ventricle is not enlarged and there is improving mass effect upon the 4th ventricle "    Pneumothorax  Assessment & Plan  · Per record review, patient developed an iatrogenic pneumothorax s/p left subclavian central line placement and had a chest tube placed but the chest tube, and she had a new chest tube placed  The patient then had another chest tube placed by IR and the other chest tube was removed by Thoracic Surgery who is now following  · Chest tube management per Thoracic Surgery     Hydrocephalus [facility])  Assessment & Plan  · Neurosurgery following  · Now off Diamox    Hypertension  Assessment & Plan  · Continue losartan 50 mg daily with hold parameters  · Monitor blood pressure per protocol      Anxiety  Assessment & Plan  · Continue Lexapro    Leukocytosis  Assessment & Plan  · Suspect steroid-induced  · Monitor     Headache  Assessment & Plan  · Neurology and neurosurgery following  · Continue current regimen        VTE Pharmacologic Prophylaxis: VTE Score: 4 Moderate Risk (Score 3-4) - Pharmacological DVT Prophylaxis Ordered: heparin  Patient Centered Rounds: I performed bedside rounds with nursing staff today  d/w [address] [place] [address] with Specialists or Other Care Team Provider:      Education and Discussions with Family / Patient: Patient declined call to   Time Spent for Care: 30 minutes  More than 50% of total time spent on counseling and coordination of care as described above      Current Length of Stay: 8 day(s)  Current Patient Status: Inpatient   Certification Statement: The patient will continue to require additional inpatient hospital stay due to with chest tube as above  Discharge Plan: pending neurosurgery clearance and plan re: chest tube per Thoracic Sx    Code Status: Level 1 - Full Code    Subjective:   Ms Kip Matos reports that she has not been sleeping well     Objective:     Vitals:   Temp (24hrs), Av °F (36 7 °C), Min:97 4 °F (36 3 °C), Max:98 7 °F (37 1 °C)    Temp:  [97 4 °F (36 3 °C)-98 7 °F (37 1 °C)] 97 4 °F (36 3 °C)  HR:  [] 69  Resp:  [16-56] 16  BP: (124-153)/(72-96) 153/96  SpO2:  [95 %-98 %] 96 %  Body mass index is 28 18 kg/m²  Input and Output Summary (last 24 hours): Intake/Output Summary (Last 24 hours) at 2021 1107  Last data filed at 2021 0645  Gross per 24 hour   Intake --   Output 1250 ml   Net -1250 ml       Physical Exam:   Physical Exam  Vitals and nursing note reviewed  Constitutional:       Comments: Patient seen sitting in bed comfortably resting, NAD   Cardiovascular:      Rate and Rhythm: Normal rate and regular rhythm  Pulmonary:      Effort: Pulmonary effort is normal       Breath sounds: Normal breath sounds  Comments: Left sided chest tube  Abdominal:      General: Bowel sounds are normal       Palpations: Abdomen is soft  Tenderness: There is no abdominal tenderness  Musculoskeletal:      Right lower leg: No edema  Left lower leg: No edema  Skin:     General: Skin is warm  Neurological:      Mental Status: She is alert and oriented to person, place, and time     Psychiatric:         Mood and Affect: Mood normal          Behavior: Behavior normal          Additional Data:     Labs:  Results from last 7 days   Lab Units 21  1021   WBC Thousand/uL 20 18*   HEMOGLOBIN g/dL 13 6   HEMATOCRIT % 41 9   PLATELETS Thousands/uL 265   NEUTROS PCT % 90*   LYMPHS PCT % 5*   MONOS PCT % 4   EOS PCT % 0     Results from last 7 days   Lab Units 21  1021   SODIUM mmol/L 136   POTASSIUM mmol/L 3 6   CHLORIDE mmol/L 105   CO2 mmol/L 27   BUN mg/dL 18   CREATININE mg/dL 0 74   ANION GAP mmol/L 4   CALCIUM mg/dL 9 5   ALBUMIN g/dL 3 2*   TOTAL BILIRUBIN mg/dL 0 35   ALK PHOS U/L 52   ALT U/L 37   AST U/L 36   GLUCOSE RANDOM mg/dL 109     Results from last 7 days   Lab Units 08/21/21  1021   INR  0 91     Results from last 7 days   Lab Units 08/18/21  1651 08/17/21  1603   POC GLUCOSE mg/dl 120 162*               Lines/Drains:  Invasive Devices     Peripheral Intravenous Line            Peripheral IV 08/21/21 Right Antecubital 2 days          Drain            Chest Tube 1 Left Second intercostal space 10 Fr  2 days                      Imaging: Reviewed radiology reports from this admission including: MRI brain    Recent Cultures (last 7 days):         Last 24 Hours Medication List:   Current Facility-Administered Medications   Medication Dose Route Frequency Provider Last Rate    acetaminophen  975 mg Oral [facility] Irene Terrell MD      cyclobenzaprine  10 mg Oral TID PRN Irene Terrell MD      dexamethasone  2 mg Oral Q6H [address] Irene Terrell MD      Followed by   Boubacar Armstrong ON 8/25/2021] dexamethasone  2 mg Oral Q12H Pamela Samuel MD      Followed by   Boubacar Armstrong ON 8/27/2021] dexamethasone  2 mg Oral Daily Irene Terrell MD      diazepam  2 5 mg Intravenous Q6H PRN Irene Terrell MD      escitalopram  5 mg Oral Daily Irene Terrell MD      heparin (porcine)  5,000 Units Subcutaneous [facility] [address] Irene Terrell MD      HYDROmorphone  0 5 mg Intravenous Q4H PRN Irene Terrell MD      Labetalol HCl  10 mg Intravenous Q4H PRN Irene Terrell MD      lidocaine  1 patch Topical Daily Irene Terrell MD      losartan  50 mg Oral Daily Irene Terrell MD      naloxone  0 04 mg Intravenous Q1MIN PRN Irene Terrell MD      ondansetron  4 mg Intravenous Q6H PRN Irene Terrell MD      oxyCODONE  10 mg Oral Q4H PRN Irene Terrell MD      oxyCODONE  5 mg Oral Q4H PRN Irene Terrell MD          Today, Patient Was Seen By: Yael Childs PA-C    **Please Note: This note may have been constructed using a voice recognition system  **

## 2021-08-23 NOTE — PROGRESS NOTES
[facility]  Progress Note - [place]a [place] 1965, 55 y o  female MRN: [id number]  Unit/Bed#: [facility] 624-01 Encounter: [id number]  Primary Care Provider: Ellen Adame MD   Date and time admitted to hospital: 8/14/2021  7:56 PM    * Cerebellopontine angle tumor [facility])  Assessment & Plan  POD5 Image guided left retromastoid craniotomy for debulking of CP angle mass (8/18/2021 Dr Ney Pereira)  · Originally presented on 8/8/21 with headaches, vertigo and hearing loss (per tympanogram left ear hearing at 24%)  · Presented again 8/14 with transient LOC after fall with questionable seizure-like activity  · Preop [facility] Brackman grade 3 facial nerve palsy  Some left rapid horizontal nystagmus  Slight right tongue deviation  Imaging:   · CT head without 8/18/2021: Postoperative change status post left retromastoid craniotomy for resection of CP angle mass  There is a moderate amount of air within the extra-axial spaces of both the CP angle and supratentorial brain parenchyma, improving compared to the prior examination  Small, mildly complex extra-axial collection of fluid within the lateral aspect of the posterior fossa and CP angle, grossly unchanged in size with slight mass effect  Stable mild ventriculomegaly of the lateral ventricles and 3rd ventricle  · CT head wo 8/19/2021: Left retromastoid, infra sigmoid surgery for left CP angle mass resection  Persistent deformity of the left brainstem and cerebellum with mild compression of the 4th ventricle  Stable lateral and 3rd ventriculomegaly  Small amount of subarachnoid hemorrhage at the craniocervical junction extending into the upper cervical spine  Postoperative intracranial and extracranial pneumocephalus  · MRI brain IAC w/wo 8/19/2021:  Status post resection of majority of the left CP angle tumor with small residual   Improving mass effect upon the brainstem and 4th ventricle    New diffusion restricted noted in the left posterior lateral fabian  Persistent mild hydrocephalus  Plan:   · Continue monitor neurological exam    · STAT CT head with any neurological decline including drop GCS of 2pts within 1 hr   · Continue decadron 2mg Q 6H - continue quick taper as ordered  · Monitor BMP - Na 136  · Medical management and pain control per primary team   · Mobilize with PT/OT - recommending rehab  · DVT prophylaxis:  Bilateral SCDs  HSQ  Neurosurgery will follow from the periphery for the remainder of her hospitalization  Patient is medically stable from neurosurgical standpoint to work towards rehab when medically appropriate  Call with any further questions or concerns  Plan of care d/w primary SLIM team - [facility]          Pneumothorax  Assessment & Plan  Patient with left tension PTX s/p left CT placement   Chest to came out on 08/20 requiring replacement on the evening of 8/20  Patient subsequently developed subcutaneous emphysema  New chest tube was placed by Interventional Radiology which remains in place at this time  Management per surgical team      Headache  Assessment & Plan  monitor  Pain medications as ordered    Leukocytosis  Assessment & Plan  Likely steroid induced  No fevers  Trend WBC      Fall with possible seizure-like activity (HCC)  Assessment & Plan  · Routine EEG negative  · See above plan    Hydrocephalus [facility])  Assessment & Plan  · Evident on MRI brain and CT head  On repeat CT head hydrocephalus appears stable  · Expect to slowly improve with time  Improved mass effect on 4th ventricle  Subjective/Objective   Chief Complaint: I'm doing okay    Subjective:  Patient states she is overall doing better  She states that the swelling in her chest and neck associated with subcutaneous emphysema and chest tube is improving  Patient original test chest tube placed following surgery for left tension pneumothorax  The tube was displaced on Friday 820 required replacement    Patient subsequently developed subcutaneous emphysema new tube was placed by Interventional Radiology  She continues with one chest tube at this time  Patient with expected soreness at incision  She denies any headaches or head pressure  She notes left-sided sensation changes diffusely  Denies any facial weakness  Speech at baseline  Denies any focal weakness or sensation changes of her extremities  States vision is improving and no longer seeing objects upside down but continues with some blurry vision  Admits to irritation of her left eye  We discussed consideration for eyedrops  Continued no hearing in left ear  Did note some intermittent but sitting noise  Objective:  Sitting up in bed  NAD  I/O       08/21 0701 - 08/22 0700 08/22 0701 - 08/23 0700 08/23 0701 - 08/24 0700    P  O  900      I V  (mL/kg) 353 8 (4 8) 51 3 (0 7)     Total Intake(mL/kg) 1253 8 (16 9) 51 3 (0 7)     Urine (mL/kg/hr) 1875 (1 1) 1910 (1)     Chest Tube 0 0     Total Output 1875 1910     Net -621 3 -1858 7            Unmeasured Urine Occurrence 1 x            Invasive Devices     Peripheral Intravenous Line            Peripheral IV 08/21/21 Right Antecubital 2 days          Drain            Chest Tube 1 Left Second intercostal space 10 Fr  2 days                Physical Exam:  Vitals: Blood pressure 140/82, pulse (!) 135, temperature (!) 97 4 °F (36 3 °C), resp  rate 16, height 5' 5 5" (1 664 m), weight 76 8 kg (169 lb 5 oz), SpO2 96 %, not currently breastfeeding  ,Body mass index is 28 18 kg/m²      General appearance: alert, appears stated age, cooperative and no distress  Head: Normocephalic, without obvious abnormality, incision CDI with expected ecchymosis  Eyes: EOMI, PERRL  Neck: supple, symmetrical, trachea midline  Chest:  Bilateral subcutaneous emphysema  Lungs: non labored breathing, chest tube in place  Heart: regular heart rate  Neurologic:   Mental status: Alert, oriented, thought content appropriate  Cranial nerves: grossly intact (Cranial nerves II-XII) except left hearing loss and abnormal left facial sensation to light touch  Sensory: normal to LT x4  Motor: moving all extremities without focal weakness   Coordination: finger to nose normal bilaterally, no drift bilaterally    Lab Results:  Results from last 7 days   Lab Units 08/21/21  1021 08/20/21  0513 08/19/21  0544   WBC Thousand/uL 20 18* 18 85* 22 26*   HEMOGLOBIN g/dL 13 6 12 3 12 2   HEMATOCRIT % 41 9 37 2 37 6   PLATELETS Thousands/uL 265 236 259   NEUTROS PCT % 90* 89* 87*   MONOS PCT % 4 6 9     Results from last 7 days   Lab Units 08/21/21  1021 08/21/21  0615 08/20/21  1501 08/18/21  1257   POTASSIUM mmol/L 3 6 4 1 4 2  --    CHLORIDE mmol/L 105 105 108  --    CO2 mmol/L 27 25 23  --    CO2, I-STAT mmol/L  --   --   --  22   BUN mg/dL 18 21 20  --    CREATININE mg/dL 0 74 0 70 0 78  --    CALCIUM mg/dL 9 5 9 1 9 1  --    ALK PHOS U/L 52  --   --   --    ALT U/L 37  --   --   --    AST U/L 36  --   --   --    GLUCOSE, ISTAT mg/dl  --   --   --  116     Results from last 7 days   Lab Units 08/21/21  0615 08/20/21  1501 08/20/21  0513   MAGNESIUM mg/dL 2 4 2 6 2 5     Results from last 7 days   Lab Units 08/21/21  0615 08/20/21  1501 08/20/21  0513   PHOSPHORUS mg/dL 3 5 2 6* 3 5     Results from last 7 days   Lab Units 08/21/21  1021 08/19/21  0542 08/18/21  0745   INR  0 91 1 00 0 92   PTT seconds  --  22* 23     No results found for: TROPONINT  ABG:No results found for: PHART, XAI8DBM, PO2ART, WWA7QRS, N2QRDQGZ, BEART, SOURCE    Imaging Studies: I have personally reviewed pertinent reports  and I have personally reviewed pertinent films in PACS    CT head wo contrast    Result Date: 8/19/2021  Impression: Postoperative change status post left retromastoid craniotomy for resection of CP angle mass    There is a moderate amount of air within the extra-axial spaces of both the CP angle and supratentorial brain parenchyma, improving compared to the prior examination  Small, mildly complex extra-axial collection of fluid within the lateral aspect of the posterior fossa and CP angle, grossly unchanged in size with slight mass effect  Stable mild ventriculomegaly of the lateral ventricles and 3rd ventricle  Workstation performed: [id number]     CT head wo contrast    Result Date: 8/18/2021  Impression: Left retromastoid, infra sigmoid surgery for left CP angle mass resection  Persistent deformity of the left brainstem and cerebellum with mild compression of the 4th ventricle  Stable lateral and 3rd ventriculomegaly  Small amount of subarachnoid hemorrhage at the craniocervical junction extending into the upper cervical spine  Postoperative intracranial and extracranial pneumocephalus  I personally discussed this study with Briana Platt on 8/18/2021 at 9:07 PM  Workstation performed: [id number]     CT head without contrast    Result Date: 8/14/2021  Impression: Moderate hydrocephalus with the size of the ventricles not significantly changed when compared to a recent CT brain dated August 8, 2021  Again, the findings are likely obstructive in nature or secondary to a 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 dated August 5, 2021  Workstation performed: [id number]     MRI brain IAC wo and w contrast    Result Date: 8/20/2021  Impression: Patient is status post removal of the vast majority of the previously identified left CP angle tumor  There is some residual enhancing tumor remaining in the anterior aspect of the CP angle with improving mass effect upon the brainstem and brachium pontis  There is no restricted diffusion identified within the left posterior lateral fabian and brachium pontis consistent with acute ischemia  Persistent mild hydrocephalus involving the lateral ventricles and 3rd ventricle    Increased FLAIR signal adjacent to the surface of the lateral ventricles may represent transependymal flow of CSF, unchanged  4th ventricle is not enlarged and there is improving mass effect upon the 4th ventricle  This examination was marked "immediate notification" in Epic in order to begin the standard process by which the radiology reading room liaison alerts the referring practitioner    Workstation performed: [id number]       EKG, Pathology, and Other Studies: none    VTE Pharmacologic Prophylaxis: Heparin    VTE Mechanical Prophylaxis: sequential compression device

## 2021-08-23 NOTE — ASSESSMENT & PLAN NOTE
Patient with left tension PTX s/p left CT placement   Chest to came out on 08/20 requiring replacement on the evening of 8/20  Patient subsequently developed subcutaneous emphysema  New chest tube was placed by Interventional Radiology which remains in place at this time      Management per surgical team

## 2021-08-23 NOTE — CASE MANAGEMENT
A post acute care recommendation was made by your care team for STR  Discussed Freedom of Choice with patient  List of facilities given to patient via in person  patient aware the list is custom filtered for them by zip code location and that [facility] post acute providers are designated  Patient is requesting referral to [facility] and [address] [place] [address],[address], does not want SNF at this time    Referrals entered in [facility]

## 2021-08-23 NOTE — ASSESSMENT & PLAN NOTE
POD5 Image guided left retromastoid craniotomy for debulking of CP angle mass (8/18/2021 Dr Randolph Peña)  · Originally presented on 8/8/21 with headaches, vertigo and hearing loss (per tympanogram left ear hearing at 24%)  · Presented again 8/14 with transient LOC after fall with questionable seizure-like activity  · Preop [facility] Brackman grade 3 facial nerve palsy  Some left rapid horizontal nystagmus  Slight right tongue deviation  Imaging:   · CT head without 8/18/2021: Postoperative change status post left retromastoid craniotomy for resection of CP angle mass  There is a moderate amount of air within the extra-axial spaces of both the CP angle and supratentorial brain parenchyma, improving compared to the prior examination  Small, mildly complex extra-axial collection of fluid within the lateral aspect of the posterior fossa and CP angle, grossly unchanged in size with slight mass effect  Stable mild ventriculomegaly of the lateral ventricles and 3rd ventricle  · CT head wo 8/19/2021: Left retromastoid, infra sigmoid surgery for left CP angle mass resection  Persistent deformity of the left brainstem and cerebellum with mild compression of the 4th ventricle  Stable lateral and 3rd ventriculomegaly  Small amount of subarachnoid hemorrhage at the craniocervical junction extending into the upper cervical spine  Postoperative intracranial and extracranial pneumocephalus  · MRI brain IAC w/wo 8/19/2021:  Status post resection of majority of the left CP angle tumor with small residual   Improving mass effect upon the brainstem and 4th ventricle  New diffusion restricted noted in the left posterior lateral fabian  Persistent mild hydrocephalus  Plan:   · Continue monitor neurological exam    · STAT CT head with any neurological decline including drop GCS of 2pts within 1 hr   · Continue decadron 2mg Q 6H - continue quick taper as ordered  · Monitor BMP - Na 136    · Medical management and pain control per primary team   · Mobilize with PT/OT - recommending rehab  · DVT prophylaxis:  Bilateral SCDs  HSQ  Neurosurgery will follow from the periphery for the remainder of her hospitalization  Patient is medically stable from neurosurgical standpoint to work towards rehab when medically appropriate  Call with any further questions or concerns   Plan of care d/w primary SLIM team - [facility]

## 2021-08-23 NOTE — PROGRESS NOTES
Progress Note -    Mell Swain 55 y o  female MRN: [id number]  Unit/Bed#: [facility] 624-01 Encounter: [id number]    Assessment:  47-yr old female with iatrogenic L pneumothorax; now s/p chest tube (x2) placement  Stable VS in room air  Subcutaneous emphysema reducing  L CT was removed yesterday; no PTX on post-pull CXR  L IR (anterior) CT: no output; with air leak on suction  PLAN:  - keep CT to suction  - diet as tolerated  - other care per primary team     Subjective:   - no new complaints this morning  Objective:     Vitals: Temp:  [97 9 °F (36 6 °C)-98 7 °F (37 1 °C)] 97 9 °F (36 6 °C)  HR:  [] 77  Resp:  [16-56] 18  BP: (124-148)/(72-90) 139/90  Body mass index is 28 18 kg/m²  I/O       08/21 0701 - 08/22 0700 08/22 0701 - 08/23 0700 08/23 0701 - 08/24 0700    P  O  900      I V  (mL/kg) 353 8 (4 8) 51 3 (0 7)     Total Intake(mL/kg) 1253 8 (16 9) 51 3 (0 7)     Urine (mL/kg/hr) 1875 (1 1) 1910 (1)     Chest Tube 0 0     Total Output 1875 1910     Net -621 3 -1858 7            Unmeasured Urine Occurrence 1 x            Physical Exam:  GEN: NAD  HEENT: atraumatic  CV: RRR  Lung: Normal effort  Ab: Soft, NT/ND  Extrem: No CCE  Neuro: A+Ox3    Lab, Imaging and other studies: I have personally reviewed pertinent reports    , CBC with diff: No results found for: WBC, HGB, HCT, MCV, PLT, ADJUSTEDWBC, MCH, MCHC, RDW, MPV, NRBC, BMP/CMP: No results found for: SODIUM, K, CL, CO2, ANIONGAP, BUN, CREATININE, GLUCOSE, CALCIUM, AST, ALT, ALKPHOS, PROT, BILITOT, EGFR  VTE Pharmacologic Prophylaxis: Heparin  VTE Mechanical Prophylaxis: sequential compression device

## 2021-08-24 ENCOUNTER — TELEPHONE (OUTPATIENT)
Dept: NEUROSURGERY | Facility: CLINIC | Age: 56
End: 2021-08-24

## 2021-08-24 LAB
ANION GAP SERPL CALCULATED.3IONS-SCNC: 6 MMOL/L (ref 4–13)
BUN SERPL-MCNC: 20 MG/DL (ref 5–25)
CALCIUM SERPL-MCNC: 8.9 MG/DL (ref 8.3–10.1)
CHLORIDE SERPL-SCNC: 102 MMOL/L (ref 100–108)
CO2 SERPL-SCNC: 27 MMOL/L (ref 21–32)
CREAT SERPL-MCNC: 0.63 MG/DL (ref 0.6–1.3)
ERYTHROCYTE [DISTWIDTH] IN BLOOD BY AUTOMATED COUNT: 13.5 % (ref 11.6–15.1)
GFR SERPL CREATININE-BSD FRML MDRD: 101 ML/MIN/1.73SQ M
GLUCOSE SERPL-MCNC: 100 MG/DL (ref 65–140)
HCT VFR BLD AUTO: 36 % (ref 34.8–46.1)
HGB BLD-MCNC: 12.1 G/DL (ref 11.5–15.4)
MCH RBC QN AUTO: 30.9 PG (ref 26.8–34.3)
MCHC RBC AUTO-ENTMCNC: 33.6 G/DL (ref 31.4–37.4)
MCV RBC AUTO: 92 FL (ref 82–98)
PLATELET # BLD AUTO: 270 THOUSANDS/UL (ref 149–390)
PMV BLD AUTO: 10.2 FL (ref 8.9–12.7)
POTASSIUM SERPL-SCNC: 4.2 MMOL/L (ref 3.5–5.3)
RBC # BLD AUTO: 3.91 MILLION/UL (ref 3.81–5.12)
SODIUM SERPL-SCNC: 135 MMOL/L (ref 136–145)
WBC # BLD AUTO: 16.83 THOUSAND/UL (ref 4.31–10.16)

## 2021-08-24 PROCEDURE — 99232 SBSQ HOSP IP/OBS MODERATE 35: CPT | Performed by: THORACIC SURGERY (CARDIOTHORACIC VASCULAR SURGERY)

## 2021-08-24 PROCEDURE — 99232 SBSQ HOSP IP/OBS MODERATE 35: CPT | Performed by: PHYSICIAN ASSISTANT

## 2021-08-24 PROCEDURE — 80048 BASIC METABOLIC PNL TOTAL CA: CPT | Performed by: PHYSICIAN ASSISTANT

## 2021-08-24 PROCEDURE — 85027 COMPLETE CBC AUTOMATED: CPT | Performed by: PHYSICIAN ASSISTANT

## 2021-08-24 RX ADMIN — ACETAMINOPHEN 975 MG: 325 TABLET, FILM COATED ORAL at 13:00

## 2021-08-24 RX ADMIN — ESCITALOPRAM 5 MG: 5 TABLET, FILM COATED ORAL at 08:51

## 2021-08-24 RX ADMIN — DEXAMETHASONE 2 MG: 2 TABLET ORAL at 05:25

## 2021-08-24 RX ADMIN — ACETAMINOPHEN 975 MG: 325 TABLET, FILM COATED ORAL at 21:23

## 2021-08-24 RX ADMIN — OXYCODONE HYDROCHLORIDE 10 MG: 10 TABLET ORAL at 11:06

## 2021-08-24 RX ADMIN — HEPARIN SODIUM 5000 UNITS: 5000 INJECTION INTRAVENOUS; SUBCUTANEOUS at 21:23

## 2021-08-24 RX ADMIN — DEXAMETHASONE 2 MG: 2 TABLET ORAL at 17:32

## 2021-08-24 RX ADMIN — ACETAMINOPHEN 975 MG: 325 TABLET, FILM COATED ORAL at 05:26

## 2021-08-24 RX ADMIN — OXYCODONE HYDROCHLORIDE 10 MG: 10 TABLET ORAL at 17:38

## 2021-08-24 RX ADMIN — OXYCODONE HYDROCHLORIDE 10 MG: 10 TABLET ORAL at 22:25

## 2021-08-24 RX ADMIN — HEPARIN SODIUM 5000 UNITS: 5000 INJECTION INTRAVENOUS; SUBCUTANEOUS at 05:25

## 2021-08-24 RX ADMIN — DEXAMETHASONE 2 MG: 2 TABLET ORAL at 11:06

## 2021-08-24 RX ADMIN — HEPARIN SODIUM 5000 UNITS: 5000 INJECTION INTRAVENOUS; SUBCUTANEOUS at 13:00

## 2021-08-24 RX ADMIN — LOSARTAN POTASSIUM 50 MG: 50 TABLET, FILM COATED ORAL at 08:51

## 2021-08-24 NOTE — ASSESSMENT & PLAN NOTE
· Per record review, patient developed an iatrogenic pneumothorax s/p left subclavian central line placement and had a chest tube placed but the chest tube came out, and she had a new chest tube placed    The patient then had another chest tube placed by IR and the other chest tube was removed by Thoracic Surgery who is now following  · Chest tube management per Thoracic Surgery

## 2021-08-24 NOTE — DISCHARGE INSTRUCTIONS
Discharge Instructions  Craniotomy for tumor resection     Activity:  1  Do not lift, push or pull more than 10 pounds for 2 weeks  2  Avoid bending, lifting and twisting for 2 weeks  No running  No athletic activities until cleared  3  No driving for at least 2 weeks or until cleared by Neurosurgery  4  When able to shower, continue to use clean towel and washcloth for 2 weeks post-op  5  Do not use a hair dryer, and avoid hair products such as mousse, oils, and gels  Do not brush your hair away from the incision since this will put strain on the suture line  6  Do not dye or perm hair for 6 weeks or until cleared by physician  7  Continue to change bed linens and pajamas more frequently  Wear clean clothes daily  8  May walk as tolerated  Recommend 4 short walks daily  Surgical incision care:  1  Keep dressings in place for 3 days  After 3 days, incisions may be left open to air, but should remain clean  2  Keep incisions dry for 3 days  3  May shower after 3 days using a baby shampoo including head incision  Rinse off shampoo and pat dry  4  Avoid rubbing the incision but gently massage hair  5  Do not immerse the incisions in water for 6 weeks  6  Staples/suture will be removed at your 2 week postoperative visit  7  Do not apply any creams or ointments to the incision, unless otherwise instructed by [facility]  8  Contact office if increasing redness, drainage, pain or swelling occurs around the incisions or if you develop a fever greater than 101F  9  Do not dye/perm hair or use any hair products until cleared by Neurosurgery  Postoperative medication:  1  [facility] will provide pain medication in the postoperative period  All prescriptions must come from a single practice  a  Take medications as prescribed  Call office with any questions/concerns  b  May use over the counter Tylenol  No NSAIDs (ie   Ibuprofen, Aleve, Advil, Naproxen)  2  Please contact office for questions regarding dosage and modifications  3  No antiplatelet or anticoagulation medication (ie  Coumadin, Aspirin, Plavix) until cleared by [email or web address], unless otherwise instructed  Please contact [facility] if you have any questions about the effects of any of your medications on blood clotting  4  Do not operate heavy machinery or vehicles while taking sedating medications  5  Use a bowel regimen while on opioids as they induce constipation  Ie  Senokot-S, Miralax, Colace, etc  Increase fiber and water intake  Follow-up as scheduled for a 2 week post-operative visit for an incision check and final pathology  ** Please notify the office if incision becomes red, swollen, tender, or has increased drainage, and temp>101  Return to the ER if you experience increased headache, drowsiness, weakness, nausea/vomiting, or seizures  **    Moderate Sedation   WHAT YOU NEED TO KNOW:   Moderate sedation, or conscious sedation, is medicine used during procedures to help you feel relaxed and calm  You will be awake and able to follow directions without anxiety or pain  You will remember little to none of the procedure  You may feel tired, weak, or unsteady on your feet after you get sedation  You may also have trouble concentrating or short-term memory loss  These symptoms should go away in 24 hours or less  DISCHARGE INSTRUCTIONS:   Call 911 or have someone else call for any of the following:   · You have sudden trouble breathing  · You cannot be woken  Seek care immediately if:   · You have a severe headache or dizziness  · Your heart is beating faster than usual   Contact your healthcare provider if:   · You have a fever  · You have nausea or are vomiting for more than 8 hours after the procedure  · Your skin is itchy, swollen, or you have a rash       · You have questions or concerns about your condition or care  Self-care:   · Have someone stay with you for 24 hours  This person can drive you to errands and help you do things around the house  This person can also watch for problems  · Rest and do quiet activities for 24 hours  Do not exercise, ride a bike, or play sports  Stand up slowly to prevent dizziness and falls  Take short walks around the house with another person  Slowly return to your usual activities the next day  · Do not drive or use dangerous machines or tools for 24 hours  You may injure yourself or others  Examples include a lawnmower, saw, or drill  Do not return to work for 24 hours if you use dangerous machines or tools for work  · Do not make important decisions for 24 hours  For example, do not sign important papers or invest money  · Drink liquids as directed  Liquids help flush the sedation medicine out of your body  Ask how much liquid to drink each day and which liquids are best for you  · Eat small, frequent meals to prevent nausea and vomiting  Start with clear liquids such as juice or broth  If you do not vomit after clear liquids, you can eat your usual foods  · Do not drink alcohol or take medicines that make you drowsy  This includes medicines that help you sleep and anxiety medicines  Ask your healthcare provider if it is safe for you to take pain medicine  Follow up with your healthcare provider as directed: Write down your questions so you remember to ask them during your visits  © 2017 [address] [employer] Information is for End User's use only and may not be sold, redistributed or otherwise used for commercial purposes  All illustrations and images included in CareNotes® are the copyrighted property of [employer]  or [employer]  The above information is an  only  It is not intended as medical advice for individual conditions or treatments   Talk to your doctor, nurse or pharmacist before following any medical regimen to see if it is safe and effective for you  Chest Tubes   WHAT YOU NEED TO KNOW:   A chest tube is also known as chest drain or chest drainage tube  It is a plastic tube that is put through the side of your chest  It uses a suction device to remove air, blood, or fluid from around your lungs or heart  A chest tube will help you breathe more easily  WHILE YOU ARE HERE:   Informed consent  is a legal document that explains the tests, treatments, or procedures that you may need  Informed consent means you understand what will be done and can make decisions about what you want  You give your permission when you sign the consent form  You can have someone sign this form for you if you are not able to sign it  You have the right to understand your medical care in words you know  Before you sign the consent form, understand the risks and benefits of what will be done  Make sure all your questions are answered  Rest:  Keep the head of your bed raised to help you breathe easier  You can also raise your head and shoulders up on pillows or rest in a reclining chair  If you feel short of breath, let caregivers know right away  A pulse oximeter  is a device that measures the amount of oxygen in your blood  A cord with a clip or sticky strip is placed on your finger, ear, or toe  The other end of the cord is hooked to a machine  Medicines:   · Antibiotics:  You may be given antibiotic medicine to prevent an infection  · Pain medicine:    Caregivers may give you medicine to take away or decrease your pain  ¨ Do not wait until the pain is severe to ask for your medicine  Tell caregivers if your pain does not decrease  The medicine may not work as well at controlling your pain if you wait too long to take it  ¨ Pain medicine can make you dizzy or sleepy  Prevent falls by calling a caregiver when you want to get out of bed or if you need help    Prevent problems with the chest tube: · Find a comfortable position:  You may have pain or discomfort while the chest tube is in  Lie in a different position to help decrease your pain  · Deep breathe and cough:  Deep breathing helps open the air passages in your lungs  Coughing helps to bring up mucus from your lungs  You can deep breathe and cough on your own or with the help of an incentive spirometer  ¨ Take a deep breath and hold the breath as long as you can  Then push the air out of your lungs with a deep, strong cough  Cough into a tissue and throw it away  Take 10 deep breaths in a row every hour that you are awake  Remember to follow each deep breath with a cough  ¨ An incentive spirometer can help you take deeper breaths  Put the plastic piece into your mouth and take a steady, deep breath in  Hold your breath as long as you can, and then breathe out  Use your incentive spirometer 10 times every hour that you are awake  · Check your chest tube for kinks or loops:  Keep the tube close to you when you are in bed, but do not lie on it  Do not let loops of tubing hang down the side of your bed  Be sure your tubing is long enough so that you can move and turn in bed without pulling on it  Never clamp the tube yourself  · Keep the chest tube drainage container below the level of your chest:  This will help fluids drain out from your chest to the container below  This will also help prevent fluids from flowing back into your chest      · Make sure your chest tube is secure: Make sure your chest tube is securely taped to your body  Your chest tube may also be taped to the suction device to help prevent the tubes from coming apart  · Do not turn knobs or change settings on your device unless a healthcare provider tells you to: If your chest tube device has water in it, the water should bubble gently, with short periods of no bubbling   If there is a lot of bubbling that does not stop, this may mean that there is an air leak   RISKS:   · You may get an infection in the area where the tube was inserted  The tube may damage body organs that are close to your lungs  Your chest tube may move out of place when you move or turn  If this happens, you may need to have another chest tube put in      · You may get a blood clot in your leg or arm  This can cause pain and swelling, and it can stop blood from flowing where it needs to go in your body  The blood clot can break loose and travel to your lungs  A blood clot in your lungs can cause chest pain and trouble breathing  This can be life-threatening  CARE AGREEMENT:   You have the right to help plan your care  Learn about your health condition and how it may be treated  Discuss treatment options with your caregivers to decide what care you want to receive  You always have the right to refuse treatment  © 2017 [address] [facility] Information is for End User's use only and may not be sold, redistributed or otherwise used for commercial purposes  All illustrations and images included in CareNotes® are the copyrighted property of [employer] M [employer] Inc  or [employer]uel[employer]  The above information is an  only  It is not intended as medical advice for individual conditions or treatments  Talk to your doctor, nurse or pharmacist before following any medical regimen to see if it is safe and effective for you

## 2021-08-24 NOTE — PROGRESS NOTES
[facility]  Progress Note - Noam Cancel 1965, 55 y o  female MRN: [id number]  Unit/Bed#: [facility] 624-01 Encounter: [id number]  Primary Care Provider: Kalpana Caputo MD   Date and time admitted to hospital: 8/14/2021  7:56 PM    Fall with possible seizure-like activity [facility])  Assessment & Plan  · Patient reported 3 falls since recent discharge  · The first one she reports falling onto her knees while going into the house after her ENT appointment Friday  · The second one she reports falling onto her knees while going into the bathroom at home Saturday  · The third one she reports falling forward and being able to hear those around her but was unable to respond  She reports this lasted for approximately 1 minute witnesses told her  Also report of bilateral hand shaking and possible bowel incontinence during the event  · Neurology and Cardiology were following given oncern for POTS and convulsive syncope   · Cardiology consult appreciated - they did not believe these syncopal episodes are consistent with POTS  · EEG 8/15 was normal  · Continue seizure precautions  · Neurosurgery signed off  · PT/OT evaluations  · Fall precautions    * 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[address] )  Assessment & Plan  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  There is some residual enhancing tumor remaining in the anterior aspect of the CP angle with improving mass effect upon the brainstem and brachium pontis   There is no restricted diffusion identified within the left posterior lateral fabian and brachium pontis consistent with acute ischemia  Persistent mild hydrocephalus involving the lateral ventricles and 3rd ventricle  Increased FLAIR signal adjacent to the surface of the lateral ventricles may represent transependymal flow of CSF, unchanged  4th ventricle is not enlarged and there is improving mass effect upon the 4th ventricle "    Pneumothorax  Assessment & Plan  · Per record review, patient developed an iatrogenic pneumothorax s/p left subclavian central line placement and had a chest tube placed but the chest tube came out, and she had a new chest tube placed  The patient then had another chest tube placed by IR and the other chest tube was removed by Thoracic Surgery who is now following  · Chest tube management per Thoracic Surgery     Hydrocephalus [facility])  Assessment & Plan  · Neurosurgery following  · Now off Diamox    Hypertension  Assessment & Plan  · Continue losartan 50 mg daily with hold parameters  · Monitor blood pressure per protocol      Anxiety  Assessment & Plan  · Continue Lexapro    Leukocytosis  Assessment & Plan  · Suspect steroid-induced  · Monitor     Headache  Assessment & Plan  · Continue current regimen        VTE Pharmacologic Prophylaxis: VTE Score: 4 Moderate Risk (Score 3-4) - Pharmacological DVT Prophylaxis Ordered: heparin  Patient Centered Rounds: I performed bedside rounds with nursing staff today  Discussions with Specialists or Other Care Team Provider:      Education and Discussions with Family / Patient: Patient declined call to   Time Spent for Care: 20 minutes  More than 50% of total time spent on counseling and coordination of care as described above      Current Length of Stay: 9 day(s)  Current Patient Status: Inpatient   Certification Statement: The patient will continue to require additional inpatient hospital stay due to chest tube  Discharge Plan: to rehab pending thoracic surgery clearance     Code Status: Level 1 - Full Code    Subjective:   Ms Narendra Rosenthal reports feeling sore in her neck today  She reports that she had a BM    Objective:     Vitals:   Temp (24hrs), Av 5 °F (36 9 °C), Min:98 °F (36 7 °C), Max:99 1 °F (37 3 °C)    Temp:  [98 °F (36 7 °C)-99 1 °F (37 3 °C)] 98 7 °F (37 1 °C)  HR:  [] 95  Resp:  [14-18] 14  BP: (122-152)/(70-91) 152/84  SpO2:  [94 %-97 %] 97 %  Body mass index is 27 96 kg/m²  Input and Output Summary (last 24 hours): Intake/Output Summary (Last 24 hours) at 2021 1334  Last data filed at 2021 1318  Gross per 24 hour   Intake 220 ml   Output 10 ml   Net 210 ml       Physical Exam:   Physical Exam  Vitals and nursing note reviewed  Exam conducted with a chaperone present  Constitutional:       Comments: Patient seen sitting in bed comfortably resting, NAD   Cardiovascular:      Rate and Rhythm: Normal rate and regular rhythm  Pulmonary:      Effort: Pulmonary effort is normal  No respiratory distress  Breath sounds: Normal breath sounds  Comments: Chest tube  Abdominal:      General: Bowel sounds are normal       Palpations: Abdomen is soft  Tenderness: There is no abdominal tenderness  Musculoskeletal:      Right lower leg: No edema  Left lower leg: No edema  Skin:     General: Skin is warm  Neurological:      Mental Status: She is alert and oriented to person, place, and time     Psychiatric:         Mood and Affect: Mood normal          Behavior: Behavior normal          Additional Data:     Labs:  Results from last 7 days   Lab Units 21  0533 21  1021   WBC Thousand/uL 16 83* 20 18*   HEMOGLOBIN g/dL 12 1 13 6   HEMATOCRIT % 36 0 41 9   PLATELETS Thousands/uL 270 265   NEUTROS PCT %  --  90*   LYMPHS PCT %  --  5*   MONOS PCT %  --  4   EOS PCT %  --  0     Results from last 7 days   Lab Units 21  0533 21  1021   SODIUM mmol/L 135* 136 POTASSIUM mmol/L 4 2 3 6   CHLORIDE mmol/L 102 105   CO2 mmol/L 27 27   BUN mg/dL 20 18   CREATININE mg/dL 0 63 0 74   ANION GAP mmol/L 6 4   CALCIUM mg/dL 8 9 9 5   ALBUMIN g/dL  --  3 2*   TOTAL BILIRUBIN mg/dL  --  0 35   ALK PHOS U/L  --  52   ALT U/L  --  37   AST U/L  --  36   GLUCOSE RANDOM mg/dL 100 109     Results from last 7 days   Lab Units 08/21/21  1021   INR  0 91     Results from last 7 days   Lab Units 08/18/21  1651 08/17/21  1603   POC GLUCOSE mg/dl 120 162*               Lines/Drains:  Invasive Devices     Peripheral Intravenous Line            Peripheral IV 08/21/21 Right Antecubital 3 days          Drain            Chest Tube 1 Left Second intercostal space 10 Fr  3 days                      Imaging: Reviewed radiology reports from this admission including: chest xray    Recent Cultures (last 7 days):         Last 24 Hours Medication List:   Current Facility-Administered Medications   Medication Dose Route Frequency Provider Last Rate    acetaminophen  975 mg Oral [facility] Pallavi Tapia MD      cyclobenzaprine  10 mg Oral TID PRN Pallavi Tapia MD      dexamethasone  2 mg Oral Q6H [address] Pallavi Tapia MD      Followed by   Jony Carson ON 8/25/2021] dexamethasone  2 mg Oral Q12H [address] Pallavi Tapia MD      Followed by   Jony Carson ON 8/27/2021] dexamethasone  2 mg Oral Daily Pallavi Tapia MD      diazepam  2 5 mg Intravenous Q6H PRN Pallavi Tapia MD      escitalopram  5 mg Oral Daily Pallavi Tapia MD      heparin (porcine)  5,000 Units Subcutaneous [facility] [address] Pallavi Tapia MD      HYDROmorphone  0 5 mg Intravenous Q4H PRN Pallavi Tapia MD      Labetalol HCl  10 mg Intravenous Q4H PRN Pallavi Tapia MD      lidocaine  1 patch Topical Daily Pallavi Tapia MD      losartan  50 mg Oral Daily Pallavi Tapia MD      naloxone  0 04 mg Intravenous Q1MIN PRN Pallavi Tapia MD      ondansetron  4 mg Intravenous Q6H PRN Pallavi Tapia MD      oxyCODONE  10 mg Oral Q4H PRN Pallavi Tapia MD      oxyCODONE  5 mg Oral Q4H PRN Landen Perales MD          Today, Patient Was Seen By: Tran Barrett PA-C    **Please Note: This note may have been constructed using a voice recognition system  **

## 2021-08-24 NOTE — PROGRESS NOTES
Progress Note -    Rachelle Fitzgerald 55 y o  female MRN: [id number]  Unit/Bed#: [facility] 624-01 Encounter: [id number]    Assessment:  47-yr old female with iatrogenic L pneumothorax; now s/p chest tube (x2) placement  L CT w/ 10cc out, Sxn w/ Airleak      L IR (anterior) CT: no output; with air leak on suction  PLAN:  - Care per primary  - Left Chest Tube potentially to water seal today   - if switched over, will follow up with repeat CXR to evaluate for PTX  Chest tube to stay until air leak resolution    Subjective:   - Doing well, no shortness of breath      Objective:     Vitals: Temp:  [97 4 °F (36 3 °C)-99 1 °F (37 3 °C)] 98 1 °F (36 7 °C)  HR:  [] 73  Resp:  [16-18] 18  BP: (122-153)/(70-96) 135/82  Body mass index is 27 96 kg/m²  I/O       08/21 0701 - 08/22 0700 08/22 0701 - 08/23 0700 08/23 0701 - 08/24 0700    P  O  900      I V  (mL/kg) 353 8 (4 8) 51 3 (0 7)     Total Intake(mL/kg) 1253 8 (16 9) 51 3 (0 7)     Urine (mL/kg/hr) 1875 (1 1) 1910 (1)     Chest Tube 0 0     Total Output 1875 1910     Net -621 3 -1858 7            Unmeasured Urine Occurrence 1 x            Physical Exam: General: AAOx3  Head: normocephalic, atraumatic  Neck: supple, trachea midline  Respiratory: BS b/l  Left Chest and neck with subcutaneous emphysema  Left Chest Tube in place Sxn with Airleak (minor on regular breathing, increased with valsalva)  Abdomen: Soft, NT, no rebound/guarding  Heart: RRR, S1s2  Ext: Warm no cyanosis         Lab, Imaging and other studies: I have personally reviewed pertinent reports    , CBC with diff: No results found for: WBC, HGB, HCT, MCV, PLT, ADJUSTEDWBC, MCH, MCHC, RDW, MPV, NRBC, BMP/CMP: No results found for: SODIUM, K, CL, CO2, ANIONGAP, BUN, CREATININE, GLUCOSE, CALCIUM, AST, ALT, ALKPHOS, PROT, BILITOT, EGFR  VTE Pharmacologic Prophylaxis: Heparin  VTE Mechanical Prophylaxis: sequential compression device

## 2021-08-24 NOTE — TELEPHONE ENCOUNTER
8/31/21- [facility]    8/30/21- [facility]    8/27/21- [facility]    8/26/21- [facility]    8/25/21- [facility]    8/24/21- [address] [facility] 9/2/21 8/23/21- (TORREY) S/O, F/U 2WK DKO

## 2021-08-24 NOTE — ASSESSMENT & PLAN NOTE
· Patient reported 3 falls since recent discharge  · The first one she reports falling onto her knees while going into the house after her ENT appointment Friday  · The second one she reports falling onto her knees while going into the bathroom at home Saturday  · The third one she reports falling forward and being able to hear those around her but was unable to respond  She reports this lasted for approximately 1 minute witnesses told her   Also report of bilateral hand shaking and possible bowel incontinence during the event  · Neurology and Cardiology were following given oncern for POTS and convulsive syncope   · Cardiology consult appreciated - they did not believe these syncopal episodes are consistent with POTS  · EEG 8/15 was normal  · Continue seizure precautions  · Neurosurgery signed off  · PT/OT evaluations  · Fall precautions

## 2021-08-24 NOTE — ASSESSMENT & PLAN NOTE
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  There is some residual enhancing tumor remaining in the anterior aspect of the CP angle with improving mass effect upon the brainstem and brachium pontis  There is no restricted diffusion identified within the left posterior lateral fabian and brachium pontis consistent with acute ischemia  Persistent mild hydrocephalus involving the lateral ventricles and 3rd ventricle  Increased FLAIR signal adjacent to the surface of the lateral ventricles may represent transependymal flow of CSF, unchanged    4th ventricle is not enlarged and there is improving mass effect upon the 4th ventricle "

## 2021-08-25 ENCOUNTER — APPOINTMENT (INPATIENT)
Dept: RADIOLOGY | Facility: HOSPITAL | Age: 56
DRG: 025 | End: 2021-08-25
Payer: COMMERCIAL

## 2021-08-25 PROBLEM — E87.1 HYPONATREMIA: Status: ACTIVE | Noted: 2021-08-25

## 2021-08-25 LAB
ANION GAP SERPL CALCULATED.3IONS-SCNC: 5 MMOL/L (ref 4–13)
BUN SERPL-MCNC: 28 MG/DL (ref 5–25)
CALCIUM SERPL-MCNC: 9.2 MG/DL (ref 8.3–10.1)
CHLORIDE SERPL-SCNC: 101 MMOL/L (ref 100–108)
CO2 SERPL-SCNC: 25 MMOL/L (ref 21–32)
CREAT SERPL-MCNC: 0.89 MG/DL (ref 0.6–1.3)
ERYTHROCYTE [DISTWIDTH] IN BLOOD BY AUTOMATED COUNT: 13.7 % (ref 11.6–15.1)
GFR SERPL CREATININE-BSD FRML MDRD: 73 ML/MIN/1.73SQ M
GLUCOSE SERPL-MCNC: 104 MG/DL (ref 65–140)
HCT VFR BLD AUTO: 36.3 % (ref 34.8–46.1)
HGB BLD-MCNC: 12.2 G/DL (ref 11.5–15.4)
MCH RBC QN AUTO: 31.1 PG (ref 26.8–34.3)
MCHC RBC AUTO-ENTMCNC: 33.6 G/DL (ref 31.4–37.4)
MCV RBC AUTO: 93 FL (ref 82–98)
PLATELET # BLD AUTO: 302 THOUSANDS/UL (ref 149–390)
PMV BLD AUTO: 10.6 FL (ref 8.9–12.7)
POTASSIUM SERPL-SCNC: 4.7 MMOL/L (ref 3.5–5.3)
RBC # BLD AUTO: 3.92 MILLION/UL (ref 3.81–5.12)
SODIUM SERPL-SCNC: 131 MMOL/L (ref 136–145)
WBC # BLD AUTO: 20.95 THOUSAND/UL (ref 4.31–10.16)

## 2021-08-25 PROCEDURE — 80048 BASIC METABOLIC PNL TOTAL CA: CPT | Performed by: PHYSICIAN ASSISTANT

## 2021-08-25 PROCEDURE — 97116 GAIT TRAINING THERAPY: CPT

## 2021-08-25 PROCEDURE — 97530 THERAPEUTIC ACTIVITIES: CPT

## 2021-08-25 PROCEDURE — 99232 SBSQ HOSP IP/OBS MODERATE 35: CPT | Performed by: THORACIC SURGERY (CARDIOTHORACIC VASCULAR SURGERY)

## 2021-08-25 PROCEDURE — 99232 SBSQ HOSP IP/OBS MODERATE 35: CPT | Performed by: PHYSICIAN ASSISTANT

## 2021-08-25 PROCEDURE — 85027 COMPLETE CBC AUTOMATED: CPT | Performed by: PHYSICIAN ASSISTANT

## 2021-08-25 PROCEDURE — 71046 X-RAY EXAM CHEST 2 VIEWS: CPT

## 2021-08-25 RX ADMIN — ACETAMINOPHEN 975 MG: 325 TABLET, FILM COATED ORAL at 05:00

## 2021-08-25 RX ADMIN — HEPARIN SODIUM 5000 UNITS: 5000 INJECTION INTRAVENOUS; SUBCUTANEOUS at 21:48

## 2021-08-25 RX ADMIN — ESCITALOPRAM 5 MG: 5 TABLET, FILM COATED ORAL at 08:45

## 2021-08-25 RX ADMIN — DEXAMETHASONE 2 MG: 2 TABLET ORAL at 21:48

## 2021-08-25 RX ADMIN — ACETAMINOPHEN 975 MG: 325 TABLET, FILM COATED ORAL at 21:48

## 2021-08-25 RX ADMIN — DEXAMETHASONE 2 MG: 2 TABLET ORAL at 00:18

## 2021-08-25 RX ADMIN — OXYCODONE HYDROCHLORIDE 10 MG: 10 TABLET ORAL at 13:56

## 2021-08-25 RX ADMIN — HEPARIN SODIUM 5000 UNITS: 5000 INJECTION INTRAVENOUS; SUBCUTANEOUS at 05:00

## 2021-08-25 RX ADMIN — ACETAMINOPHEN 975 MG: 325 TABLET, FILM COATED ORAL at 13:56

## 2021-08-25 RX ADMIN — LOSARTAN POTASSIUM 50 MG: 50 TABLET, FILM COATED ORAL at 08:45

## 2021-08-25 RX ADMIN — DEXAMETHASONE 2 MG: 2 TABLET ORAL at 08:45

## 2021-08-25 RX ADMIN — CYCLOBENZAPRINE HYDROCHLORIDE 10 MG: 10 TABLET, FILM COATED ORAL at 21:55

## 2021-08-25 NOTE — PLAN OF CARE
Problem: PAIN - ADULT  Goal: Verbalizes/displays adequate comfort level or baseline comfort level  Description: Interventions:  - Encourage patient to monitor pain and request assistance  - Assess pain using appropriate pain scale  - Administer analgesics based on type and severity of pain and evaluate response  - Implement non-pharmacological measures as appropriate and evaluate response  - Consider cultural and social influences on pain and pain management  - Notify physician/advanced practitioner if interventions unsuccessful or patient reports new pain  8/25/2021 1130 by Diamond Busch RN  Outcome: Progressing  8/25/2021 0502 by Diamond Busch RN  Outcome: Progressing

## 2021-08-25 NOTE — PROGRESS NOTES
Progress Note -    Jovi Marks 55 y o  female MRN: [id number]  Unit/Bed#: [facility] 624-01 Encounter: [id number]    Assessment:  47-yr old female with iatrogenic L pneumothorax; now s/p chest tube (x2) placement  L CT - 0cc output, on Water Seal   - no air leak at rest   - minimal air leak on valsalva    PLAN:  - Care per primary  - CXR Pa/Lateral ordered for this morning  - F/u CXR, if lung remains expanded, likely will hold CT one more day for evaluation of complete air leak resolution  - Potential CT removal today vs tomorrow    Subjective:   Feels much better  No acute events  Objective:     Vitals: Temp:  [98 5 °F (36 9 °C)-99 °F (37 2 °C)] 98 5 °F (36 9 °C)  HR:  [] 78  Resp:  [14-17] 17  BP: (133-152)/(82-91) 146/82  Body mass index is 28 32 kg/m²  I/O       08/21 0701 - 08/22 0700 08/22 0701 - 08/23 0700 08/23 0701 - 08/24 0700    P  O  900      I V  (mL/kg) 353 8 (4 8) 51 3 (0 7)     Total Intake(mL/kg) 1253 8 (16 9) 51 3 (0 7)     Urine (mL/kg/hr) 1875 (1 1) 1910 (1)     Chest Tube 0 0     Total Output 1875 1910     Net -621 3 -1858 7            Unmeasured Urine Occurrence 1 x            Physical Exam: General: AAOx3  Head: normocephalic, atraumatic  Neck: supple, trachea midline  Respiratory: BS b/l  Left Chest and neck with subcutaneous emphysema - improved  Left Chest Tube on water seal - 0cc output   - No airleak at rest   - minimal airleak on valsalva  Abdomen: Soft, NT, no rebound/guarding  Heart: RRR, S1s2  Ext: Warm no cyanosis         Lab, Imaging and other studies: I have personally reviewed pertinent reports    , CBC with diff: No results found for: WBC, HGB, HCT, MCV, PLT, ADJUSTEDWBC, MCH, MCHC, RDW, MPV, NRBC, BMP/CMP:   Lab Results   Component Value Date    SODIUM 131 (L) 08/25/2021    K 4 7 08/25/2021     08/25/2021    CO2 25 08/25/2021    BUN 28 (H) 08/25/2021    CREATININE 0 89 08/25/2021    CALCIUM 9 2 08/25/2021    EGFR 73 08/25/2021     VTE Pharmacologic Prophylaxis: Heparin  VTE Mechanical Prophylaxis: sequential compression device

## 2021-08-25 NOTE — PLAN OF CARE
Problem: PHYSICAL THERAPY ADULT  Goal: Performs mobility at highest level of function for planned discharge setting  See evaluation for individualized goals  Description: Treatment/Interventions: ADL retraining, Functional transfer training, LE strengthening/ROM, Elevations, Therapeutic exercise, Endurance training, Cognitive reorientation, Patient/family training, Bed mobility, Equipment eval/education, Gait training, Compensatory technique education, Spoke to nursing, Spoke to case management  Equipment Recommended: Wheelchair       See flowsheet documentation for full assessment, interventions and recommendations  Outcome: Progressing  Note: Prognosis: Good  Problem List: Decreased strength, Decreased endurance, Impaired balance, Decreased mobility, Decreased safety awareness, Impaired judgement, Decreased cognition  Assessment: Pt demonstrated improved endurance this session, ambulating repeated short distances with use of RW & no LOB despite complaints as noted above  Pt performed all tasks sllowly, and required seated rests due to complaints as noted above  Anticipate pt will make significant improvements in all areas of mobilty as symptoms reside & pt's balance improves  Will continue to practce functional transfer training to ensure sefty, as well as gait training to improve pace, balance & work towards PLOF  Continue to recommend rehab at this time to address these deficits to ensure safe transition to home when appropriate  Barriers to Discharge: Inaccessible home environment, Decreased caregiver support  Barriers to Discharge Comments: falls/ alone at times while spouse works- w/c level as option for home discharge discussed 2* falls- pt is in agreement w/ plan and recommendation for w/c level when alone until progressed      PT Discharge Recommendation: Post acute rehabilitation services     PT - OK to Discharge: Yes (TO REHAB WHEN MED MELISSA )    See flowsheet documentation for full assessment

## 2021-08-25 NOTE — ASSESSMENT & PLAN NOTE
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  There is some residual enhancing tumor remaining in the anterior aspect of the CP angle with improving mass effect upon the brainstem and brachium pontis  There is no restricted diffusion identified within the left posterior lateral fabian and brachium pontis consistent with acute ischemia  Persistent mild hydrocephalus involving the lateral ventricles and 3rd ventricle  Increased FLAIR signal adjacent to the surface of the lateral ventricles may represent transependymal flow of CSF, unchanged    4th ventricle is not enlarged and there is improving mass effect upon the 4th ventricle "  · Per my discussion with Neurosurgery today, unclear if patient's hearing will return in that ear

## 2021-08-25 NOTE — PLAN OF CARE
Problem: PAIN - ADULT  Goal: Verbalizes/displays adequate comfort level or baseline comfort level  Description: Interventions:  - Encourage patient to monitor pain and request assistance  - Assess pain using appropriate pain scale  - Administer analgesics based on type and severity of pain and evaluate response  - Implement non-pharmacological measures as appropriate and evaluate response  - Consider cultural and social influences on pain and pain management  - Notify physician/advanced practitioner if interventions unsuccessful or patient reports new pain  Outcome: Progressing     Problem: DISCHARGE PLANNING  Goal: Discharge to home or other facility with appropriate resources  Description: INTERVENTIONS:  - Identify barriers to discharge w/patient and caregiver  - Arrange for needed discharge resources and transportation as appropriate  - Identify discharge learning needs (meds, wound care, etc )  - Arrange for interpretive services to assist at discharge as needed  - Refer to Case Management Department for coordinating discharge planning if the patient needs post-hospital services based on physician/advanced practitioner order or complex needs related to functional status, cognitive ability, or social support system  Outcome: Progressing

## 2021-08-25 NOTE — PHYSICAL THERAPY NOTE
Physical Therapy Progress Note     08/25/21 1128   PT Last Visit   PT Visit Date 08/25/21   Note Type   Note Type Treatment   Pain Assessment   Pain Assessment Tool FLACC   Pain Rating: FLACC (Rest) - Face 1   Pain Rating: FLACC (Rest) - Legs 0   Pain Rating: FLACC (Rest) - Activity 1   Pain Rating: FLACC (Rest) - Cry 1   Pain Rating: FLACC (Rest) - Consolability 0   Score: FLACC (Rest) 3   Pain Rating: FLACC (Activity) - Face 1   Pain Rating: FLACC (Activity) - Legs 1   Pain Rating: FLACC (Activity) - Activity 1   Pain Rating: FLACC (Activity) - Cry 1   Pain Rating: FLACC (Activity) - Consolability 0   Score: FLACC (Activity) 4   Restrictions/Precautions   Other Precautions Pain; Fall Risk;Multiple lines  (CT to water seal)   Subjective   Subjective Pt encountered supine in bed, pleasant and agreeable to treatment  Reports feeling "off" since surgery, further stating she feels like her balance has been off & is still having visual difficulties  "but at least i'm not seeing everything upside down like I was before surgery "  Pt less impulsive overall & appropriately waiting for instructions to perform transfers  Bed Mobility   Supine to Sit 5  Supervision   Additional items Assist x 1;HOB elevated; Increased time required   Transfers   Sit to Stand 4  Minimal assistance   Additional items Assist x 1; Armrests; Increased time required   Stand to Sit 4  Minimal assistance   Additional items Assist x 1; Armrests; Increased time required   Ambulation/Elevation   Gait pattern Excessively slow; Short stride; Inconsistent lyubov;Decreased foot clearance; Improper Weight shift   Gait Assistance 4  Minimal assist   Additional items Assist x 1   Assistive Device Rolling walker   Distance 20' x 2   Balance   Static Sitting Fair +   Static Standing Fair -   Ambulatory Poor +   Endurance Deficit   Endurance Deficit Yes   Endurance Deficit Description fatigue, complaints of dizziness/feeling off balance   Activity Tolerance Activity Tolerance Patient tolerated treatment well;Patient limited by fatigue   Nurse [address], RN   Assessment   Prognosis Good   Problem List Decreased strength;Decreased endurance; Impaired balance;Decreased mobility; Decreased safety awareness; Impaired judgement;Decreased cognition   Assessment Pt demonstrated improved endurance this session, ambulating repeated short distances with use of RW & no LOB despite complaints as noted above  Pt performed all tasks sllowly, and required seated rests due to complaints as noted above  Anticipate pt will make significant improvements in all areas of mobilty as symptoms reside & pt's balance improves  Will continue to practce functional transfer training to ensure sefty, as well as gait training to improve pace, balance & work towards PLOF  Continue to recommend rehab at this time to address these deficits to ensure safe transition to home when appropriate  Barriers to Discharge Inaccessible home environment;Decreased caregiver support   Goals   Patient Goals to feel better & be more independent   LTG Expiration Date 09/03/21   PT Treatment Day 1   Plan   Treatment/Interventions Functional transfer training;LE strengthening/ROM; Elevations; Therapeutic exercise; Endurance training;Patient/family training;Bed mobility; Equipment eval/education;Gait training   Progress Progressing toward goals   PT Frequency Other (Comment)  (3-6x/week)   Recommendation   PT Discharge Recommendation Post acute rehabilitation services   Equipment Recommended [facility] Recommended Wheeled walker   AM-PAC Basic Mobility Inpatient   Turning in Bed Without Bedrails 4   Lying on Back to Sitting on Edge of Flat Bed 4   Moving Bed to Chair 3   Standing Up From Chair 3   Walk in Room 3   Climb 3-5 Stairs 2   Basic Mobility Inpatient Raw Score 19   Basic Mobility Standardized Score 42 48   The patient's AM-PAC Basic Mobility Inpatient Short Form Raw Score is 19, Standardized Score is 42 48  A standardized score less than 42 9 suggests the patient may benefit from discharge to post-acute rehabilitation services  Please also refer to the recommendation of the Physical Therapist for safe discharge planning          Sumi White PTA

## 2021-08-25 NOTE — PROGRESS NOTES
[facility]  Progress Note - Nirmal Richards 1965, 55 y o  female MRN: [id number]  Unit/Bed#: [facility] 624-01 Encounter: [id number]  Primary Care Provider: Anthony Roberts MD   Date and time admitted to hospital: 8/14/2021  7:56 PM    Fall with possible seizure-like activity [facility])  Assessment & Plan  · Patient reported 3 falls since recent discharge  · The first one she reports falling onto her knees while going into the house after her ENT appointment Friday  · The second one she reports falling onto her knees while going into the bathroom at home Saturday  · The third one she reports falling forward and being able to hear those around her but was unable to respond  She reports this lasted for approximately 1 minute witnesses told her  Also report of bilateral hand shaking and possible bowel incontinence during the event  · Neurology and Cardiology were following given oncern for POTS and convulsive syncope   · Cardiology consult appreciated - they did not believe these syncopal episodes are consistent with POTS  · EEG 8/15 was normal  · Continue seizure precautions  · Neurosurgery signed off  · PT/OT evaluations  · Fall precautions    * 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[address] )  Assessment & Plan  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  There is some residual enhancing tumor remaining in the anterior aspect of the CP angle with improving mass effect upon the brainstem and brachium pontis   There is no restricted diffusion identified within the left posterior lateral fabian and brachium pontis consistent with acute ischemia  Persistent mild hydrocephalus involving the lateral ventricles and 3rd ventricle  Increased FLAIR signal adjacent to the surface of the lateral ventricles may represent transependymal flow of CSF, unchanged  4th ventricle is not enlarged and there is improving mass effect upon the 4th ventricle "  · Per my discussion with Neurosurgery today, unclear if patient's hearing will return in that ear    Pneumothorax  Assessment & Plan  · Per record review, patient developed an iatrogenic pneumothorax s/p left subclavian central line placement and had a chest tube placed but the chest tube came out, and she had a new chest tube placed  The patient then had another chest tube placed by IR and the other chest tube was removed by Thoracic Surgery who is now following  · Chest tube management per Thoracic Surgery     Hydrocephalus [facility])  Assessment & Plan  · Neurosurgery following  · Now off Diamox    Hypertension  Assessment & Plan  · Continue losartan 50 mg daily with hold parameters  · Monitor blood pressure per protocol      Anxiety  Assessment & Plan  · Continue Lexapro    Leukocytosis  Assessment & Plan  · Suspect steroid-induced  · Monitor     Headache  Assessment & Plan  · Continue current regimen    Hyponatremia  Assessment & Plan  · Sodium 131 today  Repeat BMP        VTE Pharmacologic Prophylaxis: VTE Score: 4 Moderate Risk (Score 3-4) - Pharmacological DVT Prophylaxis Ordered: heparin  Patient Centered Rounds: I performed bedside rounds with nursing staff today  d/w RN outside room   Discussions with Specialists or Other Care Team Provider: Neurosurgery AP and      Education and Discussions with Family / Patient: Updated  () at bedside  Time Spent for Care: 20 minutes   More than 50% of total time spent on counseling and coordination of care as described above     Current Length of Stay: 10 day(s)  Current Patient Status: Inpatient   Certification Statement: The patient will continue to require additional inpatient hospital stay due to chest tube  Discharge Plan: to rehab pending thoracic surgery clearance with chest tube plan    Code Status: Level 1 - Full Code    Subjective:   Ms Magaly Mosquera reports still feeling crackles on her neck  She reports she cannot hear out of her right ear  Denies CP, SOB, abdominal pain    Objective:     Vitals:   Temp (24hrs), Av 7 °F (37 1 °C), Min:98 5 °F (36 9 °C), Max:98 8 °F (37 1 °C)    Temp:  [98 5 °F (36 9 °C)-98 8 °F (37 1 °C)] 98 7 °F (37 1 °C)  HR:  [78-91] 90  Resp:  [16-17] 16  BP: (127-146)/(81-85) 127/81  SpO2:  [91 %-97 %] 96 %  Body mass index is 28 32 kg/m²  Input and Output Summary (last 24 hours): Intake/Output Summary (Last 24 hours) at 2021 1647  Last data filed at 2021 0501  Gross per 24 hour   Intake 300 ml   Output 0 ml   Net 300 ml       Physical Exam:   Physical Exam  Vitals and nursing note reviewed  Constitutional:       Comments: Patient seen sitting in bed with her  present at bedisde   Cardiovascular:      Rate and Rhythm: Normal rate and regular rhythm  Pulmonary:      Effort: Pulmonary effort is normal       Comments: Left primal chest and left neck with subcutaneous emphysema  Left sided chest tube  Abdominal:      General: Bowel sounds are normal       Palpations: Abdomen is soft  Tenderness: There is no abdominal tenderness  Musculoskeletal:      Right lower leg: No edema  Left lower leg: No edema  Skin:     General: Skin is warm  Neurological:      Mental Status: She is alert and oriented to person, place, and time     Psychiatric:         Mood and Affect: Mood normal          Behavior: Behavior normal          Additional Data:     Labs:  Results from last 7 days   Lab Units 21  1010 21  1021   WBC Thousand/uL 20 95* 20 18*   HEMOGLOBIN g/dL 12 2 13 6   HEMATOCRIT % 36 3 41 9   PLATELETS Thousands/uL 302 265   NEUTROS PCT %  --  90*   LYMPHS PCT %  --  5*   MONOS PCT %  --  4   EOS PCT %  --  0     Results from last 7 days   Lab Units 08/25/21  0456 08/21/21  1021   SODIUM mmol/L 131* 136   POTASSIUM mmol/L 4 7 3 6   CHLORIDE mmol/L 101 105   CO2 mmol/L 25 27   BUN mg/dL 28* 18   CREATININE mg/dL 0 89 0 74   ANION GAP mmol/L 5 4   CALCIUM mg/dL 9 2 9 5   ALBUMIN g/dL  --  3 2*   TOTAL BILIRUBIN mg/dL  --  0 35   ALK PHOS U/L  --  52   ALT U/L  --  37   AST U/L  --  36   GLUCOSE RANDOM mg/dL 104 109     Results from last 7 days   Lab Units 08/21/21  1021   INR  0 91     Results from last 7 days   Lab Units 08/18/21  1651   POC GLUCOSE mg/dl 120               Lines/Drains:  Invasive Devices     Peripheral Intravenous Line            Peripheral IV 08/25/21 Right Arm <1 day          Drain            Chest Tube 1 Left Second intercostal space 10 Fr  4 days                      Imaging: No pertinent imaging reviewed      Recent Cultures (last 7 days):         Last 24 Hours Medication List:   Current Facility-Administered Medications   Medication Dose Route Frequency Provider Last Rate    acetaminophen  975 mg Oral [facility] Wendy Diaz MD      cyclobenzaprine  10 mg Oral TID PRN Wendy Diaz MD      dexamethasone  2 mg Oral Q12H [facility] Wendy Diaz MD      Followed by   Deny Flores ON 8/27/2021] dexamethasone  2 mg Oral Daily Wendy Diaz MD      diazepam  2 5 mg Intravenous Q6H PRN Wendy Diaz MD      escitalopram  5 mg Oral Daily Wendy Diaz MD      heparin (porcine)  5,000 Units Subcutaneous [facility] Wendy Diaz MD      HYDROmorphone  0 5 mg Intravenous Q4H PRN Wendy Diaz MD      Labetalol HCl  10 mg Intravenous Q4H PRN Wendy Diaz MD      lidocaine  1 patch Topical Daily Wendy Diaz MD      losartan  50 mg Oral Daily Wendy Diaz MD      naloxone  0 04 mg Intravenous Q1MIN PRN Wendy Diaz MD      ondansetron  4 mg Intravenous Q6H PRN Lillie Ice Eric Ashley MD      oxyCODONE  10 mg Oral Q4H PRN Sada Gonsales MD      oxyCODONE  5 mg Oral Q4H PRN Sada Gonsales MD          Today, Patient Was Seen By: Izaiah Aguero PA-C    **Please Note: This note may have been constructed using a voice recognition system  **

## 2021-08-26 ENCOUNTER — APPOINTMENT (INPATIENT)
Dept: RADIOLOGY | Facility: HOSPITAL | Age: 56
DRG: 025 | End: 2021-08-26
Payer: COMMERCIAL

## 2021-08-26 LAB
ANION GAP SERPL CALCULATED.3IONS-SCNC: 4 MMOL/L (ref 4–13)
BUN SERPL-MCNC: 23 MG/DL (ref 5–25)
CALCIUM SERPL-MCNC: 9 MG/DL (ref 8.3–10.1)
CHLORIDE SERPL-SCNC: 102 MMOL/L (ref 100–108)
CO2 SERPL-SCNC: 29 MMOL/L (ref 21–32)
CREAT SERPL-MCNC: 0.65 MG/DL (ref 0.6–1.3)
ERYTHROCYTE [DISTWIDTH] IN BLOOD BY AUTOMATED COUNT: 14.1 % (ref 11.6–15.1)
GFR SERPL CREATININE-BSD FRML MDRD: 100 ML/MIN/1.73SQ M
GLUCOSE SERPL-MCNC: 94 MG/DL (ref 65–140)
HCT VFR BLD AUTO: 34.3 % (ref 34.8–46.1)
HGB BLD-MCNC: 11.5 G/DL (ref 11.5–15.4)
MCH RBC QN AUTO: 30.8 PG (ref 26.8–34.3)
MCHC RBC AUTO-ENTMCNC: 33.5 G/DL (ref 31.4–37.4)
MCV RBC AUTO: 92 FL (ref 82–98)
PLATELET # BLD AUTO: 303 THOUSANDS/UL (ref 149–390)
PMV BLD AUTO: 10.2 FL (ref 8.9–12.7)
POTASSIUM SERPL-SCNC: 4.4 MMOL/L (ref 3.5–5.3)
RBC # BLD AUTO: 3.73 MILLION/UL (ref 3.81–5.12)
SODIUM SERPL-SCNC: 135 MMOL/L (ref 136–145)
WBC # BLD AUTO: 17.02 THOUSAND/UL (ref 4.31–10.16)

## 2021-08-26 PROCEDURE — 99232 SBSQ HOSP IP/OBS MODERATE 35: CPT | Performed by: THORACIC SURGERY (CARDIOTHORACIC VASCULAR SURGERY)

## 2021-08-26 PROCEDURE — 97535 SELF CARE MNGMENT TRAINING: CPT

## 2021-08-26 PROCEDURE — 71045 X-RAY EXAM CHEST 1 VIEW: CPT

## 2021-08-26 PROCEDURE — 99232 SBSQ HOSP IP/OBS MODERATE 35: CPT | Performed by: NURSE PRACTITIONER

## 2021-08-26 PROCEDURE — 80048 BASIC METABOLIC PNL TOTAL CA: CPT | Performed by: PHYSICIAN ASSISTANT

## 2021-08-26 PROCEDURE — 85027 COMPLETE CBC AUTOMATED: CPT | Performed by: PHYSICIAN ASSISTANT

## 2021-08-26 RX ORDER — LOPERAMIDE HYDROCHLORIDE 2 MG/1
4 CAPSULE ORAL DAILY PRN
Status: DISCONTINUED | OUTPATIENT
Start: 2021-08-26 | End: 2021-09-01 | Stop reason: HOSPADM

## 2021-08-26 RX ADMIN — HEPARIN SODIUM 5000 UNITS: 5000 INJECTION INTRAVENOUS; SUBCUTANEOUS at 13:06

## 2021-08-26 RX ADMIN — ESCITALOPRAM 5 MG: 5 TABLET, FILM COATED ORAL at 08:49

## 2021-08-26 RX ADMIN — DEXAMETHASONE 2 MG: 2 TABLET ORAL at 21:12

## 2021-08-26 RX ADMIN — HEPARIN SODIUM 5000 UNITS: 5000 INJECTION INTRAVENOUS; SUBCUTANEOUS at 05:44

## 2021-08-26 RX ADMIN — DEXAMETHASONE 2 MG: 2 TABLET ORAL at 08:49

## 2021-08-26 RX ADMIN — ACETAMINOPHEN 975 MG: 325 TABLET, FILM COATED ORAL at 05:44

## 2021-08-26 RX ADMIN — LOSARTAN POTASSIUM 50 MG: 50 TABLET, FILM COATED ORAL at 08:49

## 2021-08-26 RX ADMIN — ACETAMINOPHEN 975 MG: 325 TABLET, FILM COATED ORAL at 13:06

## 2021-08-26 RX ADMIN — ACETAMINOPHEN 975 MG: 325 TABLET, FILM COATED ORAL at 21:12

## 2021-08-26 RX ADMIN — HEPARIN SODIUM 5000 UNITS: 5000 INJECTION INTRAVENOUS; SUBCUTANEOUS at 21:12

## 2021-08-26 NOTE — ASSESSMENT & PLAN NOTE
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  There is some residual enhancing tumor remaining in the anterior aspect of the CP angle with improving mass effect upon the brainstem and brachium pontis  There is no restricted diffusion identified within the left posterior lateral fabian and brachium pontis consistent with acute ischemia  Persistent mild hydrocephalus involving the lateral ventricles and 3rd ventricle  Increased FLAIR signal adjacent to the surface of the lateral ventricles may represent transependymal flow of CSF, unchanged    4th ventricle is not enlarged and there is improving mass effect upon the 4th ventricle "  · unclear if patient's hearing will return in ear per NSX

## 2021-08-26 NOTE — ASSESSMENT & PLAN NOTE
Lab Results   Component Value Date    WBC 17 02 (H) 08/26/2021    WBC 20 95 (H) 08/25/2021    WBC 16 83 (H) 08/24/2021   Suspect steroid-induced  · Monitor

## 2021-08-26 NOTE — ASSESSMENT & PLAN NOTE
· Per record review, patient developed an iatrogenic pneumothorax s/p left subclavian central line placement and had a chest tube placed but the chest tube came out, and she had a new chest tube placed    The patient then had another chest tube placed by IR and the other chest tube was removed by Thoracic Surgery who is now following  · Chest tube management per Thoracic Surgery   · Currently on water seal

## 2021-08-26 NOTE — PROGRESS NOTES
Progress Note - Thoracic   Monika Isaacs 55 y o  female MRN: [id number]  Unit/Bed#: [facility] 624-01 Encounter: [id number]    Assessment:  55-year-old female with iatrogenic left pneumothorax now status post chest tube placement x2      Vital signs stable  White blood cell count 17 from 20 9  Left chest tube 0, water seal, positive air leak  P o  Intake 1260    Plan:  - follow up morning CXR 8/26  - pain and nausea control p r n   - encourage ambulation  - rest of care per primary team    Subjective/Objective   Subjective:    Patient feels overall well , some discomfort in chest tube site but no other complaints, denies nausea, denies vomiting, tolerating diet, out of bed without issues  Objective:     Blood pressure 131/82, pulse 83, temperature 97 5 °F (36 4 °C), resp  rate 18, height 5' 5 5" (1 664 m), weight 77 2 kg (170 lb 3 1 oz), SpO2 97 %, not currently breastfeeding  ,Body mass index is 28 32 kg/m²  Intake/Output Summary (Last 24 hours) at 8/26/2021 9031  Last data filed at 8/25/2021 2201  Gross per 24 hour   Intake 1260 ml   Output 0 ml   Net 1260 ml       Invasive Devices     Peripheral Intravenous Line            Peripheral IV 08/25/21 Right Arm 1 day          Drain            Chest Tube 1 Left Second intercostal space 10 Fr  4 days                Physical Exam:   General: NAD  Skin: Warm, anicteric  HEENT: Normocephalic, atraumatic  CV: RRR, no m/r/g  Pulm: CTA b/l, no inc WOB, left chest tube no output, water seal, positive air leak  Abd: Soft, ND/NT  MSK: Symmetric, no cyanosis, no edema  Neuro: AOx3    Lab, Imaging and other studies:I have personally reviewed pertinent lab results      VTE Pharmacologic Prophylaxis: Heparin  VTE Mechanical Prophylaxis: sequential compression device

## 2021-08-26 NOTE — OCCUPATIONAL THERAPY NOTE
Occupational Therapy Treatment Note:       08/26/21 1030   OT Last Visit   OT Visit Date 08/26/21   Note Type   Note Type Treatment   Pain Assessment   Pain Assessment Tool 0-10   Pain Score No Pain   ADL   Where Assessed Edge of bed   UB Dressing Assistance 5  Supervision/Setup   LB Dressing Assistance 5  Supervision/Setup   Functional Standing Tolerance   Time 5 min for laundery task  pt with + unsteadiness and lob with eventual selfcorrection  pt with dizziness with prolonged standing and closet negotiation  pt also with fatigue and sob with moderate activity  fair balance overall   Bed Mobility   Supine to Sit 5  Supervision   Sit to Supine 5  Supervision   Transfers   Sit to Stand 5  Supervision   Additional items   (rw)   Stand to Sit 5  Supervision   Additional items   (rw)   Functional Mobility   Functional Mobility 4  Minimal assistance   Additional Comments asst for  balane during closet negotiation ie reaching high and low    Additional items Rolling walker   Cognition   Overall Cognitive Status Impaired   Comments pt with mildly impulsive and needed minimal encouagement to attempt light homemking task limited insight and cognitive flexibility   Activity Tolerance   Activity Tolerance Patient tolerated treatment well   Assessment   Assessment pt participated in ot session and was seen focusing on adl activity, standing tolerence during light homemaking laundry activity and functional mobility and closet negotiation  pt with + unsteadiness with eventual self correction for lob  pt with moderate difficulty standing to fold large  flat sheet at the eob  pt requires incrased time and explaination and cues for impuslive / follow task  min asst functional mobility during homemaking with rw, sba to stand from tall surfaces  poor hand placement observed ie pulls self to standing from walker   Plan   Treatment Interventions ADL retraining;Functional transfer training; Endurance training;Patient/family training;Equipment evaluation/education; Activityengagement   Goal Expiration Date 09/03/21   OT Treatment Day 2   OT Frequency 3-5x/wk   Recommendation   OT Discharge Recommendation Post acute rehabilitation services   OT - OK to Discharge Yes   PAO Guillaume

## 2021-08-26 NOTE — ASSESSMENT & PLAN NOTE
Lab Results   Component Value Date    SODIUM 135 (L) 08/26/2021    SODIUM 131 (L) 08/25/2021    SODIUM 135 (L) 08/24/2021   Sodium 135 today   Repeat BMP

## 2021-08-26 NOTE — PROGRESS NOTES
[address] 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[facility])  Assessment & Plan  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  There is some residual enhancing tumor remaining in the anterior aspect of the CP angle with improving mass effect upon the brainstem and brachium pontis  There is no restricted diffusion identified within the left posterior lateral fabian and brachium pontis consistent with acute ischemia  Persistent mild hydrocephalus involving the lateral ventricles and 3rd ventricle  Increased FLAIR signal adjacent to the surface of the lateral ventricles may represent transependymal flow of CSF, unchanged  4th ventricle is not enlarged and there is improving mass effect upon the 4th ventricle "  · unclear if patient's hearing will return in ear per NSX    Fall with possible seizure-like activity [facility])  Assessment & Plan  · Patient reported 3 falls since recent discharge  · The first one she reports falling onto her knees while going into the house after her ENT appointment Friday  · The second one she reports falling onto her knees while going into the bathroom at home Saturday  · The third one she reports falling forward and being able to hear those around her but was unable to respond  She reports this lasted for approximately 1 minute witnesses told her  Also report of bilateral hand shaking and possible bowel incontinence during the event  · Neurology and Cardiology were following given oncern for POTS and convulsive syncope   · Cardiology consult appreciated - they did not believe these syncopal episodes are consistent with POTS  · EEG 8/15 was normal  · Continue seizure precautions  · Neurosurgery signed off  · PT/OT evaluations  · Fall precautions    Pneumothorax  Assessment & Plan  · Per record review, patient developed an iatrogenic pneumothorax s/p left subclavian central line placement and had a chest tube placed but the chest tube came out, and she had a new chest tube placed  The patient then had another chest tube placed by IR and the other chest tube was removed by Thoracic Surgery who is now following  · Chest tube management per Thoracic Surgery   · Currently on water seal     Hyponatremia  Assessment & Plan  Lab Results   Component Value Date    SODIUM 135 (L) 08/26/2021    SODIUM 131 (L) 08/25/2021    SODIUM 135 (L) 08/24/2021   Sodium 135 today  Repeat BMP    Headache  Assessment & Plan  · Continue current regimen    Leukocytosis  Assessment & Plan  Lab Results   Component Value Date    WBC 17 02 (H) 08/26/2021    WBC 20 95 (H) 08/25/2021    WBC 16 83 (H) 08/24/2021   Suspect steroid-induced  · Monitor     Hypertension  Assessment & Plan  · Continue losartan 50 mg daily with hold parameters  · Monitor blood pressure per protocol      Hydrocephalus (HCC)  Assessment & Plan  · Neurosurgery following  · Now off Diamox    Anxiety  Assessment & Plan  · Continue Lexapro        VTE Pharmacologic Prophylaxis: VTE Score: 4 Moderate Risk (Score 3-4) - Pharmacological DVT Prophylaxis Ordered: heparin  Patient Centered Rounds: I performed bedside rounds with nursing staff today    Discussions with Specialists or Other Care Team Provider: d/w RN     Education and Discussions with Family / Patient: Updated  () at bedside  Time Spent for Care: 30 minutes  More than 50% of total time spent on counseling and coordination of care as described above  Current Length of Stay: 11 day(s)  Current Patient Status: Inpatient   Certification Statement: The patient will continue to require additional inpatient hospital stay due to chest tube  Discharge Plan: Anticipate discharge in 48-72 hrs to rehab facility  Code Status: Level 1 - Full Code    Subjective:   Pt lying in bed  Reports some irritation at the chest tube site  Denies n/v/d/c  No sob  No chest pain  Objective:     Vitals:   Temp (24hrs), Av 5 °F (36 9 °C), Min:97 5 °F (36 4 °C), Max:99 1 °F (37 3 °C)    Temp:  [97 5 °F (36 4 °C)-99 1 °F (37 3 °C)] 99 1 °F (37 3 °C)  HR:  [78-92] 78  Resp:  [16-18] 16  BP: (125-131)/(79-82) 125/79  SpO2:  [91 %-98 %] 98 %  Body mass index is 28 32 kg/m²  Input and Output Summary (last 24 hours): Intake/Output Summary (Last 24 hours) at 2021 1446  Last data filed at 2021 0544  Gross per 24 hour   Intake 720 ml   Output 0 ml   Net 720 ml       Physical Exam:   Physical Exam  Vitals and nursing note reviewed  Constitutional:       General: She is not in acute distress  Appearance: She is well-developed  Cardiovascular:      Rate and Rhythm: Normal rate and regular rhythm  Heart sounds: Normal heart sounds  No murmur heard  Pulmonary:      Effort: Pulmonary effort is normal  No respiratory distress  Breath sounds: Normal breath sounds  No wheezing or rales  Comments: Left chest wall chest tube to water seal     Abdominal:      General: Bowel sounds are normal  There is no distension  Palpations: Abdomen is soft  Tenderness: There is no abdominal tenderness  Musculoskeletal:         General: No swelling or tenderness  Skin:     General: Skin is warm and dry  Neurological:      Mental Status: She is alert   Mental status is at baseline     Psychiatric:         Mood and Affect: Mood normal           Additional Data:     Labs:  Results from last 7 days   Lab Units 08/26/21  0512 08/21/21  1021   WBC Thousand/uL 17 02* 20 18*   HEMOGLOBIN g/dL 11 5 13 6   HEMATOCRIT % 34 3* 41 9   PLATELETS Thousands/uL 303 265   NEUTROS PCT %  --  90*   LYMPHS PCT %  --  5*   MONOS PCT %  --  4   EOS PCT %  --  0     Results from last 7 days   Lab Units 08/26/21  0512 08/21/21  1021   SODIUM mmol/L 135* 136   POTASSIUM mmol/L 4 4 3 6   CHLORIDE mmol/L 102 105   CO2 mmol/L 29 27   BUN mg/dL 23 18   CREATININE mg/dL 0 65 0 74   ANION GAP mmol/L 4 4   CALCIUM mg/dL 9 0 9 5   ALBUMIN g/dL  --  3 2*   TOTAL BILIRUBIN mg/dL  --  0 35   ALK PHOS U/L  --  52   ALT U/L  --  37   AST U/L  --  36   GLUCOSE RANDOM mg/dL 94 109     Results from last 7 days   Lab Units 08/21/21  1021   INR  0 91                   Lines/Drains:  Invasive Devices     Peripheral Intravenous Line            Peripheral IV 08/25/21 Right Arm 1 day          Drain            Chest Tube 1 Left Second intercostal space 10 Fr  5 days                      Imaging: Reviewed radiology reports from this admission including: chest xray, chest CT scan and MRI brain    Recent Cultures (last 7 days):         Last 24 Hours Medication List:   Current Facility-Administered Medications   Medication Dose Route Frequency Provider Last Rate    acetaminophen  975 mg Oral [facility] Marky De Oliveira MD      cyclobenzaprine  10 mg Oral TID PRN Marky De Oliveira MD      dexamethasone  2 mg Oral Q12H [address] Marky De Oliveira MD      Followed by   [facility] ON 8/27/2021] dexamethasone  2 mg Oral Daily Marky De Oliveira MD      diazepam  2 5 mg Intravenous Q6H PRN Marky De Oliveira MD      escitalopram  5 mg Oral Daily Marky De Oliveira MD      heparin (porcine)  5,000 Units Subcutaneous [facility] [address] Marky De Oliveira MD      HYDROmorphone  0 5 mg Intravenous Q4H PRN Marky De Oliveira MD      Labetalol HCl  10 mg Intravenous Q4H PRN MD Joshua Arzate lidocaine  1 patch Topical Daily Adalberto Bradley MD      loperamide  4 mg Oral Daily PRN ONELIA Hernandez      losartan  50 mg Oral Daily Adalberto Bradley MD      naloxone  0 04 mg Intravenous Q1MIN PRN Adalberto Bradley MD      ondansetron  4 mg Intravenous Q6H PRN Adalberto Bradley MD      oxyCODONE  10 mg Oral Q4H PRN Adalberto Bradley MD      oxyCODONE  5 mg Oral Q4H PRN Adalberto Bradley MD          Today, Patient Was Seen By: ONELIA Hernandez    **Please Note: This note may have been constructed using a voice recognition system  **

## 2021-08-26 NOTE — PLAN OF CARE
Problem: OCCUPATIONAL THERAPY ADULT  Goal: Performs self-care activities at highest level of function for planned discharge setting  See evaluation for individualized goals  Description: Treatment Interventions: ADL retraining, Visual perceptual retraining, Functional transfer training, UE strengthening/ROM, Endurance training, Cognitive reorientation, Equipment evaluation/education, Patient/family training, Compensatory technique education, Continued evaluation, Energy conservation, Activityengagement          See flowsheet documentation for full assessment, interventions and recommendations  Outcome: Progressing  Note: Limitation: Decreased ADL status, Decreased Safe judgement during ADL, Decreased cognition, Decreased endurance, Decreased self-care trans, Decreased high-level ADLs  Prognosis: Fair  Assessment: pt participated in ot session and was seen focusing on adl activity, standing tolerence during light homemaking laundry activity and functional mobility and closet negotiation  pt with + unsteadiness with eventual self correction for lob  pt with moderate difficulty standing to fold large  flat sheet at the eob  pt requires incrased time and explaination and cues for impuslive / follow task  min asst functional mobility during homemaking with rw, sba to stand from tall surfaces  poor hand placement observed ie pulls self to standing from walker  Recommendation: (S) Physiatry Consult  OT Discharge Recommendation: Post acute rehabilitation services  OT - OK to Discharge:  Yes     April A PAO Bryant

## 2021-08-26 NOTE — UTILIZATION REVIEW
Continued Stay Review    Date: 8/26                         Current Patient Class: Inpatient Current Level of Care: Med Surg    HPI:56 y o  female initially admitted on 8/15    Assessment/Plan:  Iatrogenic left pneumothorax now status post chest tube placement x2  Per Thoracic surgery; CXR - persistent small left apical ptx  Leave chest tube to water seal today  Progress notes; On Decadron taper  Continue seizure precautions for history of seizure-like activity       Vital Signs:   08/26/21 11:26:24  99 1 °F (37 3 °C)  78  16  125/79  94  98 %  --   08/26/21 0800  --  --  --  --  --  --  None (Room air)   08/26/21 07:44:42  98 5 °F (36 9 °C)  92  18  128/80  96  95 %  --     Pertinent Labs/Diagnostic Results:   8/26 PCXR -  Left chest tube with tiny left apical pneumothorax and stable subcutaneous emphysema         Results from last 7 days   Lab Units 08/26/21  0512 08/25/21  1010 08/24/21  0533 08/21/21  1021 08/20/21  0513   WBC Thousand/uL 17 02* 20 95* 16 83* 20 18* 18 85*   HEMOGLOBIN g/dL 11 5 12 2 12 1 13 6 12 3   HEMATOCRIT % 34 3* 36 3 36 0 41 9 37 2   PLATELETS Thousands/uL 303 302 270 265 236   NEUTROS ABS Thousands/µL  --   --   --  18 26* 16 92*         Results from last 7 days   Lab Units 08/26/21  0512 08/25/21  0456 08/24/21  0533 08/21/21  1021 08/21/21  0615 08/20/21  1501 08/20/21  0513   SODIUM mmol/L 135* 131* 135* 136 137 138 139   POTASSIUM mmol/L 4 4 4 7 4 2 3 6 4 1 4 2 3 8   CHLORIDE mmol/L 102 101 102 105 105 108 108   CO2 mmol/L 29 25 27 27 25 23 27   ANION GAP mmol/L 4 5 6 4 7 7 4   BUN mg/dL 23 28* 20 18 21 20 20   CREATININE mg/dL 0 65 0 89 0 63 0 74 0 70 0 78 0 77   EGFR ml/min/1 73sq m 100 73 101 91 98 86 87   CALCIUM mg/dL 9 0 9 2 8 9 9 5 9 1 9 1 8 8   CALCIUM, IONIZED mmol/L  --   --   --   --  1 20  --  1 20   MAGNESIUM mg/dL  --   --   --   --  2 4 2 6 2 5   PHOSPHORUS mg/dL  --   --   --   --  3 5 2 6* 3 5     Results from last 7 days   Lab Units 08/21/21  1021   AST U/L 36 ALT U/L 37   ALK PHOS U/L 52   TOTAL PROTEIN g/dL 8 3*   ALBUMIN g/dL 3 2*   TOTAL BILIRUBIN mg/dL 0 35         Results from last 7 days   Lab Units 08/26/21  0512 08/25/21  0456 08/24/21  0533 08/21/21  1021 08/21/21  0615 08/20/21  1501 08/20/21  0513   GLUCOSE RANDOM mg/dL 94 104 100 109 125 140 134       Results from last 7 days   Lab Units 08/21/21  1021   PROTIME seconds 12 3   INR  0 91       Medications:   Scheduled Medications:  acetaminophen, 975 mg, Oral, Q8H [address]  dexamethasone, 2 mg, Oral, Q12H [address]   Followed by  Lidia Veras ON 8/27/2021] dexamethasone, 2 mg, Oral, Daily  escitalopram, 5 mg, Oral, Daily  heparin (porcine), 5,000 Units, Subcutaneous, Q8H RONNIE  lidocaine, 1 patch, Topical, Daily  losartan, 50 mg, Oral, Daily      Continuous IV Infusions: None     PRN Meds:  cyclobenzaprine, 10 mg, Oral, TID PRN  diazepam, 2 5 mg, Intravenous, Q6H PRN  HYDROmorphone, 0 5 mg, Intravenous, Q4H PRN  Labetalol HCl, 10 mg, Intravenous, Q4H PRN  loperamide, 4 mg, Oral, Daily PRN  naloxone, 0 04 mg, Intravenous, Q1MIN PRN  ondansetron, 4 mg, Intravenous, Q6H PRN  oxyCODONE, 10 mg, Oral, Q4H PRN  oxyCODONE, 5 mg, Oral, Q4H PRN      Discharge Plan: [facility] [place]    Network Utilization Review Department  ATTENTION: Please call with any questions or concerns to [phone] and carefully listen to the prompts so that you are directed to the right person  All voicemails are confidential   Swetha Khanr all requests for admission clinical reviews, approved or denied determinations and any other requests to dedicated fax number below belonging to the campus where the patient is receiving treatment   List of dedicated fax numbers for the Facilities:  [address] DENIALS (Administrative/Medical Necessity) [phone]   [address] (Maternity/NICU/Pediatrics) 261 [facility],[address] [address] [place] [id number] [place] [address] [address]uardo Ackerman [address] [facility] [phone]

## 2021-08-27 ENCOUNTER — APPOINTMENT (INPATIENT)
Dept: RADIOLOGY | Facility: HOSPITAL | Age: 56
DRG: 025 | End: 2021-08-27
Payer: COMMERCIAL

## 2021-08-27 LAB
ANION GAP SERPL CALCULATED.3IONS-SCNC: 4 MMOL/L (ref 4–13)
BUN SERPL-MCNC: 26 MG/DL (ref 5–25)
CALCIUM SERPL-MCNC: 8.9 MG/DL (ref 8.3–10.1)
CHLORIDE SERPL-SCNC: 100 MMOL/L (ref 100–108)
CO2 SERPL-SCNC: 30 MMOL/L (ref 21–32)
CREAT SERPL-MCNC: 0.7 MG/DL (ref 0.6–1.3)
ERYTHROCYTE [DISTWIDTH] IN BLOOD BY AUTOMATED COUNT: 14.1 % (ref 11.6–15.1)
GFR SERPL CREATININE-BSD FRML MDRD: 98 ML/MIN/1.73SQ M
GLUCOSE SERPL-MCNC: 101 MG/DL (ref 65–140)
HCT VFR BLD AUTO: 36.7 % (ref 34.8–46.1)
HGB BLD-MCNC: 12 G/DL (ref 11.5–15.4)
MCH RBC QN AUTO: 30.5 PG (ref 26.8–34.3)
MCHC RBC AUTO-ENTMCNC: 32.7 G/DL (ref 31.4–37.4)
MCV RBC AUTO: 93 FL (ref 82–98)
PLATELET # BLD AUTO: 319 THOUSANDS/UL (ref 149–390)
PMV BLD AUTO: 10.3 FL (ref 8.9–12.7)
POTASSIUM SERPL-SCNC: 4.9 MMOL/L (ref 3.5–5.3)
RBC # BLD AUTO: 3.94 MILLION/UL (ref 3.81–5.12)
SODIUM SERPL-SCNC: 134 MMOL/L (ref 136–145)
WBC # BLD AUTO: 16.8 THOUSAND/UL (ref 4.31–10.16)

## 2021-08-27 PROCEDURE — 80048 BASIC METABOLIC PNL TOTAL CA: CPT | Performed by: NURSE PRACTITIONER

## 2021-08-27 PROCEDURE — 99231 SBSQ HOSP IP/OBS SF/LOW 25: CPT | Performed by: THORACIC SURGERY (CARDIOTHORACIC VASCULAR SURGERY)

## 2021-08-27 PROCEDURE — 99232 SBSQ HOSP IP/OBS MODERATE 35: CPT | Performed by: NURSE PRACTITIONER

## 2021-08-27 PROCEDURE — 85027 COMPLETE CBC AUTOMATED: CPT | Performed by: NURSE PRACTITIONER

## 2021-08-27 PROCEDURE — 71045 X-RAY EXAM CHEST 1 VIEW: CPT

## 2021-08-27 PROCEDURE — 97112 NEUROMUSCULAR REEDUCATION: CPT

## 2021-08-27 PROCEDURE — 97116 GAIT TRAINING THERAPY: CPT

## 2021-08-27 RX ADMIN — HEPARIN SODIUM 5000 UNITS: 5000 INJECTION INTRAVENOUS; SUBCUTANEOUS at 05:02

## 2021-08-27 RX ADMIN — HEPARIN SODIUM 5000 UNITS: 5000 INJECTION INTRAVENOUS; SUBCUTANEOUS at 14:44

## 2021-08-27 RX ADMIN — DEXAMETHASONE 2 MG: 2 TABLET ORAL at 08:22

## 2021-08-27 RX ADMIN — HEPARIN SODIUM 5000 UNITS: 5000 INJECTION INTRAVENOUS; SUBCUTANEOUS at 22:07

## 2021-08-27 RX ADMIN — ESCITALOPRAM 5 MG: 5 TABLET, FILM COATED ORAL at 08:22

## 2021-08-27 RX ADMIN — ACETAMINOPHEN 975 MG: 325 TABLET, FILM COATED ORAL at 22:08

## 2021-08-27 RX ADMIN — LOSARTAN POTASSIUM 50 MG: 50 TABLET, FILM COATED ORAL at 08:22

## 2021-08-27 RX ADMIN — ACETAMINOPHEN 975 MG: 325 TABLET, FILM COATED ORAL at 05:02

## 2021-08-27 RX ADMIN — ACETAMINOPHEN 975 MG: 325 TABLET, FILM COATED ORAL at 14:44

## 2021-08-27 NOTE — PLAN OF CARE
Problem: PHYSICAL THERAPY ADULT  Goal: Performs mobility at highest level of function for planned discharge setting  See evaluation for individualized goals  Description: Treatment/Interventions: ADL retraining, Functional transfer training, LE strengthening/ROM, Elevations, Therapeutic exercise, Endurance training, Cognitive reorientation, Patient/family training, Bed mobility, Equipment eval/education, Gait training, Compensatory technique education, Spoke to nursing, Spoke to case management  Equipment Recommended: Wheelchair       See flowsheet documentation for full assessment, interventions and recommendations  Outcome: Progressing  Note: Prognosis: Good  Problem List: Decreased strength, Decreased endurance, Impaired balance, Decreased mobility, Decreased safety awareness, Impaired judgement, Decreased cognition  Assessment: Pt continues to make steady progress in recent sessions performing transfers without physical assist & utilizing RW for mobiltiy in room & hallway  pt ablle to negotiate obstacles without incdent, but requires extensive time to perform all tasks & constant focus per pt to maintain safety  Seated rest given to recover after each trial   Pt demosntrated x2 minor LOB, but was able to regain balance with use of RW & no additional support  Upon return to room, pt performed standing exercises at RW as noted above, remaining RW relaint when in single leg stance, demosntrating 1 LOB when performing hip abduction exercises  This may have been due to fatigue as pt reported increased weakness as session progressed  Seated rests given to recover after each exercise  Pt instructed in seated exercises to perform outside of therapy to improve muscular endurance to reduce risk for falls, but also improve muscle coordination to improve standing & ambulatory tolerance & reduce requisite effort to perform tasks    Cotninue to recommend rehab to address functional deficits & ensure safe return to PLOF   Barriers to Discharge: Inaccessible home environment, Decreased caregiver support  Barriers to Discharge Comments: falls/ alone at times while spouse works- w/c level as option for home discharge discussed 2* falls- pt is in agreement w/ plan and recommendation for w/c level when alone until progressed      PT Discharge Recommendation: Post acute rehabilitation services     PT - OK to Discharge: Yes (TO REHAB WHEN MED MELISSA )    See flowsheet documentation for full assessment

## 2021-08-27 NOTE — QUICK NOTE
Left chest tube clamped at 14:30 hrs  Tube is to remain clamped for 6 hours (till 20:30 hrs)  A CXR will be obtained at that time following which the tube will be unclamped if imaging looks ok  The tube is to unclamped and put to suction prior to the 6-hour timeline if the patient develops sudden onset SOB/hypoxia

## 2021-08-27 NOTE — PHYSICAL THERAPY NOTE
Physical Therapy Progress Note     08/27/21 1112   PT Last Visit   PT Visit Date 08/27/21   Note Type   Note Type Treatment   Pain Assessment   Pain Assessment Tool FLACC   Pain Rating: FLACC (Rest) - Face 1   Pain Rating: FLACC (Rest) - Legs 0   Pain Rating: FLACC (Rest) - Activity 0   Pain Rating: FLACC (Rest) - Cry 1   Pain Rating: FLACC (Rest) - Consolability 0   Score: FLACC (Rest) 2   Pain Rating: FLACC (Activity) - Face 1   Pain Rating: FLACC (Activity) - Legs 0   Pain Rating: FLACC (Activity) - Activity 0   Pain Rating: FLACC (Activity) - Cry 1   Pain Rating: FLACC (Activity) - Consolability 0   Score: FLACC (Activity) 2   Restrictions/Precautions   Other Precautions Pain; Fall Risk   Subjective   Subjective pt encountered supine in bed, reporting no new complaitns  Does continue to have pain at incision site, but does feel she is getting slightly better overall  Also rports continued blurry vision in L eye which she reports is contributing to balance problems  Bed Mobility   Supine to Sit 5  Supervision   Additional items Assist x 1   Transfers   Sit to Stand 5  Supervision   Additional items Assist x 1   Stand to Sit 5  Supervision   Additional items Assist x 1   Ambulation/Elevation   Gait pattern Excessively slow; Short stride; Inconsistent lyubov;Decreased foot clearance; Improper Weight shift   Gait Assistance 4  Minimal assist   Additional items Assist x 1   Assistive Device Rolling walker   Distance 50' x 2   Balance   Static Sitting Fair +   Static Standing Fair -   Dynamic Standing Poor +   Ambulatory Poor +   Endurance Deficit   Endurance Deficit Yes   Endurance Deficit Description fatigue, complaints of LE weakness/muscular fatigue, dyspnea with exertion   Activity Tolerance   Activity Tolerance Patient tolerated treatment well;Patient limited by fatigue   Nurse Made Aware yes   Exercises   Hip Abduction Standing;20 reps;AROM; Bilateral   Marching Standing;20 reps;AROM; Bilateral   Assessment Prognosis Good   Problem List Decreased strength;Decreased endurance; Impaired balance;Decreased mobility; Decreased safety awareness; Impaired judgement;Decreased cognition   Assessment Pt continues to make steady progress in recent sessions performing transfers without physical assist & utilizing RW for mobiltiy in room & hallway  pt ablle to negotiate obstacles without incdent, but requires extensive time to perform all tasks & constant focus per pt to maintain safety  Seated rest given to recover after each trial   Pt demosntrated x2 minor LOB, but was able to regain balance with use of RW & no additional support  Upon return to room, pt performed standing exercises at RW as noted above, remaining RW relaint when in single leg stance, demosntrating 1 LOB when performing hip abduction exercises  This may have been due to fatigue as pt reported increased weakness as session progressed  Seated rests given to recover after each exercise  Pt instructed in seated exercises to perform outside of therapy to improve muscular endurance to reduce risk for falls, but also improve muscle coordination to improve standing & ambulatory tolerance & reduce requisite effort to perform tasks  Cotninue to recommend rehab to address functional deficits & ensure safe return to OF  Barriers to Discharge Inaccessible home environment;Decreased caregiver support   Goals   Patient Goals to keep getting better   LTG Expiration Date 09/03/21   PT Treatment Day 2   Plan   Treatment/Interventions Functional transfer training;Elevations;LE strengthening/ROM; Therapeutic exercise;Cognitive reorientation;Patient/family training;Equipment eval/education; Bed mobility;Gait training   Progress Progressing toward goals   PT Frequency Other (Comment)  (3-6x/week)   Recommendation   PT Discharge Recommendation Post acute rehabilitation services   Equipment Recommended [address] [facility] Recommended Wheeled walker   AM-PAC Basic Mobility Inpatient   Turning in Bed Without Bedrails 4   Lying on Back to Sitting on Edge of Flat Bed 4   Moving Bed to Chair 3   Standing Up From Chair 3   Walk in Room 3   Climb 3-5 Stairs 2   Basic Mobility Inpatient Raw Score 19   Basic Mobility Standardized Score 42 48   The patient's AM-PAC Basic Mobility Inpatient Short Form Raw Score is 19, Standardized Score is 42 48  A standardized score less than 42 9 suggests the patient may benefit from discharge to post-acute rehabilitation services  Please also refer to the recommendation of the Physical Therapist for safe discharge planning            Eileen Diaz PTA

## 2021-08-27 NOTE — PHYSICAL THERAPY NOTE
Physical Therapy Progress Note     08/27/21 1112   PT Last Visit   PT Visit Date 08/27/21   Note Type   Note Type Treatment   Pain Assessment   Pain Assessment Tool FLACC   Pain Rating: FLACC (Rest) - Face 1   Pain Rating: FLACC (Rest) - Legs 0   Pain Rating: FLACC (Rest) - Activity 0   Pain Rating: FLACC (Rest) - Cry 1   Pain Rating: FLACC (Rest) - Consolability 0   Score: FLACC (Rest) 2   Pain Rating: FLACC (Activity) - Face 1   Pain Rating: FLACC (Activity) - Legs 0   Pain Rating: FLACC (Activity) - Activity 0   Pain Rating: FLACC (Activity) - Cry 1   Pain Rating: FLACC (Activity) - Consolability 0   Score: FLACC (Activity) 2   Restrictions/Precautions   Other Precautions Pain; Fall Risk   Subjective   Subjective pt encountered supine in bed, reporting no new complaitns  Does continue to have pain at incision site, but does feel she is getting slightly better overall  Also rports continued blurry vision in L eye which she reports is contributing to balance problems  Bed Mobility   Supine to Sit 5  Supervision   Additional items Assist x 1   Transfers   Sit to Stand 5  Supervision   Additional items Assist x 1   Stand to Sit 5  Supervision   Additional items Assist x 1   Ambulation/Elevation   Gait pattern Excessively slow; Short stride; Inconsistent lyubov;Decreased foot clearance; Improper Weight shift   Gait Assistance 4  Minimal assist   Additional items Assist x 1   Assistive Device Rolling walker   Distance 50' x 2   Balance   Static Sitting Fair +   Static Standing Fair -   Dynamic Standing Poor +   Ambulatory Poor +   Endurance Deficit   Endurance Deficit Yes   Endurance Deficit Description fatigue, complaints of LE weakness/muscular fatigue, dyspnea with exertion   Activity Tolerance   Activity Tolerance Patient tolerated treatment well;Patient limited by fatigue   Nurse Made Aware yes   Assessment   Prognosis Good   Problem List Decreased strength;Decreased endurance; Impaired balance;Decreased mobility; Decreased safety awareness; Impaired judgement;Decreased cognition   Assessment Pt continues to make steady progress in recent sessions performing transfers without physical assist & utilizing RW for mobiltiy in room & hallway  pt ablle to negotiate obstacles without incdent, but requires extensive time to perform all tasks & constant focus per pt to maintain safety  Seated rest given to recover after each trial   Pt demosntrated x2 minor LOB, but was able to regain balance with use of RW & no additional support  Upon return to room, pt performed standing exercises at RW as noted above, remaining RW relaint when in single leg stance, demosntrating 1 LOB when performing hip abduction exercises  This may have been due to fatigue as pt reported increased weakness as session progressed  Seated rests given to recover after each exercise  Pt instructed in seated exercises to perform outside of therapy to improve muscular endurance to reduce risk for falls, but also improve muscle coordination to improve standing & ambulatory tolerance & reduce requisite effort to perform tasks  Cotninue to recommend rehab to address functional deficits & ensure safe return to PLOF  Barriers to Discharge Inaccessible home environment;Decreased caregiver support   Goals   Patient Goals to keep getting better   LTG Expiration Date 09/03/21   PT Treatment Day 2   Plan   Treatment/Interventions Functional transfer training;Elevations;LE strengthening/ROM; Therapeutic exercise;Cognitive reorientation;Patient/family training;Equipment eval/education; Bed mobility;Gait training   Progress Progressing toward goals   PT Frequency Other (Comment)  (3-6x/week)   Recommendation   PT Discharge Recommendation Post acute rehabilitation services   Equipment Recommended [facility] Recommended Wheeled walker   AM-PAC Basic Mobility Inpatient   Turning in Bed Without Bedrails 4   Lying on Back to Sitting on Edge of Flat Bed 4 Moving Bed to Chair 3   Standing Up From Chair 3   Walk in Room 3   Climb 3-5 Stairs 2   Basic Mobility Inpatient Raw Score 19   Basic Mobility Standardized Score 42 48   The patient's AM-PAC Basic Mobility Inpatient Short Form Raw Score is 19, Standardized Score is 42 48  A standardized score less than 42 9 suggests the patient may benefit from discharge to post-acute rehabilitation services  Please also refer to the recommendation of the Physical Therapist for safe discharge planning            Chester Olvera, PTA

## 2021-08-27 NOTE — PROGRESS NOTES
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[facility])  Assessment & Plan  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  There is some residual enhancing tumor remaining in the anterior aspect of the CP angle with improving mass effect upon the brainstem and brachium pontis  There is no restricted diffusion identified within the left posterior lateral fabian and brachium pontis consistent with acute ischemia  Persistent mild hydrocephalus involving the lateral ventricles and 3rd ventricle  Increased FLAIR signal adjacent to the surface of the lateral ventricles may represent transependymal flow of CSF, unchanged  4th ventricle is not enlarged and there is improving mass effect upon the 4th ventricle "  · unclear if patient's hearing will return in ear per NSX    Fall with possible seizure-like activity [facility])  Assessment & Plan  · Patient reported 3 falls since recent discharge  · The first one she reports falling onto her knees while going into the house after her ENT appointment Friday  · The second one she reports falling onto her knees while going into the bathroom at home Saturday  · The third one she reports falling forward and being able to hear those around her but was unable to respond  She reports this lasted for approximately 1 minute witnesses told her  Also report of bilateral hand shaking and possible bowel incontinence during the event  · Neurology and Cardiology were following given oncern for POTS and convulsive syncope   · Cardiology consult appreciated - they did not believe these syncopal episodes are consistent with POTS  · EEG 8/15 was normal  · Continue seizure precautions  · Neurosurgery signed off  · PT/OT evaluations  · Fall precautions    Pneumothorax  Assessment & Plan  · Per record review, patient developed an iatrogenic pneumothorax s/p left subclavian central line placement and had a chest tube placed but the chest tube came out, and she had a new chest tube placed  The patient then had another chest tube placed by IR and the other chest tube was removed by Thoracic Surgery who is now following  · Chest tube management per Thoracic Surgery   · Currently on water seal     Hyponatremia  Assessment & Plan  Lab Results   Component Value Date    SODIUM 134 (L) 08/27/2021    SODIUM 135 (L) 08/26/2021    SODIUM 131 (L) 08/25/2021   Sodium 134- stable- will hold on labs for tomorrow, consider repeat level in 48 hrs     Headache  Assessment & Plan  · Continue current regimen    Leukocytosis  Assessment & Plan  Lab Results   Component Value Date    WBC 16 80 (H) 08/27/2021    WBC 17 02 (H) 08/26/2021    WBC 20 95 (H) 08/25/2021   Suspect steroid-induced  · Monitor- will hold on labs for tomorrow, consider repeat level in 48 hrs    Hypertension  Assessment & Plan  · Continue losartan 50 mg daily with hold parameters  · Monitor blood pressure per protocol      Hydrocephalus (HCC)  Assessment & Plan  · Neurosurgery following  · Now off Diamox    Anxiety  Assessment & Plan  · Continue Lexapro        VTE Pharmacologic Prophylaxis: VTE Score: 4 Moderate Risk (Score 3-4) - Pharmacological DVT Prophylaxis Ordered: heparin      Patient Centered Rounds: I performed bedside rounds with nursing staff today  Discussions with Specialists or Other Care Team Provider: d/w RN     Education and Discussions with Family / Patient: Patient declined call to   Time Spent for Care: 30 minutes  More than 50% of total time spent on counseling and coordination of care as described above  Current Length of Stay: 12 day(s)  Current Patient Status: Inpatient   Certification Statement: The patient will continue to require additional inpatient hospital stay due to chest tube  Discharge Plan: Anticipate discharge in 48-72 hrs to rehab facility  Code Status: Level 1 - Full Code    Subjective:   Pt denies any complaints  Reports she is ready to get out of here but still has the chest tube  Objective:     Vitals:   Temp (24hrs), Av 7 °F (37 1 °C), Min:98 3 °F (36 8 °C), Max:98 9 °F (37 2 °C)    Temp:  [98 3 °F (36 8 °C)-98 9 °F (37 2 °C)] 98 3 °F (36 8 °C)  HR:  [69-89] 69  Resp:  [16-19] 18  BP: (112-141)/(79-90) 141/90  SpO2:  [96 %-98 %] 98 %  Body mass index is 27 88 kg/m²  Input and Output Summary (last 24 hours): Intake/Output Summary (Last 24 hours) at 2021 1218  Last data filed at 2021 0900  Gross per 24 hour   Intake 480 ml   Output 425 ml   Net 55 ml       Physical Exam:   Physical Exam  Vitals and nursing note reviewed  Constitutional:       General: She is not in acute distress  Appearance: She is well-developed  Cardiovascular:      Rate and Rhythm: Normal rate and regular rhythm  Heart sounds: Normal heart sounds  No murmur heard  Pulmonary:      Effort: Pulmonary effort is normal  No respiratory distress  Breath sounds: Normal breath sounds  No wheezing or rales  Comments: Left chest wall chest tube to water seal  Abdominal:      General: Bowel sounds are normal  There is no distension  Palpations: Abdomen is soft  Tenderness: There is no abdominal tenderness  Musculoskeletal:         General: No swelling or tenderness  Skin:     General: Skin is warm and dry  Neurological:      Mental Status: She is alert  Mental status is at baseline     Psychiatric:         Mood and Affect: Mood normal           Additional Data:     Labs:  Results from last 7 days   Lab Units 08/27/21  0532 08/21/21  1021   WBC Thousand/uL 16 80* 20 18*   HEMOGLOBIN g/dL 12 0 13 6   HEMATOCRIT % 36 7 41 9   PLATELETS Thousands/uL 319 265   NEUTROS PCT %  --  90*   LYMPHS PCT %  --  5*   MONOS PCT %  --  4   EOS PCT %  --  0     Results from last 7 days   Lab Units 08/27/21  0532 08/21/21  1021   SODIUM mmol/L 134* 136   POTASSIUM mmol/L 4 9 3 6   CHLORIDE mmol/L 100 105   CO2 mmol/L 30 27   BUN mg/dL 26* 18   CREATININE mg/dL 0 70 0 74   ANION GAP mmol/L 4 4   CALCIUM mg/dL 8 9 9 5   ALBUMIN g/dL  --  3 2*   TOTAL BILIRUBIN mg/dL  --  0 35   ALK PHOS U/L  --  52   ALT U/L  --  37   AST U/L  --  36   GLUCOSE RANDOM mg/dL 101 109     Results from last 7 days   Lab Units 08/21/21  1021   INR  0 91                   Lines/Drains:  Invasive Devices     Peripheral Intravenous Line            Peripheral IV 08/25/21 Right Arm 2 days          Drain            Chest Tube 1 Left Second intercostal space 10 Fr  6 days                      Imaging: Reviewed radiology reports from this admission including: chest xray    Recent Cultures (last 7 days):         Last 24 Hours Medication List:   Current Facility-Administered Medications   Medication Dose Route Frequency Provider Last Rate    acetaminophen  975 mg Oral [facility] Dora Blanco MD      cyclobenzaprine  10 mg Oral TID PRN Dora Blanco MD      dexamethasone  2 mg Oral Daily Dora Blanco MD      diazepam  2 5 mg Intravenous Q6H PRN Dora Balnco MD      escitalopram  5 mg Oral Daily Dora Blanco MD      heparin (porcine)  5,000 Units Subcutaneous [facility] Dora Blanco MD      HYDROmorphone  0 5 mg Intravenous Q4H PRN Dora Blanco MD      Labetalol HCl  10 mg Intravenous Q4H PRN Dora Blanco MD      lidocaine  1 patch Topical Daily Nilda Coffey MD      loperamide  4 mg Oral Daily PRN ONELIA Younger      losartan  50 mg Oral Daily Nilda Coffey MD      naloxone  0 04 mg Intravenous Q1MIN PRN Nilda Coffey MD      ondansetron  4 mg Intravenous Q6H PRN Nilda Coffey MD      oxyCODONE  10 mg Oral Q4H PRN Nilda Coffey MD      oxyCODONE  5 mg Oral Q4H PRN Nilda Coffey MD          Today, Patient Was Seen By: ONELIA Younger    **Please Note: This note may have been constructed using a voice recognition system  **

## 2021-08-27 NOTE — PROGRESS NOTES
Progress Note - Thoracic Surgery   Lashawn Davis 55 y o  female MRN: [id number]  Unit/Bed#: [facility] 624-01 Encounter: [id number]    Assessment:  72-year-old female with iatrogenic left pneumothorax now status post chest tube placement x2    L chest tube 0 cc output, -AL, WS    Plan:  -continue WS  -consider AM cxr  - pain and nausea control p r n   - encourage ambulation  - rest of care per primary team      Subjective/Objective     Subjective:   No acute events overnight  No chest pain  No SOB  Objective:     Blood pressure 112/79, pulse 88, temperature 98 9 °F (37 2 °C), resp  rate 19, height 5' 5 5" (1 664 m), weight 77 2 kg (170 lb 3 1 oz), SpO2 98 %, not currently breastfeeding  ,Body mass index is 28 32 kg/m²        Intake/Output Summary (Last 24 hours) at 8/27/2021 0610  Last data filed at 8/27/2021 0507  Gross per 24 hour   Intake 240 ml   Output 0 ml   Net 240 ml       Invasive Devices     Peripheral Intravenous Line            Peripheral IV 08/25/21 Right Arm 2 days          Drain            Chest Tube 1 Left Second intercostal space 10 Fr  5 days                Physical Exam:   Gen: NAD, Comfortable  Neuro: A&O, No focal deficits  Head: Normal Cephalic, Atraumatic  Eye: EOMI, PERRLA, No scleral icterus  Neck: Supple, No JVD, Midline trachea  CV: RRR, Cap refill <2 sec  Pulm: Normal work of breathing, no respiratory distress  Abd: Soft, Non-Distended, Non-Tender  Ext: No edema, Non-tender  Skin: warm, dry, intact

## 2021-08-27 NOTE — ASSESSMENT & PLAN NOTE
Lab Results   Component Value Date    WBC 16 80 (H) 08/27/2021    WBC 17 02 (H) 08/26/2021    WBC 20 95 (H) 08/25/2021   Suspect steroid-induced  · Monitor- will hold on labs for tomorrow, consider repeat level in 48 hrs

## 2021-08-27 NOTE — ASSESSMENT & PLAN NOTE
Lab Results   Component Value Date    SODIUM 134 (L) 08/27/2021    SODIUM 135 (L) 08/26/2021    SODIUM 131 (L) 08/25/2021   Sodium 134- stable- will hold on labs for tomorrow, consider repeat level in 48 hrs

## 2021-08-28 ENCOUNTER — APPOINTMENT (INPATIENT)
Dept: RADIOLOGY | Facility: HOSPITAL | Age: 56
DRG: 025 | End: 2021-08-28
Payer: COMMERCIAL

## 2021-08-28 PROCEDURE — 71046 X-RAY EXAM CHEST 2 VIEWS: CPT

## 2021-08-28 PROCEDURE — 99232 SBSQ HOSP IP/OBS MODERATE 35: CPT | Performed by: NURSE PRACTITIONER

## 2021-08-28 PROCEDURE — NC001 PR NO CHARGE: Performed by: PHYSICIAN ASSISTANT

## 2021-08-28 PROCEDURE — 99231 SBSQ HOSP IP/OBS SF/LOW 25: CPT | Performed by: THORACIC SURGERY (CARDIOTHORACIC VASCULAR SURGERY)

## 2021-08-28 RX ADMIN — HEPARIN SODIUM 5000 UNITS: 5000 INJECTION INTRAVENOUS; SUBCUTANEOUS at 13:47

## 2021-08-28 RX ADMIN — OXYCODONE HYDROCHLORIDE 10 MG: 10 TABLET ORAL at 09:04

## 2021-08-28 RX ADMIN — DEXAMETHASONE 2 MG: 2 TABLET ORAL at 09:04

## 2021-08-28 RX ADMIN — LOSARTAN POTASSIUM 50 MG: 50 TABLET, FILM COATED ORAL at 09:04

## 2021-08-28 RX ADMIN — HEPARIN SODIUM 5000 UNITS: 5000 INJECTION INTRAVENOUS; SUBCUTANEOUS at 05:59

## 2021-08-28 RX ADMIN — OXYCODONE HYDROCHLORIDE 5 MG: 5 TABLET ORAL at 21:52

## 2021-08-28 RX ADMIN — ESCITALOPRAM 5 MG: 5 TABLET, FILM COATED ORAL at 09:03

## 2021-08-28 RX ADMIN — ACETAMINOPHEN 975 MG: 325 TABLET, FILM COATED ORAL at 13:47

## 2021-08-28 RX ADMIN — ACETAMINOPHEN 975 MG: 325 TABLET, FILM COATED ORAL at 21:52

## 2021-08-28 RX ADMIN — ACETAMINOPHEN 975 MG: 325 TABLET, FILM COATED ORAL at 05:59

## 2021-08-28 RX ADMIN — HEPARIN SODIUM 5000 UNITS: 5000 INJECTION INTRAVENOUS; SUBCUTANEOUS at 21:52

## 2021-08-28 NOTE — ASSESSMENT & PLAN NOTE
· Patient reported 3 falls since recent discharge  · The first one she reports falling onto her knees while going into the house after her ENT appointment Friday  · The second one she reports falling onto her knees while going into the bathroom at home Saturday  · The third one she reports falling forward and being able to hear those around her but was unable to respond  She reports this lasted for approximately 1 minute witnesses told her  Also report of bilateral hand shaking and possible bowel incontinence during the event  · Neurology and Cardiology were following given oncern for POTS and convulsive syncope   · Cardiology consult appreciated - they did not believe these syncopal episodes are consistent with POTS  · Neurology consult appreciated, EEG was normal   AEDs were deferred

## 2021-08-28 NOTE — PROGRESS NOTES
Progress Note - General Surgery   Wesley Rock 55 y o  female MRN: [id number]  Unit/Bed#: [facility] 624-01 Encounter: [id number]    Assessment:  Edward Flores is a 55year old female with iatrogenic left pneumothorax, s/p chext tube placement  Patient progressing well without difficulty  Plan:  Pain/nausea PRN  OOB ambulate as tolerated  FU CXR results  Remove chest tube    Subjective/Objective   Subjective: No acute events overnight  Patient is not experiencing any pain or discomfort  Chest tube was clamped yesterday at 1430, no difficulties  Patient does not report any shortness of breath or chest pain  Tolerating diet, ambulating, bowel movements without difficulty  ROS: No headache, abdominal pain, MSK pain, fever, chills    Objective:     Blood pressure 119/75, pulse 88, temperature 98 6 °F (37 °C), resp  rate 16, height 5' 5 5" (1 664 m), weight 78 1 kg (172 lb 2 9 oz), SpO2 96 %, not currently breastfeeding  ,Body mass index is 28 65 kg/m²  I/O       08/26 0701 - 08/27 0700 08/27 0701 - 08/28 0700    P  O  240 600    Total Intake(mL/kg) 240 (3 2) 600 (7 7)    Urine (mL/kg/hr)  425 (0 2)    Chest Tube 0     Total Output 0 425    Net +240 +175          Unmeasured Urine Occurrence 2 x 4 x            Invasive Devices     Peripheral Intravenous Line            Peripheral IV 08/25/21 Right Arm 3 days          Drain            Chest Tube 1 Left Second intercostal space 10 Fr  6 days                Physical Exam:   General: Awake, alert, oriented  No apparent distress  Cardio: RRR  Pulm: Clear to auscultation, respiratory effort non-labored  Abd: No apparent distention  Skin: Warm, dry    L chest drain bandage clean, dry, intact    Lab, Imaging and other studies: No new labs/imaging today  VTE Pharmacologic Prophylaxis: Heparin  VTE Mechanical Prophylaxis: sequential compression device and foot pump applied

## 2021-08-28 NOTE — ASSESSMENT & PLAN NOTE
· Patient was recently admitted 8/8/21-8/12/21  She was seen by Neurosurgery during recent admission and was placed on steroid taper and Diamox  · Was seen by Neurosurgery, s/p guided left retromastoid craniotomy for debulking of CP angle mass 8/18/21 by Dr Imelda Licea  · On Decadron taper per Neurosurgery  · Now off Diamox  · Post-op MRI brain revealed, per the results report, "Patient is status post removal of the vast majority of the previously identified left CP angle tumor  There is some residual enhancing tumor remaining in the anterior aspect of the CP angle with improving mass effect upon the brainstem and brachium pontis  There is no restricted diffusion identified within the left posterior lateral fabian and brachium pontis consistent with acute ischemia  Persistent mild hydrocephalus involving the lateral ventricles and 3rd ventricle  Increased FLAIR signal adjacent to the surface of the lateral ventricles may represent transependymal flow of CSF, unchanged    4th ventricle is not enlarged and there is improving mass effect upon the 4th ventricle "  · Unclear if patient's hearing will return in ear per NSX  · Neurosurgery follow-up scheduled for 9/2  · PT/OT recommending rehab

## 2021-08-28 NOTE — QUICK NOTE
Quick note    CXR reviewed after chest tube removal  No pneumothorax  Thoracic surgery will sign off  Please reach out with questions or concerns       Robel Johnson MD  12:54 PM  8/28/21

## 2021-08-28 NOTE — ASSESSMENT & PLAN NOTE
Lab Results   Component Value Date    SODIUM 134 (L) 08/27/2021    SODIUM 135 (L) 08/26/2021    SODIUM 131 (L) 08/25/2021   · Sodium stable

## 2021-08-28 NOTE — ASSESSMENT & PLAN NOTE
· Per record review, patient developed an iatrogenic pneumothorax s/p left subclavian central line placement and had a chest tube placed but the chest tube came out, and she had a new chest tube placed  The patient then had another chest tube placed by IR and the other chest tube was removed by Thoracic Surgery who is now following  · Chest tube removed today by thoracic surgery  Chest x-ray pending

## 2021-08-28 NOTE — PROGRESS NOTES
08/28/21    Procedure: Chest tube removal    Left pigtail  removed in routine fashion without incident  The patient tolerated the procedure well  A dry, sterile dressing was placed  Will check a pa/lat chest x-ray       Tata Herrera PA-C

## 2021-08-28 NOTE — PROGRESS NOTES
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)  Assessment & Plan  · Patient was recently admitted 8/8/21-8/12/21  She was seen by Neurosurgery during recent admission and was placed on steroid taper and Diamox  · Was seen by Neurosurgery, s/p guided left retromastoid craniotomy for debulking of CP angle mass 8/18/21 by Dr Rosalind Jaime  · On Decadron taper per Neurosurgery  · Now off Diamox  · Post-op MRI brain revealed, per the results report, "Patient is status post removal of the vast majority of the previously identified left CP angle tumor  There is some residual enhancing tumor remaining in the anterior aspect of the CP angle with improving mass effect upon the brainstem and brachium pontis  There is no restricted diffusion identified within the left posterior lateral fabian and brachium pontis consistent with acute ischemia  Persistent mild hydrocephalus involving the lateral ventricles and 3rd ventricle  Increased FLAIR signal adjacent to the surface of the lateral ventricles may represent transependymal flow of CSF, unchanged  4th ventricle is not enlarged and there is improving mass effect upon the 4th ventricle "  · Unclear if patient's hearing will return in ear per NSX  · Neurosurgery follow-up scheduled for 9/2  · PT/OT recommending rehab    Hydrocephalus [facility])  Assessment & Plan  · Postoperative MRI as above  · Outpatient neurosurgery follow-up    Headache  Assessment & Plan  · No further complaints       Pneumothorax  Assessment & Plan  · Per record review, patient developed an iatrogenic pneumothorax s/p left subclavian central line placement and had a chest tube placed but the chest tube came out, and she had a new chest tube placed  The patient then had another chest tube placed by IR and the other chest tube was removed by Thoracic Surgery who is now following  · Chest tube removed today by thoracic surgery  Chest x-ray pending  Leukocytosis  Assessment & Plan  Lab Results   Component Value Date    WBC 16 80 (H) 08/27/2021    WBC 17 02 (H) 08/26/2021    WBC 20 95 (H) 08/25/2021   Suspect steroid-induced  · Monitor- will hold on labs for tomorrow, consider repeat level in 48 hrs    Hyponatremia  Assessment & Plan  Lab Results   Component Value Date    SODIUM 134 (L) 08/27/2021    SODIUM 135 (L) 08/26/2021    SODIUM 131 (L) 08/25/2021   · Sodium stable  Fall with possible seizure-like activity ([facility] [address] )  Assessment & Plan  · Patient reported 3 falls since recent discharge  · The first one she reports falling onto her knees while going into the house after her ENT appointment Friday  · The second one she reports falling onto her knees while going into the bathroom at home Saturday  · The third one she reports falling forward and being able to hear those around her but was unable to respond  She reports this lasted for approximately 1 minute witnesses told her  Also report of bilateral hand shaking and possible bowel incontinence during the event  · Neurology and Cardiology were following given oncern for POTS and convulsive syncope   · Cardiology consult appreciated - they did not believe these syncopal episodes are consistent with POTS  · Neurology consult appreciated, EEG was normal   AEDs were deferred  Hypertension  Assessment & Plan  · Blood pressure acceptable  Continue losartan  · Monitor      Anxiety  Assessment & Plan  · Continue Lexapro      VTE Pharmacologic Prophylaxis: VTE Score: 4 Moderate Risk (Score 3-4) - Pharmacological DVT Prophylaxis Ordered: heparin  Patient Centered Rounds: I performed bedside rounds with nursing staff today    Discussions with Specialists or Other Care Team Provider: nursing    Education and Discussions with Family / Patient: Patient declined call to   Time Spent for Care: 30 minutes  More than 50% of total time spent on counseling and coordination of care as described above  Current Length of Stay: 13 day(s)  Current Patient Status: Inpatient   Certification Statement: The patient will continue to require additional inpatient hospital stay due to Thoracic surgery clearance  Discharge Plan: Anticipate discharge in 24-48 hrs to rehab facility  Code Status: Level 1 - Full Code    Subjective:   Patient is happy chest tube was removed  Denies shortness of breath  Still unable to hear out of left ear  Objective:     Vitals:   Temp (24hrs), Av 6 °F (37 °C), Min:98 6 °F (37 °C), Max:98 6 °F (37 °C)    Temp:  [98 6 °F (37 °C)] 98 6 °F (37 °C)  HR:  [83-92] 83  Resp:  [16] 16  BP: (119-121)/(72-75) 121/74  SpO2:  [93 %-97 %] 97 %  Body mass index is 28 65 kg/m²  Input and Output Summary (last 24 hours): Intake/Output Summary (Last 24 hours) at 2021 0913  Last data filed at 2021 1453  Gross per 24 hour   Intake 360 ml   Output --   Net 360 ml       Physical Exam:   Physical Exam  Vitals and nursing note reviewed  Cardiovascular:      Rate and Rhythm: Normal rate  Pulmonary:      Breath sounds: Normal breath sounds  Abdominal:      Tenderness: There is no abdominal tenderness  Musculoskeletal:         General: No swelling  Skin:     General: Skin is warm  Neurological:      Mental Status: She is alert and oriented to person, place, and time  Mental status is at baseline     Psychiatric:         Mood and Affect: Mood normal         Additional Data:     Labs:  Results from last 7 days   Lab Units 21  0532 21  1021   WBC Thousand/uL 16 80* 20 18*   HEMOGLOBIN g/dL 12 0 13 6   HEMATOCRIT % 36 7 41 9   PLATELETS Thousands/uL 319 265   NEUTROS PCT %  --  90*   LYMPHS PCT %  --  5*   MONOS PCT %  --  4   EOS PCT %  --  0     Results from last 7 days   Lab Units 08/27/21  0532 08/21/21  1021   SODIUM mmol/L 134* 136   POTASSIUM mmol/L 4 9 3 6   CHLORIDE mmol/L 100 105   CO2 mmol/L 30 27   BUN mg/dL 26* 18   CREATININE mg/dL 0 70 0 74   ANION GAP mmol/L 4 4   CALCIUM mg/dL 8 9 9 5   ALBUMIN g/dL  --  3 2*   TOTAL BILIRUBIN mg/dL  --  0 35   ALK PHOS U/L  --  52   ALT U/L  --  37   AST U/L  --  36   GLUCOSE RANDOM mg/dL 101 109     Results from last 7 days   Lab Units 08/21/21  1021   INR  0 91                   Lines/Drains:  Invasive Devices     Peripheral Intravenous Line            Peripheral IV 08/25/21 Right Arm 3 days          Drain            Chest Tube 1 Left Second intercostal space 10 Fr  6 days                      Imaging: No pertinent imaging reviewed      Recent Cultures (last 7 days):         Last 24 Hours Medication List:   Current Facility-Administered Medications   Medication Dose Route Frequency Provider Last Rate    acetaminophen  975 mg Oral [facility] Magi Mack MD      cyclobenzaprine  10 mg Oral TID PRN Magi Mack MD      diazepam  2 5 mg Intravenous Q6H PRN Magi Mack MD      escitalopram  5 mg Oral Daily Magi Mack MD      glycerin-hypromellose-  1 drop Both Eyes Q3H PRN ONELIA Bhakta      heparin (porcine)  5,000 Units Subcutaneous [facility] Magi Mack MD      HYDROmorphone  0 5 mg Intravenous Q4H PRN Magi Mack MD      Labetalol HCl  10 mg Intravenous Q4H PRN Magi Mack MD      lidocaine  1 patch Topical Daily Magi Mack MD      loperamide  4 mg Oral Daily PRN ONELIA Bhakta      losartan  50 mg Oral Daily Magi Mack MD      naloxone  0 04 mg Intravenous Q1MIN PRN Magi Mack MD      ondansetron  4 mg Intravenous Q6H PRN Magi Mack MD      oxyCODONE  10 mg Oral Q4H PRN Magi Mack MD      oxyCODONE  5 mg Oral Q4H PRN Magi Mack MD          Today, Patient Was Seen By: ONELIA Fajardo    **Please Note: This note may have been constructed using a voice recognition system  **

## 2021-08-29 PROBLEM — R51.9 HEADACHE: Status: RESOLVED | Noted: 2021-08-15 | Resolved: 2021-08-29

## 2021-08-29 LAB
ANION GAP SERPL CALCULATED.3IONS-SCNC: 3 MMOL/L (ref 4–13)
BASOPHILS # BLD AUTO: 0.02 THOUSANDS/ΜL (ref 0–0.1)
BASOPHILS NFR BLD AUTO: 0 % (ref 0–1)
BUN SERPL-MCNC: 20 MG/DL (ref 5–25)
CALCIUM SERPL-MCNC: 8.8 MG/DL (ref 8.3–10.1)
CHLORIDE SERPL-SCNC: 101 MMOL/L (ref 100–108)
CO2 SERPL-SCNC: 29 MMOL/L (ref 21–32)
CREAT SERPL-MCNC: 0.63 MG/DL (ref 0.6–1.3)
EOSINOPHIL # BLD AUTO: 0.37 THOUSAND/ΜL (ref 0–0.61)
EOSINOPHIL NFR BLD AUTO: 2 % (ref 0–6)
ERYTHROCYTE [DISTWIDTH] IN BLOOD BY AUTOMATED COUNT: 14.8 % (ref 11.6–15.1)
GFR SERPL CREATININE-BSD FRML MDRD: 101 ML/MIN/1.73SQ M
GLUCOSE SERPL-MCNC: 75 MG/DL (ref 65–140)
HCT VFR BLD AUTO: 34.3 % (ref 34.8–46.1)
HGB BLD-MCNC: 11.4 G/DL (ref 11.5–15.4)
IMM GRANULOCYTES # BLD AUTO: 0.32 THOUSAND/UL (ref 0–0.2)
IMM GRANULOCYTES NFR BLD AUTO: 2 % (ref 0–2)
LYMPHOCYTES # BLD AUTO: 3.3 THOUSANDS/ΜL (ref 0.6–4.47)
LYMPHOCYTES NFR BLD AUTO: 21 % (ref 14–44)
MCH RBC QN AUTO: 30.6 PG (ref 26.8–34.3)
MCHC RBC AUTO-ENTMCNC: 33.2 G/DL (ref 31.4–37.4)
MCV RBC AUTO: 92 FL (ref 82–98)
MONOCYTES # BLD AUTO: 0.75 THOUSAND/ΜL (ref 0.17–1.22)
MONOCYTES NFR BLD AUTO: 5 % (ref 4–12)
NEUTROPHILS # BLD AUTO: 10.76 THOUSANDS/ΜL (ref 1.85–7.62)
NEUTS SEG NFR BLD AUTO: 70 % (ref 43–75)
NRBC BLD AUTO-RTO: 0 /100 WBCS
PLATELET # BLD AUTO: 283 THOUSANDS/UL (ref 149–390)
PMV BLD AUTO: 9.6 FL (ref 8.9–12.7)
POTASSIUM SERPL-SCNC: 4.8 MMOL/L (ref 3.5–5.3)
RBC # BLD AUTO: 3.72 MILLION/UL (ref 3.81–5.12)
SODIUM SERPL-SCNC: 133 MMOL/L (ref 136–145)
WBC # BLD AUTO: 15.52 THOUSAND/UL (ref 4.31–10.16)

## 2021-08-29 PROCEDURE — 80048 BASIC METABOLIC PNL TOTAL CA: CPT | Performed by: NURSE PRACTITIONER

## 2021-08-29 PROCEDURE — 97116 GAIT TRAINING THERAPY: CPT

## 2021-08-29 PROCEDURE — 97535 SELF CARE MNGMENT TRAINING: CPT

## 2021-08-29 PROCEDURE — 85025 COMPLETE CBC W/AUTO DIFF WBC: CPT | Performed by: NURSE PRACTITIONER

## 2021-08-29 PROCEDURE — 99232 SBSQ HOSP IP/OBS MODERATE 35: CPT | Performed by: NURSE PRACTITIONER

## 2021-08-29 RX ADMIN — HEPARIN SODIUM 5000 UNITS: 5000 INJECTION INTRAVENOUS; SUBCUTANEOUS at 13:21

## 2021-08-29 RX ADMIN — LOSARTAN POTASSIUM 50 MG: 50 TABLET, FILM COATED ORAL at 08:14

## 2021-08-29 RX ADMIN — HEPARIN SODIUM 5000 UNITS: 5000 INJECTION INTRAVENOUS; SUBCUTANEOUS at 21:24

## 2021-08-29 RX ADMIN — ACETAMINOPHEN 975 MG: 325 TABLET, FILM COATED ORAL at 05:12

## 2021-08-29 RX ADMIN — HEPARIN SODIUM 5000 UNITS: 5000 INJECTION INTRAVENOUS; SUBCUTANEOUS at 05:12

## 2021-08-29 RX ADMIN — ESCITALOPRAM 5 MG: 5 TABLET, FILM COATED ORAL at 08:14

## 2021-08-29 RX ADMIN — ACETAMINOPHEN 975 MG: 325 TABLET, FILM COATED ORAL at 13:21

## 2021-08-29 RX ADMIN — ACETAMINOPHEN 975 MG: 325 TABLET, FILM COATED ORAL at 21:23

## 2021-08-29 NOTE — PHYSICAL THERAPY NOTE
PHYSICAL THERAPY NOTE          Patient Name: John Acosta  [id number] Date: 8/29/2021 08/29/21 1154   PT Last Visit   PT Visit Date 08/29/21   Note Type   Note Type Treatment for insurance authorization   Pain Assessment   Pain Assessment Tool Pain Assessment not indicated - pt denies pain   Restrictions/Precautions   Weight Bearing Precautions Per Order No   Other Precautions Fall Risk   General   Chart Reviewed Yes   Response to Previous Treatment Patient with no complaints from previous session  Family/Caregiver Present Yes   Cognition   Arousal/Participation Alert   Attention Within functional limits   Orientation Level Oriented X4   Memory Decreased recall of precautions   Following Commands Follows one step commands without difficulty   Comments Pt mildly impulsive  Pt with some decreased safety awareness and insight into deficits  Subjective   Subjective Pt pleasant and agreeable to participate in therapy session  Bed Mobility   Supine to Sit 6  Modified independent   Additional items HOB elevated; Bedrails   Sit to Supine 6  Modified independent   Additional items HOB elevated; Bedrails   Additional Comments Supine in bed upon PT arrival   Pt left supine in bed with all needs in reach  Transfers   Sit to Stand 5  Supervision   Additional items Assist x 1; Increased time required;Verbal cues   Stand to Sit 5  Supervision   Additional items Increased time required;Verbal cues   Additional Comments Transfers with RW  Ambulation/Elevation   Gait pattern Excessively slow; Ataxia; Decreased foot clearance   Gait Assistance   (Min A progressing to supervision)   Additional items Assist x 1; Tactile cues   Assistive Device Rolling walker   Distance 100 ft x 2   Balance   Static Sitting Fair +   Dynamic Sitting Fair   Static Standing Fair -   Dynamic Standing Fair -   Ambulatory Poor +   Endurance Deficit   Endurance Deficit Yes   Endurance Deficit Description fatigue   Activity Tolerance   Activity Tolerance Patient limited by fatigue   Nurse Made Aware RN cleared pt to be seen by PT   Assessment   Prognosis Good   Problem List Decreased strength;Decreased endurance; Impaired balance;Decreased mobility; Decreased coordination;Decreased safety awareness   Assessment Pt seen for PT treatment session with focus on bed mobility, functional transfers, functional mobility  Pt making steady progress toward goals this session  Pt continues to be able to perform transfers with supervision and demonstrates no LOB upon static stance  With dynamic standing and functional mobility, pt does continue to present with balance impairments and gait deficits that result in increased risk for falls  Pt left supine in bed with all needs in reach  Pt will benefit from skilled therapy in order to address current impairments and functional limitations  PT to follow pt and recommending rehab once medically cleared  The patient's AM-PAC Basic Mobility Inpatient Short Form Raw Score is 20, Standardized Score is 43 99  A standardized score less than 42 9 suggests the patient may benefit from discharge to post-acute rehabilitation services  Please also refer to the recommendation of the Physical Therapist for safe discharge planning  Barriers to Discharge Inaccessible home environment;Decreased caregiver support   Goals   Patient Goals to get stronger   LTG Expiration Date 09/03/21   Plan   Treatment/Interventions Functional transfer training;LE strengthening/ROM; Therapeutic exercise; Endurance training;Elevations; Patient/family training;Equipment eval/education; Bed mobility;Gait training;Spoke to nursing   Progress Progressing toward goals   PT Frequency Other (Comment)  (3-6x/wk)   Recommendation   PT Discharge Recommendation Post acute rehabilitation services   Equipment Recommended Walker   PT - OK to Discharge Yes  (to rehab once medically cleared)   AM-PAC Basic Mobility Inpatient   Turning in Bed Without Bedrails 4   Lying on Back to Sitting on Edge of Flat Bed 4   Moving Bed to Chair 3   Standing Up From Chair 3   Walk in Room 3   Climb 3-5 Stairs 3   Basic Mobility Inpatient Raw Score 20   Basic Mobility Standardized Score 43 99     Javier Aguirre, PT, DPT

## 2021-08-29 NOTE — ASSESSMENT & PLAN NOTE
Lab Results   Component Value Date    WBC 15 52 (H) 08/29/2021    WBC 16 80 (H) 08/27/2021    WBC 17 02 (H) 08/26/2021   Suspect steroid-induced  · Monitor as needed

## 2021-08-29 NOTE — OCCUPATIONAL THERAPY NOTE
Occupational Therapy Treatment Note       08/29/21 1216   OT Last Visit   OT Visit Date 08/29/21   Note Type   Note Type Treatment for insurance authorization   Restrictions/Precautions   Weight Bearing Precautions Per Order No   Braces or Orthoses   (OT PROVIDED EYE PATCH TO ASSIST WITH DIPOPLIA)   Other Precautions Fall Risk  (2* pt balance deficits)   Lifestyle   Autonomy independent in ADLs, walker use for functional mobility PTA   Reciprocal Relationships supportive family    Service to Others unemployed   [address] enjoys spending time with family   Pain Assessment   Pain Assessment Tool 0-10   Pain Score No Pain   ADL   Toileting Assistance  5  Supervision/Setup   Toileting Deficit Clothing management up;Clothing management down;Perineal hygiene   Bed Mobility   Supine to Sit 6  Modified independent   Additional items HOB elevated   Sit to Supine 6  Modified independent   Additional items HOB elevated   Transfers   Sit to Stand 5  Supervision   Additional items Assist x 1   Stand to Sit 5  Supervision   Additional items Assist x 1   Functional Mobility   Functional Mobility 4  Minimal assistance   Additional items Rolling walker   Shower Transfers   Shower Transfer From Walker   Shower Transfer Type To and From   Shower Transfer to [employer] seat with back   Shower Transfer Technique Ambulating   Shower Transfers Minimal assistance   Shower Transfers Comments use of grab bars required and pt requires increased time allowed    Cognition   Overall Cognitive Status Impaired   Arousal/Participation Alert   Attention Within functional limits   Orientation Level Oriented X4   Memory Decreased recall of precautions   Following Commands Follows one step commands without difficulty   Activity Tolerance   Activity Tolerance Patient tolerated treatment well   Assessment   Assessment Pt participated in occupational therapy with focus on activity tolerance,  bed mob, functional transfers/mob/shower transfers and toilet transfers/toileting  Pt cleared by RN/Jacoby for pt participation in occupational therapy  Pt received HOB raised/supine pt sitting upright and agreeable to therapy following pt Identifiers confirmed  Pt  her therapygoal to eventually "go home" however reported her recent falls  Pt stated"If it takes me 8 steps to turn around then it takes me 8 steps"  " It lost my hearing and balance on my left side from the tumor and I have to be extra careful"   Pt required assist for toileting/toilet transfer 2* pt balance and coordination deficits  Pt will require post acute rehab services to continue to address these above noted pt deficits which currently impair pt ADL and functional mob      Plan   Treatment Interventions ADL retraining   Goal Expiration Date 09/03/21   OT Treatment Day 3   OT Frequency 3-5x/wk   Recommendation   OT Discharge Recommendation Post acute rehabilitation services   AM-PAC Daily Activity Inpatient   Lower Body Dressing 3   Bathing 3   Toileting 3   Upper Body Dressing 4   Grooming 4   Eating 4   Daily Activity Raw Score 21   Daily Activity Standardized Score (Calc for Raw Score >=11) 44 27   AM-PAC Applied Cognition Inpatient   Following a Speech/Presentation 2   Understanding Ordinary Conversation 3   Taking Medications 3   Remembering Where Things Are Placed or Put Away 3   Remembering List of 4-5 Errands 2   Taking Care of Complicated Tasks 2   Applied Cognition Raw Score 15   Applied Cognition Standardized Score 33 54   Barthel Index   Feeding 10   Bathing 5   Grooming Score 5   Dressing Score 10   Bladder Score 10   Bowels Score 10   Toilet Use Score 10   Transfers (Bed/Chair) Score 10   Mobility (Level Surface) Score 0   Stairs Score 0   Barthel Index Score 70     Zandra CEDENO/ASHWIN

## 2021-08-29 NOTE — ASSESSMENT & PLAN NOTE
· Patient was recently admitted 8/8/21-8/12/21  She was seen by Neurosurgery during recent admission and was placed on steroid taper and Diamox  · Was seen by Neurosurgery, s/p guided left retromastoid craniotomy for debulking of CP angle mass 8/18/21 by Dr Jamie Moon  · On Decadron taper per Neurosurgery  · Now off Diamox  · Post-op MRI brain revealed, per the results report, "Patient is status post removal of the vast majority of the previously identified left CP angle tumor  There is some residual enhancing tumor remaining in the anterior aspect of the CP angle with improving mass effect upon the brainstem and brachium pontis  There is no restricted diffusion identified within the left posterior lateral fabian and brachium pontis consistent with acute ischemia  Persistent mild hydrocephalus involving the lateral ventricles and 3rd ventricle  Increased FLAIR signal adjacent to the surface of the lateral ventricles may represent transependymal flow of CSF, unchanged  4th ventricle is not enlarged and there is improving mass effect upon the 4th ventricle "  · Unclear if patient's hearing will return in left ear per NSX  · Neurosurgery follow-up scheduled for 9/2  · Stable for discharge  PT/OT recommending rehab, case management following

## 2021-08-29 NOTE — PROGRESS NOTES
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[facility])  Assessment & Plan  · Patient was recently admitted 8/8/21-8/12/21  She was seen by Neurosurgery during recent admission and was placed on steroid taper and Diamox  · Was seen by Neurosurgery, s/p guided left retromastoid craniotomy for debulking of CP angle mass 8/18/21 by Dr Jamie Moon  · On Decadron taper per Neurosurgery  · Now off Diamox  · Post-op MRI brain revealed, per the results report, "Patient is status post removal of the vast majority of the previously identified left CP angle tumor  There is some residual enhancing tumor remaining in the anterior aspect of the CP angle with improving mass effect upon the brainstem and brachium pontis  There is no restricted diffusion identified within the left posterior lateral fabian and brachium pontis consistent with acute ischemia  Persistent mild hydrocephalus involving the lateral ventricles and 3rd ventricle  Increased FLAIR signal adjacent to the surface of the lateral ventricles may represent transependymal flow of CSF, unchanged  4th ventricle is not enlarged and there is improving mass effect upon the 4th ventricle "  · Unclear if patient's hearing will return in left ear per NSX  · Neurosurgery follow-up scheduled for 9/2  · Stable for discharge  PT/OT recommending rehab, case management following  Hydrocephalus ([address] )  Assessment & Plan  · Postoperative MRI as above  · Outpatient neurosurgery follow-up    Headache-resolved as of 8/29/2021  Assessment & Plan  · No further complaints       Pneumothorax  Assessment & Plan  · Per record review, patient developed an iatrogenic pneumothorax s/p left subclavian central line placement and had a chest tube placed but the chest tube came out, and she had a new chest tube placed  The patient then had another chest tube placed by IR and the other chest tube was removed by Thoracic Surgery  · Most recent Chest tube removed 8/28, thoracic surgery signed off  Leukocytosis  Assessment & Plan  Lab Results   Component Value Date    WBC 15 52 (H) 08/29/2021    WBC 16 80 (H) 08/27/2021    WBC 17 02 (H) 08/26/2021   Suspect steroid-induced  · Monitor as needed  Hyponatremia  Assessment & Plan  Lab Results   Component Value Date    SODIUM 133 (L) 08/29/2021    SODIUM 134 (L) 08/27/2021    SODIUM 135 (L) 08/26/2021   · Sodium stable  Fall with possible seizure-like activity ([facility] [address] )  Assessment & Plan  · Patient reported 3 falls since recent discharge  · The first one she reports falling onto her knees while going into the house after her ENT appointment Friday  · The second one she reports falling onto her knees while going into the bathroom at home Saturday  · The third one she reports falling forward and being able to hear those around her but was unable to respond  She reports this lasted for approximately 1 minute witnesses told her  Also report of bilateral hand shaking and possible bowel incontinence during the event  · Neurology and Cardiology were following given oncern for POTS and convulsive syncope   · Cardiology consult appreciated - they did not believe these syncopal episodes are consistent with POTS  · Neurology consult appreciated, EEG was normal   AEDs were deferred  Hypertension  Assessment & Plan  · Blood pressure acceptable  Continue losartan  · Monitor      Anxiety  Assessment & Plan  · Continue Lexapro      VTE Pharmacologic Prophylaxis: VTE Score: 4 Moderate Risk (Score 3-4) - Pharmacological DVT Prophylaxis Ordered: heparin  Patient Centered Rounds: I performed bedside rounds with nursing staff today    Discussions with Specialists or Other Care Team Provider: nursing, case management (will)    Education and Discussions with Family / Patient: Patient declined call to   Time Spent for Care: 20 minutes  More than 50% of total time spent on counseling and coordination of care as described above  Current Length of Stay: 14 day(s)  Current Patient Status: Inpatient   Certification Statement: The patient will continue to require additional inpatient hospital stay due to pending rehab placement  Discharge Plan: Pending rehab placement    Code Status: Level 1 - Full Code    Subjective:   Patient offers no acute complaints  Feeling much better since chest tube was removed  No shortness of breath  Still unable to hear out of left ear  Objective:     Vitals:   Temp (24hrs), Av 1 °F (36 7 °C), Min:98 1 °F (36 7 °C), Max:98 1 °F (36 7 °C)    Temp:  [98 1 °F (36 7 °C)] 98 1 °F (36 7 °C)  HR:  [73-99] 74  Resp:  [17-19] 17  BP: (122-125)/(71-74) 125/74  SpO2:  [96 %] 96 %  Body mass index is 28 65 kg/m²  Input and Output Summary (last 24 hours): Intake/Output Summary (Last 24 hours) at 2021 1019  Last data filed at 2021 2200  Gross per 24 hour   Intake 110 ml   Output --   Net 110 ml       Physical Exam:   Physical Exam  Vitals and nursing note reviewed  Cardiovascular:      Rate and Rhythm: Normal rate  Pulmonary:      Breath sounds: Normal breath sounds  Abdominal:      Tenderness: There is no abdominal tenderness  Musculoskeletal:         General: No swelling  Skin:     General: Skin is warm  Neurological:      Mental Status: She is alert and oriented to person, place, and time  Mental status is at baseline     Psychiatric:         Mood and Affect: Mood normal           Additional Data:     Labs:  Results from last 7 days   Lab Units 21  0542   WBC Thousand/uL 15 52*   HEMOGLOBIN g/dL 11 4*   HEMATOCRIT % 34 3*   PLATELETS Thousands/uL 283   NEUTROS PCT % 70   LYMPHS PCT % 21   MONOS PCT % 5   EOS PCT % 2     Results from last 7 days   Lab Units 08/29/21  0542   SODIUM mmol/L 133*   POTASSIUM mmol/L 4 8   CHLORIDE mmol/L 101   CO2 mmol/L 29   BUN mg/dL 20   CREATININE mg/dL 0 63   ANION GAP mmol/L 3*   CALCIUM mg/dL 8 8   GLUCOSE RANDOM mg/dL 75                       Lines/Drains:  Invasive Devices     Peripheral Intravenous Line            Peripheral IV 08/29/21 Dorsal (posterior); Right Wrist <1 day          Drain            Chest Tube 1 Left Second intercostal space 10 Fr  7 days                      Imaging: Reviewed radiology reports from this admission including: chest xray    Recent Cultures (last 7 days):         Last 24 Hours Medication List:   Current Facility-Administered Medications   Medication Dose Route Frequency Provider Last Rate    acetaminophen  975 mg Oral [facility] Jeniffer Ramirez MD      cyclobenzaprine  10 mg Oral TID PRN Jeniffer Ramirez MD      diazepam  2 5 mg Intravenous Q6H PRN Jeniffer Ramirez MD      escitalopram  5 mg Oral Daily Jeniffer Ramirez MD      glycerin-hypromellose-  1 drop Both Eyes Q3H PRN ONELIA Scales      heparin (porcine)  5,000 Units Subcutaneous [facility] Jeniffer Ramirez MD      HYDROmorphone  0 5 mg Intravenous Q4H PRN Jeniffer Ramirez MD      Labetalol HCl  10 mg Intravenous Q4H PRN Jeniffer Ramirez MD      lidocaine  1 patch Topical Daily Jeniffer Ramirez MD      loperamide  4 mg Oral Daily PRN ONELIA Scales      losartan  50 mg Oral Daily Jeniffer Ramirez MD      naloxone  0 04 mg Intravenous Q1MIN PRN Jeniffer Ramirez MD      ondansetron  4 mg Intravenous Q6H PRN Jeniffer Ramirez MD      oxyCODONE  10 mg Oral Q4H PRN Jeniffer Ramirez MD      oxyCODONE  5 mg Oral Q4H PRN Jeniffer Ramirez MD          Today, Patient Was Seen By: Lala Kocher, CRNP    **Please Note: This note may have been constructed using a voice recognition system  **

## 2021-08-29 NOTE — PLAN OF CARE
Problem: PHYSICAL THERAPY ADULT  Goal: Performs mobility at highest level of function for planned discharge setting  See evaluation for individualized goals  Description: Treatment/Interventions: ADL retraining, Functional transfer training, LE strengthening/ROM, Elevations, Therapeutic exercise, Endurance training, Cognitive reorientation, Patient/family training, Bed mobility, Equipment eval/education, Gait training, Compensatory technique education, Spoke to nursing, Spoke to case management  Equipment Recommended: Wheelchair       See flowsheet documentation for full assessment, interventions and recommendations  Outcome: Progressing  Note: Prognosis: Good  Problem List: Decreased strength, Decreased endurance, Impaired balance, Decreased mobility, Decreased coordination, Decreased safety awareness  Assessment: Pt seen for PT treatment session with focus on bed mobility, functional transfers, functional mobility  Pt making steady progress toward goals this session  Pt continues to be able to perform transfers with supervision and demonstrates no LOB upon static stance  With dynamic standing and functional mobility, pt does continue to present with balance impairments and gait deficits that result in increased risk for falls  Pt left supine in bed with all needs in reach  Pt will benefit from skilled therapy in order to address current impairments and functional limitations  PT to follow pt and recommending rehab once medically cleared  The patient's AM-PAC Basic Mobility Inpatient Short Form Raw Score is 20, Standardized Score is 43 99  A standardized score less than 42 9 suggests the patient may benefit from discharge to post-acute rehabilitation services  Please also refer to the recommendation of the Physical Therapist for safe discharge planning    Barriers to Discharge: Inaccessible home environment, Decreased caregiver support  Barriers to Discharge Comments: falls/ alone at times while spouse works- w/c level as option for home discharge discussed 2* falls- pt is in agreement w/ plan and recommendation for w/c level when alone until progressed      PT Discharge Recommendation: Post acute rehabilitation services     PT - OK to Discharge: Yes (to rehab once medically cleared)    See flowsheet documentation for full assessment

## 2021-08-29 NOTE — ASSESSMENT & PLAN NOTE
· Per record review, patient developed an iatrogenic pneumothorax s/p left subclavian central line placement and had a chest tube placed but the chest tube came out, and she had a new chest tube placed  The patient then had another chest tube placed by IR and the other chest tube was removed by Thoracic Surgery  · Most recent Chest tube removed 8/28, thoracic surgery signed off

## 2021-08-29 NOTE — ASSESSMENT & PLAN NOTE
Lab Results   Component Value Date    SODIUM 133 (L) 08/29/2021    SODIUM 134 (L) 08/27/2021    SODIUM 135 (L) 08/26/2021   · Sodium stable

## 2021-08-29 NOTE — PLAN OF CARE
Problem: OCCUPATIONAL THERAPY ADULT  Goal: Performs self-care activities at highest level of function for planned discharge setting  See evaluation for individualized goals  Description: Treatment Interventions: ADL retraining, Visual perceptual retraining, Functional transfer training, UE strengthening/ROM, Endurance training, Cognitive reorientation, Equipment evaluation/education, Patient/family training, Compensatory technique education, Continued evaluation, Energy conservation, Activityengagement          See flowsheet documentation for full assessment, interventions and recommendations  Outcome: Progressing  Note: Limitation: Decreased ADL status, Decreased Safe judgement during ADL, Decreased cognition, Decreased endurance, Decreased self-care trans, Decreased high-level ADLs  Prognosis: Fair  Assessment: Pt participated in occupational therapy with focus on activity tolerance,  bed mob, functional transfers/mob/shower transfers and toilet transfers/toileting  Pt cleared by RN/Jacoby for pt participation in occupational therapy  Pt received HOB raised/supine pt sitting upright and agreeable to therapy following pt Identifiers confirmed  Pt  her therapygoal to eventually "go home" however reported her recent falls  Pt stated"If it takes me 8 steps to turn around then it takes me 8 steps"  " It lost my hearing and balance on my left side from the tumor and I have to be extra careful"   Pt required assist for toileting/toilet transfer 2* pt balance and coordination deficits  Pt will require post acute rehab services to continue to address these above noted pt deficits which currently impair pt ADL and functional mob  Recommendation: (S) Physiatry Consult  OT Discharge Recommendation: Post acute rehabilitation services  OT - OK to Discharge:  Yes

## 2021-08-30 PROCEDURE — 97530 THERAPEUTIC ACTIVITIES: CPT

## 2021-08-30 PROCEDURE — 99232 SBSQ HOSP IP/OBS MODERATE 35: CPT | Performed by: INTERNAL MEDICINE

## 2021-08-30 PROCEDURE — 97116 GAIT TRAINING THERAPY: CPT

## 2021-08-30 PROCEDURE — 97535 SELF CARE MNGMENT TRAINING: CPT

## 2021-08-30 RX ADMIN — HEPARIN SODIUM 5000 UNITS: 5000 INJECTION INTRAVENOUS; SUBCUTANEOUS at 21:31

## 2021-08-30 RX ADMIN — ACETAMINOPHEN 975 MG: 325 TABLET, FILM COATED ORAL at 21:31

## 2021-08-30 RX ADMIN — LOSARTAN POTASSIUM 50 MG: 50 TABLET, FILM COATED ORAL at 08:15

## 2021-08-30 RX ADMIN — ACETAMINOPHEN 975 MG: 325 TABLET, FILM COATED ORAL at 05:45

## 2021-08-30 RX ADMIN — OXYCODONE HYDROCHLORIDE 10 MG: 10 TABLET ORAL at 18:54

## 2021-08-30 RX ADMIN — HEPARIN SODIUM 5000 UNITS: 5000 INJECTION INTRAVENOUS; SUBCUTANEOUS at 05:45

## 2021-08-30 RX ADMIN — HEPARIN SODIUM 5000 UNITS: 5000 INJECTION INTRAVENOUS; SUBCUTANEOUS at 13:16

## 2021-08-30 RX ADMIN — ESCITALOPRAM 5 MG: 5 TABLET, FILM COATED ORAL at 08:15

## 2021-08-30 RX ADMIN — ACETAMINOPHEN 975 MG: 325 TABLET, FILM COATED ORAL at 13:16

## 2021-08-30 NOTE — UTILIZATION REVIEW
Continued Stay Review    Date: 8/28/21                       Current Patient Class: Inpatient Current Level of Care: Med Surg    HPI:56 y o  female initially admitted on 8/15/21    8/28 Surgery: Chest rube was clamped yesterday, removed today  No SOB, minimal pain at chest tube site  CXR reviewed after chest tube removal, no pneumothorax  Internal Medicine:  Alert and oriented x3  Denies SOB  On Decadron taper per Neurosurgery  Still unable to hear out of left ear, unclear if patient's hearing will return  PT/OT recommending rehab  Vital Signs:       Date/Time  Temp   Resp  BP  MAP (mmHg)  SpO2  O2 Device   08/30/21 14:49:19  98 8 °F (37 1 °C)   19  108/73  85  95 %  --   08/30/21 0011  98 4 °F (36 9 °C)   18  121/60  --  96 %  --   08/29/21 2100  --   --  --  --  --  None (Room air)   08/29/21 19:35:15  98 1 °F (36 7 °C)   17  115/61  79  93 %  --   08/29/21 14:41:20  98 8 °F (37 1 °C)   18  104/67  79  94 %  --   08/29/21 0753  98 1 °F (36 7 °C)   17  125/74  --  96 %  --   08/28/21 22:31:08  98 1 °F (36 7 °C)   18  123/73  90  96 %  --   08/28/21 2200  --   --  --  --  --  None (Room air)   08/28/21 16:00:02  98 1 °F (36 7 °C)   19  122/71  88  96 %  --   08/28/21 08:01:42  98 6 °F (37 °C)   16  121/74  90  97 %  --     Date and Time Eye Opening Best Verbal Response Best Motor Response [place] Coma Scale Score   08/30/21 1600 4 5 6 15   08/30/21 0711 4 5 6 15   08/29/21 2100 4 5 6 15   08/29/21 1600 4 5 6 15   08/29/21 0724 4 5 6 15   08/28/21 2200 4 5 6 15   08/28/21 0753 4 5 6 15   08/27/21 2000 4 5 6 15   08/27/21 0747 4 5 6 15   08/27/21 0400 4 5 6 15   08/26/21 2009 4 5 6 15         Pertinent Labs/Diagnostic Results:     8/28 CXR:  No left pneumothorax  Persistent bilateral subcutaneous emphysema  8/27 CXR:  Left thoracotomy tube remains  No left pneumothorax  Persistent bilateral lower neck and left subcutaneous emphysema  Increased right lower subcutaneous emphysema   Pneumomediastinum noted     8/26 CXR:   Left chest tube with tiny left apical pneumothorax and stable subcutaneous emphysema              Results from last 7 days   Lab Units 08/29/21  0542 08/27/21  0532 08/26/21  0512 08/25/21  1010 08/24/21  0533   WBC Thousand/uL 15 52* 16 80* 17 02* 20 95* 16 83*   HEMOGLOBIN g/dL 11 4* 12 0 11 5 12 2 12 1   HEMATOCRIT % 34 3* 36 7 34 3* 36 3 36 0   PLATELETS Thousands/uL 283 319 303 302 270   NEUTROS ABS Thousands/µL 10 76*  --   --   --   --          Results from last 7 days   Lab Units 08/29/21  0542 08/27/21  0532 08/26/21  0512 08/25/21  0456 08/24/21  0533   SODIUM mmol/L 133* 134* 135* 131* 135*   POTASSIUM mmol/L 4 8 4 9 4 4 4 7 4 2   CHLORIDE mmol/L 101 100 102 101 102   CO2 mmol/L 29 30 29 25 27   ANION GAP mmol/L 3* 4 4 5 6   BUN mg/dL 20 26* 23 28* 20   CREATININE mg/dL 0 63 0 70 0 65 0 89 0 63   EGFR ml/min/1 73sq m 101 98 100 73 101   CALCIUM mg/dL 8 8 8 9 9 0 9 2 8 9             Results from last 7 days   Lab Units 08/29/21  0542 08/27/21  0532 08/26/21  0512 08/25/21  0456 08/24/21  0533   GLUCOSE RANDOM mg/dL 75 101 94 104 100             Medications:   Scheduled Medications:    Last 24 Hours Medication List:          Medication Dose Route Frequency    acetaminophen  975 mg Oral Q8H [facility]    cyclobenzaprine  10 mg Oral TID PRN    diazepam  2 5 mg Intravenous Q6H PRN    escitalopram  5 mg Oral Daily    glycerin-hypromellose-  1 drop Both Eyes Q3H PRN    heparin (porcine)  5,000 Units Subcutaneous Q8H [facility]    HYDROmorphone  0 5 mg Intravenous Q4H PRN    Labetalol HCl  10 mg Intravenous Q4H PRN    lidocaine  1 patch Topical Daily    loperamide  4 mg Oral Daily PRN    losartan  50 mg Oral Daily    naloxone  0 04 mg Intravenous Q1MIN PRN    ondansetron  4 mg Intravenous Q6H PRN    oxyCODONE  10 mg Oral Q4H PRN x 2 dose 8/28    oxyCODONE  5 mg Oral Q4H PRN x 1 dose 8/28      dexamethasone (DECADRON) tablet 2 mg   Dose: 2 mg  Freq: Daily Route: PO  Start: 08/27/21 0900 End: 08/28/21 0904    dexamethasone (DECADRON) tablet 2 mg   Dose: 2 mg  Freq: Every 12 hours scheduled Route: PO  Start: 08/25/21 0900 End: 08/26/21 2112    dexamethasone (DECADRON) tablet 2 mg   Dose: 2 mg  Freq: Every 6 hours scheduled Route: PO  Start: 08/23/21 0600 End: 08/25/21 0018      Discharge Plan: TBD          Network Utilization Review Department  ATTENTION: Please call with any questions or concerns to [phone] and carefully listen to the prompts so that you are directed to the right person  All voicemails are confidential   Samir Epp all requests for admission clinical reviews, approved or denied determinations and any other requests to dedicated fax number below belonging to the campus where the patient is receiving treatment   List of dedicated fax numbers for the Facilities:  [address] DENIALS (Administrative/Medical Necessity) [phone]   [address] (Maternity/NICU/Pediatrics) [phone]   [address] [address] [place] [address] [address] [facility] [facility] [phone]

## 2021-08-30 NOTE — ASSESSMENT & PLAN NOTE
Patient reported 3 falls since recent discharge  · The first one she reports falling onto her knees while going into the house after her ENT appointment  · The second one she reports falling onto her knees while going into the bathroom at home Saturday  · The third one she reports falling forward and being able to hear those around her but was unable to respond  She reports this lasted for approximately 1 minute witnesses told her  Also report of bilateral hand shaking and possible bowel incontinence during the event  · Neurology and Cardiology were following given oncern for POTS and convulsive syncope   · Cardiology consult appreciated - they did not believe these syncopal episodes are consistent with POTS  · Neurology consult appreciated, EEG was normal   AEDs were deferred

## 2021-08-30 NOTE — DISCHARGE SUMMARY
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[facility])  Assessment & Plan  Patient was recently admitted 8/8/21-8/12/21  She was seen by Neurosurgery during recent admission and was placed on steroid taper and Diamox  · Was seen by Neurosurgery, s/p guided left retromastoid craniotomy for debulking of CP angle mass 8/18/21 by Dr Carlyle Salinas  · Completed Decadron taper   · Now off Diamox  · Post-op MRI brain revealed, per the results report, "Patient is status post removal of the vast majority of the previously identified left CP angle tumor  There is some residual enhancing tumor remaining in the anterior aspect of the CP angle with improving mass effect upon the brainstem and brachium pontis  There is no restricted diffusion identified within the left posterior lateral fabian and brachium pontis consistent with acute ischemia  Persistent mild hydrocephalus involving the lateral ventricles and 3rd ventricle  Increased FLAIR signal adjacent to the surface of the lateral ventricles may represent transependymal flow of CSF, unchanged  4th ventricle is not enlarged and there is improving mass effect upon the 4th ventricle "  · Unclear if patient's hearing will return in left ear per NSX  · Neurosurgery follow-up scheduled for 9/2  · Stable for discharge  PT/OT recommending rehab, case management following  Hyponatremia  Assessment & Plan  Lab Results   Component Value Date    SODIUM 133 (L) 08/29/2021    SODIUM 134 (L) 08/27/2021    SODIUM 135 (L) 08/26/2021   · Sodium stable       Pneumothorax  Assessment & Plan  Per record review, patient developed an iatrogenic pneumothorax s/p left subclavian central line placement and had a chest tube placed but the chest tube came out, and she had a new chest tube placed  The patient then had another chest tube placed by IR and the other chest tube was removed by Thoracic Surgery  · Most recent Chest tube removed 8/28, thoracic surgery signed off  Leukocytosis  Assessment & Plan  Lab Results   Component Value Date    WBC 15 52 (H) 08/29/2021    WBC 16 80 (H) 08/27/2021    WBC 17 02 (H) 08/26/2021   Suspect steroid-induced  · Monitor as needed  Fall with possible seizure-like activity [facility])  Assessment & Plan  Patient reported 3 falls since recent discharge  · The first one she reports falling onto her knees while going into the house after her ENT appointment  · The second one she reports falling onto her knees while going into the bathroom at home Saturday  · The third one she reports falling forward and being able to hear those around her but was unable to respond  She reports this lasted for approximately 1 minute witnesses told her  Also report of bilateral hand shaking and possible bowel incontinence during the event  · Neurology and Cardiology were following given oncern for POTS and convulsive syncope   · Cardiology consult appreciated - they did not believe these syncopal episodes are consistent with POTS  · Neurology consult appreciated, EEG was normal   AEDs were deferred  Hypertension  Assessment & Plan  Blood pressure acceptable  Continue losartan  · Monitor      Hydrocephalus [facility])  Assessment & Plan  Postoperative MRI as above    · Outpatient neurosurgery follow-up    Anxiety  Assessment & Plan  · Continue Lexapro      Medical Problems     Resolved Problems  Date Reviewed: 8/30/2021        Resolved    Headache 8/29/2021     Resolved by  ONELIA Medina              Discharging Physician / Practitioner: Rehan Tate PA-C  PCP: Magnus You MD  Admission Date:   Admission Orders (From admission, onward)     Ordered        08/15/21 0032  Inpatient Admission  Once                   Discharge Date: 9/1/2021    Consultations During Hospital Stay:  · Thoracic surgery  · Interventional Radiology  · Cardiology  · Neurology  · Neurosurgery    Procedures Performed:   · 8/18: Image guided left retromastoid craniotomy for debulking of CP angle mass; intraoperative monitoring (Left)  · Multiple imaging studies, please see chart    Significant Findings / Test Results:   · Many, please see chart    Incidental Findings:   · Many, see chart     Test Results Pending at Discharge (will require follow up): · Pathology      Outpatient Tests Requested:  · F/u PCP  · F/u neurosurgery    Complications:  non    Reason for Admission: fall with possible seizure like activity    Hospital Course:   Slime Alcantara is a 55 y o  female patient with past medical history of hypertension who originally presented to the hospital on 8/14/2021 due to dizziness, gait disturbance and headaches  Due to lengthy hospital stay of 16 days, kindly review A/P above for details  Was recommended for rehab however ultimately wishes to go home with home services  Neurosurgery has f/u appointment scheduled 9/14 to review path  Please see above list of diagnoses and related plan for additional information  Condition at Discharge: stable    Discharge Day Visit / Exam:   Subjective:  Doing fine today  Wants to go home  Tired of waiting for rehab  Vitals: Blood Pressure: 121/60 (08/30/21 0011)  Pulse: 93 (08/30/21 0011)  Temperature: 98 4 °F (36 9 °C) (08/30/21 0011)  Temp Source: Oral (08/22/21 0030)  Respirations: 18 (08/30/21 0011)  Height: 5' 5 5" (166 4 cm) (08/15/21 1708)  Weight - Scale: 78 1 kg (172 lb 2 9 oz) (08/28/21 0547)  SpO2: 96 % (08/30/21 0011)  Exam:   Physical Exam  Vitals and nursing note reviewed  Constitutional:       General: She is not in acute distress  Cardiovascular:      Rate and Rhythm: Normal rate  Pulmonary:      Effort: No respiratory distress  Abdominal:      General: There is no distension  Musculoskeletal:      Right lower leg: No edema  Left lower leg: No edema  Neurological:      Mental Status: She is oriented to person, place, and time  Psychiatric:         Mood and Affect: Mood normal           Discussion with Family: Updated  () at bedside  Discharge instructions/Information to patient and family:   See after visit summary for information provided to patient and family  Provisions for Follow-Up Care:  See after visit summary for information related to follow-up care and any pertinent home health orders  Disposition:   Home with VNA Services (Reminder: Complete face to face encounter)    Planned Readmission: no     Discharge Statement:  I spent 34 minutes discharging the patient  This time was spent on the day of discharge  I had direct contact with the patient on the day of discharge  Greater than 50% of the total time was spent examining patient, answering all patient questions, arranging and discussing plan of care with patient as well as directly providing post-discharge instructions  Additional time then spent on discharge activities  Discharge Medications:  See after visit summary for reconciled discharge medications provided to patient and/or family        **Please Note: This note may have been constructed using a voice recognition system**

## 2021-08-30 NOTE — PHYSICAL THERAPY NOTE
Physical Therapy Progress Note     08/30/21 1510   PT Last Visit   PT Visit Date 08/30/21   Note Type   Note Type Treatment   Pain Assessment   Pain Assessment Tool Pain Assessment not indicated - pt denies pain   Restrictions/Precautions   Other Precautions Fall Risk;Visual impairment   Subjective   Subjective Pt encountered supine in bed, initially frustrated wtih extended hospital stay & frequent interruptions  "I got denied rehab & i'm so tired of being here "  Pt became more pleasant with conversation & discussion regarding progress she has made since surgery  Pt is hopeful to get into [facility]  "I just want to do therapy & get better "  Reports continued L eye visual deficits & hearing loss in L ear  Bed Mobility   Supine to Sit 6  Modified independent   Sit to Supine 6  Modified independent   Transfers   Sit to Stand 5  Supervision   Stand to Sit 5  Supervision   Ambulation/Elevation   Gait pattern Excessively slow; Short stride;Decreased foot clearance;Narrow [place]; Decreased L stance; Improper Weight shift   Gait Assistance 4  Minimal assist   Additional items Assist x 1   Assistive Device Rolling walker   Distance 120' x 2   Balance   Static Sitting Fair +   Static Standing Fair   Ambulatory Poor +   Endurance Deficit   Endurance Deficit Yes   Endurance Deficit Description fatigue   Activity Tolerance   Activity Tolerance Patient tolerated treatment well;Patient limited by fatigue   Nurse [address], RN   Assessment   Prognosis Good   Problem List Decreased strength;Decreased endurance; Impaired balance;Decreased mobility; Decreased coordination;Decreased safety awareness   Assessment Pt continues to require min A/supervision at this time due to impaired balance  Has been able to progress ambulatory distances this session, and only required standing rest to recover prior to returning to room  pt did demonstrate 1 minor LOB, self correcting with RW    Pt is unable to perform extensive head movements or postural changes whiel ambulating, reporting she feels like she will lose balance while doing so  Will benefit from performing balance exercises in all positions to requce reliance on RW  Extensive discussion & emotional support provided during session regarding functional progress, role of rehab, discharge planning & goals to maximize pt independence at this time  diandraue to recommend rehab at this time due to these functional deficits so pt may return home with less required assist at this time  Goals   Patient Goals to be able to go home   LTG Expiration Date 09/03/21   PT Treatment Day 3   Plan   Treatment/Interventions Functional transfer training;LE strengthening/ROM; Elevations; Therapeutic exercise; Endurance training;Patient/family training;Equipment eval/education; Bed mobility;Gait training   Progress Progressing toward goals   PT Frequency Other (Comment)  (3-6x/week)   Recommendation   PT Discharge Recommendation Post acute rehabilitation services   Equipment Recommended [address] [facility] Recommended Wheeled walker   PT - OK to Discharge Yes   AM-PAC Basic Mobility Inpatient   Turning in Bed Without Bedrails 4   Lying on Back to Sitting on Edge of Flat Bed 4   Moving Bed to Chair 3   Standing Up From Chair 4   Walk in Room 3   Climb 3-5 Stairs 3   Basic Mobility Inpatient Raw Score 21   Basic Mobility Standardized Score 45 55   The patient's AM-PAC Basic Mobility Inpatient Short Form Raw Score is 21, Standardized Score is 45 55  A standardized score greater than 42 9 suggests the patient may benefit from discharge to home  Please also refer to the recommendation of the Physical Therapist for safe discharge planning            Velma Ray PTA

## 2021-08-30 NOTE — ASSESSMENT & PLAN NOTE
Patient was recently admitted 8/8/21-8/12/21  She was seen by Neurosurgery during recent admission and was placed on steroid taper and Diamox  · Was seen by Neurosurgery, s/p guided left retromastoid craniotomy for debulking of CP angle mass 8/18/21 by Dr Trip Juarez  · Completed Decadron taper   · Now off Diamox  · Post-op MRI brain revealed, per the results report, "Patient is status post removal of the vast majority of the previously identified left CP angle tumor  There is some residual enhancing tumor remaining in the anterior aspect of the CP angle with improving mass effect upon the brainstem and brachium pontis  There is no restricted diffusion identified within the left posterior lateral fabian and brachium pontis consistent with acute ischemia  Persistent mild hydrocephalus involving the lateral ventricles and 3rd ventricle  Increased FLAIR signal adjacent to the surface of the lateral ventricles may represent transependymal flow of CSF, unchanged  4th ventricle is not enlarged and there is improving mass effect upon the 4th ventricle "  · Unclear if patient's hearing will return in left ear per NSX  · Neurosurgery follow-up scheduled for 9/2  · Stable for discharge  PT/OT recommending rehab, case management following

## 2021-08-30 NOTE — PLAN OF CARE
Problem: PHYSICAL THERAPY ADULT  Goal: Performs mobility at highest level of function for planned discharge setting  See evaluation for individualized goals  Description: Treatment/Interventions: ADL retraining, Functional transfer training, LE strengthening/ROM, Elevations, Therapeutic exercise, Endurance training, Cognitive reorientation, Patient/family training, Bed mobility, Equipment eval/education, Gait training, Compensatory technique education, Spoke to nursing, Spoke to case management  Equipment Recommended: Wheelchair       See flowsheet documentation for full assessment, interventions and recommendations  Outcome: Progressing  Note: Prognosis: Good  Problem List: Decreased strength, Decreased endurance, Impaired balance, Decreased mobility, Decreased coordination, Decreased safety awareness  Assessment: Pt continues to require min A/supervision at this time due to impaired balance  Has been able to progress ambulatory distances this session, and only required standing rest to recover prior to returning to room  pt did demonstrate 1 minor LOB, self correcting with RW  Pt is unable to perform extensive head movements or postural changes whiel ambulating, reporting she feels like she will lose balance while doing so  Will benefit from performing balance exercises in all positions to requce reliance on RW  Extensive discussion & emotional support provided during session regarding functional progress, role of rehab, discharge planning & goals to maximize pt independence at this time  diandraue to recommend rehab at this time due to these functional deficits so pt may return home with less required assist at this time    Barriers to Discharge: Inaccessible home environment, Decreased caregiver support  Barriers to Discharge Comments: falls/ alone at times while spouse works- w/c level as option for home discharge discussed 2* falls- pt is in agreement w/ plan and recommendation for w/c level when alone until progressed      PT Discharge Recommendation: Post acute rehabilitation services     PT - OK to Discharge: Yes    See flowsheet documentation for full assessment

## 2021-08-30 NOTE — PROGRESS NOTES
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[facility])  Assessment & Plan  Patient was recently admitted 8/8/21-8/12/21  She was seen by Neurosurgery during recent admission and was placed on steroid taper and Diamox  · Was seen by Neurosurgery, s/p guided left retromastoid craniotomy for debulking of CP angle mass 8/18/21 by Dr Robin Ramirez  · Completed Decadron taper   · Now off Diamox  · Post-op MRI brain revealed, per the results report, "Patient is status post removal of the vast majority of the previously identified left CP angle tumor  There is some residual enhancing tumor remaining in the anterior aspect of the CP angle with improving mass effect upon the brainstem and brachium pontis  There is no restricted diffusion identified within the left posterior lateral fabian and brachium pontis consistent with acute ischemia  Persistent mild hydrocephalus involving the lateral ventricles and 3rd ventricle  Increased FLAIR signal adjacent to the surface of the lateral ventricles may represent transependymal flow of CSF, unchanged  4th ventricle is not enlarged and there is improving mass effect upon the 4th ventricle "  · Unclear if patient's hearing will return in left ear per NSX  · Neurosurgery follow-up scheduled for 9/2  · Stable for discharge  PT/OT recommending rehab, case management following  Hyponatremia  Assessment & Plan  Lab Results   Component Value Date    SODIUM 133 (L) 08/29/2021    SODIUM 134 (L) 08/27/2021    SODIUM 135 (L) 08/26/2021   · Sodium stable       Pneumothorax  Assessment & Plan  Per record review, patient developed an iatrogenic pneumothorax s/p left subclavian central line placement and had a chest tube placed but the chest tube came out, and she had a new chest tube placed  The patient then had another chest tube placed by IR and the other chest tube was removed by Thoracic Surgery  · Most recent Chest tube removed 8/28, thoracic surgery signed off  Leukocytosis  Assessment & Plan  Lab Results   Component Value Date    WBC 15 52 (H) 08/29/2021    WBC 16 80 (H) 08/27/2021    WBC 17 02 (H) 08/26/2021   Suspect steroid-induced  · Monitor as needed  Fall with possible seizure-like activity [facility])  Assessment & Plan  Patient reported 3 falls since recent discharge  · The first one she reports falling onto her knees while going into the house after her ENT appointment  · The second one she reports falling onto her knees while going into the bathroom at home Saturday  · The third one she reports falling forward and being able to hear those around her but was unable to respond  She reports this lasted for approximately 1 minute witnesses told her  Also report of bilateral hand shaking and possible bowel incontinence during the event  · Neurology and Cardiology were following given oncern for POTS and convulsive syncope   · Cardiology consult appreciated - they did not believe these syncopal episodes are consistent with POTS  · Neurology consult appreciated, EEG was normal   AEDs were deferred  Hypertension  Assessment & Plan  Blood pressure acceptable  Continue losartan  · Monitor      Hydrocephalus [facility])  Assessment & Plan  Postoperative MRI as above  · Outpatient neurosurgery follow-up    Anxiety  Assessment & Plan  · Continue Lexapro        VTE Pharmacologic Prophylaxis: VTE Score: 4 Moderate Risk (Score 3-4) - Pharmacological DVT Prophylaxis Ordered: heparin  Patient Centered Rounds: I performed bedside rounds with nursing staff today  Discussions with Specialists or Other Care Team Provider: Case management  Education and Discussions with Family / Patient: Patient declined call to        Time Spent for Care: 20 minutes  More than 50% of total time spent on counseling and coordination of care as described above  Current Length of Stay: 15 day(s)  Current Patient Status: Inpatient   Certification Statement: The patient will continue to require additional inpatient hospital stay due to medically stable, pending rehab placement   Discharge Plan: stable at any time for dc     Code Status: Level 1 - Full Code    Subjective:   Doing fine  No complaints other than being tired  Objective:     Vitals:   Temp (24hrs), Av 4 °F (36 9 °C), Min:98 1 °F (36 7 °C), Max:98 8 °F (37 1 °C)    Temp:  [98 1 °F (36 7 °C)-98 8 °F (37 1 °C)] 98 4 °F (36 9 °C)  HR:  [91-94] 93  Resp:  [17-18] 18  BP: (104-121)/(60-67) 121/60  SpO2:  [93 %-96 %] 96 %  Body mass index is 28 65 kg/m²  Input and Output Summary (last 24 hours): Intake/Output Summary (Last 24 hours) at 2021  Last data filed at 2021 2200  Gross per 24 hour   Intake 350 ml   Output --   Net 350 ml       Physical Exam:   Physical Exam  Vitals and nursing note reviewed  Constitutional:       General: She is not in acute distress  Cardiovascular:      Rate and Rhythm: Normal rate and regular rhythm  Pulmonary:      Effort: No respiratory distress  Abdominal:      General: There is no distension  Neurological:      Mental Status: She is oriented to person, place, and time     Psychiatric:         Mood and Affect: Mood normal           Additional Data:     Labs:  Results from last 7 days   Lab Units 21  0542   WBC Thousand/uL 15 52*   HEMOGLOBIN g/dL 11 4*   HEMATOCRIT % 34 3*   PLATELETS Thousands/uL 283   NEUTROS PCT % 70   LYMPHS PCT % 21   MONOS PCT % 5   EOS PCT % 2     Results from last 7 days   Lab Units 21  0542   SODIUM mmol/L 133*   POTASSIUM mmol/L 4 8   CHLORIDE mmol/L 101   CO2 mmol/L 29   BUN mg/dL 20   CREATININE mg/dL 0 63   ANION GAP mmol/L 3*   CALCIUM mg/dL 8 8   GLUCOSE RANDOM mg/dL 75 Lines/Drains:  Invasive Devices     Peripheral Intravenous Line            Peripheral IV 08/29/21 Dorsal (posterior); Right Wrist 1 day                      Imaging: No pertinent imaging reviewed  Recent Cultures (last 7 days):         Last 24 Hours Medication List:   Current Facility-Administered Medications   Medication Dose Route Frequency Provider Last Rate    acetaminophen  975 mg Oral [facility] Yadira Pollard MD      cyclobenzaprine  10 mg Oral TID PRN Yadira Pollard MD      diazepam  2 5 mg Intravenous Q6H PRN Yadira Pollard MD      escitalopram  5 mg Oral Daily Yadira Pollard MD      glycerin-hypromellose-  1 drop Both Eyes Q3H PRN ONELIA Gonzáles      heparin (porcine)  5,000 Units Subcutaneous [facility] Yadira Pollard MD      HYDROmorphone  0 5 mg Intravenous Q4H PRN Yadira Pollard MD      Labetalol HCl  10 mg Intravenous Q4H PRN Yadira Pollard MD      lidocaine  1 patch Topical Daily Yadira Pollard MD      loperamide  4 mg Oral Daily PRN ONELIA Gonzáles      losartan  50 mg Oral Daily Yadira Pollard MD      naloxone  0 04 mg Intravenous Q1MIN PRN Yadira Pollard MD      ondansetron  4 mg Intravenous Q6H PRN Yadira Pollard MD      oxyCODONE  10 mg Oral Q4H PRN Yadira Pollard MD      oxyCODONE  5 mg Oral Q4H PRN Yadira Pollard MD          Today, Patient Was Seen By: Maribel Griffin PA-C    **Please Note: This note may have been constructed using a voice recognition system  **

## 2021-08-30 NOTE — ASSESSMENT & PLAN NOTE
Per record review, patient developed an iatrogenic pneumothorax s/p left subclavian central line placement and had a chest tube placed but the chest tube came out, and she had a new chest tube placed  The patient then had another chest tube placed by IR and the other chest tube was removed by Thoracic Surgery  · Most recent Chest tube removed 8/28, thoracic surgery signed off

## 2021-08-30 NOTE — OCCUPATIONAL THERAPY NOTE
Occupational Therapy Treatment Note:       08/30/21 1230   OT Last Visit   OT Visit Date 08/30/21   Note Type   Note Type Treatment   Restrictions/Precautions   Other Precautions Fall Risk   Pain Assessment   Pain Assessment Tool Pain Assessment not indicated - pt denies pain   ADL   Where Assessed   (s for seated adls after set up, min a req for functl mob)   Functional Standing Tolerance   Time pt is noted to have ataxic lb and uses rw fairly heavily  pt with decrased balance during unilateral stance during which she maintained b ue support of rw during task ( stepping on pedal inorder to lift ld of hamper)   Transfers   Sit to Stand 5  Supervision   Stand to Sit 5  Supervision   Functional Mobility   Functional Mobility 4  Minimal assistance   Additional Comments pt ntoed to run into chair legs with rw when turning, limited perception noted, pt stood on 1 foot inorder to push hamper pedal to open lid during laundry activity  pt with unsteadiness noted  and needed ue support   Additional items Rolling walker   Cognition   Overall Cognitive Status Impaired   Cognition Assessment Tools ACLS   Score 4 4   Activity Tolerance   Activity Tolerance Patient tolerated treatment well   Assessment   Assessment pt participated in am ot session and was seen focusing on  ongoing cognitive assessment using lacls and functional mobility  / dynamic reaching  without bending  pt is able to perform adls seated and in stance as necessary with supervision  pt is observed bumping into chair legs/ furniture with bottom of walker wheel during functional mobility  pt with decrased balance with unilateral stance  pt's  works nights and sleeps during day  pts dtr also works and at times pt will be alone for periods of time  pt is at increased risk for falls  pt was noted to become very frustrated as acls became more cognitively taxing   pt would benifit from in pt rehab inorder to focus on functional mobility, dynamic balance and ataxia during functional mobility c and s a d  as appropriate  pt enjoys assisting with homemaking and iadls   per pt report her dtr is purchasing a tub bench for pt  Plan   Treatment Interventions ADL retraining;Functional transfer training; Endurance training;Cognitive reorientation;Patient/family training;Equipment evaluation/education; Activityengagement   Goal Expiration Date 09/03/21   OT Treatment Day 4   OT Frequency 3-5x/wk   Recommendation   Recommendation Physiatry Consult   OT Discharge Recommendation Post acute rehabilitation services   OT - OK to Discharge Yes   AM-PAC Daily Activity Inpatient   Lower Body Dressing 3   Bathing 3   Toileting 3   Upper Body Dressing 4   Grooming 4   Eating 4   Daily Activity Raw Score 21   Daily Activity Standardized Score (Calc for Raw Score >=11) 44 27   AM-PAC Applied Cognition Inpatient   Following a Speech/Presentation 3   Understanding Ordinary Conversation 4   Taking Medications 2   Remembering Where Things Are Placed or Put Away 3   Remembering List of 4-5 Errands 2   Taking Care of Complicated Tasks 2   Applied Cognition Raw Score 16   Applied Cognition Standardized Score 35 03   pt is at sba level for adls while seated or after set up  4 4    Administered Harvey Cognitive Level Screen (ACLS)  Pt scored 4 4/6 0 indicating it is recommended that the person live with someone who does a daily check on the environment to remove any safety hazards and solve problems when minor changes in the home occur  The person may be alone for part of the day with a pre-established procedure for getting help from a neighbor  Behavior:  Knows day/date  Follows routine sequence of activities  Will learn schedule and follow daily routine  Hazards are not anticipated  Memorization is slow  Will need long term repetitive training for all new tasks  May become upset if routine is altered  May seek assist if lost   Do not expect to be aware of needs of others    May need reminders to keep appointments  Grooming:  Dubose, brushes and styles hair  Applies makeup  May insist on familiar products  Finds supplies in familiar locations  May be unable to master use of new tools  Hazards are not anticipated  Dressing: Finds garments in familiar locations  Initiates dressing at usual times  Selects familiar combinations of outfits and may wear same outfit over and over  Resists new combinations  May fail to consider weather conditions, season or occasion when selecting clothing  May argue with suggested corrections of errors  Bathing:  Initiates bath/shower at customary times and follows typical routine  May collect supplies from a familiar location  May not vary rate  May want to use same products  May use excessive amounts of product  May have difficulty opening small or unusual containers  May leave towels on floor  Protect from unseen hazards (wet floors, electrical appliances near water)  Walking/exercising:  Ambulates within fitness capacity in neighborhood  Chooses to go by familiar routes  May fail to attend to signs or activities outside visual field  Needs new route identified by others and may learn after several weeks of practice  May become anxious in high stimulus environments (malls, airports, casinos)  Eating: May notice and clean highly visible dropped food items  May not be able to eat and converse at the same time  May resist changes in diet and menu  Watch handling of hot foods/liquids and heavy items  Toileting: Follows a routine of toileting in a familiar environment  Needs to have public rest rooms pointed out  May use too much paper  Does not consider needs of others  May spend excessive time in rest room  Medications: May follow routine rigidly and resist changes in prescriptions based on erroneous beliefs of effects  Does not note adverse side effects  Does not anticipate need to renew prescriptions    May be able to open child proof containers  Can use DAILY pill box marked with day/time (filled by another person)  Use of adaptive equipment:  May be trained to use adaptive equipment that requires a sequence of familiar actions  Does not anticipate hazards  Once learned, may resist changes in equipment  May abandon use of assistive device or use in unsafe manner  Housekeeping:  Sweeps, polishes and puts away objects to match previous performance  Fails to see dirt or dust in corners  May use excessive amounts of , polish or water  May resist changes in routine or products used  May fail to differentiate between similar cleaning products  Food preparation:  May follow a fixed diet and go hungry if usual food items are not available  Does not check inventory and may run out of essentials frequently  Does not consider nutritional needs  Goes to familiar restaurants of grocery stores  Is able to prepare simple hot/cold dishes  Spending money:  Manages routine purchases for immediate needs  May count cash very slowly  May use credit cards or checks  May need assistance for making monthly budget  May not remember to account for taxes or tips  Can manage a daily allowance  Shopping:  Goes to familiar stores for routine items  Does not compare product prices or quality  May not consider cost of item in relation to overall budget  May overspend  Laundry:  Initiates and completes laundry routine at usual time  May sort items by color  May follow schedule rigidly  May forget to clean lint traps  May forget to turn iron off  Traveling:  Needs new routes and travel procedures identified by others  May express interest in driving a car but fails to attend to all environmental cues to do so safely  May not allow adequate time for travel  Telephone:  Writes down messages and new numbers slowly  May attempt to use an address book  May make calls at odd hours without consideration of others schedule or cost of call  May run up large telephone bills  Driving: Should NOT operate a motor vehicle

## 2021-08-30 NOTE — ASSESSMENT & PLAN NOTE
Patient was recently admitted 8/8/21-8/12/21  She was seen by Neurosurgery during recent admission and was placed on steroid taper and Diamox  · Was seen by Neurosurgery, s/p guided left retromastoid craniotomy for debulking of CP angle mass 8/18/21 by Dr Ney Pereira  · Completed Decadron taper   · Now off Diamox  · Post-op MRI brain revealed, per the results report, "Patient is status post removal of the vast majority of the previously identified left CP angle tumor  There is some residual enhancing tumor remaining in the anterior aspect of the CP angle with improving mass effect upon the brainstem and brachium pontis  There is no restricted diffusion identified within the left posterior lateral fabian and brachium pontis consistent with acute ischemia  Persistent mild hydrocephalus involving the lateral ventricles and 3rd ventricle  Increased FLAIR signal adjacent to the surface of the lateral ventricles may represent transependymal flow of CSF, unchanged  4th ventricle is not enlarged and there is improving mass effect upon the 4th ventricle "  · Unclear if patient's hearing will return in left ear per NSX  · Neurosurgery follow-up scheduled for 9/2  · Stable for discharge  PT/OT recommending rehab, case management following

## 2021-08-31 PROCEDURE — 99232 SBSQ HOSP IP/OBS MODERATE 35: CPT | Performed by: INTERNAL MEDICINE

## 2021-08-31 RX ADMIN — ACETAMINOPHEN 975 MG: 325 TABLET, FILM COATED ORAL at 21:17

## 2021-08-31 RX ADMIN — HEPARIN SODIUM 5000 UNITS: 5000 INJECTION INTRAVENOUS; SUBCUTANEOUS at 05:13

## 2021-08-31 RX ADMIN — LOSARTAN POTASSIUM 50 MG: 50 TABLET, FILM COATED ORAL at 08:12

## 2021-08-31 RX ADMIN — HEPARIN SODIUM 5000 UNITS: 5000 INJECTION INTRAVENOUS; SUBCUTANEOUS at 21:17

## 2021-08-31 RX ADMIN — ACETAMINOPHEN 975 MG: 325 TABLET, FILM COATED ORAL at 05:13

## 2021-08-31 RX ADMIN — ACETAMINOPHEN 975 MG: 325 TABLET, FILM COATED ORAL at 13:15

## 2021-08-31 RX ADMIN — ESCITALOPRAM 5 MG: 5 TABLET, FILM COATED ORAL at 08:12

## 2021-08-31 RX ADMIN — HEPARIN SODIUM 5000 UNITS: 5000 INJECTION INTRAVENOUS; SUBCUTANEOUS at 13:15

## 2021-08-31 NOTE — PROGRESS NOTES
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[facility])  Assessment & Plan  Patient was recently admitted 8/8/21-8/12/21  She was seen by Neurosurgery during recent admission and was placed on steroid taper and Diamox  · Was seen by Neurosurgery, s/p guided left retromastoid craniotomy for debulking of CP angle mass 8/18/21 by Dr Jennifer Moura  · Completed Decadron taper   · Now off Diamox  · Post-op MRI brain revealed, per the results report, "Patient is status post removal of the vast majority of the previously identified left CP angle tumor  There is some residual enhancing tumor remaining in the anterior aspect of the CP angle with improving mass effect upon the brainstem and brachium pontis  There is no restricted diffusion identified within the left posterior lateral fabian and brachium pontis consistent with acute ischemia  Persistent mild hydrocephalus involving the lateral ventricles and 3rd ventricle  Increased FLAIR signal adjacent to the surface of the lateral ventricles may represent transependymal flow of CSF, unchanged  4th ventricle is not enlarged and there is improving mass effect upon the 4th ventricle "  · Unclear if patient's hearing will return in left ear per NSX  · Neurosurgery follow-up scheduled for 9/2  · Stable for discharge  PT/OT recommending rehab, case management following  Hyponatremia  Assessment & Plan  Lab Results   Component Value Date    SODIUM 133 (L) 08/29/2021    SODIUM 134 (L) 08/27/2021    SODIUM 135 (L) 08/26/2021   · Sodium stable       Pneumothorax  Assessment & Plan  Per record review, patient developed an iatrogenic pneumothorax s/p left subclavian central line placement and had a chest tube placed but the chest tube came out, and she had a new chest tube placed  The patient then had another chest tube placed by IR and the other chest tube was removed by Thoracic Surgery  · Most recent Chest tube removed 8/28, thoracic surgery signed off  Leukocytosis  Assessment & Plan  Lab Results   Component Value Date    WBC 15 52 (H) 08/29/2021    WBC 16 80 (H) 08/27/2021    WBC 17 02 (H) 08/26/2021   Suspect steroid-induced  · Monitor as needed  Fall with possible seizure-like activity [facility])  Assessment & Plan  Patient reported 3 falls since recent discharge  · The first one she reports falling onto her knees while going into the house after her ENT appointment  · The second one she reports falling onto her knees while going into the bathroom at home Saturday  · The third one she reports falling forward and being able to hear those around her but was unable to respond  She reports this lasted for approximately 1 minute witnesses told her  Also report of bilateral hand shaking and possible bowel incontinence during the event  · Neurology and Cardiology were following given oncern for POTS and convulsive syncope   · Cardiology consult appreciated - they did not believe these syncopal episodes are consistent with POTS  · Neurology consult appreciated, EEG was normal   AEDs were deferred  Hypertension  Assessment & Plan  Blood pressure acceptable  Continue losartan  · Monitor      Hydrocephalus [facility])  Assessment & Plan  Postoperative MRI as above  · Outpatient neurosurgery follow-up    Anxiety  Assessment & Plan  · Continue Lexapro        VTE Pharmacologic Prophylaxis: VTE Score: 4 Moderate Risk (Score 3-4) - Pharmacological DVT Prophylaxis Ordered: heparin  Patient Centered Rounds: I performed bedside rounds with nursing staff today  Discussions with Specialists or Other Care Team Provider: case management  Education and Discussions with Family / Patient: Patient declined call to        Time Spent for Care: 30 minutes  More than 50% of total time spent on counseling and coordination of care as described above  Current Length of Stay: 16 day(s)  Current Patient Status: Inpatient   Certification Statement: The patient will continue to require additional inpatient hospital stay due to Medically stable, pending rehab placement  Discharge Plan: Patient is medically stable, anticipate discharge as soon as rehab bed found    Code Status: Level 1 - Full Code    Subjective:   Patient doing fine today  States she did not sleep well and has a little bit of neck pain  Objective:     Vitals:   Temp (24hrs), Av 2 °F (36 8 °C), Min:97 8 °F (36 6 °C), Max:98 8 °F (37 1 °C)    Temp:  [97 8 °F (36 6 °C)-98 8 °F (37 1 °C)] 98 °F (36 7 °C)  HR:  [] 74  Resp:  [18-19] 18  BP: (108-134)/(70-81) 134/81  SpO2:  [94 %-98 %] 98 %  Body mass index is 27 66 kg/m²  Input and Output Summary (last 24 hours): Intake/Output Summary (Last 24 hours) at 2021 1008  Last data filed at 2021 0900  Gross per 24 hour   Intake 1080 ml   Output --   Net 1080 ml       Physical Exam:   Physical Exam  Vitals and nursing note reviewed  Constitutional:       General: She is not in acute distress  Cardiovascular:      Rate and Rhythm: Normal rate and regular rhythm  Abdominal:      General: There is no distension  Neurological:      Mental Status: She is oriented to person, place, and time     Psychiatric:         Mood and Affect: Mood normal           Additional Data:     Labs:  Results from last 7 days   Lab Units 21  0542   WBC Thousand/uL 15 52*   HEMOGLOBIN g/dL 11 4*   HEMATOCRIT % 34 3*   PLATELETS Thousands/uL 283   NEUTROS PCT % 70   LYMPHS PCT % 21   MONOS PCT % 5   EOS PCT % 2     Results from last 7 days   Lab Units 21  0542   SODIUM mmol/L 133*   POTASSIUM mmol/L 4 8   CHLORIDE mmol/L 101   CO2 mmol/L 29   BUN mg/dL 20   CREATININE mg/dL 0 63   ANION GAP mmol/L 3*   CALCIUM mg/dL 8 8 GLUCOSE RANDOM mg/dL 75                       Lines/Drains:  Invasive Devices     Peripheral Intravenous Line            Peripheral IV 08/29/21 Dorsal (posterior); Right Wrist 2 days                      Imaging: No pertinent imaging reviewed  Recent Cultures (last 7 days):         Last 24 Hours Medication List:   Current Facility-Administered Medications   Medication Dose Route Frequency Provider Last Rate    acetaminophen  975 mg Oral [facility] Sadia Smith MD      cyclobenzaprine  10 mg Oral TID PRN Sadia Smith MD      diazepam  2 5 mg Intravenous Q6H PRN Sadia Smith MD      escitalopram  5 mg Oral Daily Sadia Smith MD      glycerin-hypromellose-  1 drop Both Eyes Q3H PRN Kev Hollow, CRNP      heparin (porcine)  5,000 Units Subcutaneous [facility] Sadia Smith MD      HYDROmorphone  0 5 mg Intravenous Q4H PRN Sadia Smith MD      Labetalol HCl  10 mg Intravenous Q4H PRN Sadia Simth MD      lidocaine  1 patch Topical Daily Sadia Smith MD      loperamide  4 mg Oral Daily PRN Kev Hollow, CRNP      losartan  50 mg Oral Daily Sadia Smith MD      naloxone  0 04 mg Intravenous Q1MIN PRN Sadia Smith MD      ondansetron  4 mg Intravenous Q6H PRN Sadia Smith MD      oxyCODONE  10 mg Oral Q4H PRN Sadia Smith MD      oxyCODONE  5 mg Oral Q4H PRN Sadia Smith MD          Today, Patient Was Seen By: Ruthie Lawson PA-C    **Please Note: This note may have been constructed using a voice recognition system  **

## 2021-08-31 NOTE — PLAN OF CARE
Problem: PAIN - ADULT  Goal: Verbalizes/displays adequate comfort level or baseline comfort level  Description: Interventions:  - Encourage patient to monitor pain and request assistance  - Assess pain using appropriate pain scale  - Administer analgesics based on type and severity of pain and evaluate response  - Implement non-pharmacological measures as appropriate and evaluate response  - Consider cultural and social influences on pain and pain management  - Notify physician/advanced practitioner if interventions unsuccessful or patient reports new pain  Outcome: Progressing     Problem: SAFETY ADULT  Goal: Patient will remain free of falls  Description: INTERVENTIONS:  - Educate patient/family on patient safety including physical limitations  - Instruct patient to call for assistance with activity   - Consult OT/PT to assist with strengthening/mobility   - Keep Call bell within reach  - Keep bed low and locked with side rails adjusted as appropriate  - Keep care items and personal belongings within reach  - Initiate and maintain comfort rounds  - Make Fall Risk Sign visible to staff  - Offer Toileting every 1 Hours, in advance of need  - Apply yellow socks and bracelet for high fall risk patients  - Consider moving patient to room near nurses station  Outcome: Progressing  Goal: Maintain or return to baseline ADL function  Description: INTERVENTIONS:  -  Assess patient's ability to carry out ADLs; assess patient's baseline for ADL function and identify physical deficits which impact ability to perform ADLs (bathing, care of mouth/teeth, toileting, grooming, dressing, etc )  - Assess/evaluate cause of self-care deficits   - Assess range of motion  - Assess patient's mobility; develop plan if impaired  - Assess patient's need for assistive devices and provide as appropriate  - Encourage maximum independence but intervene and supervise when necessary  - Involve family in performance of ADLs  - Assess for home care needs following discharge   - Consider OT consult to assist with ADL evaluation and planning for discharge  - Provide patient education as appropriate  Outcome: Progressing  Goal: Maintains/Returns to pre admission functional level  Description: INTERVENTIONS:  - Perform BMAT or MOVE assessment daily    - Set and communicate daily mobility goal to care team and patient/family/caregiver  - Collaborate with rehabilitation services on mobility goals if consulted  - Perform Range of Motion 3 times a day  - Reposition patient every 2 hours  - Dangle patient 2 times a day  - Stand patient 2 times a day  - Ambulate patient 2 times a day  - Out of bed to chair 2 times a day   - Out of bed for meals 2 times a day  - Out of bed for toileting  - Record patient progress and toleration of activity level   Outcome: Progressing     Problem: DISCHARGE PLANNING  Goal: Discharge to home or other facility with appropriate resources  Description: INTERVENTIONS:  - Identify barriers to discharge w/patient and caregiver  - Arrange for needed discharge resources and transportation as appropriate  - Identify discharge learning needs (meds, wound care, etc )  - Arrange for interpretive services to assist at discharge as needed  - Refer to Case Management Department for coordinating discharge planning if the patient needs post-hospital services based on physician/advanced practitioner order or complex needs related to functional status, cognitive ability, or social support system  Outcome: Progressing     Problem: Knowledge Deficit  Goal: Patient/family/caregiver demonstrates understanding of disease process, treatment plan, medications, and discharge instructions  Description: Complete learning assessment and assess knowledge base    Interventions:  - Provide teaching at level of understanding  - Provide teaching via preferred learning methods  Outcome: Progressing     Problem: MOBILITY - ADULT  Goal: Maintain or return to baseline ADL function  Description: INTERVENTIONS:  -  Assess patient's ability to carry out ADLs; assess patient's baseline for ADL function and identify physical deficits which impact ability to perform ADLs (bathing, care of mouth/teeth, toileting, grooming, dressing, etc )  - Assess/evaluate cause of self-care deficits   - Assess range of motion  - Assess patient's mobility; develop plan if impaired  - Assess patient's need for assistive devices and provide as appropriate  - Encourage maximum independence but intervene and supervise when necessary  - Involve family in performance of ADLs  - Assess for home care needs following discharge   - Consider OT consult to assist with ADL evaluation and planning for discharge  - Provide patient education as appropriate  Outcome: Progressing     Problem: Prexisting or High Potential for Compromised Skin Integrity  Goal: Skin integrity is maintained or improved  Description: INTERVENTIONS:  - Identify patients at risk for skin breakdown  - Assess and monitor skin integrity  - Assess and monitor nutrition and hydration status  - Monitor labs   - Assess for incontinence   - Turn and reposition patient  - Assist with mobility/ambulation  - Relieve pressure over bony prominences  - Avoid friction and shearing  - Provide appropriate hygiene as needed including keeping skin clean and dry  - Evaluate need for skin moisturizer/barrier cream  - Collaborate with interdisciplinary team   - Patient/family teaching  - Consider wound care consult   Outcome: Progressing     Problem: Nutrition/Hydration-ADULT  Goal: Nutrient/Hydration intake appropriate for improving, restoring or maintaining nutritional needs  Description: Monitor and assess patient's nutrition/hydration status for malnutrition  Collaborate with interdisciplinary team and initiate plan and interventions as ordered  Monitor patient's weight and dietary intake as ordered or per policy   Utilize nutrition screening tool and intervene as necessary  Determine patient's food preferences and provide high-protein, high-caloric foods as appropriate       INTERVENTIONS:  - Monitor oral intake, urinary output, labs, and treatment plans  - Assess nutrition and hydration status and recommend course of action  - Evaluate amount of meals eaten  - Assist patient with eating if necessary   - Allow adequate time for meals  - Recommend/ encourage appropriate diets, oral nutritional supplements, and vitamin/mineral supplements  - Order, calculate, and assess calorie counts as needed  - Recommend, monitor, and adjust tube feedings and TPN/PPN based on assessed needs  - Assess need for intravenous fluids  - Provide specific nutrition/hydration education as appropriate  - Include patient/family/caregiver in decisions related to nutrition  Outcome: Progressing

## 2021-09-01 VITALS
HEIGHT: 66 IN | HEART RATE: 105 BPM | TEMPERATURE: 98.4 F | BODY MASS INDEX: 26.71 KG/M2 | RESPIRATION RATE: 19 BRPM | WEIGHT: 166.23 LBS | OXYGEN SATURATION: 97 % | SYSTOLIC BLOOD PRESSURE: 136 MMHG | DIASTOLIC BLOOD PRESSURE: 81 MMHG

## 2021-09-01 LAB
DME PARACHUTE DELIVERY DATE ACTUAL: NORMAL
DME PARACHUTE DELIVERY DATE REQUESTED: NORMAL
DME PARACHUTE DELIVERY NOTE: NORMAL
DME PARACHUTE ITEM DESCRIPTION: NORMAL
DME PARACHUTE ITEM DESCRIPTION: NORMAL
DME PARACHUTE ORDER STATUS: NORMAL
DME PARACHUTE SUPPLIER NAME: NORMAL
DME PARACHUTE SUPPLIER PHONE: NORMAL

## 2021-09-01 PROCEDURE — NC001 PR NO CHARGE: Performed by: INTERNAL MEDICINE

## 2021-09-01 PROCEDURE — 99239 HOSP IP/OBS DSCHRG MGMT >30: CPT | Performed by: INTERNAL MEDICINE

## 2021-09-01 PROCEDURE — 97116 GAIT TRAINING THERAPY: CPT

## 2021-09-01 PROCEDURE — 97530 THERAPEUTIC ACTIVITIES: CPT

## 2021-09-01 RX ORDER — LIDOCAINE 50 MG/G
1 PATCH TOPICAL DAILY
Refills: 0
Start: 2021-09-02 | End: 2022-03-03

## 2021-09-01 RX ORDER — ACETAMINOPHEN 325 MG/1
975 TABLET ORAL EVERY 8 HOURS SCHEDULED
Refills: 0
Start: 2021-09-01

## 2021-09-01 RX ORDER — OXYCODONE HYDROCHLORIDE 5 MG/1
5 TABLET ORAL EVERY 4 HOURS PRN
Qty: 30 TABLET | Refills: 0 | Status: SHIPPED | OUTPATIENT
Start: 2021-09-01 | End: 2021-09-11

## 2021-09-01 RX ADMIN — LOSARTAN POTASSIUM 50 MG: 50 TABLET, FILM COATED ORAL at 09:23

## 2021-09-01 RX ADMIN — HEPARIN SODIUM 5000 UNITS: 5000 INJECTION INTRAVENOUS; SUBCUTANEOUS at 05:45

## 2021-09-01 RX ADMIN — HEPARIN SODIUM 5000 UNITS: 5000 INJECTION INTRAVENOUS; SUBCUTANEOUS at 13:03

## 2021-09-01 RX ADMIN — ESCITALOPRAM 5 MG: 5 TABLET, FILM COATED ORAL at 09:23

## 2021-09-01 RX ADMIN — ACETAMINOPHEN 975 MG: 325 TABLET, FILM COATED ORAL at 13:03

## 2021-09-01 RX ADMIN — ACETAMINOPHEN 975 MG: 325 TABLET, FILM COATED ORAL at 05:45

## 2021-09-01 NOTE — TREATMENT PLAN
Patient approximately 2 weeks s/p retromastoid craniotomy for debulking of CP angle mass  Patient was due to day for a 2 week pathology and incision check  Her pathology was not final until after 11:30am on 9/1/2021  results are still consistent with schwannoma  Reached out to office staff and will determine when patient is able to return to the office for pathology review and determine further follow up care  Left retromastoid incision appears to be healing well  No acute issues  Minimal ecchymosis without significant erythema  Flat in appearance with well approximated edges

## 2021-09-01 NOTE — CASE MANAGEMENT
[address] [facility] Admissions Coordinator had telephone conversations with patient and also her   Information on TCF services, average length of stay, and mandatory quarantine  Patient made aware that TCF is currently without visitation  Patient is not Covid vaccinated, no recent Covid exposure noted  Patient expressed concern with having to quarantine  Patient was with interest in dc'ing home with out patient vs home therapy  CM made aware of same

## 2021-09-01 NOTE — PROGRESS NOTES
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[facility])  Assessment & Plan  Patient was recently admitted 8/8/21-8/12/21  She was seen by Neurosurgery during recent admission and was placed on steroid taper and Diamox  · Was seen by Neurosurgery, s/p guided left retromastoid craniotomy for debulking of CP angle mass 8/18/21 by Dr Carlyle Salinas  · Completed Decadron taper   · Now off Diamox  · Post-op MRI brain revealed, per the results report, "Patient is status post removal of the vast majority of the previously identified left CP angle tumor  There is some residual enhancing tumor remaining in the anterior aspect of the CP angle with improving mass effect upon the brainstem and brachium pontis  There is no restricted diffusion identified within the left posterior lateral fabian and brachium pontis consistent with acute ischemia  Persistent mild hydrocephalus involving the lateral ventricles and 3rd ventricle  Increased FLAIR signal adjacent to the surface of the lateral ventricles may represent transependymal flow of CSF, unchanged  4th ventricle is not enlarged and there is improving mass effect upon the 4th ventricle "  · Unclear if patient's hearing will return in left ear per NSX  · Neurosurgery follow-up scheduled for 9/2--will d/w them tomorrow if still here   · Stable for discharge  PT/OT recommending rehab, case management following  Hyponatremia  Assessment & Plan  Lab Results   Component Value Date    SODIUM 133 (L) 08/29/2021    SODIUM 134 (L) 08/27/2021    SODIUM 135 (L) 08/26/2021   · Sodium stable       Pneumothorax  Assessment & Plan  Per record review, patient developed an iatrogenic pneumothorax s/p left subclavian central line placement and had a chest tube placed but the chest tube came out, and she had a new chest tube placed  The patient then had another chest tube placed by IR and the other chest tube was removed by Thoracic Surgery  · Most recent Chest tube removed 8/28, thoracic surgery signed off  Leukocytosis  Assessment & Plan  Lab Results   Component Value Date    WBC 15 52 (H) 08/29/2021    WBC 16 80 (H) 08/27/2021    WBC 17 02 (H) 08/26/2021   Suspect steroid-induced  · Monitor as needed  Fall with possible seizure-like activity [facility])  Assessment & Plan  Patient reported 3 falls since recent discharge  · The first one she reports falling onto her knees while going into the house after her ENT appointment  · The second one she reports falling onto her knees while going into the bathroom at home Saturday  · The third one she reports falling forward and being able to hear those around her but was unable to respond  She reports this lasted for approximately 1 minute witnesses told her  Also report of bilateral hand shaking and possible bowel incontinence during the event  · Neurology and Cardiology were following given oncern for POTS and convulsive syncope   · Cardiology consult appreciated - they did not believe these syncopal episodes are consistent with POTS  · Neurology consult appreciated, EEG was normal   AEDs were deferred  Hypertension  Assessment & Plan  Blood pressure acceptable  Continue losartan  · Monitor      Hydrocephalus [facility])  Assessment & Plan  Postoperative MRI as above  · Outpatient neurosurgery follow-up    Anxiety  Assessment & Plan  · Continue Lexapro        VTE Pharmacologic Prophylaxis: VTE Score: 4 Moderate Risk (Score 3-4) - Pharmacological DVT Prophylaxis Ordered: heparin  Patient Centered Rounds: I performed bedside rounds with nursing staff today    Discussions with Specialists or Other Care Team Provider: Neurosurgery, CM    Education and Discussions with Family / Patient: Patient declined call to   Time Spent for Care: 20 minutes  More than 50% of total time spent on counseling and coordination of care as described above  Current Length of Stay: 17 day(s)  Current Patient Status: Inpatient   Certification Statement: The patient will continue to require additional inpatient hospital stay due to pending placement   Discharge Plan: stable whenenver for rehab     Code Status: Level 1 - Full Code    Subjective:   Doing fine  No complaints  Objective:     Vitals:   Temp (24hrs), Av 5 °F (36 9 °C), Min:98 4 °F (36 9 °C), Max:98 6 °F (37 °C)    Temp:  [98 4 °F (36 9 °C)-98 6 °F (37 °C)] 98 4 °F (36 9 °C)  HR:  [105] 105  Resp:  [18-19] 19  BP: (103-136)/(72-81) 136/81  SpO2:  [97 %] 97 %  Body mass index is 27 66 kg/m²  Input and Output Summary (last 24 hours): Intake/Output Summary (Last 24 hours) at 2021 0934  Last data filed at 2021 1320  Gross per 24 hour   Intake 480 ml   Output --   Net 480 ml       Physical Exam:   Physical Exam  Vitals and nursing note reviewed  Constitutional:       General: She is not in acute distress  Cardiovascular:      Rate and Rhythm: Normal rate  Abdominal:      General: There is no distension  Musculoskeletal:      Right lower leg: No edema  Left lower leg: No edema  Neurological:      Mental Status: She is oriented to person, place, and time     Psychiatric:         Mood and Affect: Mood normal           Additional Data:     Labs:  Results from last 7 days   Lab Units 21  0542   WBC Thousand/uL 15 52*   HEMOGLOBIN g/dL 11 4*   HEMATOCRIT % 34 3*   PLATELETS Thousands/uL 283   NEUTROS PCT % 70   LYMPHS PCT % 21   MONOS PCT % 5   EOS PCT % 2     Results from last 7 days   Lab Units 21  0542   SODIUM mmol/L 133*   POTASSIUM mmol/L 4 8   CHLORIDE mmol/L 101   CO2 mmol/L 29   BUN mg/dL 20   CREATININE mg/dL 0 63   ANION GAP mmol/L 3*   CALCIUM mg/dL 8 8   GLUCOSE RANDOM mg/dL 75 Lines/Drains:  Invasive Devices     Peripheral Intravenous Line            Peripheral IV 08/29/21 Dorsal (posterior); Right Wrist 3 days                      Imaging: No pertinent imaging reviewed  Recent Cultures (last 7 days):         Last 24 Hours Medication List:   Current Facility-Administered Medications   Medication Dose Route Frequency Provider Last Rate    acetaminophen  975 mg Oral [facility] Sada Gonsales MD      cyclobenzaprine  10 mg Oral TID PRN Sada Gonsales MD      diazepam  2 5 mg Intravenous Q6H PRN Sada Gonsales MD      escitalopram  5 mg Oral Daily Sada Gonsales MD      glycerin-hypromellose-  1 drop Both Eyes Q3H PRN ONELIA Mcnally      heparin (porcine)  5,000 Units Subcutaneous [facility] Sada Gonsales MD      HYDROmorphone  0 5 mg Intravenous Q4H PRN Sada Gonsales MD      Labetalol HCl  10 mg Intravenous Q4H PRN Sada Gonsales MD      lidocaine  1 patch Topical Daily Sada Gonsales MD      loperamide  4 mg Oral Daily PRN ANGELICA McnallyNP      losartan  50 mg Oral Daily Sada Gonsales MD      naloxone  0 04 mg Intravenous Q1MIN PRN Sada Gonsales MD      ondansetron  4 mg Intravenous Q6H PRN Sada Gonsales MD      oxyCODONE  10 mg Oral Q4H PRN Sada Gonsales MD      oxyCODONE  5 mg Oral Q4H PRN Sada Gonsales MD          Today, Patient Was Seen By: Maria Isabel Alonso PA-C    **Please Note: This note may have been constructed using a voice recognition system  **

## 2021-09-01 NOTE — PLAN OF CARE
Problem: PAIN - ADULT  Goal: Verbalizes/displays adequate comfort level or baseline comfort level  Description: Interventions:  - Encourage patient to monitor pain and request assistance  - Assess pain using appropriate pain scale  - Administer analgesics based on type and severity of pain and evaluate response  - Implement non-pharmacological measures as appropriate and evaluate response  - Consider cultural and social influences on pain and pain management  - Notify physician/advanced practitioner if interventions unsuccessful or patient reports new pain  Outcome: Progressing     Problem: SAFETY ADULT  Goal: Patient will remain free of falls  Description: INTERVENTIONS:  - Educate patient/family on patient safety including physical limitations  - Instruct patient to call for assistance with activity   - Consult OT/PT to assist with strengthening/mobility   - Keep Call bell within reach  - Keep bed low and locked with side rails adjusted as appropriate  - Keep care items and personal belongings within reach  - Initiate and maintain comfort rounds  - Make Fall Risk Sign visible to staff  - Obtain necessary fall risk management equipment  - Apply yellow socks and bracelet for high fall risk patients  - Consider moving patient to room near nurses station  Outcome: Progressing  Goal: Maintain or return to baseline ADL function  Description: INTERVENTIONS:  -  Assess patient's ability to carry out ADLs; assess patient's baseline for ADL function and identify physical deficits which impact ability to perform ADLs (bathing, care of mouth/teeth, toileting, grooming, dressing, etc )  - Assess/evaluate cause of self-care deficits   - Assess range of motion  - Assess patient's mobility; develop plan if impaired  - Assess patient's need for assistive devices and provide as appropriate  - Encourage maximum independence but intervene and supervise when necessary  - Involve family in performance of ADLs  - Assess for home care needs following discharge   - Consider OT consult to assist with ADL evaluation and planning for discharge  - Provide patient education as appropriate  Outcome: Progressing  Goal: Maintains/Returns to pre admission functional level  Description: INTERVENTIONS:  - Perform BMAT or MOVE assessment daily    - Set and communicate daily mobility goal to care team and patient/family/caregiver  - Collaborate with rehabilitation services on mobility goals if consulted  - Out of bed for toileting  - Record patient progress and toleration of activity level   Outcome: Progressing     Problem: DISCHARGE PLANNING  Goal: Discharge to home or other facility with appropriate resources  Description: INTERVENTIONS:  - Identify barriers to discharge w/patient and caregiver  - Arrange for needed discharge resources and transportation as appropriate  - Identify discharge learning needs (meds, wound care, etc )  - Arrange for interpretive services to assist at discharge as needed  - Refer to Case Management Department for coordinating discharge planning if the patient needs post-hospital services based on physician/advanced practitioner order or complex needs related to functional status, cognitive ability, or social support system  Outcome: Progressing     Problem: Knowledge Deficit  Goal: Patient/family/caregiver demonstrates understanding of disease process, treatment plan, medications, and discharge instructions  Description: Complete learning assessment and assess knowledge base    Interventions:  - Provide teaching at level of understanding  - Provide teaching via preferred learning methods  Outcome: Progressing     Problem: MOBILITY - ADULT  Goal: Maintain or return to baseline ADL function  Description: INTERVENTIONS:  -  Assess patient's ability to carry out ADLs; assess patient's baseline for ADL function and identify physical deficits which impact ability to perform ADLs (bathing, care of mouth/teeth, toileting, grooming, dressing, etc )  - Assess/evaluate cause of self-care deficits   - Assess range of motion  - Assess patient's mobility; develop plan if impaired  - Assess patient's need for assistive devices and provide as appropriate  - Encourage maximum independence but intervene and supervise when necessary  - Involve family in performance of ADLs  - Assess for home care needs following discharge   - Consider OT consult to assist with ADL evaluation and planning for discharge  - Provide patient education as appropriate  Outcome: Progressing  Goal: Maintains/Returns to pre admission functional level  Description: INTERVENTIONS:  - Perform BMAT or MOVE assessment daily    - Set and communicate daily mobility goal to care team and patient/family/caregiver     - Collaborate with rehabilitation services on mobility goals if consulted  - Out of bed for toileting  - Record patient progress and toleration of activity level   Outcome: Progressing     Problem: Potential for Falls  Goal: Patient will remain free of falls  Description: INTERVENTIONS:  - Educate patient/family on patient safety including physical limitations  - Instruct patient to call for assistance with activity   - Consult OT/PT to assist with strengthening/mobility   - Keep Call bell within reach  - Keep bed low and locked with side rails adjusted as appropriate  - Keep care items and personal belongings within reach  - Initiate and maintain comfort rounds  - Make Fall Risk Sign visible to staff  - Apply yellow socks and bracelet for high fall risk patients  - Consider moving patient to room near nurses station  Outcome: Progressing     Problem: Prexisting or High Potential for Compromised Skin Integrity  Goal: Skin integrity is maintained or improved  Description: INTERVENTIONS:  - Identify patients at risk for skin breakdown  - Assess and monitor skin integrity  - Assess and monitor nutrition and hydration status  - Monitor labs   - Assess for incontinence   - Turn and reposition patient  - Assist with mobility/ambulation  - Relieve pressure over bony prominences  - Avoid friction and shearing  - Provide appropriate hygiene as needed including keeping skin clean and dry  - Evaluate need for skin moisturizer/barrier cream  - Collaborate with interdisciplinary team   - Patient/family teaching  - Consider wound care consult   Outcome: Progressing     Problem: Nutrition/Hydration-ADULT  Goal: Nutrient/Hydration intake appropriate for improving, restoring or maintaining nutritional needs  Description: Monitor and assess patient's nutrition/hydration status for malnutrition  Collaborate with interdisciplinary team and initiate plan and interventions as ordered  Monitor patient's weight and dietary intake as ordered or per policy  Utilize nutrition screening tool and intervene as necessary  Determine patient's food preferences and provide high-protein, high-caloric foods as appropriate       INTERVENTIONS:  - Monitor oral intake, urinary output, labs, and treatment plans  - Assess nutrition and hydration status and recommend course of action  - Evaluate amount of meals eaten  - Assist patient with eating if necessary   - Allow adequate time for meals  - Recommend/ encourage appropriate diets, oral nutritional supplements, and vitamin/mineral supplements  - Order, calculate, and assess calorie counts as needed  - Recommend, monitor, and adjust tube feedings and TPN/PPN based on assessed needs  - Assess need for intravenous fluids  - Provide specific nutrition/hydration education as appropriate  - Include patient/family/caregiver in decisions related to nutrition  Outcome: Progressing

## 2021-09-01 NOTE — CASE MANAGEMENT
Patient refusing rehab, would like to go home, states she will have either her daughter or  with her 24/7 to assist   Patient has working commode, JUANY, shower chair, wedge at home  Patient agreeable to [facility] at time of DC, referrals entered in [place] per patient request    JENNY NESS able to accept    Patient in agreement

## 2021-09-01 NOTE — OCCUPATIONAL THERAPY NOTE
Occupational Therapy Treatment Note:       09/01/21 1515   OT Last Visit   OT Visit Date 09/01/21   Note Type   Note Type Treatment   Restrictions/Precautions   Other Precautions Cognitive; Fall Risk;Hard of hearing;Visual impairment   Pain Assessment   Pain Assessment Tool 0-10   Pain Score 2  (head)   Transfers   Sit to Stand 5  Supervision   Additional items   (pt was able to pull own chair out with sba)   Stand to Sit 5  Supervision   Functional Mobility   Functional Mobility 5  Supervision   Additional Comments   (min self corrected difficulty with pulling own chair out)   Additional items Rolling walker   Cognition   Overall Cognitive Status Impaired   Arousal/Participation Alert   Comments pt engaged in portions of lotca assessment and was noted to score 4/4 on the following overlaping figures, clock drawing, peg borad construction and 2 d model  pt was noted to score 3/4 in the following sections: plain and colored block design and coping geometric forms  pt did well with 5 step pictoral sequence and 2/4 geometric sequence  Activity Tolerance   Activity Tolerance Patient tolerated treatment well   Assessment   Assessment pt participated in pm ot session  pt with noted improvement with sob and fatigue  pt also did not c/i dizziness or HA this session  pt was able to engage in tabletop cognitive and v/p activities with noted difficulty   pt was provided with  worksheets to attempt in home enviornment  pt is motivated and very cooperative  pts  was present at beginnign and end of session  pt ideally would benifit from out pt ot and s t  services as appropriate  pt requires s for iadls ie hot meal prep, knife use etc  discussion was also held about fall reduction for rw within home enviornment  Plan   Treatment Interventions ADL retraining;Visual perceptual retraining;Functional transfer training; Endurance training;Cognitive reorientation;Patient/family training; Activityengagement   Goal Expiration Date 09/03/21   OT Treatment Day 5   OT Frequency 3-5x/wk   Recommendation   OT Discharge Recommendation Post acute rehabilitation services   OT - OK to Discharge Yes   April PAO Neff

## 2021-09-01 NOTE — ASSESSMENT & PLAN NOTE
Patient was recently admitted 8/8/21-8/12/21  She was seen by Neurosurgery during recent admission and was placed on steroid taper and Diamox  · Was seen by Neurosurgery, s/p guided left retromastoid craniotomy for debulking of CP angle mass 8/18/21 by Dr Rosalind aJime  · Completed Decadron taper   · Now off Diamox  · Post-op MRI brain revealed, per the results report, "Patient is status post removal of the vast majority of the previously identified left CP angle tumor  There is some residual enhancing tumor remaining in the anterior aspect of the CP angle with improving mass effect upon the brainstem and brachium pontis  There is no restricted diffusion identified within the left posterior lateral fabian and brachium pontis consistent with acute ischemia  Persistent mild hydrocephalus involving the lateral ventricles and 3rd ventricle  Increased FLAIR signal adjacent to the surface of the lateral ventricles may represent transependymal flow of CSF, unchanged  4th ventricle is not enlarged and there is improving mass effect upon the 4th ventricle "  · Unclear if patient's hearing will return in left ear per NSX  · Neurosurgery follow-up scheduled for 9/2--will d/w them tomorrow if still here   · Stable for discharge  PT/OT recommending rehab, case management following

## 2021-09-01 NOTE — PLAN OF CARE
Problem: PHYSICAL THERAPY ADULT  Goal: Performs mobility at highest level of function for planned discharge setting  See evaluation for individualized goals  Description: Treatment/Interventions: ADL retraining, Functional transfer training, LE strengthening/ROM, Elevations, Therapeutic exercise, Endurance training, Cognitive reorientation, Patient/family training, Bed mobility, Equipment eval/education, Gait training, Compensatory technique education, Spoke to nursing, Spoke to case management  Equipment Recommended: Wheelchair       See flowsheet documentation for full assessment, interventions and recommendations  Outcome: Progressing  Note: Prognosis: Good  Problem List: Decreased strength, Decreased endurance, Impaired balance, Decreased mobility, Decreased coordination  Assessment: The pt  was able to progress her ambulatory distance today as she was able to progress to community distances  It was noted that her walker was at the lowest setting, and it was elevated three notches to a more appropriate size  She was able to ambulate without any loss of balance, but she has difficulty manuevering around static obstacles on the left  Moving obstacles, such as another person, provide a significant challenge for her as she is unable to rotate her head bilaterally in order to scan the environment to avoid them  She does demonstrate improved awareness of her deficits especially with the stair trial  She probes with her foot to ensure proper placement on the stairs before weightbearing on that leg  The pt  did require rests in-between and after each trial  She will benefit from continued therapy in order to facilitate her return to independence  The pt  is now declining rehab due to having to wait so long for it as well as having to quarantine  She wants to return home, and recommend assistance for all mobility at home due to her continued deficits and her high risk of future falls   She would also benefit from continued therapy ideally at a rehab level for the more intense work, but outpatient, and home health will also provide a slower-paced recovery  Expressed concerns about returning home, but she continues to want this option at this time  Barriers to Discharge: Decreased caregiver support  Barriers to Discharge Comments: falls/ alone at times while spouse works- w/c level as option for home discharge discussed 2* falls- pt is in agreement w/ plan and recommendation for w/c level when alone until progressed      PT Discharge Recommendation: Post acute rehabilitation services (However pt  wants home as noted in assessment )     PT - OK to Discharge: Yes    See flowsheet documentation for full assessment

## 2021-09-01 NOTE — PHYSICAL THERAPY NOTE
Physical Therapy Progress Note     09/01/21 1205   PT Last Visit   PT Visit Date 09/01/21   Note Type   Note Type Treatment   Pain Assessment   Pain Assessment Tool 0-10   Pain Score 2   Pain Location/Orientation Location: Head   Hospital Pain Intervention(s) Repositioned; Ambulation/increased activity; Emotional support   Restrictions/Precautions   Other Precautions Visual impairment; Fall Risk;Hard of hearing  (No hearing in her left ear )   Subjective   Subjective The pt  states that she is frustrated with having to remain her still as well as about having to quarantine at rehab  She expresses a fear of falling especially with the stairs  Bed Mobility   Supine to Sit 6  Modified independent   Transfers   Sit to Stand 5  Supervision   Stand to Sit 5  Supervision   Ambulation/Elevation   Gait pattern Excessively slow; Inconsistent lyubov;Narrow [place]   Gait Assistance 4  Minimal assist  (Contact guard )   Additional items Assist x 1   Assistive Device Rolling walker   Distance 150 feet x 2  Stair Management Assistance 4  Minimal assist  (Contact guard )   Additional items Assist x 1; Increased time required   Stair Management Technique Two rails; Step to pattern; Foreward   Number of Stairs 3   Balance   Static Sitting Good   Dynamic Sitting Fair +   Static Standing Fair   Dynamic Standing Benedict Osuna [id number] -   Activity Tolerance   Activity Tolerance Patient tolerated treatment well;Patient limited by fatigue   Nurse EDI Garcia 8, RN  Assessment   Prognosis Good   Problem List Decreased strength;Decreased endurance; Impaired balance;Decreased mobility; Decreased coordination   Assessment The pt  was able to progress her ambulatory distance today as she was able to progress to community distances  It was noted that her walker was at the lowest setting, and it was elevated three notches to a more appropriate size   She was able to ambulate without any loss of balance, but she has difficulty manuevering around static obstacles on the left  Moving obstacles, such as another person, provide a significant challenge for her as she is unable to rotate her head bilaterally in order to scan the environment to avoid them  She does demonstrate improved awareness of her deficits especially with the stair trial  She probes with her foot to ensure proper placement on the stairs before weightbearing on that leg  The pt  did require rests in-between and after each trial  She will benefit from continued therapy in order to facilitate her return to independence  The pt  is now declining rehab due to having to wait so long for it as well as having to quarantine  She wants to return home, and recommend assistance for all mobility at home due to her continued deficits and her high risk of future falls  She would also benefit from continued therapy ideally at a rehab level for the more intense work, but outpatient, and home health will also provide a slower-paced recovery  Expressed concerns about returning home, but she continues to want this option at this time  Barriers to Discharge Decreased caregiver support   Goals   Patient Goals To go home  LTG Expiration Date 09/03/21   PT Treatment Day 4   Plan   Treatment/Interventions Functional transfer training;LE strengthening/ROM; Elevations; Therapeutic exercise; Endurance training;Patient/family training;Bed mobility;Gait training   Progress Progressing toward goals   PT Frequency   (3-5x a week )   Recommendation   PT Discharge Recommendation Post acute rehabilitation services  (However pt  wants home as noted in assessment )   Equipment Recommended [place] walker   AM-PAC Basic Mobility Inpatient   Turning in Bed Without Bedrails 4   Lying on Back to Sitting on Edge of Flat Bed 4   Moving Bed to Chair 3   Standing Up From Chair 3   Walk in Room 3   Climb 3-5 Stairs 3   Basic Mobility Inpatient Raw Score 20   Basic Mobility Standardized Score 43 99 An AM-PAC Basic Mobility standardized score less than 42 9 suggests the patient may benefit from discharge to post-acute rehab services      Quinn Frye PTA

## 2021-09-02 ENCOUNTER — DOCUMENTATION (OUTPATIENT)
Dept: SOCIAL WORK | Facility: HOSPITAL | Age: 56
End: 2021-09-02

## 2021-09-02 ENCOUNTER — OFFICE VISIT (OUTPATIENT)
Dept: NEUROSURGERY | Facility: CLINIC | Age: 56
End: 2021-09-02

## 2021-09-02 ENCOUNTER — TELEPHONE (OUTPATIENT)
Dept: NEUROLOGY | Facility: CLINIC | Age: 56
End: 2021-09-02

## 2021-09-02 VITALS
SYSTOLIC BLOOD PRESSURE: 127 MMHG | TEMPERATURE: 97.6 F | HEART RATE: 113 BPM | DIASTOLIC BLOOD PRESSURE: 83 MMHG | WEIGHT: 159 LBS | RESPIRATION RATE: 16 BRPM | HEIGHT: 66 IN | BODY MASS INDEX: 25.55 KG/M2

## 2021-09-02 DIAGNOSIS — D33.3 VESTIBULAR SCHWANNOMA (HCC): ICD-10-CM

## 2021-09-02 DIAGNOSIS — Z48.89 AFTERCARE FOLLOWING SURGERY: Primary | ICD-10-CM

## 2021-09-02 PROCEDURE — 99024 POSTOP FOLLOW-UP VISIT: CPT | Performed by: NEUROLOGICAL SURGERY

## 2021-09-02 NOTE — PROGRESS NOTES
Admission Report at [facility] has admitted your patient to [address] [place] [address] service with the following disciplines:      PT and OT  This report is informational only, no responses is needed     Primary focus of home health care neurology    Patient stated goals of care to be more independent with getting into the BR to void and to take a shower  to get out of the home for appointments  to be normal and active again  to be able to walk and be functioning    Anticipated visit pattern 1wk1 and starting week of 09 05 21  2wk3 and 1wk2  Significant clinical findings non removable chest tube dressing from recent hospital stay    Request for medication clarification pt reports not using Lidocaine patch      Thank you for allowing us to participate in the care of your patient        Halina De La Cruz, PT

## 2021-09-02 NOTE — PROGRESS NOTES
DISCUSSION SUMMARY  This is a 55 y o  female who is status post resection of a vestibular schwannoma  The pathology did come back today as being consistent with schwannoma  She continues to have some balance difficulties and numbness on her face but overall is improving  I recommended continued physical therapy  She may need stereotactic radio surgery for the small residual tumor however I recommend not doing this until she is completely neurologically recovered  Return in about 6 months (around 3/2/2022) for follow-up after test         Diagnosis ICD-10-CM Associated Orders   1  Vestibular schwannoma ([facility] [address] )  D33 3 Ambulatory referral to Neurosurgery          Chief Complaint   FOLLOW UP  Hosp F/u for [facility] vestibular schwannoma after ENT/Audiogram to discuss surgery  HPI  This patient is known to our practice for [facility] Vestibular schwannoma   Onset: since Feb 2021   Prior Symptoms: Left ear buzzing and humming, numbness in lips and hands with spasms in the left shoulder; numbness in bilateral arms and legs; difficulty with balance from dizziness uses a walker to ambulate; Headaches, blurred vision, and neck pain at the back of her head   Physical Therapy: Yes   Previous Surgery:  o 8/18/2021 (DKO): Image guided left retromastoid craniotomy for debulking of CP angle mass; intraoperative monitoring (Left Head)    Today, patient is status post a subtotal resection of a very large vestibular schwannoma  She is doing well overall  She does have some numbness on the left side of her face  This is unchanged  She has balance difficulties and dizziness these are improving  Her speech difficulty has improved  Her vision has improved  Overall she is doing well  She has had no problems with her incision  Review of Systems   Constitutional: Negative  HENT: Positive for hearing loss (left ear)  Eyes: Negative  Respiratory: Negative  Cardiovascular: Negative  Gastrointestinal: Negative  Endocrine: Negative  Genitourinary: Negative  Musculoskeletal: Positive for gait problem (ambulates with walker) and myalgias (occasional generalized muscle spasms )  Skin: Negative  Allergic/Immunologic: Negative  Neurological: Positive for dizziness (constantly in the left eye ), speech difficulty (word finding, increased after surgery), weakness (muscle mass, b/l UE and LE) and numbness (still has left sided facial numbness before and after surgery)  Hematological: Bruises/bleeds easily (due to collasping veins )  Psychiatric/Behavioral: Positive for decreased concentration (slight memoryv loss) and sleep disturbance ( every night)  Vitals:    /83 (BP Location: Right arm, Patient Position: Sitting, Cuff Size: Standard)   Pulse (!) 113   Temp 97 6 °F (36 4 °C) (Probe)   Resp 16   Ht 5' 5 5" (1 664 m)   Wt 72 1 kg (159 lb)   BMI 26 06 kg/m²       MEDICAL HISTORY  Past Medical History:   Diagnosis Date    Hypertension      Past Surgical History:   Procedure Laterality Date    CRANIOTOMY Left 2021    Procedure: Image guided left retromastoid craniotomy for debulking of CP angle mass; intraoperative monitoring;  Surgeon: Cinthia Bence, MD;  [facility];  Service: Neurosurgery    IR CHEST TUBE PLACEMENT  2021     Social History     Tobacco Use    Smoking status: Former Smoker     Quit date: 2021     Years since quittin 6    Smokeless tobacco: Never Used   Vaping Use    Vaping Use: Never used   Substance Use Topics    Alcohol use:  Yes    Drug use: No        Current Outpatient Medications:     acetaminophen (TYLENOL) 325 mg tablet, Take 3 tablets (975 mg total) by mouth every 8 (eight) hours, Disp: , Rfl: 0    cyclobenzaprine (FLEXERIL) 10 mg tablet, Take 1 tablet (10 mg total) by mouth 3 (three) times a day as needed for muscle spasms (headache), Disp: 30 tablet, Rfl: 0    escitalopram (LEXAPRO) 5 mg tablet, , Disp: , Rfl:     lidocaine (LIDODERM) 5 %, Apply 1 patch topically daily Remove & Discard patch within 12 hours or as directed by MD, Disp: , Rfl: 0    losartan (COZAAR) 50 mg tablet, Take 50 mg by mouth daily, Disp: , Rfl:     oxyCODONE (ROXICODONE) 5 mg immediate release tablet, Take 1 tablet (5 mg total) by mouth every 4 (four) hours as needed for moderate pain for up to 10 daysMax Daily Amount: 30 mg, Disp: 30 tablet, Rfl: 0  No current facility-administered medications for this visit  No Known Allergies     The following portions of the patient's history were updated by MA and reviewed by MD: allergies, current medications, past family history, past medical history, past social history, past surgical history and problem list       Physical Exam  Incision clean and dry  Her station and gait are very slow and deliberate  She can ambulate in a straight line without a wondering baseline  Her power is 5/5 bilaterally  Her speech is clear and comprehensible  Her extraocular movements are full  RESULTS/DATA  I have personally reviewed pertinent films in PACS     MRI of the brain postop on 19 August 2021 is compared to the preoperative study from 5 August 2021  The vast majority of the tumor has been removed

## 2021-09-02 NOTE — UTILIZATION REVIEW
Notification of Discharge   This is a Notification of Discharge from our facility [address]  Please be advised that this patient has been discharge from our facility  Below you will find the admission and discharge date and time including the patients disposition  UTILIZATION REVIEW CONTACT:  Vannessa Sales  Utilization   Network Utilization Review Department  Phone: [phone] x carefully listen to the prompts  All voicemails are confidential   Email: [email or web address]     PHYSICIAN ADVISORY SERVICES:  FOR XBAL-HA-CPZF REVIEW - MEDICAL NECESSITY DENIAL  Phone: [phone]  Fax: [phone]  Email: [email or web address]     PRESENTATION DATE: 8/14/2021  7:56 PM  OBERVATION ADMISSION DATE:   INPATIENT ADMISSION DATE: 8/15/21 12:30 AM   DISCHARGE DATE: 9/1/2021  3:19 PM  DISPOSITION: Home with [place] with [address] [place] [address] Road INFORMATION:  Send all requests for admission clinical reviews, approved or denied determinations and any other requests to dedicated fax number below belonging to the campus where the patient is receiving treatment   List of dedicated fax numbers:  [address] [place] [address] DENIALS (Administrative/Medical Necessity) [phone]   [address]  (Maternity/NICU/Pediatrics) [phone]   [facility] [phone]   [facility] [phone]   [facility] [phone]   [address] [phone]   [facility]  [phone]   [address] [facility] [address]  [address] [facility] [address] [facility] [phone]

## 2021-09-02 NOTE — TELEPHONE ENCOUNTER
1ST ATTEMPT LMOM for pt to call in to sched HFU appt  SLB/SZ Like Activity/[employer] BS    NOTE FROM CHART:  Reason for Consult / Principal Problem: Seizure like activity   Isiah Ghosh will need follow up in in 4 weeks with general attending  She will not require outpatient neurological testing

## 2021-09-24 ENCOUNTER — EVALUATION (OUTPATIENT)
Dept: PHYSICAL THERAPY | Facility: CLINIC | Age: 56
End: 2021-09-24
Payer: COMMERCIAL

## 2021-09-24 DIAGNOSIS — R26.2 AMBULATORY DYSFUNCTION: ICD-10-CM

## 2021-09-24 PROCEDURE — 97163 PT EVAL HIGH COMPLEX 45 MIN: CPT

## 2021-09-24 NOTE — PROGRESS NOTES
PT Evaluation     Today's date: 2021  Patient name: Jovi Marks  : 1965  MRN: [id number]  Referring provider: Sonny Diaz  Dx:   Encounter Diagnosis     ICD-10-CM    1  Ambulatory dysfunction  R26 2 Ambulatory referral to Physical Therapy       Start Time: 1030  Stop Time: 1115  Total time in clinic (min): 45 minutes    Assessment  Assessment details: Patient is a 55y o  year old female presenting to OPPT s/p diagnosis of ambulatory dysfunction and s/p resection of vestibular schwanoma  Patient demonstrates decreased strength, endurance, balance, and functional independence  She requires assistance for ADLs and IADLs due to her balance  She currently uses a shower chair, previously she did not need any adaptive equipment  She currently does not perform stairs secondary to her balance and strength  She will benefit from skilled PT interventions to maximize return to PLOF and independence as able  She was given an HEP and discussed with her daughter  Thank you for this referral and the ability to participate in the care of this patient  Impairments: abnormal coordination, abnormal gait, activity intolerance, impaired balance, impaired physical strength, lacks appropriate home exercise program and safety issue  Understanding of Dx/Px/POC: good   Prognosis: good    Goals  In 4 weeks, patient will:  1  Demonstrate consistent carryover with HEP  2  Demonstrate ability to maintain upright balance unsupported without use of RW for more than 5 minutes  3   Demonstrate ability to perform stair climbing with the use of a handrail  4   Demonstrate increased strength of +1 to allow for improved transfer quality and stability  5  Improve 5xSTS by minimally clinical improved difference (2 3 seconds) to demonstrate improved balance and stability with transfers  In 4-10 weeks, patient will:  1  Improve TUG score below cutoff of 11 1 seconds to demonstrate less fall risk    2   Return to community ambulation with no assistive device for at least 1000+ feet  3   Improve 5xSTS score below cutoff of 10 seconds to demonstrate reduced fall risk  4   Improve ABC score to above cutoff (67%) to indicate improved confidence in balance  Plan  Plan details: Increased challenge to VOR exercises  Dynamic balance  Potential use of solo step  Other planned modality interventions: as needed for symptom modification  Planned therapy interventions: neuromuscular re-education, manual therapy, patient education, home exercise program, therapeutic exercise, therapeutic activities and gait training  Frequency: 2x week  Duration in visits: 16  Duration in weeks: 8  Plan of Care beginning date: 2021  Plan of Care expiration date: 2021  Treatment plan discussed with: patient and family        Subjective Evaluation    History of Present Illness  Mechanism of injury: Presents to PT: Began in Feb  When she started getting HA and was treated for HTN  In August began falling frequently and went to the ER and they noted a mass on her brain MRI  She notes that meeting with her neurosx, they believe that it is a vestibular schwanoma  They performed a procedure to remove the mass, she reports that they did not get rid of all of it  She is following up with her neurosx in 6 months and has an appointment with a neurologist in the future  Her biggest issues are her balance and feeling confident in her balance  She also notes difficulty in finding words and other cognitive tasks  She states she does not do much walking without her RW  She wants to improve her balance and get rid of her RW  Notes that she has had some L eye watering and burning  Recently she had a HA on the L side which is unusual and will continue monitor  Pain  Current pain rating: 3  At best pain ratin  At worst pain ratin  Location: Incision site behind L ear  HA currently    Relieving factors: medications    Social Support  Steps to enter house: yes  1  Stairs in house: yes   12  Lives in: one-story house  Lives with: adult children, young children and spouse    Employment status: not working  Hand dominance: left      Diagnostic Tests  MRI studies: abnormal    FCE comments: IMPRESSION:     Patient is status post removal of the vast majority of the previously identified left CP angle tumor  There is some residual enhancing tumor remaining in the anterior aspect of the CP angle with improving mass effect upon the brainstem and brachium   pontis      There is no restricted diffusion identified within the left posterior lateral fabian and brachium pontis consistent with acute ischemia      Persistent mild hydrocephalus involving the lateral ventricles and 3rd ventricle  Increased FLAIR signal adjacent to the surface of the lateral ventricles may represent transependymal flow of CSF, unchanged  4th ventricle is not enlarged and there is   improving mass effect upon the 4th ventricle      This examination was marked "immediate notification" in Epic in order to begin the standard process by which the radiology reading room liaison alerts the referring practitioner  Treatments  Previous treatment: physical therapy and occupational therapy  Discharged from (in last 30 days): home health care  Patient Goals  Patient goals for therapy: independence with ADLs/IADLs, increased strength, improved balance and decreased pain  Patient goal: "get rid of the walker "        Objective     Neurological Testing     Additional Neurological Details  Numbness on L side of face      Strength/Myotome Testing     Left Hip   Planes of Motion   Flexion: 4-  Extension: 4-    Right Hip   Planes of Motion   Flexion: 4-  Extension: 4-    Left Knee   Flexion: 4  Extension: 4    Right Knee   Flexion: 4  Extension: 4    Left Ankle/Foot   Dorsiflexion: 4  Plantar flexion: 4+    Right Ankle/Foot   Dorsiflexion: 4  Plantar flexion: 4+  Neuro Exam:     Dizziness  Positive for disequilibrium, vertigo and motion sickness  Negative for oscillopsia and diplopia  Cervical exam   Ligament Laxity Testing   Alar ligament: WNL  Sharp Reggie: WNL    Oculomotor exam   Smooth pursuits: within normal limits  Vertical saccades: normal  Horizontal saccades: normal  Convergence: normal  Cover test: normal  Head thrust: right normal  Head thrust: left abnormal    Sensation   Light touch LE: left WNL and right WNL    Coordination   Rapid alternating movements: UE impaired and LE impaired    Transfers Sit to stand: independent     Functional outcomes   5x sit to stand: 11 12 BL UE support (seconds)  TU 45 RW (seconds)  TUG cognitive: 20 62 RW - difficulty counting (seconds)  Functional outcome gait comment: Using RW smaller step length and reduced gait speed        Balance assessments   MCTSIB   Eyes open level surface: 30  Eyes open foam surface: 15  Eyes closed level surface: 18  Eyes closed foam surface: 0             Precautions: Hx of falls  Re-eval Date: 2021    Date        Visit Count See IE       FOTO See IE       Pain In See IE       Pain Out                Manuals        LEs prn                               Neuro Re-Ed        Dynamic balance        Obstacle Course        VORx1        VORx2                        Ther Ex                                                                        Ther Activity        Hep Issued written HEP               Gait Training        Divided Attention        Head turns        Modalities         prn

## 2021-09-30 ENCOUNTER — OFFICE VISIT (OUTPATIENT)
Dept: PHYSICAL THERAPY | Facility: CLINIC | Age: 56
End: 2021-09-30
Payer: COMMERCIAL

## 2021-09-30 DIAGNOSIS — R26.2 AMBULATORY DYSFUNCTION: Primary | ICD-10-CM

## 2021-09-30 PROCEDURE — 97116 GAIT TRAINING THERAPY: CPT

## 2021-09-30 PROCEDURE — 97112 NEUROMUSCULAR REEDUCATION: CPT

## 2021-09-30 NOTE — PROGRESS NOTES
Daily Note     Today's date: 2021  Patient name: Parisa Juarez  : 1965  MRN: [id number]  Referring provider: Jordy Acosta  Dx:   Encounter Diagnosis     ICD-10-CM    1  Ambulatory dysfunction  R26 2        Start Time: 1244  Stop Time: 1130  Total time in clinic (min): 45 minutes    Subjective: Pt reports "burning" in her L eye, which she uses re-wetting drops for  She notes Friday after her evaluation she was exhausted from therapy for roughly 2 days  Notes she is performing her HEP  Objective: See treatment diary below      Assessment: Tolerated treatment well  Pt instructed on resting throughout therapy session to avoid fatigue for subsequent days  3 seated rest breaks throughout session today to alleviate fatigue  Will continue to monitor fatigue levels  Able to progress VORx1 exercise time  Pt ambulates with slightly wider base of support but with good initial contact and UE/torso rotation  Turns to the L side more off-balance  Patient would benefit from continued PT      Plan: Continue per plan of care  Instructed to contact PCP if eye "burning" persists  Continue to progress VORx1 exercises and dynamic/static balance exercises       Precautions: Hx of falls  Re-eval Date: 2021    Date       Visit Count See IE       FOTO See IE       Pain In See IE Mild pain on incision posterior to L ear      Pain Out                Manuals        LEs prn                               Neuro Re-Ed        Dynamic balance        Obstacle Course  Hurdles with turns cues for head/eye position 10min      VORx1  45 sec vert/horiz x2 ea firm plain background      VORx2                        Ther Ex                                                                        Ther Activity        Hep Issued written HEP               Gait Training  Solo step overground no AD 10min      Divided Attention        Head turns        Modalities         prn

## 2021-10-01 ENCOUNTER — OFFICE VISIT (OUTPATIENT)
Dept: PHYSICAL THERAPY | Facility: CLINIC | Age: 56
End: 2021-10-01
Payer: COMMERCIAL

## 2021-10-01 DIAGNOSIS — R26.2 AMBULATORY DYSFUNCTION: Primary | ICD-10-CM

## 2021-10-01 PROCEDURE — 97112 NEUROMUSCULAR REEDUCATION: CPT

## 2021-10-07 ENCOUNTER — OFFICE VISIT (OUTPATIENT)
Dept: PHYSICAL THERAPY | Facility: CLINIC | Age: 56
End: 2021-10-07
Payer: COMMERCIAL

## 2021-10-07 DIAGNOSIS — R26.2 AMBULATORY DYSFUNCTION: Primary | ICD-10-CM

## 2021-10-07 PROCEDURE — 97112 NEUROMUSCULAR REEDUCATION: CPT

## 2021-10-07 PROCEDURE — 97116 GAIT TRAINING THERAPY: CPT

## 2021-10-08 ENCOUNTER — TRANSCRIBE ORDERS (OUTPATIENT)
Dept: PHYSICAL THERAPY | Facility: CLINIC | Age: 56
End: 2021-10-08

## 2021-10-08 ENCOUNTER — EVALUATION (OUTPATIENT)
Dept: OCCUPATIONAL THERAPY | Facility: CLINIC | Age: 56
End: 2021-10-08
Payer: COMMERCIAL

## 2021-10-08 ENCOUNTER — OFFICE VISIT (OUTPATIENT)
Dept: PHYSICAL THERAPY | Facility: CLINIC | Age: 56
End: 2021-10-08
Payer: COMMERCIAL

## 2021-10-08 DIAGNOSIS — R26.2 AMBULATORY DYSFUNCTION: Primary | ICD-10-CM

## 2021-10-08 DIAGNOSIS — M62.81 LEFT-SIDED MUSCLE WEAKNESS: Primary | ICD-10-CM

## 2021-10-08 PROCEDURE — 97116 GAIT TRAINING THERAPY: CPT

## 2021-10-08 PROCEDURE — 97166 OT EVAL MOD COMPLEX 45 MIN: CPT | Performed by: OCCUPATIONAL THERAPIST

## 2021-10-08 PROCEDURE — 97112 NEUROMUSCULAR REEDUCATION: CPT

## 2021-10-11 ENCOUNTER — TELEPHONE (OUTPATIENT)
Dept: LAB | Facility: HOSPITAL | Age: 56
End: 2021-10-11

## 2021-10-13 ENCOUNTER — OFFICE VISIT (OUTPATIENT)
Dept: OCCUPATIONAL THERAPY | Facility: CLINIC | Age: 56
End: 2021-10-13
Payer: COMMERCIAL

## 2021-10-13 DIAGNOSIS — M62.81 LEFT-SIDED MUSCLE WEAKNESS: Primary | ICD-10-CM

## 2021-10-13 PROCEDURE — 97530 THERAPEUTIC ACTIVITIES: CPT | Performed by: OCCUPATIONAL THERAPIST

## 2021-10-13 PROCEDURE — 97110 THERAPEUTIC EXERCISES: CPT | Performed by: OCCUPATIONAL THERAPIST

## 2021-10-14 ENCOUNTER — OFFICE VISIT (OUTPATIENT)
Dept: PHYSICAL THERAPY | Facility: CLINIC | Age: 56
End: 2021-10-14
Payer: COMMERCIAL

## 2021-10-14 DIAGNOSIS — R26.2 AMBULATORY DYSFUNCTION: Primary | ICD-10-CM

## 2021-10-14 PROCEDURE — 97112 NEUROMUSCULAR REEDUCATION: CPT

## 2021-10-14 PROCEDURE — 97116 GAIT TRAINING THERAPY: CPT

## 2021-10-15 ENCOUNTER — OFFICE VISIT (OUTPATIENT)
Dept: PHYSICAL THERAPY | Facility: CLINIC | Age: 56
End: 2021-10-15
Payer: COMMERCIAL

## 2021-10-15 ENCOUNTER — OFFICE VISIT (OUTPATIENT)
Dept: OCCUPATIONAL THERAPY | Facility: CLINIC | Age: 56
End: 2021-10-15
Payer: COMMERCIAL

## 2021-10-15 DIAGNOSIS — M62.81 LEFT-SIDED MUSCLE WEAKNESS: Primary | ICD-10-CM

## 2021-10-15 DIAGNOSIS — R26.2 AMBULATORY DYSFUNCTION: Primary | ICD-10-CM

## 2021-10-15 PROCEDURE — 97116 GAIT TRAINING THERAPY: CPT

## 2021-10-15 PROCEDURE — 97530 THERAPEUTIC ACTIVITIES: CPT | Performed by: OCCUPATIONAL THERAPIST

## 2021-10-15 PROCEDURE — 97110 THERAPEUTIC EXERCISES: CPT | Performed by: OCCUPATIONAL THERAPIST

## 2021-10-15 PROCEDURE — 97112 NEUROMUSCULAR REEDUCATION: CPT

## 2021-10-18 ENCOUNTER — APPOINTMENT (OUTPATIENT)
Dept: LAB | Facility: HOSPITAL | Age: 56
End: 2021-10-18
Payer: COMMERCIAL

## 2021-10-18 DIAGNOSIS — E78.00 PURE HYPERCHOLESTEROLEMIA: ICD-10-CM

## 2021-10-18 DIAGNOSIS — E51.9 THIAMIN DEFICIENCY: ICD-10-CM

## 2021-10-18 DIAGNOSIS — E53.8 BIOTIN-(PROPIONYL-COA-CARBOXYLASE) LIGASE DEFICIENCY: ICD-10-CM

## 2021-10-18 DIAGNOSIS — E55.9 VITAMIN D DEFICIENCY: ICD-10-CM

## 2021-10-18 DIAGNOSIS — I10 HYPERTENSION, ESSENTIAL: Primary | ICD-10-CM

## 2021-10-18 DIAGNOSIS — E03.9 HYPOTHYROIDISM, UNSPECIFIED TYPE: ICD-10-CM

## 2021-10-18 LAB
25(OH)D3 SERPL-MCNC: 26.9 NG/ML (ref 30–100)
ALBUMIN SERPL BCP-MCNC: 3.7 G/DL (ref 3.5–5)
ALP SERPL-CCNC: 69 U/L (ref 46–116)
ALT SERPL W P-5'-P-CCNC: 31 U/L (ref 12–78)
ANION GAP SERPL CALCULATED.3IONS-SCNC: 9 MMOL/L (ref 4–13)
AST SERPL W P-5'-P-CCNC: 15 U/L (ref 5–45)
BASOPHILS # BLD AUTO: 0.06 THOUSANDS/ΜL (ref 0–0.1)
BASOPHILS NFR BLD AUTO: 1 % (ref 0–1)
BILIRUB SERPL-MCNC: 0.4 MG/DL (ref 0.2–1)
BUN SERPL-MCNC: 8 MG/DL (ref 5–25)
CALCIUM SERPL-MCNC: 9.2 MG/DL (ref 8.3–10.1)
CHLORIDE SERPL-SCNC: 105 MMOL/L (ref 100–108)
CHOLEST SERPL-MCNC: 241 MG/DL (ref 50–200)
CO2 SERPL-SCNC: 29 MMOL/L (ref 21–32)
CREAT SERPL-MCNC: 0.91 MG/DL (ref 0.6–1.3)
EOSINOPHIL # BLD AUTO: 0.21 THOUSAND/ΜL (ref 0–0.61)
EOSINOPHIL NFR BLD AUTO: 3 % (ref 0–6)
ERYTHROCYTE [DISTWIDTH] IN BLOOD BY AUTOMATED COUNT: 14.9 % (ref 11.6–15.1)
GFR SERPL CREATININE-BSD FRML MDRD: 71 ML/MIN/1.73SQ M
GLUCOSE P FAST SERPL-MCNC: 83 MG/DL (ref 65–99)
HCT VFR BLD AUTO: 40.8 % (ref 34.8–46.1)
HDLC SERPL-MCNC: 64 MG/DL
HGB BLD-MCNC: 12.7 G/DL (ref 11.5–15.4)
IMM GRANULOCYTES # BLD AUTO: 0.01 THOUSAND/UL (ref 0–0.2)
IMM GRANULOCYTES NFR BLD AUTO: 0 % (ref 0–2)
LDLC SERPL CALC-MCNC: 142 MG/DL (ref 0–100)
LYMPHOCYTES # BLD AUTO: 2.25 THOUSANDS/ΜL (ref 0.6–4.47)
LYMPHOCYTES NFR BLD AUTO: 34 % (ref 14–44)
MCH RBC QN AUTO: 30.4 PG (ref 26.8–34.3)
MCHC RBC AUTO-ENTMCNC: 31.1 G/DL (ref 31.4–37.4)
MCV RBC AUTO: 98 FL (ref 82–98)
MONOCYTES # BLD AUTO: 0.72 THOUSAND/ΜL (ref 0.17–1.22)
MONOCYTES NFR BLD AUTO: 11 % (ref 4–12)
NEUTROPHILS # BLD AUTO: 3.41 THOUSANDS/ΜL (ref 1.85–7.62)
NEUTS SEG NFR BLD AUTO: 51 % (ref 43–75)
NONHDLC SERPL-MCNC: 177 MG/DL
NRBC BLD AUTO-RTO: 0 /100 WBCS
PLATELET # BLD AUTO: 407 THOUSANDS/UL (ref 149–390)
PMV BLD AUTO: 10.6 FL (ref 8.9–12.7)
POTASSIUM SERPL-SCNC: 3.7 MMOL/L (ref 3.5–5.3)
PROT SERPL-MCNC: 7.6 G/DL (ref 6.4–8.2)
RBC # BLD AUTO: 4.18 MILLION/UL (ref 3.81–5.12)
SODIUM SERPL-SCNC: 143 MMOL/L (ref 136–145)
T4 FREE SERPL-MCNC: 0.93 NG/DL (ref 0.76–1.46)
TRIGL SERPL-MCNC: 176 MG/DL
TSH SERPL DL<=0.05 MIU/L-ACNC: 4.04 UIU/ML (ref 0.36–3.74)
VIT B12 SERPL-MCNC: 522 PG/ML (ref 100–900)
WBC # BLD AUTO: 6.66 THOUSAND/UL (ref 4.31–10.16)

## 2021-10-18 PROCEDURE — 82607 VITAMIN B-12: CPT

## 2021-10-18 PROCEDURE — 36415 COLL VENOUS BLD VENIPUNCTURE: CPT

## 2021-10-18 PROCEDURE — 80053 COMPREHEN METABOLIC PANEL: CPT

## 2021-10-18 PROCEDURE — 85025 COMPLETE CBC W/AUTO DIFF WBC: CPT

## 2021-10-18 PROCEDURE — 84425 ASSAY OF VITAMIN B-1: CPT

## 2021-10-18 PROCEDURE — 84439 ASSAY OF FREE THYROXINE: CPT

## 2021-10-18 PROCEDURE — 82306 VITAMIN D 25 HYDROXY: CPT

## 2021-10-18 PROCEDURE — 80061 LIPID PANEL: CPT

## 2021-10-18 PROCEDURE — 84443 ASSAY THYROID STIM HORMONE: CPT

## 2021-10-18 PROCEDURE — 84207 ASSAY OF VITAMIN B-6: CPT

## 2021-10-20 ENCOUNTER — OFFICE VISIT (OUTPATIENT)
Dept: OCCUPATIONAL THERAPY | Facility: CLINIC | Age: 56
End: 2021-10-20
Payer: COMMERCIAL

## 2021-10-20 DIAGNOSIS — M62.81 LEFT-SIDED MUSCLE WEAKNESS: Primary | ICD-10-CM

## 2021-10-20 PROCEDURE — 97530 THERAPEUTIC ACTIVITIES: CPT | Performed by: OCCUPATIONAL THERAPIST

## 2021-10-20 PROCEDURE — 97110 THERAPEUTIC EXERCISES: CPT | Performed by: OCCUPATIONAL THERAPIST

## 2021-10-21 ENCOUNTER — APPOINTMENT (OUTPATIENT)
Dept: PHYSICAL THERAPY | Facility: CLINIC | Age: 56
End: 2021-10-21
Payer: COMMERCIAL

## 2021-10-22 ENCOUNTER — APPOINTMENT (OUTPATIENT)
Dept: PHYSICAL THERAPY | Facility: CLINIC | Age: 56
End: 2021-10-22
Payer: COMMERCIAL

## 2021-10-22 ENCOUNTER — APPOINTMENT (OUTPATIENT)
Dept: OCCUPATIONAL THERAPY | Facility: CLINIC | Age: 56
End: 2021-10-22
Payer: COMMERCIAL

## 2021-10-22 LAB — VIT B6 SERPL-MCNC: 11.3 UG/L (ref 2–32.8)

## 2021-10-25 ENCOUNTER — OFFICE VISIT (OUTPATIENT)
Dept: PHYSICAL THERAPY | Facility: CLINIC | Age: 56
End: 2021-10-25
Payer: COMMERCIAL

## 2021-10-25 DIAGNOSIS — R26.2 AMBULATORY DYSFUNCTION: Primary | ICD-10-CM

## 2021-10-25 PROCEDURE — 97116 GAIT TRAINING THERAPY: CPT

## 2021-10-25 PROCEDURE — 97110 THERAPEUTIC EXERCISES: CPT

## 2021-10-25 PROCEDURE — 97112 NEUROMUSCULAR REEDUCATION: CPT

## 2021-10-26 ENCOUNTER — TELEPHONE (OUTPATIENT)
Dept: PHYSICAL THERAPY | Facility: CLINIC | Age: 56
End: 2021-10-26

## 2021-10-26 ENCOUNTER — APPOINTMENT (OUTPATIENT)
Dept: PHYSICAL THERAPY | Facility: CLINIC | Age: 56
End: 2021-10-26
Payer: COMMERCIAL

## 2021-10-27 ENCOUNTER — OFFICE VISIT (OUTPATIENT)
Dept: OCCUPATIONAL THERAPY | Facility: CLINIC | Age: 56
End: 2021-10-27
Payer: COMMERCIAL

## 2021-10-27 DIAGNOSIS — M62.81 LEFT-SIDED MUSCLE WEAKNESS: Primary | ICD-10-CM

## 2021-10-27 LAB — VIT B1 BLD-SCNC: 140.3 NMOL/L (ref 66.5–200)

## 2021-10-27 PROCEDURE — 97530 THERAPEUTIC ACTIVITIES: CPT | Performed by: OCCUPATIONAL THERAPIST

## 2021-10-27 PROCEDURE — 97110 THERAPEUTIC EXERCISES: CPT | Performed by: OCCUPATIONAL THERAPIST

## 2021-10-29 ENCOUNTER — OFFICE VISIT (OUTPATIENT)
Dept: OCCUPATIONAL THERAPY | Facility: CLINIC | Age: 56
End: 2021-10-29
Payer: COMMERCIAL

## 2021-10-29 DIAGNOSIS — M62.81 LEFT-SIDED MUSCLE WEAKNESS: Primary | ICD-10-CM

## 2021-10-29 PROCEDURE — 97530 THERAPEUTIC ACTIVITIES: CPT | Performed by: OCCUPATIONAL THERAPIST

## 2021-10-29 PROCEDURE — 97110 THERAPEUTIC EXERCISES: CPT | Performed by: OCCUPATIONAL THERAPIST

## 2021-10-29 NOTE — PROGRESS NOTES
Patient ID: Monique Rodriguez is a 55 y o  female with left vestibular schwannoma c/b obstructive hydrocephalus, s/p resection, who is presenting to Neurology office for evaluation of her headaches, balance difficulty and spells of decreased responsiveness  Assessment/Plan:    Increased frequency of headaches  She has been having more frequent headaches over the last several weeks  Her headaches have not been going on very long, but have some features of possible low pressure headache with the headache worsening when standing up  Because the headache has been going on for less than 1 marcos, we discussed that it may not be necessary to start a daily preventative medication at this point  If her headaches would worsen or become more problematic, then we could consider starting a daily preventative medication  -- I will have her get an MRI of her brain with and without contrast to look for cause of her headaches or evidence of low pressure headache  Fall with possible seizure-like activity ([facility] [address] )  She did have difficulty with falling and gait imbalance due to her vestibular schwannoma  This has significantly improved with physical therapy  She will Return in about 3 months (around 2/1/2022)  Subjective:    HPI  Current medications as per Epic    Briefly reviewing her history, she was having a lot of headaches for the last 9 years, but in February, started to have more trouble dizziness and begin off balance  She was having trouble with falls  She had a fall in August where she went to the hospital, but had to go back to the hospital because of more falls and a larger episode  She remembers having gotten up, felt very dizzy, off balance, like her legs were numb and fell forward  She remembers trying to get her hand moving  She remembers her  talking, her dog laying on her and the ambulance coming  She wasn't fully unconscious   Her  saw her with her hands clenched and was moving her hands  She was trying to talk to them, but couldn't  She denies any full loss of consciousness     Following this episode, she was admitted to the hospital and had near total resection of her vestibular schwannoma  She tolerated the surgery well  Following the surgery, she felt like she was seeing everything upside down  She had some post-operative head pain, but denies any "headaches" after  She still feels some discomfort from the surgery  She has been working with physical therapy and feels her balance has been getting better  Over the last 2-3 weeks, she has been having "regular headaches"  This is mainly over the back of her head, left temporal area, and bilateral temporal areas  She will often take tylenol right away, and it will make the "wave feeling in her eyes" go away, but often will not help with the headaches  She feels like the headache gets worse if she stands up  She feels an ear ache and some decreased feeling in her left ear with some pressure  She has not had any additional episodes of falling  She has constant numbness in the left side ever since her surgery  Prior Evaluation:  - MRI brain: 8/19/2021: Patient is status post removal of the vast majority of the previously identified left CP angle tumor  There is some residual enhancing tumor remaining in the anterior aspect of the CP angle with improving mass effect upon the brainstem and brachium   Pontis  There is no restricted diffusion identified within the left posterior lateral fabian and brachium pontis consistent with acute ischemia  Persistent mild hydrocephalus involving the lateral ventricles and 3rd ventricle  Increased FLAIR signal adjacent to the surface of the lateral ventricles may represent transependymal flow of CSF, unchanged  4th ventricle is not enlarged and there is   improving mass effect upon the 4th ventricle    -MRI Brain: 8/5/2021: Large left CP angle mass extending into the internal auditory canal, most consistent with acoustic neuroma  Moderate mass effect upon the posterior fossa structures on the left without edema  Interval development of hydrocephalus involving the lateral ventricles and 3rd ventricle which may be a result of the above described mass and its mass effect upon the 4th ventricle or disruption of CSF flow within the posterior fossa  Mild increased signal on FLAIR imaging adjacent to the lateral ventricles may represent minor transependymal flow of CSF  - Routine EE2021: normal  - Ambulatory EEG: none  - Video EEG: none  - PET scan brain : none  - Neuropsychologic testing: none    Her history was also obtained from her , who was present at today's visit  I personally reviewed her MRI brains from 2021, 2021, and from   I reviewed prior notes including hospitalization notes, as documented in Epic/[employer] acquired by [employer], and summarized above  The following portions of the patient's history were reviewed and updated as appropriate: allergies, current medications, past family history, past medical history, past social history, past surgical history and problem list      Objective:    Blood pressure 141/82, pulse (!) 114, height 5' 5 5" (1 664 m), weight 72 1 kg (159 lb), not currently breastfeeding  Physical Exam    Neurological Exam  GENERAL EXAMINATION:   In general patient is well appearing and in no distress  There is no peripheral edema  NEUROLOGIC EXAMINATION:         Alert and oriented to person, date, location  Fund of knowledge is full with good understanding of medical situation  Recent and remote memory were intact    Mood and affect are appropriate  Attention is intact  Language function including fluency, naming, and comprehension intact  Cranial nerves: Pupils are equal round reactive to light and accommodation  Visual Fields are full to confrontation bilaterally   Optic discs are sharp with no evidence of papilledema and with present spontaneous venous pulsations  Extraocular movements are intact with end-gaze nystagmus when looking to the left and right  Facial sensation is intact to light touch  No facial droop, face activates symmetrically  There is no dysarthria  Hearing was decreased to finger rub on the left  Tongue and uvula are midline and palate elevates symmetrically  Shoulder shrug  5/5  Motor Exam:  No pronator drift  Bulk and tone are normal  Strength is 5/5 throughout  Deep tendon reflexes: Biceps 2+, brachioradialis 2+, patellar 2+, Achilles 2+ bilaterally  Sensation: Intact light touch    Coordination: Finger nose finger and heel to shin testing are without dysmetria  Gait: Negative romberg  Wide based gait  ROS:    Review of Systems   Constitutional: Negative  Negative for appetite change and fever  HENT: Positive for ear pain (left ear)  Negative for hearing loss, tinnitus, trouble swallowing and voice change  Ringing of the ears   Eyes: Positive for visual disturbance (left eye, blurred and burning)  Negative for photophobia and pain  Respiratory: Negative  Negative for shortness of breath  Cardiovascular: Negative  Negative for palpitations  Gastrointestinal: Negative  Negative for nausea and vomiting  Endocrine: Negative  Negative for cold intolerance  Genitourinary: Negative  Negative for dysuria, frequency and urgency  Musculoskeletal: Negative  Negative for myalgias and neck pain  Skin: Negative  Negative for rash  Neurological: Positive for dizziness, speech difficulty, numbness (face) and headaches  Negative for tremors, seizures, syncope, facial asymmetry, weakness and light-headedness  Hematological: Negative  Does not bruise/bleed easily  Psychiatric/Behavioral: Negative  Negative for confusion, hallucinations and sleep disturbance  All other systems reviewed and are negative          I personally reviewed the ROS that was entered by the medical assistant

## 2021-11-01 ENCOUNTER — OFFICE VISIT (OUTPATIENT)
Dept: NEUROLOGY | Facility: CLINIC | Age: 56
End: 2021-11-01
Payer: COMMERCIAL

## 2021-11-01 VITALS
DIASTOLIC BLOOD PRESSURE: 82 MMHG | SYSTOLIC BLOOD PRESSURE: 141 MMHG | WEIGHT: 159 LBS | HEIGHT: 66 IN | BODY MASS INDEX: 25.55 KG/M2 | HEART RATE: 114 BPM

## 2021-11-01 DIAGNOSIS — D33.3 CEREBELLOPONTINE ANGLE TUMOR (HCC): ICD-10-CM

## 2021-11-01 DIAGNOSIS — R56.9 SEIZURE-LIKE ACTIVITY (HCC): Primary | ICD-10-CM

## 2021-11-01 DIAGNOSIS — R51.9 INCREASED FREQUENCY OF HEADACHES: ICD-10-CM

## 2021-11-01 PROCEDURE — 99214 OFFICE O/P EST MOD 30 MIN: CPT | Performed by: PSYCHIATRY & NEUROLOGY

## 2021-11-01 RX ORDER — TRAZODONE HYDROCHLORIDE 50 MG/1
50 TABLET ORAL AS NEEDED
COMMUNITY

## 2021-11-01 NOTE — PATIENT INSTRUCTIONS
-- Please get the MRI brain on 11/11/2021 as planned  -- if your headaches worsen or become more problematic, please give us a call

## 2021-11-03 ENCOUNTER — OFFICE VISIT (OUTPATIENT)
Dept: OCCUPATIONAL THERAPY | Facility: CLINIC | Age: 56
End: 2021-11-03
Payer: COMMERCIAL

## 2021-11-03 DIAGNOSIS — M62.81 LEFT-SIDED MUSCLE WEAKNESS: Primary | ICD-10-CM

## 2021-11-03 PROCEDURE — 97110 THERAPEUTIC EXERCISES: CPT | Performed by: OCCUPATIONAL THERAPIST

## 2021-11-03 PROCEDURE — 97530 THERAPEUTIC ACTIVITIES: CPT | Performed by: OCCUPATIONAL THERAPIST

## 2021-11-04 ENCOUNTER — OFFICE VISIT (OUTPATIENT)
Dept: PHYSICAL THERAPY | Facility: CLINIC | Age: 56
End: 2021-11-04
Payer: COMMERCIAL

## 2021-11-04 DIAGNOSIS — R26.2 AMBULATORY DYSFUNCTION: Primary | ICD-10-CM

## 2021-11-04 PROCEDURE — 97110 THERAPEUTIC EXERCISES: CPT

## 2021-11-04 PROCEDURE — 97112 NEUROMUSCULAR REEDUCATION: CPT

## 2021-11-05 ENCOUNTER — OFFICE VISIT (OUTPATIENT)
Dept: PHYSICAL THERAPY | Facility: CLINIC | Age: 56
End: 2021-11-05
Payer: COMMERCIAL

## 2021-11-05 ENCOUNTER — OFFICE VISIT (OUTPATIENT)
Dept: OCCUPATIONAL THERAPY | Facility: CLINIC | Age: 56
End: 2021-11-05
Payer: COMMERCIAL

## 2021-11-05 DIAGNOSIS — M62.81 LEFT-SIDED MUSCLE WEAKNESS: Primary | ICD-10-CM

## 2021-11-05 DIAGNOSIS — R26.2 AMBULATORY DYSFUNCTION: Primary | ICD-10-CM

## 2021-11-05 PROCEDURE — 97112 NEUROMUSCULAR REEDUCATION: CPT

## 2021-11-05 PROCEDURE — 97110 THERAPEUTIC EXERCISES: CPT

## 2021-11-05 PROCEDURE — 97116 GAIT TRAINING THERAPY: CPT

## 2021-11-05 PROCEDURE — 97530 THERAPEUTIC ACTIVITIES: CPT | Performed by: OCCUPATIONAL THERAPIST

## 2021-11-05 PROCEDURE — 97110 THERAPEUTIC EXERCISES: CPT | Performed by: OCCUPATIONAL THERAPIST

## 2021-11-10 ENCOUNTER — OFFICE VISIT (OUTPATIENT)
Dept: OCCUPATIONAL THERAPY | Facility: CLINIC | Age: 56
End: 2021-11-10
Payer: COMMERCIAL

## 2021-11-10 DIAGNOSIS — M62.81 LEFT-SIDED MUSCLE WEAKNESS: Primary | ICD-10-CM

## 2021-11-10 PROCEDURE — 97110 THERAPEUTIC EXERCISES: CPT | Performed by: OCCUPATIONAL THERAPIST

## 2021-11-10 PROCEDURE — 97530 THERAPEUTIC ACTIVITIES: CPT | Performed by: OCCUPATIONAL THERAPIST

## 2021-11-11 ENCOUNTER — HOSPITAL ENCOUNTER (OUTPATIENT)
Dept: MRI IMAGING | Facility: HOSPITAL | Age: 56
Discharge: HOME | End: 2021-11-11
Payer: COMMERCIAL

## 2021-11-11 ENCOUNTER — OFFICE VISIT (OUTPATIENT)
Dept: PHYSICAL THERAPY | Facility: CLINIC | Age: 56
End: 2021-11-11
Payer: COMMERCIAL

## 2021-11-11 DIAGNOSIS — R41.3 AMNESIA: ICD-10-CM

## 2021-11-11 DIAGNOSIS — R27.0 ATAXIA: ICD-10-CM

## 2021-11-11 DIAGNOSIS — R51.9 SHARP HEADACHE: ICD-10-CM

## 2021-11-11 DIAGNOSIS — R26.2 AMBULATORY DYSFUNCTION: Primary | ICD-10-CM

## 2021-11-11 PROCEDURE — 97116 GAIT TRAINING THERAPY: CPT

## 2021-11-11 PROCEDURE — G1004 CDSM NDSC: HCPCS

## 2021-11-11 PROCEDURE — 97112 NEUROMUSCULAR REEDUCATION: CPT

## 2021-11-11 PROCEDURE — A9585 GADOBUTROL INJECTION: HCPCS | Performed by: FAMILY MEDICINE

## 2021-11-11 PROCEDURE — 97110 THERAPEUTIC EXERCISES: CPT

## 2021-11-11 PROCEDURE — 70553 MRI BRAIN STEM W/O & W/DYE: CPT

## 2021-11-11 RX ADMIN — GADOBUTROL 7 ML: 604.72 INJECTION INTRAVENOUS at 20:33

## 2021-11-12 ENCOUNTER — OFFICE VISIT (OUTPATIENT)
Dept: PHYSICAL THERAPY | Facility: CLINIC | Age: 56
End: 2021-11-12
Payer: COMMERCIAL

## 2021-11-12 ENCOUNTER — OFFICE VISIT (OUTPATIENT)
Dept: OCCUPATIONAL THERAPY | Facility: CLINIC | Age: 56
End: 2021-11-12
Payer: COMMERCIAL

## 2021-11-12 DIAGNOSIS — R26.2 AMBULATORY DYSFUNCTION: Primary | ICD-10-CM

## 2021-11-12 DIAGNOSIS — M62.81 LEFT-SIDED MUSCLE WEAKNESS: Primary | ICD-10-CM

## 2021-11-12 PROCEDURE — 97112 NEUROMUSCULAR REEDUCATION: CPT

## 2021-11-12 PROCEDURE — 97110 THERAPEUTIC EXERCISES: CPT

## 2021-11-12 PROCEDURE — 97530 THERAPEUTIC ACTIVITIES: CPT | Performed by: OCCUPATIONAL THERAPIST

## 2021-11-12 PROCEDURE — 97110 THERAPEUTIC EXERCISES: CPT | Performed by: OCCUPATIONAL THERAPIST

## 2021-11-15 ENCOUNTER — TELEPHONE (OUTPATIENT)
Dept: NEUROLOGY | Facility: CLINIC | Age: 56
End: 2021-11-15

## 2021-11-15 ENCOUNTER — TELEPHONE (OUTPATIENT)
Dept: NEUROSURGERY | Facility: CLINIC | Age: 56
End: 2021-11-15

## 2021-11-17 ENCOUNTER — OFFICE VISIT (OUTPATIENT)
Dept: OCCUPATIONAL THERAPY | Facility: CLINIC | Age: 56
End: 2021-11-17
Payer: COMMERCIAL

## 2021-11-17 DIAGNOSIS — M62.81 LEFT-SIDED MUSCLE WEAKNESS: Primary | ICD-10-CM

## 2021-11-17 PROCEDURE — 97110 THERAPEUTIC EXERCISES: CPT | Performed by: OCCUPATIONAL THERAPIST

## 2021-11-17 PROCEDURE — 97530 THERAPEUTIC ACTIVITIES: CPT | Performed by: OCCUPATIONAL THERAPIST

## 2021-11-18 ENCOUNTER — OFFICE VISIT (OUTPATIENT)
Dept: PHYSICAL THERAPY | Facility: CLINIC | Age: 56
End: 2021-11-18
Payer: COMMERCIAL

## 2021-11-18 DIAGNOSIS — R26.2 AMBULATORY DYSFUNCTION: Primary | ICD-10-CM

## 2021-11-18 PROCEDURE — 97112 NEUROMUSCULAR REEDUCATION: CPT

## 2021-11-18 PROCEDURE — 97110 THERAPEUTIC EXERCISES: CPT

## 2021-11-19 ENCOUNTER — APPOINTMENT (OUTPATIENT)
Dept: OCCUPATIONAL THERAPY | Facility: CLINIC | Age: 56
End: 2021-11-19
Payer: COMMERCIAL

## 2021-11-19 ENCOUNTER — EVALUATION (OUTPATIENT)
Dept: PHYSICAL THERAPY | Facility: CLINIC | Age: 56
End: 2021-11-19
Payer: COMMERCIAL

## 2021-11-19 DIAGNOSIS — R26.2 AMBULATORY DYSFUNCTION: Primary | ICD-10-CM

## 2021-11-19 PROCEDURE — 97112 NEUROMUSCULAR REEDUCATION: CPT

## 2021-11-24 ENCOUNTER — APPOINTMENT (OUTPATIENT)
Dept: PHYSICAL THERAPY | Facility: CLINIC | Age: 56
End: 2021-11-24
Payer: COMMERCIAL

## 2021-11-24 ENCOUNTER — APPOINTMENT (OUTPATIENT)
Dept: OCCUPATIONAL THERAPY | Facility: CLINIC | Age: 56
End: 2021-11-24
Payer: COMMERCIAL

## 2021-12-01 ENCOUNTER — APPOINTMENT (OUTPATIENT)
Dept: OCCUPATIONAL THERAPY | Facility: CLINIC | Age: 56
End: 2021-12-01
Payer: COMMERCIAL

## 2021-12-02 ENCOUNTER — OFFICE VISIT (OUTPATIENT)
Dept: PHYSICAL THERAPY | Facility: CLINIC | Age: 56
End: 2021-12-02
Payer: COMMERCIAL

## 2021-12-02 DIAGNOSIS — R26.2 AMBULATORY DYSFUNCTION: Primary | ICD-10-CM

## 2021-12-02 PROCEDURE — 97112 NEUROMUSCULAR REEDUCATION: CPT

## 2021-12-03 ENCOUNTER — OFFICE VISIT (OUTPATIENT)
Dept: PHYSICAL THERAPY | Facility: CLINIC | Age: 56
End: 2021-12-03
Payer: COMMERCIAL

## 2021-12-03 DIAGNOSIS — R26.2 AMBULATORY DYSFUNCTION: Primary | ICD-10-CM

## 2021-12-03 PROCEDURE — 97116 GAIT TRAINING THERAPY: CPT

## 2021-12-03 PROCEDURE — 97112 NEUROMUSCULAR REEDUCATION: CPT

## 2021-12-08 ENCOUNTER — OFFICE VISIT (OUTPATIENT)
Dept: OCCUPATIONAL THERAPY | Facility: CLINIC | Age: 56
End: 2021-12-08
Payer: COMMERCIAL

## 2021-12-08 DIAGNOSIS — M62.81 LEFT-SIDED MUSCLE WEAKNESS: Primary | ICD-10-CM

## 2021-12-08 PROCEDURE — 97530 THERAPEUTIC ACTIVITIES: CPT | Performed by: OCCUPATIONAL THERAPIST

## 2021-12-08 PROCEDURE — 97110 THERAPEUTIC EXERCISES: CPT | Performed by: OCCUPATIONAL THERAPIST

## 2021-12-09 ENCOUNTER — OFFICE VISIT (OUTPATIENT)
Dept: PHYSICAL THERAPY | Facility: CLINIC | Age: 56
End: 2021-12-09
Payer: COMMERCIAL

## 2021-12-09 DIAGNOSIS — R26.2 AMBULATORY DYSFUNCTION: Primary | ICD-10-CM

## 2021-12-09 PROCEDURE — 97112 NEUROMUSCULAR REEDUCATION: CPT

## 2021-12-10 ENCOUNTER — OFFICE VISIT (OUTPATIENT)
Dept: PHYSICAL THERAPY | Facility: CLINIC | Age: 56
End: 2021-12-10
Payer: COMMERCIAL

## 2021-12-10 DIAGNOSIS — R26.2 AMBULATORY DYSFUNCTION: Primary | ICD-10-CM

## 2021-12-10 PROCEDURE — 97112 NEUROMUSCULAR REEDUCATION: CPT

## 2021-12-10 PROCEDURE — 97110 THERAPEUTIC EXERCISES: CPT

## 2021-12-15 ENCOUNTER — OFFICE VISIT (OUTPATIENT)
Dept: OCCUPATIONAL THERAPY | Facility: CLINIC | Age: 56
End: 2021-12-15
Payer: COMMERCIAL

## 2021-12-15 DIAGNOSIS — M62.81 LEFT-SIDED MUSCLE WEAKNESS: Primary | ICD-10-CM

## 2021-12-15 PROCEDURE — 97530 THERAPEUTIC ACTIVITIES: CPT | Performed by: OCCUPATIONAL THERAPIST

## 2021-12-15 PROCEDURE — 97110 THERAPEUTIC EXERCISES: CPT | Performed by: OCCUPATIONAL THERAPIST

## 2021-12-16 ENCOUNTER — OFFICE VISIT (OUTPATIENT)
Dept: PHYSICAL THERAPY | Facility: CLINIC | Age: 56
End: 2021-12-16
Payer: COMMERCIAL

## 2021-12-16 DIAGNOSIS — R26.2 AMBULATORY DYSFUNCTION: Primary | ICD-10-CM

## 2021-12-16 PROCEDURE — 97112 NEUROMUSCULAR REEDUCATION: CPT

## 2021-12-16 PROCEDURE — 97110 THERAPEUTIC EXERCISES: CPT

## 2021-12-17 ENCOUNTER — OFFICE VISIT (OUTPATIENT)
Dept: PHYSICAL THERAPY | Facility: CLINIC | Age: 56
End: 2021-12-17
Payer: COMMERCIAL

## 2021-12-17 DIAGNOSIS — R26.2 AMBULATORY DYSFUNCTION: Primary | ICD-10-CM

## 2021-12-17 PROCEDURE — 97110 THERAPEUTIC EXERCISES: CPT

## 2021-12-17 PROCEDURE — 97112 NEUROMUSCULAR REEDUCATION: CPT

## 2021-12-23 ENCOUNTER — APPOINTMENT (OUTPATIENT)
Dept: PHYSICAL THERAPY | Facility: CLINIC | Age: 56
End: 2021-12-23
Payer: COMMERCIAL

## 2021-12-29 ENCOUNTER — OFFICE VISIT (OUTPATIENT)
Dept: OCCUPATIONAL THERAPY | Facility: CLINIC | Age: 56
End: 2021-12-29
Payer: COMMERCIAL

## 2021-12-29 DIAGNOSIS — M62.81 LEFT-SIDED MUSCLE WEAKNESS: Primary | ICD-10-CM

## 2021-12-29 PROCEDURE — 97530 THERAPEUTIC ACTIVITIES: CPT | Performed by: OCCUPATIONAL THERAPIST

## 2021-12-29 PROCEDURE — 97110 THERAPEUTIC EXERCISES: CPT | Performed by: OCCUPATIONAL THERAPIST

## 2021-12-30 ENCOUNTER — OFFICE VISIT (OUTPATIENT)
Dept: PHYSICAL THERAPY | Facility: CLINIC | Age: 56
End: 2021-12-30
Payer: COMMERCIAL

## 2021-12-30 DIAGNOSIS — R26.2 AMBULATORY DYSFUNCTION: Primary | ICD-10-CM

## 2021-12-30 PROCEDURE — 97112 NEUROMUSCULAR REEDUCATION: CPT

## 2022-02-21 ENCOUNTER — HOSPITAL ENCOUNTER (OUTPATIENT)
Dept: MRI IMAGING | Facility: HOSPITAL | Age: 57
Discharge: HOME | End: 2022-02-21
Attending: NEUROLOGICAL SURGERY
Payer: COMMERCIAL

## 2022-02-21 DIAGNOSIS — Z48.89 AFTERCARE FOLLOWING SURGERY: ICD-10-CM

## 2022-02-21 DIAGNOSIS — D33.3 VESTIBULAR SCHWANNOMA (HCC): ICD-10-CM

## 2022-02-21 PROCEDURE — A9585 GADOBUTROL INJECTION: HCPCS | Performed by: NEUROLOGICAL SURGERY

## 2022-02-21 PROCEDURE — G1004 CDSM NDSC: HCPCS

## 2022-02-21 PROCEDURE — 70553 MRI BRAIN STEM W/O & W/DYE: CPT

## 2022-02-21 RX ADMIN — GADOBUTROL 7 ML: 604.72 INJECTION INTRAVENOUS at 11:08

## 2022-02-24 ENCOUNTER — OFFICE VISIT (OUTPATIENT)
Dept: NEUROLOGY | Facility: CLINIC | Age: 57
End: 2022-02-24
Payer: COMMERCIAL

## 2022-02-24 VITALS
HEART RATE: 112 BPM | DIASTOLIC BLOOD PRESSURE: 97 MMHG | BODY MASS INDEX: 26.68 KG/M2 | HEIGHT: 66 IN | WEIGHT: 166 LBS | SYSTOLIC BLOOD PRESSURE: 149 MMHG

## 2022-02-24 DIAGNOSIS — G47.9 SLEEP DIFFICULTIES: ICD-10-CM

## 2022-02-24 DIAGNOSIS — R56.9 SEIZURE-LIKE ACTIVITY (HCC): ICD-10-CM

## 2022-02-24 DIAGNOSIS — I10 PRIMARY HYPERTENSION: ICD-10-CM

## 2022-02-24 DIAGNOSIS — R51.9 HEADACHE: Primary | ICD-10-CM

## 2022-02-24 PROCEDURE — 99214 OFFICE O/P EST MOD 30 MIN: CPT | Performed by: PSYCHIATRY & NEUROLOGY

## 2022-02-24 RX ORDER — GABAPENTIN 100 MG/1
CAPSULE ORAL
Qty: 90 CAPSULE | Refills: 5 | Status: SHIPPED | OUTPATIENT
Start: 2022-02-24 | End: 2022-06-16 | Stop reason: SDUPTHER

## 2022-02-24 NOTE — PROGRESS NOTES
Patient ID: Tiffani Solares is a 55 y o  female with a left vestibular schwannoma c/b obstructive hydrocephalus, s/p resection,  who is returning to Neurology office for follow up of her headaches, balance difficulty and spells of decreased responsiveness  Assessment/Plan:  In summary, Tiffani Solares is a 55 y o   female with a history of  left vestibular schwannoma c/b obstructive hydrocephalus, s/p resection,  who is returning to Neurology office for follow up of her headaches, balance difficulty and spells of decreased responsiveness  The patients facial shocks are in the L V2 distribution and are likely due to trigeminal irritation from the surgery  The other symptoms she is reporting are not unsurprising and also are surgical related  Plan  - Will start the patient on gabapentin, 100mg initially with instructions to titrate up to 300mg  - Discussed that the patient should talk  to their primary care doctor about Cymbalta instead of lexapro which would help with her anxiety but also with the headaches/electric shocks  - Had a discussion with the patient about sleep hygrine and gave her information on her AVS about sleep hygrine  - Instructed the patient that she needs to take her HTN medication       She will Return in about 3 months (around 5/24/2022)  Subjective:  Current seizure medications: None  Other medications as per Epic  Since her last visit, she reports over all she is doing well  She denies having any seizure like activity since the surgery  Still has pressure feeling in head, constant, left sided by the temple  Nothing improves it or makes it worse  Will get a brief (1-3 seconds) electric like shock in her left jaw, happens most often while she is laying down, occurs 1-2x a day  Varies with the number of shocks she will get at a time from 1-3  Will wake her up from sleep  Still odalys/tears out of R eye only  Metal taste in mouth  Numb face on the left  Ringing in left ear   She has issues with controlling her bladder at night, she has leakage  The pt had an MRI of her brain completed earlier this week which did not show any new growth of her left vestibular schwannoma, and had expected post operative changes  Briefly reviewing her history, she was having a lot of headaches for the last 9 years, but in February, started to have more trouble dizziness and begin off balance  She was having trouble with falls  She had a fall in August where she went to the hospital, but had to go back to the hospital because of more falls and a larger episode  She remembers having gotten up, felt very dizzy, off balance, like her legs were numb and fell forward  She remembers trying to get her hand moving  She remembers her  talking, her dog laying on her and the ambulance coming  She wasn't fully unconscious  Her  saw her with her hands clenched and was moving her hands  She was trying to talk to them, but couldn't  She denies any full loss of consciousness      Following this episode, she was admitted to the hospital and had near total resection of her vestibular schwannoma  She tolerated the surgery well  Following the surgery, she felt like she was seeing everything upside down  She had some post-operative head pain, but denies any "headaches" after  She still feels some discomfort from the surgery  She has been working with physical therapy and feels her balance has been getting better  Prior Evaluation:  - MRI brain: 8/19/2021: Patient is status post removal of the vast majority of the previously identified left CP angle tumor  Nikki Marine is some residual enhancing tumor remaining in the anterior aspect of the CP angle with improving mass effect upon the brainstem and brachium   Pontis  There is no restricted diffusion identified within the left posterior lateral fabian and brachium pontis consistent with acute ischemia   Persistent mild hydrocephalus involving the lateral ventricles and 3rd ventricle   Increased FLAIR signal adjacent to the surface of the lateral ventricles may represent transependymal flow of CSF, unchanged   4th ventricle is not enlarged and there is   improving mass effect upon the 4th ventricle  -MRI Brain: 2021: Large left CP angle mass extending into the internal auditory canal, most consistent with acoustic neuroma   Moderate mass effect upon the posterior fossa structures on the left without edema  Interval development of hydrocephalus involving the lateral ventricles and 3rd ventricle which may be a result of the above described mass and its mass effect upon the 4th ventricle or disruption of CSF flow within the posterior fossa   Mild increased signal on FLAIR imaging adjacent to the lateral ventricles may represent minor transependymal flow of CSF  - Routine EE2021: normal  - Ambulatory EEG: none  - Video EEG: none  - PET scan brain : none  - Neuropsychologic testing: none    Her history was also obtained from her , who was present at today's visit  I personally reviewed her MRI from 2022  I reviewed the MRI and medical record, as documented in Epic/[employer], and summarized above  Objective:    Blood pressure 149/97, pulse (!) 112, height 5' 5 5" (1 664 m), weight 75 3 kg (166 lb), not currently breastfeeding  Physical Exam  Vitals and nursing note reviewed  Constitutional:       General: She is not in acute distress  Appearance: She is not ill-appearing, toxic-appearing or diaphoretic  HENT:      Head: Normocephalic and atraumatic  Nose: Nose normal       Mouth/Throat:      Mouth: Mucous membranes are moist       Pharynx: Oropharynx is clear  No oropharyngeal exudate  Eyes:      General: Lids are normal  No scleral icterus  Right eye: No discharge  Left eye: No discharge  Extraocular Movements: Extraocular movements intact        Conjunctiva/sclera: Conjunctivae normal       Pupils: Pupils are equal, round, and reactive to light  Cardiovascular:      Rate and Rhythm: Normal rate  Pulses: Normal pulses  Pulmonary:      Effort: Pulmonary effort is normal    Abdominal:      General: Abdomen is flat  Musculoskeletal:         General: No swelling or deformity  Cervical back: Normal range of motion  Skin:     General: Skin is warm and dry  Neurological:      Mental Status: She is alert  Psychiatric:         Mood and Affect: Mood normal          Speech: Speech normal          Behavior: Behavior normal          Neurological Exam  Mental Status  Awake, alert and oriented to person, place and time  Alert  Recent and remote memory are intact  Speech is normal  Language is fluent with no aphasia  Attention and concentration are normal     Cranial Nerves  CN II: Visual acuity is normal  Visual fields full to confrontation  CN III, IV, VI: Extraocular movements intact bilaterally  Normal lids and orbits bilaterally  Pupils equal round and reactive to light bilaterally  CN V:  Left: Diminished sensation of the entire left side of the face  Decreased V2 distribution  CN VII: Full and symmetric facial movement  CN VIII: Hearing is normal   CN IX, X: Palate elevates symmetrically  CN XI: Shoulder shrug strength is normal   CN XII: Tongue midline without atrophy or fasciculations  Motor  Normal muscle bulk throughout  Normal muscle tone  No abnormal involuntary movements  Strength is 5/5 in all four extremities except as noted  Sensory  Light touch is normal in upper and lower extremities  Temperature is normal in upper and lower extremities  Reflexes  Deep tendon reflexes are 2+ and symmetric except as noted  Coordination  Right: Finger-to-nose normal   Left: Finger-to-nose normal     Gait  Casual gait: Antalgic gait  ROS:    Review of Systems   Constitutional: Negative  Negative for appetite change and fever  HENT: Negative    Negative for hearing loss, tinnitus, trouble swallowing and voice change  Eyes: Negative  Negative for photophobia and pain  Respiratory: Negative  Negative for shortness of breath  Cardiovascular: Negative  Negative for palpitations  Gastrointestinal: Negative  Negative for nausea and vomiting  Endocrine: Negative  Negative for cold intolerance  Genitourinary: Negative  Negative for dysuria, frequency and urgency  Musculoskeletal: Negative  Negative for myalgias and neck pain  Skin: Negative  Negative for rash  Neurological: Negative  Negative for dizziness, tremors, seizures, syncope, facial asymmetry, speech difficulty, weakness, light-headedness, numbness and headaches  Hematological: Negative  Does not bruise/bleed easily  Psychiatric/Behavioral: Negative  Negative for confusion, hallucinations and sleep disturbance  All other systems reviewed and are negative        I personally reviewed the ROS that was entered by the medical assistant

## 2022-02-24 NOTE — ASSESSMENT & PLAN NOTE
She has been having more frequent headaches over the last several weeks  Her headaches have not been going on very long, but have some features of possible low pressure headache with the headache worsening when standing up  Because the headache has been going on for less than 1 marcos, we discussed that it may not be necessary to start a daily preventative medication at this point  If her headaches would worsen or become more problematic, then we could consider starting a daily preventative medication  -- I will have her get an MRI of her brain with and without contrast to look for cause of her headaches or evidence of low pressure headache

## 2022-02-24 NOTE — ASSESSMENT & PLAN NOTE
She did have difficulty with falling and gait imbalance due to her vestibular schwannoma  This has significantly improved with physical therapy

## 2022-02-24 NOTE — PATIENT INSTRUCTIONS
Start Gabapentin - Take 1 pill (100mg) a day at night x 1 week; then take 2 pills a day at night (200mg) x 1 week;  then take 3 pills (300mg) a day at night  You can stop increasing the medication at 100mg or 200mg if these have provided you relief from the electric shock sensation  You can consider talking to your primary care doctor about Cymbalta (duloxetine) instead of lexapro which would help with your anxiety but also with the headaches/electric shocks    Please start taking your blood pressure medication (losartan)    Sleep Recommendations    1  Wake up at the same time every morning (7 a m  or 8 a m  is best) and do something active in the light  2  No sleep when the sun is up  3  No caffeine past noon, and no fluids after dinner  4  Allow yourself at least an 8 hour window for sleep  5  No TV, phones, or computers in the bedroom  (nothing with a screen)  6  Go to bed at the same time every night and do the same things before you go to bed  7  If you don't fall asleep within 15-30 minutes then get up and do something for 5 minutes then go back to bed  Please call with any questions or concerns   Office number is [phone]

## 2022-03-03 ENCOUNTER — OFFICE VISIT (OUTPATIENT)
Dept: NEUROSURGERY | Facility: CLINIC | Age: 57
End: 2022-03-03
Payer: COMMERCIAL

## 2022-03-03 VITALS
DIASTOLIC BLOOD PRESSURE: 85 MMHG | HEIGHT: 65 IN | BODY MASS INDEX: 27.66 KG/M2 | TEMPERATURE: 98.1 F | RESPIRATION RATE: 16 BRPM | SYSTOLIC BLOOD PRESSURE: 134 MMHG | HEART RATE: 81 BPM | WEIGHT: 166 LBS

## 2022-03-03 DIAGNOSIS — R40.0 SOMNOLENCE, DAYTIME: ICD-10-CM

## 2022-03-03 DIAGNOSIS — R26.89 BALANCE DISORDER: ICD-10-CM

## 2022-03-03 DIAGNOSIS — F41.9 ANXIETY: ICD-10-CM

## 2022-03-03 DIAGNOSIS — D33.3 CEREBELLOPONTINE ANGLE TUMOR (HCC): Primary | ICD-10-CM

## 2022-03-03 DIAGNOSIS — R20.0 LEFT FACIAL NUMBNESS: ICD-10-CM

## 2022-03-03 PROCEDURE — 99213 OFFICE O/P EST LOW 20 MIN: CPT | Performed by: NEUROLOGICAL SURGERY

## 2022-03-03 NOTE — PROGRESS NOTES
DISCUSSION SUMMARY  This is a 55 y o  female who is 6 months out from a retromastoid craniotomy for debulking of a cerebellopontine angle mass  This was demonstrated to be a vestibular schwannoma on pathology  She continues to improve  She does report being overly tired and so I did a brief endocrine screen I recommended that she have a follow-up study in 6 months time so that we can follow this along  If it does appear to be increasing in size then gamma knife for the residual would be indicated  Return in about 6 months (around 9/3/2022) for follow-up after test         Diagnosis ICD-10-CM Associated Orders   1  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HCC)  D33 3 MRI brain IAC wo and w contrast   2  Anxiety  F41 9    3  Balance disorder  R26 89    4  Left facial numbness  R20 0    5  Somnolence, daytime  R40 0 TSH, 3rd generation     T4, free     Cortisol Level, AM Specimen     Basic metabolic panel          Chief Complaint   Patient presents with    Follow-up     6 Months F/u for Vestibular schwannoma, S/p (DKO) Image guided left retromastoid craniotomy for debulking of CP angle mass [8/18/2021]; MRI Brain 2/21/22       HPI this 55-year-old female who is 6 months out from a retromastoid craniotomy for debulking of a vestibular schwannoma  She says that she has not regained her taste and she has decreased tearing of her left eye  This is old and has not changed  She reports that she has no pain in her left eye and that the eye does not appear to be injected  She does not cough at meals  She has not fallen  She does feel that her gait is much improved than had been previously  She does report some decreased concentration and anxiety related symptomatology  She does not believe that she has dealt with the reality of having undergone the surgery that she did and the fact that she had a tumor in her brain    She does continue to report numbness of her face and has been started on gabapentin which does appear to be helping with this and associated pain in the same distribution  She feels that she has decreased concentration and difficulty with sleeping and decreased ability to stay awake alert for substantial periods of time  She has not had a recent endocrine screen  She does report some nocturnal diuresis  She also has increased fear of height  Review of Systems   Constitutional: Positive for appetite change (Eats once a day; reports having no taste) and fatigue  HENT: Positive for ear pain (Intermittent stabbing pain in left ear; managed with medication), hearing loss (left ear) and tinnitus (constant left ear ringing)  Eyes: Positive for visual disturbance  Patient reports decrease in depth perception and inability to maintain eye focus with abrupt head movements  Respiratory: Negative  Cardiovascular: Positive for palpitations (occurs at night)  Gastrointestinal: Negative  Negative for nausea and vomiting  Endocrine: Negative  Genitourinary: Positive for urgency (intermittent bladder incontinence at night)  Musculoskeletal: Positive for gait problem (cautious when walking)  Negative for myalgias  Last sessions for PT and OT were 12/2021 - Very helpful  Denies falls     Skin: Negative  Allergic/Immunologic: Negative  Neurological: Positive for dizziness (Intermittent occurs when standing), speech difficulty (word finding, increased after surgery; slight improvement as of 3/3/22 ), weakness (muscle mass, b/l UE and LE) and numbness (still has left sided facial numbness before and after surgery)  Negative for light-headedness and headaches  Hematological: Bruises/bleeds easily (due to collasping veins )  Psychiatric/Behavioral: Positive for decreased concentration (slight memory loss ) and sleep disturbance ( every night)  Negative for hallucinations  The patient is nervous/anxious (occurs at night)  I reviewed the ROS        Vitals:    /85 (BP Location: Right arm, Patient Position: Sitting, Cuff Size: Standard)   Pulse 81   Temp 98 1 °F (36 7 °C) (Probe)   Resp 16   Ht 5' 5" (1 651 m)   Wt 75 3 kg (166 lb)   BMI 27 62 kg/m²       MEDICAL HISTORY  Past Medical History:   Diagnosis Date    Hypertension      Past Surgical History:   Procedure Laterality Date    CRANIOTOMY Left 2021    Procedure: Image guided left retromastoid craniotomy for debulking of CP angle mass; intraoperative monitoring;  Surgeon: Jamia Field MD;  [facility];  Service: Neurosurgery    IR CHEST TUBE PLACEMENT  2021     Social History     Tobacco Use    Smoking status: Former Smoker     Quit date: 2021     Years since quittin 1    Smokeless tobacco: Never Used   Vaping Use    Vaping Use: Never used   Substance Use Topics    Alcohol use: Yes    Drug use: No        Current Outpatient Medications:     acetaminophen (TYLENOL) 325 mg tablet, Take 3 tablets (975 mg total) by mouth every 8 (eight) hours, Disp: , Rfl: 0    escitalopram (LEXAPRO) 5 mg tablet, , Disp: , Rfl:     gabapentin (Neurontin) 100 mg capsule, Take 1 pill (100mg) a day at night x 1 week; then take 2 pills a day at night (200mg) x 1 week;  then take 3 pills (300mg) a day at night, Disp: 90 capsule, Rfl: 5    losartan (COZAAR) 50 mg tablet, Take 50 mg by mouth daily, Disp: , Rfl:     traZODone (DESYREL) 50 mg tablet, Take 50 mg by mouth as needed  , Disp: , Rfl:    No Known Allergies     The following portions of the patient's history were updated by MA and reviewed by MD: allergies, current medications, past family history, past medical history, past social history, past surgical history and problem list       Physical Exam  Vitals and nursing note reviewed  Constitutional:       General: She is not in acute distress  Appearance: Normal appearance  She is not ill-appearing, toxic-appearing or diaphoretic  HENT:      Head: Normocephalic        Nose: Nose normal    Eyes: Extraocular Movements: Extraocular movements intact  Pupils: Pupils are equal, round, and reactive to light  Comments: Contract type the is moist bilaterally   Musculoskeletal:         General: No swelling, tenderness, deformity or signs of injury  Normal range of motion  Cervical back: Normal range of motion and neck supple  No rigidity  Right lower leg: No edema  Left lower leg: No edema  Lymphadenopathy:      Cervical: No cervical adenopathy  Skin:     General: Skin is warm and dry  Coloration: Skin is not jaundiced  Findings: No erythema or rash  Neurological:      Mental Status: She is alert and oriented to person, place, and time  Cranial Nerves: Cranial nerve deficit ( is symmetric hearing loss) present  Sensory: Sensory deficit ( there is reduction to fine touch and pinprick in the V1 and V2 distributions) present  Motor: No weakness ( her upper extremity power is 5/5 bilaterally  )  Coordination: Coordination abnormal ( she does have some difficulty with coordination with rapid all today movement and past pointing being slightly greater on the left than the right )  Gait: Gait abnormal (Wide-based gait with a an irregular baseline but she she does not trip and fall  )  Deep Tendon Reflexes: Reflexes normal    Psychiatric:         Mood and Affect: Mood normal          Behavior: Behavior normal          Thought Content: Thought content normal          Judgment: Judgment normal            RESULTS/DATA  I have personally reviewed pertinent films in PACS        MRI of the brain is carefully reviewed and compared with a study performed on 5 August 2021  This demonstrates small residual tumor without significant change from her previous studies  Studies below our preoperative and current studies in comparison  The hydrocephalus which was present preoperatively is completely resolved  There is no overt obvious injury to the brain    The small region where the tumor had compress the brain has closed largely

## 2022-06-16 ENCOUNTER — OFFICE VISIT (OUTPATIENT)
Dept: NEUROLOGY | Facility: CLINIC | Age: 57
End: 2022-06-16
Payer: COMMERCIAL

## 2022-06-16 VITALS
DIASTOLIC BLOOD PRESSURE: 93 MMHG | HEIGHT: 65 IN | BODY MASS INDEX: 27.82 KG/M2 | SYSTOLIC BLOOD PRESSURE: 162 MMHG | HEART RATE: 89 BPM | WEIGHT: 167 LBS

## 2022-06-16 DIAGNOSIS — R51.9 CHRONIC NONINTRACTABLE HEADACHE, UNSPECIFIED HEADACHE TYPE: Primary | ICD-10-CM

## 2022-06-16 DIAGNOSIS — G89.29 CHRONIC NONINTRACTABLE HEADACHE, UNSPECIFIED HEADACHE TYPE: Primary | ICD-10-CM

## 2022-06-16 DIAGNOSIS — R51.9 HEADACHE: ICD-10-CM

## 2022-06-16 DIAGNOSIS — R56.9 SEIZURE-LIKE ACTIVITY (HCC): ICD-10-CM

## 2022-06-16 PROCEDURE — 99214 OFFICE O/P EST MOD 30 MIN: CPT | Performed by: PSYCHIATRY & NEUROLOGY

## 2022-06-16 RX ORDER — GABAPENTIN 300 MG/1
CAPSULE ORAL
Qty: 90 CAPSULE | Refills: 3 | Status: SHIPPED | OUTPATIENT
Start: 2022-06-16

## 2022-06-16 NOTE — PROGRESS NOTES
Patient ID: Pavan Marvin is a 55 y o  female with left vestibular schwannoma c/b obstructive hydrocephalus, s/p resection, who is returning to Neurology office for follow up of her spells of decreased responsiveness, headaches, and balance difficulty  Assessment/Plan:    Chronic headaches  Overall, her headaches have significantly improved since starting gabapentin  She feels this has been quite effective for her and although she still does get some occasional pains, these are not severe and infrequent  Overall she feels she is doing well and does not wish to change any thing with her medications today  --she will continue gabapentin 300 mg nightly  I will change her prescription be a 300 mg capsule that she only has to take 1 at night instead of taking 3 of the 100 mg capsules  Fall with possible seizure-like activity ([address] )  She has not had any other episodes concerning for seizure or loss of consciousness  Additionally she feels that her balance has been getting better  We will continue to follow how she does over time, and if this would worsen, we will need to reassess        She will Return in about 6 months (around 12/16/2022)  Subjective:    HPI  Current medications:  1  Gabapentin 300 mg nightly  Other medications as per Epic  Since her last visit, she has been doing much better  Her headaches are much better as well  She still will get rare electric/sharp pains, but these are much better  She did start gabapentin, and she feels this has been helpful  She still has some balance issues, but this has been stable and if anything, improving  Objective:    Blood pressure 162/93, pulse 89, height 5' 5" (1 651 m), weight 75 8 kg (167 lb), not currently breastfeeding  Physical Exam    Neurological Exam      ROS:    Review of Systems   Constitutional: Negative  Negative for appetite change and fever  HENT: Negative    Negative for hearing loss, tinnitus, trouble swallowing and voice change  Eyes: Negative  Negative for photophobia and pain  Respiratory: Negative  Negative for shortness of breath  Cardiovascular: Negative  Negative for palpitations  Gastrointestinal: Negative  Negative for nausea and vomiting  Endocrine: Negative  Negative for cold intolerance  Genitourinary: Negative  Negative for dysuria, frequency and urgency  Musculoskeletal: Negative  Negative for myalgias and neck pain  Skin: Negative  Negative for rash  Neurological: Positive for headaches (experiencing one today, still experiencing electric shocks)  Negative for dizziness, tremors, seizures, syncope, facial asymmetry, speech difficulty, weakness, light-headedness and numbness  Hematological: Negative  Does not bruise/bleed easily  Psychiatric/Behavioral: Negative  Negative for confusion, hallucinations and sleep disturbance  All other systems reviewed and are negative  I personally reviewed the ROS that was entered by the medical assistant    Voice recognition software was used in the generation of this note  There may be unintentional errors including grammatical errors, spelling errors, or pronoun errors

## 2022-06-16 NOTE — PATIENT INSTRUCTIONS
-- continue to take your gabapentin unchanged  I did send a new prescription to the pharmacy for 300 mg pills, so when you pick it up from the pharmacy, you will only need to take one (300 mg) pill at night

## 2022-06-20 PROBLEM — G89.29 CHRONIC HEADACHES: Status: ACTIVE | Noted: 2021-08-15

## 2022-06-20 NOTE — ASSESSMENT & PLAN NOTE
Overall, her headaches have significantly improved since starting gabapentin  She feels this has been quite effective for her and although she still does get some occasional pains, these are not severe and infrequent  Overall she feels she is doing well and does not wish to change any thing with her medications today  --she will continue gabapentin 300 mg nightly  I will change her prescription be a 300 mg capsule that she only has to take 1 at night instead of taking 3 of the 100 mg capsules

## 2022-06-20 NOTE — ASSESSMENT & PLAN NOTE
She has not had any other episodes concerning for seizure or loss of consciousness  Additionally she feels that her balance has been getting better    We will continue to follow how she does over time, and if this would worsen, we will need to reassess

## 2022-07-21 ENCOUNTER — APPOINTMENT (OUTPATIENT)
Dept: RADIOLOGY | Facility: CLINIC | Age: 57
End: 2022-07-21
Payer: COMMERCIAL

## 2022-07-21 DIAGNOSIS — M79.645 PAIN OF LEFT THUMB: ICD-10-CM

## 2022-07-21 PROCEDURE — 73130 X-RAY EXAM OF HAND: CPT

## 2022-08-22 ENCOUNTER — HOSPITAL ENCOUNTER (OUTPATIENT)
Dept: MRI IMAGING | Facility: HOSPITAL | Age: 57
Discharge: HOME | End: 2022-08-22
Attending: NEUROLOGICAL SURGERY
Payer: COMMERCIAL

## 2022-08-22 DIAGNOSIS — D33.3 CEREBELLOPONTINE ANGLE TUMOR (HCC): ICD-10-CM

## 2022-08-22 PROCEDURE — 70553 MRI BRAIN STEM W/O & W/DYE: CPT

## 2022-08-22 PROCEDURE — G1004 CDSM NDSC: HCPCS

## 2022-08-22 PROCEDURE — A9585 GADOBUTROL INJECTION: HCPCS | Performed by: NEUROLOGICAL SURGERY

## 2022-08-22 RX ADMIN — GADOBUTROL 7 ML: 604.72 INJECTION INTRAVENOUS at 12:56

## 2022-08-23 ENCOUNTER — APPOINTMENT (OUTPATIENT)
Dept: LAB | Facility: CLINIC | Age: 57
End: 2022-08-23
Payer: COMMERCIAL

## 2022-08-23 DIAGNOSIS — R40.0 SOMNOLENCE, DAYTIME: ICD-10-CM

## 2022-08-23 LAB
ANION GAP SERPL CALCULATED.3IONS-SCNC: 4 MMOL/L (ref 4–13)
BUN SERPL-MCNC: 13 MG/DL (ref 5–25)
CALCIUM SERPL-MCNC: 9.8 MG/DL (ref 8.3–10.1)
CHLORIDE SERPL-SCNC: 111 MMOL/L (ref 96–108)
CO2 SERPL-SCNC: 25 MMOL/L (ref 21–32)
CORTIS AM PEAK SERPL-MCNC: 27.6 UG/DL (ref 4.2–22.4)
CREAT SERPL-MCNC: 1.06 MG/DL (ref 0.6–1.3)
GFR SERPL CREATININE-BSD FRML MDRD: 58 ML/MIN/1.73SQ M
GLUCOSE P FAST SERPL-MCNC: 85 MG/DL (ref 65–99)
POTASSIUM SERPL-SCNC: 4.7 MMOL/L (ref 3.5–5.3)
SODIUM SERPL-SCNC: 140 MMOL/L (ref 135–147)
T4 FREE SERPL-MCNC: 0.94 NG/DL (ref 0.76–1.46)
TSH SERPL DL<=0.05 MIU/L-ACNC: 3.22 UIU/ML (ref 0.45–4.5)

## 2022-08-23 PROCEDURE — 84439 ASSAY OF FREE THYROXINE: CPT

## 2022-08-23 PROCEDURE — 80048 BASIC METABOLIC PNL TOTAL CA: CPT

## 2022-08-23 PROCEDURE — 84443 ASSAY THYROID STIM HORMONE: CPT

## 2022-08-23 PROCEDURE — 82533 TOTAL CORTISOL: CPT

## 2022-08-23 PROCEDURE — 36415 COLL VENOUS BLD VENIPUNCTURE: CPT

## 2022-09-02 ENCOUNTER — TELEPHONE (OUTPATIENT)
Dept: NEUROSURGERY | Facility: CLINIC | Age: 57
End: 2022-09-02

## 2022-10-03 NOTE — ASSESSMENT & PLAN NOTE
Presents for 1-year follow up from left retromastoid craniotomy for debulking of CP angle mass on 8/18/2021 with Dr Ana Maria Bhardwaj  · Pathology consistent with Schwannoma  · Continues to endorse pain in ear and face on left, constant tinnitus, urinary urgency, anxiety and decreased memory  · Completed PT/OT  · Current exam is non-focal except for subjective numbness to left face and hearing loss  Imaging:  · MRI brain IAC w/wo, 8/22/2022:   Stable postoperative changes related to subtotal resection of left cerebellopontine angle vestibular schwannoma with an unchanged residual enhancing lesion compared to 2/21/2022  2  No acute infarction, edema, or pathologic intra-axial enhancement  Plan:  · Reviewed imaging extensively with patient and daughter  · Discussed that at this time finding is stable and would recommend ongoing surveillance on a yearly basis  · Advised that patient still seems depressed and endorses some agoraphobia  States PCP recently increased dose of Lexapro but doesn't feel it is helping  Recommend that she pursue some therapy  She is in agreement as is her daughter  · Continue gabapentin as this has been helping with some of her symptoms  · At previous appointment complained of fatigue  AM cortisol slightly elevated and TSH/T4 normal    · Follow up in 1 year with updated MRI brain IAC w/wo

## 2022-10-04 ENCOUNTER — OFFICE VISIT (OUTPATIENT)
Dept: NEUROSURGERY | Facility: CLINIC | Age: 57
End: 2022-10-04
Payer: COMMERCIAL

## 2022-10-04 VITALS
WEIGHT: 167 LBS | HEART RATE: 73 BPM | HEIGHT: 65 IN | DIASTOLIC BLOOD PRESSURE: 90 MMHG | RESPIRATION RATE: 16 BRPM | BODY MASS INDEX: 27.82 KG/M2 | SYSTOLIC BLOOD PRESSURE: 154 MMHG

## 2022-10-04 DIAGNOSIS — G93.89 BRAIN MASS: Primary | ICD-10-CM

## 2022-10-04 DIAGNOSIS — G91.1 OBSTRUCTIVE HYDROCEPHALUS (HCC): ICD-10-CM

## 2022-10-04 PROCEDURE — 99213 OFFICE O/P EST LOW 20 MIN: CPT | Performed by: NURSE PRACTITIONER

## 2022-10-04 NOTE — PROGRESS NOTES
Neurosurgery Office Note  Rajni Francis 65 y o  female MRN: [id number]      Assessment/Plan     Cerebellopontine angle tumor [facility])  Presents for 1-year follow up from left retromastoid craniotomy for debulking of CP angle mass on 8/18/2021 with Dr Priya Wilson  · Pathology consistent with Schwannoma  · Continues to endorse pain in ear and face on left, constant tinnitus, urinary urgency, anxiety and decreased memory  · Completed PT/OT  · Current exam is non-focal except for subjective numbness to left face and hearing loss  Imaging:  · MRI brain IAC w/wo, 8/22/2022:   Stable postoperative changes related to subtotal resection of left cerebellopontine angle vestibular schwannoma with an unchanged residual enhancing lesion compared to 2/21/2022  2  No acute infarction, edema, or pathologic intra-axial enhancement  Plan:  · Reviewed imaging extensively with patient and daughter  · Discussed that at this time finding is stable and would recommend ongoing surveillance on a yearly basis  · Advised that patient still seems depressed and endorses some agoraphobia  States PCP recently increased dose of Lexapro but doesn't feel it is helping  Recommend that she pursue some therapy  She is in agreement as is her daughter  · Continue gabapentin as this has been helping with some of her symptoms  · At previous appointment complained of fatigue  AM cortisol slightly elevated and TSH/T4 normal    · Follow up in 1 year with updated MRI brain IAC w/wo           Diagnoses and all orders for this visit:    Brain mass  -     MRI brain IAC wo and w contrast; Future    Obstructive hydrocephalus (HCC)          I spent 20 minutes with the patient today in which >50% of the time was spent counseling/coordination of care regarding diagnosis, imaging review, symptoms and treatment plan       CHIEF COMPLAINT    Chief Complaint   Patient presents with    Follow-up     Return in about 6 months (around 9/3/2022) for follow-up after test        HISTORY    History of Present Illness     65y o  year old female     With past medical history of hypertension, anxiety and depression, who presents for 1 year follow-up status post left retromastoid craniotomy for debulking of CP angle mass on 08/18/2021 with Dr Eliezer Saxena  Her pathology was consistent with schwannoma  She continues to endorse significant symptoms  She states for about a month following her surgery her symptoms of headache had resolved but then started up again  She continues to endorse constant left ear tinnitus along with left facial pain and jaw pain and numbness  She continues to endorse fatigue and memory impairment  She states she feels almost a [employer] like she does not want to go outside or do anything  She is very dismayed at the extent of her fatigue when she tries to do her usual activities  She did complete PT and OT in December of 2021 and found those to be very helpful  She takes gabapentin for her nerve pain which she states helps a little bit  She states her PCP recently increased her dose of Lexapro to 10 mg from 5 mg but she has not noticed much difference  She lives at home with her daughter and helps care for her grandson, otherwise she is on disability  See Discussion    REVIEW OF SYSTEMS    Review of Systems   Constitutional: Positive for appetite change (Eats once a day;) and fatigue  HENT: Positive for ear pain (Intermittent stabbing pain in left ear/ gabriel helping with shocking pain), hearing loss (left ear) and tinnitus (constant left ear ringing/buzzing)  Eyes:        Patient reports decrease in depth perception and inability to maintain eye focus with abrupt head movements  Respiratory: Negative  Cardiovascular: Negative for palpitations (occurs at night)  Gastrointestinal: Negative  Negative for nausea and vomiting  Endocrine: Negative  Genitourinary: Negative for urgency (intermittent bladder incontinence at night)  Musculoskeletal: Positive for gait problem (cautious when walking)  Negative for myalgias  Last sessions for PT and OT were 12/2021 - Very helpful  Denies falls     Skin: Negative  Allergic/Immunologic: Negative  Neurological: Positive for dizziness (Intermittent occurs when standing), speech difficulty (word finding, slight improvement as of 3/3/22 ), weakness (muscle mass, b/l UE and LE) and numbness (still has left sided facial numbness before and after surgery)  Negative for light-headedness and headaches (frontal and temple/ left- daily)  Hematological: Bruises/bleeds easily (due to collasping veins )  Psychiatric/Behavioral: Positive for confusion, decreased concentration (slight memory loss ) and sleep disturbance ( every night)  Negative for hallucinations  The patient is nervous/anxious (triggers, increased agitation)  ROS reviewed and edited as needed  Meds/Allergies     Current Outpatient Medications   Medication Sig Dispense Refill    acetaminophen (TYLENOL) 325 mg tablet Take 3 tablets (975 mg total) by mouth every 8 (eight) hours  0    escitalopram (LEXAPRO) 5 mg tablet 10 mg      gabapentin (Neurontin) 300 mg capsule Take 1 pill (300mg) nightly 90 capsule 3    losartan (COZAAR) 50 mg tablet Take 50 mg by mouth daily      traZODone (DESYREL) 50 mg tablet Take 50 mg by mouth as needed         No current facility-administered medications for this visit         No Known Allergies    PAST HISTORY    Past Medical History:   Diagnosis Date    Hypertension        Past Surgical History:   Procedure Laterality Date    CRANIOTOMY Left 8/18/2021    Procedure: Image guided left retromastoid craniotomy for debulking of CP angle mass; intraoperative monitoring;  Surgeon: Lauri Deng MD;  [facility];  Service: Neurosurgery    IR CHEST TUBE PLACEMENT  8/21/2021       Social History     Tobacco Use    Smoking status: Former Smoker     Quit date: 1/8/2021     Years since quittin 7    Smokeless tobacco: Never Used   Vaping Use    Vaping Use: Never used   Substance Use Topics    Alcohol use: Yes    Drug use: No       No family history on file  Above history personally reviewed  EXAM    Vitals:Blood pressure 154/90, pulse 73, resp  rate 16, height 5' 5" (1 651 m), weight 75 8 kg (167 lb), not currently breastfeeding  ,Body mass index is 27 79 kg/m²  Physical Exam  Constitutional:       Appearance: She is well-developed  HENT:      Head: Normocephalic and atraumatic  Eyes:      Extraocular Movements: EOM normal       Pupils: Pupils are equal, round, and reactive to light  Pulmonary:      Effort: Pulmonary effort is normal    Abdominal:      Palpations: Abdomen is soft  Musculoskeletal:         General: Normal range of motion  Cervical back: Normal range of motion and neck supple  Skin:     General: Skin is warm and dry  Neurological:      General: No focal deficit present  Mental Status: She is alert and oriented to person, place, and time  Mental status is at baseline  Cranial Nerves: Cranial nerve deficit present  Sensory: No sensory deficit  Motor: No weakness  Coordination: Coordination normal  Finger-Nose-Finger Test normal    Psychiatric:         Speech: Speech normal          Neurologic Exam     Mental Status   Oriented to person, place, and time  Oriented to person  Oriented to place  Oriented to time  Oriented to year, month and date  Registration: recalls 3 of 3 objects  Attention: normal  Concentration: normal    Speech: speech is normal   Level of consciousness: alert  Knowledge: good and consistent with education  Able to name object  Cranial Nerves     CN III, IV, VI   Pupils are equal, round, and reactive to light  Extraocular motions are normal    Right pupil: Size: 3 mm  Shape: regular  Reactivity: brisk  Consensual response: intact  Accommodation: intact  Left pupil: Size: 3 mm   Shape: regular  Reactivity: brisk  Consensual response: intact  Accommodation: intact  Nystagmus: none   Diplopia: none  Conjugate gaze: present    CN V   Right facial sensation deficit: none  Left facial sensation deficit: cheeks and mandible    CN VII   Facial expression full, symmetric  CN VIII   Hearing: impaired    CN IX, X   Palate: symmetric    CN XI   Right sternocleidomastoid strength: normal  Left sternocleidomastoid strength: normal  Right trapezius strength: normal  Left trapezius strength: normal    CN XII   Tongue: not atrophic  Fasciculations: absent  Tongue deviation: none    Motor Exam   Muscle bulk: normal  Overall muscle tone: normal  Right arm pronator drift: absent  Left arm pronator drift: absent    Strength   Right deltoid: 5/5  Left deltoid: 5/5  Right biceps: 5/5  Left biceps: 5/5  Right triceps: 5/5  Left triceps: 5/5  Right quadriceps: 5/5  Left quadriceps: 5/5  Right hamstrin/5  Left hamstrin/5  Right anterior tibial: 5/5  Left anterior tibial: 5/5  Right posterior tibial: 5/5  Left posterior tibial: 5/5  Right peroneal: 5/5  Left peroneal: 5/5  Right gastroc: 5/5  Left gastroc: 5/5    Sensory Exam   Light touch normal    Proprioception normal      Gait, Coordination, and Reflexes     Coordination   Finger to nose coordination: normal    Tremor   Resting tremor: absent  Intention tremor: absent  Action tremor: absent        MEDICAL DECISION MAKING    Imaging Studies:       No results found  I have personally reviewed pertinent reports     and I have personally reviewed pertinent films in PACS

## 2022-12-07 ENCOUNTER — TELEPHONE (OUTPATIENT)
Dept: NEUROLOGY | Facility: CLINIC | Age: 57
End: 2022-12-07

## 2022-12-19 ENCOUNTER — OFFICE VISIT (OUTPATIENT)
Dept: NEUROLOGY | Facility: CLINIC | Age: 57
End: 2022-12-19

## 2022-12-19 VITALS
BODY MASS INDEX: 28.99 KG/M2 | HEIGHT: 65 IN | HEART RATE: 97 BPM | WEIGHT: 174 LBS | SYSTOLIC BLOOD PRESSURE: 145 MMHG | DIASTOLIC BLOOD PRESSURE: 90 MMHG

## 2022-12-19 DIAGNOSIS — F41.9 ANXIETY: ICD-10-CM

## 2022-12-19 DIAGNOSIS — R51.9 HEADACHE: ICD-10-CM

## 2022-12-19 DIAGNOSIS — D33.3 CEREBELLOPONTINE ANGLE TUMOR (HCC): ICD-10-CM

## 2022-12-19 DIAGNOSIS — G89.29 CHRONIC NONINTRACTABLE HEADACHE, UNSPECIFIED HEADACHE TYPE: Primary | ICD-10-CM

## 2022-12-19 DIAGNOSIS — R51.9 CHRONIC NONINTRACTABLE HEADACHE, UNSPECIFIED HEADACHE TYPE: Primary | ICD-10-CM

## 2022-12-19 RX ORDER — GABAPENTIN 300 MG/1
CAPSULE ORAL
Qty: 180 CAPSULE | Refills: 3 | Status: SHIPPED | OUTPATIENT
Start: 2022-12-19

## 2022-12-19 NOTE — PROGRESS NOTES
Review of Systems   Constitutional: Negative  Negative for appetite change and fever  HENT: Negative  Negative for hearing loss, tinnitus, trouble swallowing and voice change  Eyes: Negative  Negative for photophobia, pain and visual disturbance  Respiratory: Negative  Negative for shortness of breath  Cardiovascular: Negative  Negative for palpitations  Gastrointestinal: Negative  Negative for nausea and vomiting  Endocrine: Negative  Negative for cold intolerance  Genitourinary: Negative  Negative for dysuria, frequency and urgency  Musculoskeletal: Positive for neck pain  Negative for gait problem and myalgias  Skin: Negative  Negative for rash  Allergic/Immunologic: Negative  Neurological: Positive for numbness (left side of face) and headaches (daily)  Negative for dizziness, tremors, seizures, syncope, facial asymmetry, speech difficulty, weakness and light-headedness  Hematological: Negative  Does not bruise/bleed easily  Psychiatric/Behavioral: Negative  Negative for confusion, hallucinations and sleep disturbance  All other systems reviewed and are negative

## 2022-12-19 NOTE — ASSESSMENT & PLAN NOTE
We did discuss that some of her symptoms may partially be related to her anxiety  She does have ongoing issues with this which could be influencing her fatigue and headaches  I will be important for her to continue to follow with her PCP and consider having her dose of Lexapro adjusted if needed

## 2022-12-19 NOTE — PROGRESS NOTES
Patient ID: Chele Dave is a 65 y o  female with left vestibular schwannoma c/b obstructive hydrocephalus, s/p resection, who is returning to Neurology office for follow up of her spells of decreased responsiveness, headaches, and balance difficulty  Assessment/Plan:    Chronic headaches  She continues to have difficulty with headaches  Although her sharp stabbing pains have resolved, she is getting more constant pain usually on the left back part of her head around her prior incision site  This does sometimes interfere with her ability to function  She has been doing better since starting gabapentin as noted previously  --I will have her increase her gabapentin to be 600 mg nightly  If after 1 month she is still having difficulty with headaches, she could try taking 300 mg twice a day or we could increase further to be 300 mg in the morning and 600 mg at night  --She will continue use Tylenol as needed for her headaches    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[address] )  She was recently seen by neurosurgery and imaging was stable  She will continue to follow-up with them as previously scheduled  Anxiety  We did discuss that some of her symptoms may partially be related to her anxiety  She does have ongoing issues with this which could be influencing her fatigue and headaches  I will be important for her to continue to follow with her PCP and consider having her dose of Lexapro adjusted if needed  She will Return in about 3 months (around 3/19/2023)  Subjective:    HPI  Current medications:  1  Gabapentin 300 mg nightly  Other medications as per Epic  Since her last visit, she isn't really getting the sharp pains in her head any longer  She does have some pain around the back part of her head around where the incision is  She is more sensitive to the light and a lot of loud sounds  She will still get some minor headaches and tylenol, which typically helps      She feels like her balance trouble has plateau'd  This hasn't been more of a problem and she hasn't fallen, but it hasn't gotten better  She denies any side effect with gabapentin  She does feel that it has been helpful in the past      She has not had any other episodes of loss of consciousness or falling  She did see Neurosurgery for follow up with an MRI on 8/22/2022  The MRI was stable, so she is planned for yearly follow up with NSGY  Prior Seizure Medications: none for seizures    Her history was also obtained from her daughter, who was present at today's visit  I did review her MRI brain from 8/22/22 and recent neurosurgery appointment notes, as detailed in Epic  Objective:    Blood pressure 145/90, pulse 97, height 5' 5" (1 651 m), weight 78 9 kg (174 lb), not currently breastfeeding  Physical Exam    Neurological Exam      ROS:    Review of Systems    I personally reviewed the ROS that was entered by the medical assistant in a separate note  Voice recognition software was used in the generation of this note  There may be unintentional errors including grammatical errors, spelling errors, or pronoun errors

## 2022-12-19 NOTE — ASSESSMENT & PLAN NOTE
She continues to have difficulty with headaches  Although her sharp stabbing pains have resolved, she is getting more constant pain usually on the left back part of her head around her prior incision site  This does sometimes interfere with her ability to function  She has been doing better since starting gabapentin as noted previously  --I will have her increase her gabapentin to be 600 mg nightly  If after 1 month she is still having difficulty with headaches, she could try taking 300 mg twice a day or we could increase further to be 300 mg in the morning and 600 mg at night      --She will continue use Tylenol as needed for her headaches

## 2022-12-19 NOTE — PATIENT INSTRUCTIONS
-- Please increase your Gabapentin to be 600 mg nightly    -- If you are still having headaches at that dose, we could change to Gabapentin 300 mg twice a day or increase further to 300 mg in the am and 600 mg at night

## 2022-12-19 NOTE — ASSESSMENT & PLAN NOTE
She was recently seen by neurosurgery and imaging was stable  She will continue to follow-up with them as previously scheduled

## 2023-03-21 ENCOUNTER — OFFICE VISIT (OUTPATIENT)
Dept: NEUROLOGY | Facility: CLINIC | Age: 58
End: 2023-03-21

## 2023-03-21 VITALS
TEMPERATURE: 97.5 F | RESPIRATION RATE: 20 BRPM | BODY MASS INDEX: 29.34 KG/M2 | WEIGHT: 176.3 LBS | OXYGEN SATURATION: 93 % | HEART RATE: 102 BPM | SYSTOLIC BLOOD PRESSURE: 110 MMHG | DIASTOLIC BLOOD PRESSURE: 68 MMHG

## 2023-03-21 DIAGNOSIS — G43.109 MIGRAINE WITH AURA AND WITHOUT STATUS MIGRAINOSUS, NOT INTRACTABLE: Primary | ICD-10-CM

## 2023-03-21 DIAGNOSIS — D33.3 CEREBELLOPONTINE ANGLE TUMOR (HCC): ICD-10-CM

## 2023-03-21 DIAGNOSIS — R51.9 HEADACHE: ICD-10-CM

## 2023-03-21 RX ORDER — ESCITALOPRAM OXALATE 10 MG/1
10 TABLET ORAL DAILY
COMMUNITY
Start: 2023-01-19

## 2023-03-21 RX ORDER — GABAPENTIN 300 MG/1
CAPSULE ORAL
Qty: 270 CAPSULE | Refills: 3 | Status: SHIPPED | OUTPATIENT
Start: 2023-03-21

## 2023-03-21 RX ORDER — METRONIDAZOLE 10 MG/G
GEL TOPICAL DAILY
COMMUNITY
Start: 2022-11-16

## 2023-03-21 NOTE — PROGRESS NOTES
Review of Systems   Constitutional: Negative  Negative for appetite change and fever  HENT: Negative  Negative for hearing loss, tinnitus, trouble swallowing and voice change  Eyes: Positive for visual disturbance  Negative for photophobia and pain  Respiratory: Negative  Negative for shortness of breath  Cardiovascular: Negative  Negative for palpitations  Gastrointestinal: Negative  Negative for nausea and vomiting  Endocrine: Negative  Negative for cold intolerance  Genitourinary: Negative  Negative for dysuria, frequency and urgency  Musculoskeletal: Negative  Negative for gait problem, myalgias and neck pain  Skin: Negative  Negative for rash  Allergic/Immunologic: Negative  Neurological: Positive for dizziness and headaches  Negative for tremors, seizures, syncope, facial asymmetry, speech difficulty, weakness, light-headedness and numbness  Hematological: Negative  Does not bruise/bleed easily  Psychiatric/Behavioral: Negative  Negative for confusion, hallucinations and sleep disturbance

## 2023-03-21 NOTE — PATIENT INSTRUCTIONS
- Continue gabapentin 300 mg twice per day - you can take an extra capsule if needed during the daytime  - Try taking nurtec as needed for headache - take one tablet at onset of headache  No more than 1 tablet in 24 hours  No more than 3 tablets per week  - If this is not helpful, please call the office  - Let us know if headaches get worse or with concerns  - Follow up in 3 months

## 2023-03-21 NOTE — PROGRESS NOTES
Patient ID: Jesse Ying is a 65 y o  female with left vestibular schwannoma c/b obstructive hydrocephalus, s/p resection, who is returning to Neurology office for follow up of her spells of decreased responsiveness, headaches, and balance difficulty  Assessment/Plan:    Cerebellopontine angle tumor [facility])  Following with neurosurgery with stable imaging, most recently done 8/22/22  Continue following with neurosurgery and surveillance imaging recommendations  Migraine with aura and without status migrainosus, not intractable  Patient with headaches in the setting of left vestibular schwannoma c/b obstructive hydrocephalus, s/p resection  There is a history of migraine headaches as well  Headaches have improved with gabapentin, currently taking 300 mg BID  While they are not as severe, she does continue to have them noting that prior to them occurring, she will have a visual change, like waves in her vision consistent with migraine aura  She has had migraines in the past as well  She also feels that even though the headaches are not as bad, she does feel like the mild headaches persist longer and do not turn into full fledged headaches  Due to this, recommended adding PRN mid day dose of gabapentin  Discussed that if this is helpful she could take this daily for further prevention  For abortive, recommended that she try rimegepant (nurtec) given tylenol has not been helpful  Prior use of triptans was not tolerated  Would also like to avoid triptans due to history of surgical resection, prior obstructive hydrocephalus and age of patient  Patient will reach out with worsening of headaches or if the above treatment is not helpful  If headaches continue or worse, consider earlier MRI brain imaging (scheduled for yearly surveillance imaging) to ensure no underlying pathology to cause worsening of headaches  She will follow up in 3 months or sooner if needed with Dr John Hidalgo           She will Return in about 3 months (around 6/21/2023) for Follow up with Dr Virgie Gilbert  Subjective:  Dani Welsh is a 65 y o  female with left vestibular schwannoma c/b obstructive hydrocephalus, s/p resection, who is returning to Neurology office for follow up of her spells of decreased responsiveness, headaches, and balance difficulty  She was last seen in the office by Dr Virgie Gilbert on 12/19/2022  At that time, there were difficulties with chronic headaches that did sometimes inferfere with her ability to function  She was doing better since starting gabapentin so recommended increasing night time dose to 600 mg and with recommendations to increase further to 300 mg int he morning and 600 mg HS  She was following with neurosurgery for cerebellopontine angle tumor, imaging stable at that time  As for her anxiety, recommended continuing to follow with PCP and considering having lexapro dose adjusted as some of her symptoms could be related to anxiety  Recommended 3 month follow up  The patient presents to her office visit this afternoon with her daughter and granddaughter  The following was addressed:    Headaches - Improved with taking gabapentin 300 mg BID  When she gets her headaches they are not as bad  Feels like the headaches are not turning into full fledged headaches  She does take tylenol which does not help and then sleeps  When she talks a lot her head starts to hurt  She does get some visual changes (looks like heat waves) prior to getting a headache (ROS+ for visual disturbances with headache)  She reports that in the past she did take something for migraines,  likely took a triptan but it did not help and caused oversedation  Neck continues to be sore about 6-7/10, especially if she is holding her head up for a long time without resting  She does do some exercises/exercises  Does not use heating pad      Balance - Balance is not great at times but overall is improved from in the past  Patient and daughter report that the balance is getting better  Dizziness- the same as it has been  If she walks and turns her head quickly she gets dizzy  Falls- NONE    Episodes of LOC or decreased responsiveness - NONE      MRI brain 8/22/22:  FINDINGS:  IAC'S:  Stable postoperative changes related to subtotal resection of left cerebellopontine angle vestibular schwannoma with an unchanged residual enhancing lesion measuring 0 7 x 1 4 cm (AP x TRV)  Postoperative encephalomalacia is again noted along   the left middle cerebellar peduncle and anterior cerebral with mild surrounding gliosis  BRAIN PARENCHYMA:  There is no mass effect or midline shift  Brainstem and cerebellum demonstrate normal signal  There is no intracranial hemorrhage  There is no evidence of acute infarction and diffusion imaging is unremarkable  There are no white matter changes in the cerebral hemispheres  There is no pathologic intra-axial enhancement  VENTRICLES:  Normal   SELLA AND PITUITARY GLAND:  Normal   ORBITS:  Normal   PARANASAL SINUSES:  Normal   VASCULATURE:  Evaluation of the major intracranial vasculature demonstrates appropriate flow voids  CALVARIUM AND SKULL BASE:  Normal   EXTRACRANIAL SOFT TISSUES:  Normal   IMPRESSION:  1  Stable postoperative changes related to subtotal resection of left cerebellopontine angle vestibular schwannoma with an unchanged residual enhancing lesion compared to 2/21/2022   2  No acute infarction, edema, or pathologic intra-axial enhancement  Routine EEG 8/16/21: Normal EEG    Her history was also obtained from her daughter, who was present at today's visit  I reviewed prior neurology notes, most recent labs, as documented in Epic/[employer], and summarized above  Objective:    Blood pressure 110/68, pulse 102, temperature 97 5 °F (36 4 °C), temperature source Temporal, resp  rate 20, weight 80 kg (176 lb 4 8 oz), SpO2 93 %, not currently breastfeeding      Physical Exam  No apparent distress  Appears well nourished  Mood appropriate for situation     Neurologic Exam  Mental status- alert and oriented to person, place, and time  Speech appropriate for conversation  Good attention and knowledge  Cranial Nerves- PERRL, VFFTC, EOMS normal, facial sensation and muscles symmetric, hearing intact to finger rubs on right, unable to hear finger rubs on left, tongue midline, palate rise symmetrical, shoulder shrug symmetrical     Motor- No pronator drift  Appropriate strength  Moves all extremities freely  No tremor  Sensory-  Intact distally in all extremities to light touch  DTRs- 2+ and symmetric in all extremities  Gait- normal casual gait  Negative rhomberg  Slight difficulty with tandem gait  Coordination- FNF intact  ROS:    Review of Systems   Constitutional: Negative  Negative for appetite change and fever  HENT: Negative  Negative for hearing loss, tinnitus, trouble swallowing and voice change  Eyes: Positive for visual disturbance  Negative for photophobia and pain  Respiratory: Negative  Negative for shortness of breath  Cardiovascular: Negative  Negative for palpitations  Gastrointestinal: Negative  Negative for nausea and vomiting  Endocrine: Negative  Negative for cold intolerance  Genitourinary: Negative  Negative for dysuria, frequency and urgency  Musculoskeletal: Negative  Negative for gait problem, myalgias and neck pain  Skin: Negative  Negative for rash  Allergic/Immunologic: Negative  Neurological: Positive for dizziness and headaches  Negative for tremors, seizures, syncope, facial asymmetry, speech difficulty, weakness, light-headedness and numbness  Hematological: Negative  Does not bruise/bleed easily  Psychiatric/Behavioral: Negative  Negative for confusion, hallucinations and sleep disturbance       ROS obtained by MA and reviewed by myself         This note may have been created using voice recognition software  There may be unintentional errors such as grammatical errors, spelling errors, or pronoun errors

## 2023-03-22 PROBLEM — G43.109 MIGRAINE WITH AURA AND WITHOUT STATUS MIGRAINOSUS, NOT INTRACTABLE: Status: ACTIVE | Noted: 2023-03-22

## 2023-03-22 NOTE — ASSESSMENT & PLAN NOTE
Patient with headaches in the setting of left vestibular schwannoma c/b obstructive hydrocephalus, s/p resection  There is a history of migraine headaches as well  Headaches have improved with gabapentin, currently taking 300 mg BID  While they are not as severe, she does continue to have them noting that prior to them occurring, she will have a visual change, like waves in her vision consistent with migraine aura  She has had migraines in the past as well  She also feels that even though the headaches are not as bad, she does feel like the mild headaches persist longer and do not turn into full fledged headaches  Due to this, recommended adding PRN mid day dose of gabapentin  Discussed that if this is helpful she could take this daily for further prevention  For abortive, recommended that she try rimegepant (nurtec) given tylenol has not been helpful  Prior use of triptans was not tolerated  Would also like to avoid triptans due to history of surgical resection, prior obstructive hydrocephalus and age of patient  Patient will reach out with worsening of headaches or if the above treatment is not helpful  If headaches continue or worse, consider earlier MRI brain imaging (scheduled for yearly surveillance imaging) to ensure no underlying pathology to cause worsening of headaches  She will follow up in 3 months or sooner if needed with Dr Jeannette Martin

## 2023-03-22 NOTE — ASSESSMENT & PLAN NOTE
Following with neurosurgery with stable imaging, most recently done 8/22/22  Continue following with neurosurgery and surveillance imaging recommendations

## 2023-03-28 ENCOUNTER — TELEPHONE (OUTPATIENT)
Dept: NEUROLOGY | Facility: CLINIC | Age: 58
End: 2023-03-28

## 2023-03-29 NOTE — TELEPHONE ENCOUNTER
Fax received from pharmacy  PA needed for nurte  Key: [id number]    [place] at [phone]     PA submitted, awaiting determination

## 2023-03-30 NOTE — TELEPHONE ENCOUNTER
[facility] approved through 3/27/2024      Message left for pharmacy to notify of approval

## 2023-07-18 ENCOUNTER — APPOINTMENT (OUTPATIENT)
Dept: LAB | Facility: CLINIC | Age: 58
End: 2023-07-18
Payer: COMMERCIAL

## 2023-07-18 ENCOUNTER — OFFICE VISIT (OUTPATIENT)
Dept: NEUROLOGY | Facility: CLINIC | Age: 58
End: 2023-07-18
Payer: COMMERCIAL

## 2023-07-18 VITALS
WEIGHT: 166.8 LBS | BODY MASS INDEX: 27.79 KG/M2 | HEART RATE: 85 BPM | HEIGHT: 65 IN | DIASTOLIC BLOOD PRESSURE: 70 MMHG | SYSTOLIC BLOOD PRESSURE: 102 MMHG | TEMPERATURE: 97.9 F | OXYGEN SATURATION: 99 %

## 2023-07-18 DIAGNOSIS — G89.29 CHRONIC NONINTRACTABLE HEADACHE, UNSPECIFIED HEADACHE TYPE: ICD-10-CM

## 2023-07-18 DIAGNOSIS — R51.9 CHRONIC NONINTRACTABLE HEADACHE, UNSPECIFIED HEADACHE TYPE: ICD-10-CM

## 2023-07-18 DIAGNOSIS — G44.85 STABBING HEADACHE: Primary | ICD-10-CM

## 2023-07-18 DIAGNOSIS — H91.8X9 ASYMMETRICAL HEARING LOSS: ICD-10-CM

## 2023-07-18 DIAGNOSIS — R51.9 HEADACHE: ICD-10-CM

## 2023-07-18 DIAGNOSIS — R56.9 SEIZURE-LIKE ACTIVITY (HCC): ICD-10-CM

## 2023-07-18 DIAGNOSIS — G44.85 STABBING HEADACHE: ICD-10-CM

## 2023-07-18 LAB
CRP SERPL QL: 9.3 MG/L
ERYTHROCYTE [SEDIMENTATION RATE] IN BLOOD: 21 MM/HOUR (ref 0–29)

## 2023-07-18 PROCEDURE — 86140 C-REACTIVE PROTEIN: CPT

## 2023-07-18 PROCEDURE — 99214 OFFICE O/P EST MOD 30 MIN: CPT | Performed by: NURSE PRACTITIONER

## 2023-07-18 PROCEDURE — 85652 RBC SED RATE AUTOMATED: CPT

## 2023-07-18 PROCEDURE — 36415 COLL VENOUS BLD VENIPUNCTURE: CPT

## 2023-07-18 RX ORDER — GABAPENTIN 300 MG/1
CAPSULE ORAL
Qty: 270 CAPSULE | Refills: 3 | Status: SHIPPED | OUTPATIENT
Start: 2023-07-18

## 2023-07-18 NOTE — ASSESSMENT & PLAN NOTE
No further events. Denies any episodes of loss of consciousness or falls. Continues to have ongoing balance/dizziness issues but overall this is stable. If there is worsening of balance, dizziness, or any falls/LOC she will call the office.

## 2023-07-18 NOTE — PATIENT INSTRUCTIONS
- Increase gabapentin to 300 mg three times per day  - Have blood work  - Call the office with worsening of headaches or concerns  - Let us know if there is worsening balance, dizziness or episodes of loss of consciousness  - Follow up in 6 months with Dr Wiliam Otto

## 2023-07-18 NOTE — PROGRESS NOTES
Patient ID: Noy Pal is a 65 y.o. female with left vestibular schwannoma c/b obstructive hydrocephalus, s/p resection, who is returning to Neurology office for follow up of her spells of decreased responsiveness, headaches, and balance difficulty. Assessment/Plan:    Fall with possible seizure-like activity ([address])  No further events. Denies any episodes of loss of consciousness or falls. Continues to have ongoing balance/dizziness issues but overall this is stable. If there is worsening of balance, dizziness, or any falls/LOC she will call the office. Chronic headaches  Headaches have overall improved with addition of gabapentin. She is currently on gabapentin 300 mg BID + mid day dose PRN. While she has not had any migraines recently, she does have more mild headaches a few times per week. When she does take the PRN dose of gabapentin it is helpful. Recommended increasing gabapentin to 300 mg TID to see if more mild headaches resolve. Patient does report recent stabbing pain of her left temple that has resolved. She has not had this in the past. No accompanied by visual changes or other symptoms. Will check CRP/sed rate to rule out inflammatory process. Asymmetrical hearing loss  Chronic s/p schwannoma resection. She will Return in about 6 months (around 1/18/2024) for Dr Jennifer Mccain. Subjective:  Noy Pal is a 65 y.o. female with left vestibular schwannoma c/b obstructive hydrocephalus, s/p resection, who is returning to Neurology office for follow up of her spells of decreased responsiveness, headaches, and balance difficulty. She was last seen in the office on 3/21/23. At that time, she was following with neurosurgery regarding surveillance imaging for schwannoma. There were no further episodes of LOC or falls. She did continue to have difficulty with dizziness and balance.  Headaches at that time had improved with gabapentin 300 mg BID but recommended adding PRN mid day dose of gabapentin. For abortive, recommended rimegepant given tylenol was not helpful and triptans were not tolerated. Recommended 3 month follow up. Since their last visit, her insurance wanted to charge her $150 for nurtec so she did not fill it. She reports mild headaches a few times per week but not no migraines. She does take tylenol which does not really help. She does take the extra dose of gabapentin if needed and it seems to be helpful. Due for next MRI on October 4th. She does report that the other day she had a stabbing pain on the left temple of her head which is different. She does get It in her ear. No episodes of LOC. Balance has been good, typically only has an issue on the stairs because she gets dizzy. Dizziness is about the same. No falls. Current seizure medications:  - none  Other medications as per [facility]. MRI brain 8/22/22:  FINDINGS:  IAC'S:  Stable postoperative changes related to subtotal resection of left cerebellopontine angle vestibular schwannoma with an unchanged residual enhancing lesion measuring 0.7 x 1.4 cm (AP x TRV).  Postoperative encephalomalacia is again noted along   the left middle cerebellar peduncle and anterior cerebral with mild surrounding gliosis. BRAIN PARENCHYMA: Ellouise Corns is no mass effect or midline shift.  Brainstem and cerebellum demonstrate normal signal. There is no intracranial hemorrhage. Ellouise Corns is no evidence of acute infarction and diffusion imaging is unremarkable.  There are no white matter changes in the cerebral hemispheres. There is no pathologic intra-axial enhancement. VENTRICLES:  Normal.  SELLA AND PITUITARY GLAND:  Normal.  ORBITS:  Normal.  PARANASAL SINUSES:  Normal.  VASCULATURE:  Evaluation of the major intracranial vasculature demonstrates appropriate flow voids.   CALVARIUM AND SKULL BASE:  Normal.  EXTRACRANIAL SOFT TISSUES:  Normal.  IMPRESSION:  1. Stable postoperative changes related to subtotal resection of left cerebellopontine angle vestibular schwannoma with an unchanged residual enhancing lesion compared to 2/21/2022.  2. No acute infarction, edema, or pathologic intra-axial enhancement.     Routine EEG 8/16/21: Normal EEG    Her history was also obtained from her daughter, who was present at today's visit. I reviewed prior neurology notes, most recent labs, as documented in Epic/[employer], and summarized above. Objective:    Blood pressure 102/70, pulse 85, temperature 97.9 °F (36.6 °C), temperature source Temporal, height 5' 5" (1.651 m), weight 75.7 kg (166 lb 12.8 oz), SpO2 99 %, not currently breastfeeding. Physical Exam  No apparent distress. Appears well nourished. Mood appropriate for situation     Neurologic Exam  Mental status- alert and oriented to person, place, and time. Speech appropriate for conversation. Good attention and knowledge. Cranial Nerves- PERRL, VFFTC, EOMS normal, facial sensation and muscles symmetric, hearing intact on right/absent on left (chronic), tongue midline, palate rise symmetrical, shoulder shrug symmetrical.    Motor- No pronator drift. Appropriate strength. Moves all extremities freely. No tremor. Sensory-  Intact distally in all extremities to light touch. DTRs- not assessed at today's visit. Gait- normal casual gait. Coordination- FNF intact. ROS:  Review of Systems   Constitutional: Negative for appetite change, fatigue and fever. HENT: Negative. Negative for hearing loss, tinnitus, trouble swallowing and voice change. Eyes: Negative. Negative for photophobia, pain and visual disturbance. Respiratory: Negative. Negative for shortness of breath. Cardiovascular: Negative. Negative for palpitations. Gastrointestinal: Negative. Negative for nausea and vomiting. Endocrine: Negative. Negative for cold intolerance. Genitourinary: Negative. Negative for dysuria, frequency and urgency.    Musculoskeletal: Negative for back pain, gait problem, myalgias and neck pain. Skin: Negative. Negative for rash. Allergic/Immunologic: Negative. Neurological: Positive for numbness (in face - all the time - chronic) and headaches (1-2 a week, bearable, no migraines). Negative for dizziness, tremors, seizures, syncope, facial asymmetry, speech difficulty, weakness and light-headedness. Hematological: Negative. Does not bruise/bleed easily. Psychiatric/Behavioral: Negative. Negative for confusion, hallucinations and sleep disturbance. All other systems reviewed and are negative. ROS obtained by MA and reviewed by myself. This note may have been created using voice recognition software. There may be unintentional errors such as grammatical errors, spelling errors, or pronoun errors.

## 2023-07-18 NOTE — ASSESSMENT & PLAN NOTE
Headaches have overall improved with addition of gabapentin. She is currently on gabapentin 300 mg BID + mid day dose PRN. While she has not had any migraines recently, she does have more mild headaches a few times per week. When she does take the PRN dose of gabapentin it is helpful. Recommended increasing gabapentin to 300 mg TID to see if more mild headaches resolve. Patient does report recent stabbing pain of her left temple that has resolved. She has not had this in the past. No accompanied by visual changes or other symptoms. Will check CRP/sed rate to rule out inflammatory process.

## 2023-07-18 NOTE — PROGRESS NOTES
Review of Systems   Constitutional: Negative for appetite change, fatigue and fever. HENT: Negative. Negative for hearing loss, tinnitus, trouble swallowing and voice change. Eyes: Negative. Negative for photophobia, pain and visual disturbance. Respiratory: Negative. Negative for shortness of breath. Cardiovascular: Negative. Negative for palpitations. Gastrointestinal: Negative. Negative for nausea and vomiting. Endocrine: Negative. Negative for cold intolerance. Genitourinary: Negative. Negative for dysuria, frequency and urgency. Musculoskeletal: Negative for back pain, gait problem, myalgias and neck pain. Skin: Negative. Negative for rash. Allergic/Immunologic: Negative. Neurological: Positive for numbness (in face - all the time) and headaches (1-2 a week, bearable, no migraines). Negative for dizziness, tremors, seizures, syncope, facial asymmetry, speech difficulty, weakness and light-headedness. Hematological: Negative. Does not bruise/bleed easily. Psychiatric/Behavioral: Negative. Negative for confusion, hallucinations and sleep disturbance. All other systems reviewed and are negative.

## 2023-10-04 ENCOUNTER — HOSPITAL ENCOUNTER (OUTPATIENT)
Dept: MRI IMAGING | Facility: HOSPITAL | Age: 58
Discharge: HOME | End: 2023-10-04
Payer: COMMERCIAL

## 2023-10-04 DIAGNOSIS — G93.89 BRAIN MASS: ICD-10-CM

## 2023-10-04 PROCEDURE — 70553 MRI BRAIN STEM W/O & W/DYE: CPT

## 2023-10-04 PROCEDURE — G1004 CDSM NDSC: HCPCS

## 2023-10-04 PROCEDURE — A9585 GADOBUTROL INJECTION: HCPCS | Performed by: NURSE PRACTITIONER

## 2023-10-04 RX ORDER — GADOBUTROL 604.72 MG/ML
7 INJECTION INTRAVENOUS
Status: COMPLETED | OUTPATIENT
Start: 2023-10-04 | End: 2023-10-04

## 2023-10-04 RX ADMIN — GADOBUTROL 7 ML: 604.72 INJECTION INTRAVENOUS at 14:31

## 2023-10-11 ENCOUNTER — OFFICE VISIT (OUTPATIENT)
Dept: NEUROSURGERY | Facility: CLINIC | Age: 58
End: 2023-10-11
Payer: COMMERCIAL

## 2023-10-11 VITALS
RESPIRATION RATE: 17 BRPM | WEIGHT: 164.4 LBS | BODY MASS INDEX: 27.39 KG/M2 | SYSTOLIC BLOOD PRESSURE: 116 MMHG | DIASTOLIC BLOOD PRESSURE: 76 MMHG | HEIGHT: 65 IN | OXYGEN SATURATION: 98 % | HEART RATE: 73 BPM | TEMPERATURE: 98.3 F

## 2023-10-11 DIAGNOSIS — D33.3 CEREBELLOPONTINE ANGLE TUMOR (HCC): Primary | ICD-10-CM

## 2023-10-11 PROCEDURE — 99214 OFFICE O/P EST MOD 30 MIN: CPT | Performed by: NURSE PRACTITIONER

## 2023-10-11 NOTE — PROGRESS NOTES
Neurosurgery Office Note  Mark Zafarelor 63 y.o. 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 Latanya Marcelo. Pathology consistent with Schwannoma. Continues to endorse pain in ear and face on left, constant tinnitus  Current exam is non-focal except for subjective numbness to left face and hearing loss. Imaging:  MRI brain IAC w/wo, 10/4/23:Stable postoperative changes related to subtotal resection of left cerebellopontine angle vestibular schwannoma with an unchanged residual enhancing lesion compared to 8/22/2022. No acute infarction, edema, or pathologic intra-axial enhancement. Plan:  Reviewed imaging extensively with patient and   Discussed that at this time finding is stable and would recommend ongoing surveillance on a yearly basis. Continue gabapentin as this has been helping with some of her symptoms. Reach out to neurology to see if there can be an increase in gabapentin. Follow up in 1 year with updated MRI brain IAC w/wo. Diagnoses and all orders for this visit:    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[address])  -     MRI brain IAC wo and w contrast; Future  -     BUN; Future  -     Creatinine, serum; Future          I have spent a total time of 30 minutes on 10/11/23 in caring for this patient including Diagnostic results, Instructions for management, Patient and family education, Importance of tx compliance, Risk factor reductions, Impressions, Counseling / Coordination of care, Documenting in the medical record, Reviewing / ordering tests, medicine, procedures  , and Obtaining or reviewing history  .       CHIEF COMPLAINT    Chief Complaint   Patient presents with    Follow-up       HISTORY    History of Present Illness     63y.o. year old female with past medical history significant for hypertension, migraines, anxiety, seizure-like activity, and chronic headaches. Patient seen in outpatient office today for approximately 2-year follow-up status post left retromastoid craniotomy for debulking of CP angle mass on 8/18/2021 by Dr. Massimo Tolentino. Her pathology was consistent with schwannoma. Patient originally presented on 8/8/2021 with headaches, vertigo and hearing loss. She then represented to the ED on 8/15/2021 with questionable seizure-like activity and transient LOC after fall. She was placed on Diamox and ultimately underwent a left retromastoid craniotomy for debulking of CP angle mass on 8/18/2021 by Dr. Massimo Tolentino. Patient states she is doing well but does complain of daily headaches for which she sees neurology for and was placed on gabapentin, she states over the past few months her symptoms have worsened, spoke with patient she should reach out to neurology and possibly increase her gabapentin or try another medication. She states she is more lightheaded over the past few months. She also endorsed some blurry vision especially in the morning which lasted few minutes and is not every day, she does not have insurance to see an ophthalmologist but is working on it. She denies any recent falls or traumas but states her balance is worse again over the past few months. She also endorses left ear pain as well as loss of hearing. She also has left facial pain and numbness. She denies any chest pain, shortness of breath, abdominal pain, nausea, vomiting, diarrhea, no problems with bowel or bladder, no new weakness or numbness/tingling. HPI    See Discussion    REVIEW OF SYSTEMS    Review of Systems   Constitutional:  Positive for appetite change (Eats once a day;) and fatigue. HENT:  Positive for ear pain (Intermittent stabbing pain in left ear/ gabriel helping with shocking pain), hearing loss (left ear) and tinnitus (constant left ear ringing/buzzing). Eyes:  Positive for visual disturbance (blurry left more then right). Patient reports decrease in depth perception and inability to maintain eye focus with abrupt head movements. Respiratory: Negative. Cardiovascular:  Negative for palpitations (occurs at night). Gastrointestinal: Negative. Negative for nausea and vomiting. Endocrine: Negative. Genitourinary: Negative. Negative for urgency (intermittent bladder incontinence at night). Musculoskeletal:  Positive for gait problem (unbalanced no assitive device, no recent falls). Negative for myalgias. Last sessions for PT and OT were 12/2021 - Very helpful  Denies falls     Skin: Negative. Allergic/Immunologic: Negative. Neurological:  Positive for dizziness (constant got worse within year), speech difficulty (trouble finding words), weakness (muscle mass, b/l UE and LE), numbness (still has left sided facial numbness before and after surgery left side) and headaches (frontal and temple/ left- daily). Negative for light-headedness. Hematological:  Bruises/bleeds easily (due to collasping veins). Psychiatric/Behavioral:  Positive for agitation (feels agitated not like herself), confusion (short term memory), decreased concentration (slight memory loss ) and sleep disturbance ( every night). Negative for hallucinations. The patient is nervous/anxious (triggers, increased agitation). ROS reviewed with patient and agree and changes were made as needed    Meds/Allergies     Current Outpatient Medications   Medication Sig Dispense Refill    acetaminophen (TYLENOL) 325 mg tablet Take 3 tablets (975 mg total) by mouth every 8 (eight) hours  0    escitalopram (LEXAPRO) 10 mg tablet Take 10 mg by mouth daily      gabapentin (Neurontin) 300 mg capsule Take one capsule by mouth 3 times per day. 270 capsule 3    losartan (COZAAR) 50 mg tablet Take 50 mg by mouth daily      metroNIDAZOLE (METROGEL) 1 % gel Apply topically Daily       No current facility-administered medications for this visit.        No Known Allergies    PAST HISTORY    Past Medical History:   Diagnosis Date    HL (hearing loss) 2021    Hypertension     Migraine Years       Past Surgical History:   Procedure Laterality Date    CRANIOTOMY Left 2021    Procedure: Image guided left retromastoid craniotomy for debulking of CP angle mass; intraoperative monitoring;  Surgeon: Tata Hassan MD;  Location: [facility];  Service: Neurosurgery    IR CHEST TUBE PLACEMENT  2021       Social History     Tobacco Use    Smoking status: Former     Packs/day: 1.00     Types: Cigarettes     Quit date: 2021     Years since quittin.7    Smokeless tobacco: Never   Vaping Use    Vaping Use: Never used   Substance Use Topics    Alcohol use: Yes     Alcohol/week: 5.0 standard drinks of alcohol     Types: 5 Cans of beer per week     Comment: Social    Drug use: Never       Family History   Problem Relation Age of Onset    Stroke Maternal Grandmother          Above history personally reviewed. EXAM    Vitals:Blood pressure 116/76, pulse 73, temperature 98.3 °F (36.8 °C), temperature source Temporal, resp. rate 17, height 5' 5" (1.651 m), weight 74.6 kg (164 lb 6.4 oz), SpO2 98 %, not currently breastfeeding. ,Body mass index is 27.36 kg/m². Physical Exam  Vitals reviewed. Constitutional:       General: She is awake. She is not in acute distress. Appearance: Normal appearance. She is well-developed. She is not ill-appearing. HENT:      Head: Normocephalic and atraumatic. Comments: Left retromastoid incision well-healed  Eyes:      Extraocular Movements: Extraocular movements intact and EOM normal.      Conjunctiva/sclera: Conjunctivae normal.      Pupils: Pupils are equal, round, and reactive to light. Pulmonary:      Effort: Pulmonary effort is normal. No respiratory distress. Abdominal:      General: There is no distension. Palpations: Abdomen is soft. Tenderness: There is no abdominal tenderness. Musculoskeletal:         General: Normal range of motion. Cervical back: Normal range of motion and neck supple. Skin:     General: Skin is warm and dry. Neurological:      Mental Status: She is alert and oriented to person, place, and time. Motor: Motor strength is normal.     Coordination: Finger-Nose-Finger Test normal.      Gait: Gait is intact. Deep Tendon Reflexes:      Reflex Scores:       Bicep reflexes are 2+ on the right side and 2+ on the left side. Patellar reflexes are 2+ on the right side and 2+ on the left side. Psychiatric:         Attention and Perception: Attention and perception normal.         Mood and Affect: Mood and affect normal.         Speech: Speech normal.         Behavior: Behavior normal. Behavior is cooperative. Thought Content: Thought content normal.         Cognition and Memory: Cognition and memory normal.         Judgment: Judgment normal.         Neurologic Exam     Mental Status   Oriented to person, place, and time. Follows 2 step commands. Attention: normal. Concentration: normal.   Speech: speech is normal   Level of consciousness: alert  Knowledge: good. Able to perform simple calculations. Able to name object. Normal comprehension. Cranial Nerves     CN III, IV, VI   Pupils are equal, round, and reactive to light. Extraocular motions are normal.   CN III: no CN III palsy  CN VI: no CN VI palsy  Nystagmus: none   Diplopia: none  Conjugate gaze: present    CN VII   Facial expression full, symmetric. CN VIII   Hearing: impaired (impaired on L side)    CN IX, X   CN IX normal.     CN XI   CN XI normal.     CN XII   CN XII normal.   Decreased sensation to left V1 and V2 distribution     Motor Exam   Muscle bulk: normal  Overall muscle tone: normal  Right arm pronator drift: absent  Left arm pronator drift: absent    Strength   Strength 5/5 throughout.      Sensory Exam   Light touch normal.   Proprioception normal.   JPS and DST intact     Gait, Coordination, and Reflexes     Gait  Gait: normal    Coordination   Finger to nose coordination: normal    Tremor   Resting tremor: absent  Intention tremor: absent  Action tremor: absent    Reflexes   Right biceps: 2+  Left biceps: 2+  Right patellar: 2+  Left patellar: 2+  Right Ye: absent  Left Ye: absent  Right ankle clonus: absent  Left ankle clonus: absent        MEDICAL DECISION MAKING    Imaging Studies:     MRI brain IAC wo and w contrast    Result Date: 10/10/2023  Narrative: MRI BRAIN AND IAC'S -  WITH AND WITHOUT CONTRAST INDICATION: G93.89: Other specified disorders of brain. COMPARISON: MRI brain and IACs 8/22/2022 TECHNIQUE: Multiplanar, multisequence imaging of the brain and IAC's was performed. Targeted images of the IAC'S were performed requiring additional time at acquisition and interpretation of approximately 25% IV Contrast:  7 mL of Gadobutrol injection (SINGLE-DOSE) IMAGE QUALITY:   Diagnostic. FINDINGS: BRAIN PARENCHYMA:  There is no midline shift. There is no intracranial hemorrhage. There is no evidence of acute infarction and diffusion imaging is unremarkable. There are no white matter changes in the cerebral hemispheres. Normal postcontrast imaging. Stable focal CSF prominence along the left ambient cistern (series 5: 9) possibly related to an underlying arachnoid cyst. IAC'S:  Stable postoperative changes related to subtotal resection of left cerebellopontine angle vestibular schwannoma with an unchanged residual enhancing lesion measuring 0.6 x 1.3 x 0.9 cm (AP x ML x CC) (series 12: 38). Postoperative encephalomalacia is again noted along the left middle cerebellar peduncle and anterior cerebellum with mild surrounding gliosis. VENTRICLES:  Normal for the patient's age. SELLA AND PITUITARY GLAND:  Normal. ORBITS:  Normal. PARANASAL SINUSES:  Normal. VASCULATURE:  Evaluation of the major intracranial vasculature demonstrates appropriate flow voids. CALVARIUM AND SKULL BASE: Mild marrow signal heterogeneity which can be seen with underlying red marrow proliferation. EXTRACRANIAL SOFT TISSUES:  Normal.     Impression: 1. Stable postoperative changes related to subtotal resection of left cerebellopontine angle vestibular schwannoma with an unchanged residual enhancing lesion compared to 8/22/2022. 2. No acute infarction, edema, or pathologic intra-axial enhancement. Workstation performed: [id number]       I have personally reviewed pertinent reports.    and I have personally reviewed pertinent films in PACS

## 2023-10-11 NOTE — ASSESSMENT & PLAN NOTE
Presents for 2-year follow up from left retromastoid craniotomy for debulking of CP angle mass on 8/18/2021 with Dr. Massimo Tolentino. Pathology consistent with Schwannoma. Continues to endorse pain in ear and face on left, constant tinnitus  Current exam is non-focal except for subjective numbness to left face and hearing loss. Imaging:  MRI brain IAC w/wo, 10/4/23:Stable postoperative changes related to subtotal resection of left cerebellopontine angle vestibular schwannoma with an unchanged residual enhancing lesion compared to 8/22/2022. No acute infarction, edema, or pathologic intra-axial enhancement. Plan:  Reviewed imaging extensively with patient and   Discussed that at this time finding is stable and would recommend ongoing surveillance on a yearly basis. Continue gabapentin as this has been helping with some of her symptoms. Reach out to neurology to see if there can be an increase in gabapentin. Follow up in 1 year with updated MRI brain IAC w/wo.

## 2023-10-13 DIAGNOSIS — R51.9 HEADACHE: ICD-10-CM

## 2023-10-13 RX ORDER — GABAPENTIN 300 MG/1
300 CAPSULE ORAL 4 TIMES DAILY
Qty: 360 CAPSULE | Refills: 3 | Status: SHIPPED | OUTPATIENT
Start: 2023-10-13

## 2023-10-13 RX ORDER — GABAPENTIN 300 MG/1
300 CAPSULE ORAL 4 TIMES DAILY
Qty: 360 CAPSULE | Refills: 3 | Status: SHIPPED | OUTPATIENT
Start: 2023-10-13 | End: 2023-10-13

## 2024-02-05 ENCOUNTER — OFFICE VISIT (OUTPATIENT)
Dept: NEUROLOGY | Facility: CLINIC | Age: 59
End: 2024-02-05
Payer: COMMERCIAL

## 2024-02-05 VITALS
DIASTOLIC BLOOD PRESSURE: 85 MMHG | SYSTOLIC BLOOD PRESSURE: 137 MMHG | BODY MASS INDEX: 28.78 KG/M2 | TEMPERATURE: 97.3 F | HEART RATE: 71 BPM | HEIGHT: 65 IN | WEIGHT: 172.7 LBS

## 2024-02-05 DIAGNOSIS — F41.9 ANXIETY: ICD-10-CM

## 2024-02-05 DIAGNOSIS — G89.29 CHRONIC NONINTRACTABLE HEADACHE, UNSPECIFIED HEADACHE TYPE: ICD-10-CM

## 2024-02-05 DIAGNOSIS — G43.109 MIGRAINE WITH AURA AND WITHOUT STATUS MIGRAINOSUS, NOT INTRACTABLE: Primary | ICD-10-CM

## 2024-02-05 DIAGNOSIS — R26.89 BALANCE DISORDER: ICD-10-CM

## 2024-02-05 DIAGNOSIS — R51.9 CHRONIC NONINTRACTABLE HEADACHE, UNSPECIFIED HEADACHE TYPE: ICD-10-CM

## 2024-02-05 PROCEDURE — 99214 OFFICE O/P EST MOD 30 MIN: CPT | Performed by: PSYCHIATRY & NEUROLOGY

## 2024-02-05 RX ORDER — AMITRIPTYLINE HYDROCHLORIDE 25 MG/1
TABLET, FILM COATED ORAL
Qty: 30 TABLET | Refills: 11 | Status: SHIPPED | OUTPATIENT
Start: 2024-02-05

## 2024-02-05 NOTE — PATIENT INSTRUCTIONS
Start taking Amitriptyline half a tab (12.5 mg) nightly and decrease Gabapentin to be 600 mg at night for 2 weeks.    - Then increase Amitriptyline to be 25 mg nightly and decrease Gabapentin to be 300 mg nightly for 2 weeks,     - Then continue Amitriptyline unchanged and stop Gabapentin      -- Talk with your PCP about your depression symptoms. You could consider changing Lexapro to a medication like Cymbalta that can also help with neuropathic pain or chronic pain.     -- Please get some blood work before your next appointment.

## 2024-02-05 NOTE — PROGRESS NOTES
Patient ID: Riccardo Chu is a 58 y.o. female with  left vestibular schwannoma c/b obstructive hydrocephalus, s/p resection, who is returning to Neurology office for follow up of her spells of decreased responsiveness, headaches, and balance difficulty.       Assessment/Plan:    Migraine with aura and without status migrainosus, not intractable  She continues to have very frequent headaches, and has not been tolerating higher doses of gabapentin due to side effects.  Has poor sleep at night, and a myriad of other symptoms that are likely unrelated to her migraines.  Overall, she would benefit from changing her medications.  We did discuss potential medication options.  At this point, I would recommend changing gabapentin to a different medication, amitriptyline.  We discussed the risks and rationale of this medication including potential side effects.  I am hopeful that this change will help improve her sleep overnight, and limit her daytime sleepiness.  Although amitriptyline can cause sleepiness, when taken at night it typically does not have those effects during the day.  They will be in touch with our office if she has any difficulty with this change    --She will start taking amitriptyline 12.5 mg nightly and increase after 2 weeks to 25 mg nightly.  As she is starting this medication, she will also gradually wean off of gabapentin.    Anxiety  She does have chronic issues with anxiety and depression.  It is likely that her fatigue and limited energy with poor sleep as well as depressed mood are related to this.  She does not felt that Lexapro has been helpful.  I discussed the importance of discussing the symptoms further with her PCP.  1 potential option would be to change her Lexapro to a different medication such as duloxetine (Cymbalta), which can be helpful for chronic pain, headaches, and depression/anxiety    --In addition to discussing with her PCP about changing Lexapro to a different medication  such as Cymbalta, I will have her get some blood work to check a TSH, B12, methylmalonic acid to further look into her symptoms    Balance disorder  She continues to have difference with her balance and asymmetric hearing loss from her schwannoma, which is status post resection.  Repeat imaging has been stable.  She will continue to follow-up with neurosurgery as planned for ongoing surveillance        She will Return in about 3 months (around 5/5/2024).      Subjective:    HPI  Current medications:  1. Gabapentin 300 mg in the am and 600 mg at night.   Other medications as per Epic.    Since her last visit, she had increased the gabapentin up to 300 mg in the am, 300 mg in the afternoon, and 600 mg at night. She doesn't feel like this has been helpful and she was excessively sleepy. She since decreased the Gabapentin to her current dose.     She chronically feels fatigued and tired. She has very limited energy. She often will not want to get up and get a shower. She feels like she may be over sedated from her medication.     She continues to have ringing in her ear and a pain in her head. Her headaches seem to be worse, mainly on the left side of her head. She has ome tenderness to palpation in the left side of her head.     She has balance issues and shaking. If she is eating or drinking water from a cup, she may have some shaking of her hands. Her daughter feels it is more when she is gripping something. And she may drop things. She has good days and bad days. When it happens, she just wants to lay down. She feels like she has some vertigo when she is upright, but if laying down.     This has mainly been going on for like 6 months.     She feels that her mood is mainly driven by the pain on the side of her head. She continues to feel difficulty with mood.     Prior Medications: none for seizures    Her history was also obtained from her daughter, who was present at today's visit.        Objective:    Blood  "pressure 137/85, pulse 71, temperature (!) 97.3 °F (36.3 °C), temperature source Temporal, height 5' 5\" (1.651 m), weight 78.3 kg (172 lb 11.2 oz), not currently breastfeeding.    Physical Exam    Neurological Exam      ROS:    Review of Systems    I personally reviewed the ROS that was entered by the medical assistant in a separate note.     Voice recognition software was used in the generation of this note. There may be unintentional errors including grammatical errors, spelling errors, or pronoun errors.    "

## 2024-02-07 NOTE — ASSESSMENT & PLAN NOTE
She continues to have very frequent headaches, and has not been tolerating higher doses of gabapentin due to side effects.  Has poor sleep at night, and a myriad of other symptoms that are likely unrelated to her migraines.  Overall, she would benefit from changing her medications.  We did discuss potential medication options.  At this point, I would recommend changing gabapentin to a different medication, amitriptyline.  We discussed the risks and rationale of this medication including potential side effects.  I am hopeful that this change will help improve her sleep overnight, and limit her daytime sleepiness.  Although amitriptyline can cause sleepiness, when taken at night it typically does not have those effects during the day.  They will be in touch with our office if she has any difficulty with this change    --She will start taking amitriptyline 12.5 mg nightly and increase after 2 weeks to 25 mg nightly.  As she is starting this medication, she will also gradually wean off of gabapentin.

## 2024-02-07 NOTE — ASSESSMENT & PLAN NOTE
She continues to have difference with her balance and asymmetric hearing loss from her schwannoma, which is status post resection.  Repeat imaging has been stable.  She will continue to follow-up with neurosurgery as planned for ongoing surveillance

## 2024-02-07 NOTE — ASSESSMENT & PLAN NOTE
She does have chronic issues with anxiety and depression.  It is likely that her fatigue and limited energy with poor sleep as well as depressed mood are related to this.  She does not felt that Lexapro has been helpful.  I discussed the importance of discussing the symptoms further with her PCP.  1 potential option would be to change her Lexapro to a different medication such as duloxetine (Cymbalta), which can be helpful for chronic pain, headaches, and depression/anxiety    --In addition to discussing with her PCP about changing Lexapro to a different medication such as Cymbalta, I will have her get some blood work to check a TSH, B12, methylmalonic acid to further look into her symptoms

## 2024-04-27 ENCOUNTER — HOSPITAL ENCOUNTER (OUTPATIENT)
Dept: MRI IMAGING | Facility: HOSPITAL | Age: 59
Discharge: HOME | End: 2024-04-27
Payer: COMMERCIAL

## 2024-04-27 DIAGNOSIS — S83.242A OTHER TEAR OF MEDIAL MENISCUS, CURRENT INJURY, LEFT KNEE, INITIAL ENCOUNTER: ICD-10-CM

## 2024-04-27 PROCEDURE — 73721 MRI JNT OF LWR EXTRE W/O DYE: CPT

## 2024-05-02 ENCOUNTER — OFFICE VISIT (OUTPATIENT)
Dept: OBGYN CLINIC | Facility: CLINIC | Age: 59
End: 2024-05-02
Payer: COMMERCIAL

## 2024-05-02 ENCOUNTER — APPOINTMENT (OUTPATIENT)
Dept: RADIOLOGY | Facility: CLINIC | Age: 59
End: 2024-05-02
Payer: COMMERCIAL

## 2024-05-02 VITALS
DIASTOLIC BLOOD PRESSURE: 82 MMHG | BODY MASS INDEX: 29.99 KG/M2 | SYSTOLIC BLOOD PRESSURE: 120 MMHG | WEIGHT: 180 LBS | HEIGHT: 65 IN

## 2024-05-02 DIAGNOSIS — M23.204 DEGENERATIVE TEAR OF MEDIAL MENISCUS OF LEFT KNEE: ICD-10-CM

## 2024-05-02 DIAGNOSIS — M25.462 EFFUSION OF LEFT KNEE: ICD-10-CM

## 2024-05-02 DIAGNOSIS — M25.562 ACUTE PAIN OF LEFT KNEE: ICD-10-CM

## 2024-05-02 DIAGNOSIS — M17.12 PRIMARY OSTEOARTHRITIS OF LEFT KNEE: Primary | ICD-10-CM

## 2024-05-02 PROCEDURE — 73564 X-RAY EXAM KNEE 4 OR MORE: CPT

## 2024-05-02 PROCEDURE — 99203 OFFICE O/P NEW LOW 30 MIN: CPT | Performed by: ORTHOPAEDIC SURGERY

## 2024-05-02 PROCEDURE — 20610 DRAIN/INJ JOINT/BURSA W/O US: CPT | Performed by: ORTHOPAEDIC SURGERY

## 2024-05-02 RX ORDER — LIDOCAINE HYDROCHLORIDE 10 MG/ML
7 INJECTION, SOLUTION EPIDURAL; INFILTRATION; INTRACAUDAL; PERINEURAL
Status: COMPLETED | OUTPATIENT
Start: 2024-05-02 | End: 2024-05-02

## 2024-05-02 RX ORDER — BETAMETHASONE SODIUM PHOSPHATE AND BETAMETHASONE ACETATE 3; 3 MG/ML; MG/ML
6 INJECTION, SUSPENSION INTRA-ARTICULAR; INTRALESIONAL; INTRAMUSCULAR; SOFT TISSUE
Status: COMPLETED | OUTPATIENT
Start: 2024-05-02 | End: 2024-05-02

## 2024-05-02 RX ADMIN — BETAMETHASONE SODIUM PHOSPHATE AND BETAMETHASONE ACETATE 6 MG: 3; 3 INJECTION, SUSPENSION INTRA-ARTICULAR; INTRALESIONAL; INTRAMUSCULAR; SOFT TISSUE at 14:45

## 2024-05-02 RX ADMIN — LIDOCAINE HYDROCHLORIDE 7 ML: 10 INJECTION, SOLUTION EPIDURAL; INFILTRATION; INTRACAUDAL; PERINEURAL at 14:45

## 2024-05-02 NOTE — PROGRESS NOTES
Assessment:     1. Primary osteoarthritis of left knee    2. Degenerative tear of medial meniscus of left knee    3. Effusion of left knee    4. Acute pain of left knee        Plan:     Problem List Items Addressed This Visit          Musculoskeletal and Integument    Primary osteoarthritis of left knee - Primary    Relevant Medications    lidocaine (PF) (XYLOCAINE-MPF) 1 % injection 7 mL (Completed)    betamethasone acetate-betamethasone sodium phosphate (CELESTONE) injection 6 mg (Completed)    Other Relevant Orders    Large joint arthrocentesis: L knee (Completed)    Brace    Degenerative tear of medial meniscus of left knee     Other Visit Diagnoses       Effusion of left knee        Relevant Medications    lidocaine (PF) (XYLOCAINE-MPF) 1 % injection 7 mL (Completed)    betamethasone acetate-betamethasone sodium phosphate (CELESTONE) injection 6 mg (Completed)    Other Relevant Orders    Large joint arthrocentesis: L knee (Completed)    Acute pain of left knee        Relevant Orders    XR knee 4+ vw left injury         Findings consistent with left knee mild osteoarthritis with effusion and degenerative medial meniscal tear, anteromedial plica. Imaging and prognosis reviewed with patient. She had aspiration of left knee 6 cc of clear, yellow fluid followed by cortisone injection, tolerated procedure well, cold compress today. She is currently not having any mechanical symptoms and would not recommend arthroscopy. If she started to experience mechanical symptoms such as locking, instability with pain the would recommend arthroscopy. Avoid deep squats, pivoting or twisting motions. Nsaids, voltaren gel as needed for pain control. Hinged knee brace for support. Also discussed if CSI offers minimal relief then instructed to call and referral can be placed for gel injections. See patient back on as needed basis.  All patient's questions were answered to her satisfaction.  This note is created using dictation  transcription.  It may contain typographical errors, grammatical errors, improperly dictated words, background noise and other errors.    Subjective:     Patient ID: Riccardo Chu is a 58 y.o. female.  Chief Complaint:  58 yr old female in for evaluation of left knee pain.  Patient referred here by her PCP, ONELIA Aranda.  She states 3 weeks ago she was on her couch adjusting shades and lost her balance and fell into a squat position. She was able to get up with a little discomfort. Next day she had more pain medial, posterior, anterior aspect of her knee. She states no pain at rest. Pain can be sharp in nature with pivoting or twisting motions with sense of giving out. Knee is tight in morning and difficult to move. She will also get pain with prolonged standing, walking. No previous injuries to her knee.     Allergy:  No Known Allergies  Medications:  all current active meds have been reviewed  Past Medical History:  Past Medical History:   Diagnosis Date    HL (hearing loss) Feb 2021    Hypertension     Migraine Years     Past Surgical History:  Past Surgical History:   Procedure Laterality Date    CRANIOTOMY Left 8/18/2021    Procedure: Image guided left retromastoid craniotomy for debulking of CP angle mass; intraoperative monitoring;  Surgeon: Negro Ayoub MD;  Location: [facility];  Service: Neurosurgery    IR CHEST TUBE PLACEMENT  8/21/2021     Family History:  Family History   Problem Relation Age of Onset    Stroke Maternal Grandmother      Social History:  Social History     Substance and Sexual Activity   Alcohol Use Yes    Alcohol/week: 5.0 standard drinks of alcohol    Types: 5 Cans of beer per week    Comment: Social     Social History     Substance and Sexual Activity   Drug Use Never     Social History     Tobacco Use   Smoking Status Former    Current packs/day: 0.00    Types: Cigarettes    Quit date: 1/1/2021    Years since quitting: 3.3   Smokeless Tobacco Never     Review of  "Systems   Constitutional:  Negative for chills and fever.   HENT:  Negative for ear pain and sore throat.    Eyes:  Negative for pain and visual disturbance.   Respiratory:  Negative for cough and shortness of breath.    Cardiovascular:  Negative for chest pain and palpitations.   Gastrointestinal:  Negative for abdominal pain and vomiting.   Genitourinary:  Negative for dysuria and hematuria.   Musculoskeletal:  Positive for arthralgias (left knee). Negative for back pain.   Skin:  Negative for color change and rash.   Neurological:  Negative for seizures and syncope.   Psychiatric/Behavioral: Negative.     All other systems reviewed and are negative.        Objective:  BP Readings from Last 1 Encounters:   05/02/24 120/82      Wt Readings from Last 1 Encounters:   05/02/24 81.6 kg (180 lb)      BMI:   Estimated body mass index is 29.95 kg/m² as calculated from the following:    Height as of this encounter: 5' 5\" (1.651 m).    Weight as of this encounter: 81.6 kg (180 lb).  BSA:   Estimated body surface area is 1.89 meters squared as calculated from the following:    Height as of this encounter: 5' 5\" (1.651 m).    Weight as of this encounter: 81.6 kg (180 lb).   Physical Exam  Vitals and nursing note reviewed.   Constitutional:       Appearance: Normal appearance. She is well-developed.   HENT:      Head: Normocephalic and atraumatic.      Right Ear: External ear normal.      Left Ear: External ear normal.   Eyes:      Extraocular Movements: Extraocular movements intact.      Conjunctiva/sclera: Conjunctivae normal.   Pulmonary:      Effort: Pulmonary effort is normal.   Musculoskeletal:         General: Tenderness (left knee arthralgia) present.      Cervical back: Neck supple.      Left knee: Effusion (grade 1) present.      Instability Tests: Medial Dorian test negative and lateral Dorian test negative.   Skin:     General: Skin is warm and dry.   Neurological:      Mental Status: She is alert and oriented " to person, place, and time.      Deep Tendon Reflexes: Reflexes are normal and symmetric.   Psychiatric:         Mood and Affect: Mood normal.         Behavior: Behavior normal.       Left Knee Exam     Muscle Strength   The patient has normal left knee strength.    Tenderness   Left knee tenderness location: no specific tenderness.    Range of Motion   Extension:  normal   Flexion:  normal     Tests   Dorian:  Medial - negative Lateral - negative  Varus: negative Valgus: negative  Patellar apprehension: negative    Other   Erythema: absent  Scars: absent  Sensation: normal  Pulse: present  Swelling: mild  Effusion: effusion (grade 1) present    Comments:  Mild crepitation with motion of patella             I have personally reviewed pertinent films in PACS and my interpretation is MR of left knee degenerative complex tear of medial meniscus posterior horn, mild osteoarthritis, effusion, anteromedial plica, xr left knee mild osteoarthritis with small marginal spurring .    Large joint arthrocentesis: L knee  Universal Protocol:  Consent: Verbal consent obtained.  Risks and benefits: risks, benefits and alternatives were discussed  Consent given by: patient  Patient understanding: patient states understanding of the procedure being performed  Site marked: the operative site was marked  Patient identity confirmed: verbally with patient  Supporting Documentation  Indications: pain and joint swelling   Procedure Details  Location: knee - L knee  Preparation: Patient was prepped and draped in the usual sterile fashion  Needle size: 18 G  Ultrasound guidance: no  Approach: lateral  Medications administered: 7 mL lidocaine (PF) 1 %; 6 mg betamethasone acetate-betamethasone sodium phosphate 6 (3-3) mg/mL    Aspirate amount: 6 mL  Aspirate: clear and yellow  Patient tolerance: patient tolerated the procedure well with no immediate complications  Dressing:  Sterile dressing applied    5 ML 1% lidocaine injection as local  anesthetic          Scribe Attestation      I,:  Jose Gates am acting as a scribe while in the presence of the attending physician.:       I,:  Valerie Crockett MD personally performed the services described in this documentation    as scribed in my presence.:

## 2024-05-08 ENCOUNTER — OFFICE VISIT (OUTPATIENT)
Dept: OBGYN CLINIC | Facility: CLINIC | Age: 59
End: 2024-05-08
Payer: COMMERCIAL

## 2024-05-08 ENCOUNTER — APPOINTMENT (OUTPATIENT)
Dept: RADIOLOGY | Facility: CLINIC | Age: 59
End: 2024-05-08
Payer: COMMERCIAL

## 2024-05-08 VITALS
BODY MASS INDEX: 29.99 KG/M2 | HEIGHT: 65 IN | WEIGHT: 180 LBS | DIASTOLIC BLOOD PRESSURE: 82 MMHG | SYSTOLIC BLOOD PRESSURE: 122 MMHG

## 2024-05-08 DIAGNOSIS — M25.561 ACUTE PAIN OF RIGHT KNEE: ICD-10-CM

## 2024-05-08 DIAGNOSIS — M25.461 EFFUSION OF RIGHT KNEE: ICD-10-CM

## 2024-05-08 DIAGNOSIS — M17.11 PRIMARY OSTEOARTHRITIS OF RIGHT KNEE: Primary | ICD-10-CM

## 2024-05-08 PROCEDURE — 20610 DRAIN/INJ JOINT/BURSA W/O US: CPT | Performed by: ORTHOPAEDIC SURGERY

## 2024-05-08 PROCEDURE — 73564 X-RAY EXAM KNEE 4 OR MORE: CPT

## 2024-05-08 PROCEDURE — 99213 OFFICE O/P EST LOW 20 MIN: CPT | Performed by: ORTHOPAEDIC SURGERY

## 2024-05-08 RX ORDER — LIDOCAINE HYDROCHLORIDE 10 MG/ML
7 INJECTION, SOLUTION EPIDURAL; INFILTRATION; INTRACAUDAL; PERINEURAL
Status: COMPLETED | OUTPATIENT
Start: 2024-05-08 | End: 2024-05-08

## 2024-05-08 RX ORDER — BETAMETHASONE SODIUM PHOSPHATE AND BETAMETHASONE ACETATE 3; 3 MG/ML; MG/ML
6 INJECTION, SUSPENSION INTRA-ARTICULAR; INTRALESIONAL; INTRAMUSCULAR; SOFT TISSUE
Status: COMPLETED | OUTPATIENT
Start: 2024-05-08 | End: 2024-05-08

## 2024-05-08 RX ADMIN — BETAMETHASONE SODIUM PHOSPHATE AND BETAMETHASONE ACETATE 6 MG: 3; 3 INJECTION, SUSPENSION INTRA-ARTICULAR; INTRALESIONAL; INTRAMUSCULAR; SOFT TISSUE at 14:30

## 2024-05-08 RX ADMIN — LIDOCAINE HYDROCHLORIDE 7 ML: 10 INJECTION, SOLUTION EPIDURAL; INFILTRATION; INTRACAUDAL; PERINEURAL at 14:30

## 2024-05-08 NOTE — PROGRESS NOTES
Assessment:     1. Primary osteoarthritis of right knee    2. Effusion of right knee    3. Acute pain of right knee        Plan:     Problem List Items Addressed This Visit          Musculoskeletal and Integument    Primary osteoarthritis of right knee - Primary    Relevant Medications    lidocaine (PF) (XYLOCAINE-MPF) 1 % injection 7 mL (Completed)    betamethasone acetate-betamethasone sodium phosphate (CELESTONE) injection 6 mg (Completed)    Other Relevant Orders    Large joint arthrocentesis: R knee (Completed)     Other Visit Diagnoses       Effusion of right knee        Relevant Medications    lidocaine (PF) (XYLOCAINE-MPF) 1 % injection 7 mL (Completed)    betamethasone acetate-betamethasone sodium phosphate (CELESTONE) injection 6 mg (Completed)    Other Relevant Orders    Large joint arthrocentesis: R knee (Completed)    Acute pain of right knee        Relevant Orders    XR knee 4+ vw right injury         Findings consistent with right knee osteoarthritis with effusion. Imaging and prognosis reviewed with patient. Aspirated 36 cc of clear fluid from knee followed by cortisone injection, tolerated procedure well, cold compress today. Repeat CSI every 3-4 months if needed.  Recommend patient to use over-the-counter NSAID and topical NSAID creams for pain control. Stationary bike, elliptical for low impact exercise. See patient back as needed.  All patient's questions were answered to her satisfaction.  This note is created using dictation transcription.  It may contain typographical errors, grammatical errors, improperly dictated words, background noise and other errors.    Subjective:     Patient ID: Riccardo Chu is a 58 y.o. female.  Chief Complaint:  58 yr old female in for evaluation of right knee pain. Seen and treated for days ago for her left knee. Chronic right knee pain for past 6 yrs. History arthroscopy 6 yrs ago at [facility]. Pain is global in nature with no new injury or trauma.  Pain made worse with weight bearing activities, standing, walking, stairs. She has also noticed swelling in her knee. She has just been dealing with the pain and no recent treatment. She denies any erma instability, locking.     Allergy:  No Known Allergies  Medications:  all current active meds have been reviewed  Past Medical History:  Past Medical History:   Diagnosis Date    HL (hearing loss) Feb 2021    Hypertension     Migraine Years     Past Surgical History:  Past Surgical History:   Procedure Laterality Date    CRANIOTOMY Left 8/18/2021    Procedure: Image guided left retromastoid craniotomy for debulking of CP angle mass; intraoperative monitoring;  Surgeon: Negro Ayoub MD;  Location: [facility];  Service: Neurosurgery    IR CHEST TUBE PLACEMENT  8/21/2021     Family History:  Family History   Problem Relation Age of Onset    Stroke Maternal Grandmother      Social History:  Social History     Substance and Sexual Activity   Alcohol Use Yes    Alcohol/week: 5.0 standard drinks of alcohol    Types: 5 Cans of beer per week    Comment: Social     Social History     Substance and Sexual Activity   Drug Use Never     Social History     Tobacco Use   Smoking Status Former    Current packs/day: 0.00    Types: Cigarettes    Quit date: 1/1/2021    Years since quitting: 3.3   Smokeless Tobacco Never     Review of Systems   Constitutional:  Negative for chills and fever.   HENT:  Negative for ear pain and sore throat.    Eyes:  Negative for pain and visual disturbance.   Respiratory:  Negative for cough and shortness of breath.    Cardiovascular:  Negative for chest pain and palpitations.   Gastrointestinal:  Negative for abdominal pain and vomiting.   Genitourinary:  Negative for dysuria and hematuria.   Musculoskeletal:  Positive for arthralgias (right knee) and joint swelling (Right knee). Negative for back pain.   Skin:  Negative for color change and rash.   Neurological:  Negative for seizures and syncope.  "  Psychiatric/Behavioral: Negative.     All other systems reviewed and are negative.        Objective:  BP Readings from Last 1 Encounters:   05/08/24 122/82      Wt Readings from Last 1 Encounters:   05/08/24 81.6 kg (180 lb)      BMI:   Estimated body mass index is 29.95 kg/m² as calculated from the following:    Height as of this encounter: 5' 5\" (1.651 m).    Weight as of this encounter: 81.6 kg (180 lb).  BSA:   Estimated body surface area is 1.89 meters squared as calculated from the following:    Height as of this encounter: 5' 5\" (1.651 m).    Weight as of this encounter: 81.6 kg (180 lb).   Physical Exam  Vitals and nursing note reviewed.   Constitutional:       Appearance: Normal appearance. She is well-developed.   HENT:      Head: Normocephalic and atraumatic.      Right Ear: External ear normal.      Left Ear: External ear normal.   Eyes:      Extraocular Movements: Extraocular movements intact.      Conjunctiva/sclera: Conjunctivae normal.   Pulmonary:      Effort: Pulmonary effort is normal.   Musculoskeletal:         General: Tenderness (right knee arthralgia) present.      Cervical back: Neck supple.      Right knee: Effusion (grade 2) present.      Instability Tests: Medial Dorian test negative and lateral Dorian test negative.   Skin:     General: Skin is warm and dry.   Neurological:      Mental Status: She is alert and oriented to person, place, and time.      Deep Tendon Reflexes: Reflexes are normal and symmetric.   Psychiatric:         Mood and Affect: Mood normal.         Behavior: Behavior normal.       Right Knee Exam     Muscle Strength   The patient has normal right knee strength.    Tenderness   Right knee tenderness location: diffuse.    Range of Motion   Extension:  0   Flexion:  110 (pain)     Tests   Dorian:  Medial - negative Lateral - negative  Varus: negative Valgus: negative  Patellar apprehension: negative    Other   Erythema: absent  Scars: present (well healed portal " sites)  Sensation: normal  Pulse: present  Swelling: moderate  Effusion: effusion (grade 2) present    Comments:  Patellofemoral joint crepitation with knee motion            I have personally reviewed pertinent films in PACS and my interpretation is xr right knee moderate to severe medial compartment osteoarthritis with joint narrowing.  Marginal osteophytes.    Large joint arthrocentesis: R knee  Universal Protocol:  Consent: Verbal consent obtained.  Risks and benefits: risks, benefits and alternatives were discussed  Consent given by: patient  Patient understanding: patient states understanding of the procedure being performed  Site marked: the operative site was marked  Patient identity confirmed: verbally with patient  Supporting Documentation  Indications: pain and joint swelling   Procedure Details  Location: knee - R knee  Preparation: Patient was prepped and draped in the usual sterile fashion  Needle size: 18 G  Ultrasound guidance: no  Approach: Superolateral.  Medications administered: 7 mL lidocaine (PF) 1 %; 6 mg betamethasone acetate-betamethasone sodium phosphate 6 (3-3) mg/mL    Aspirate amount: 36 mL  Aspirate: clear  Patient tolerance: patient tolerated the procedure well with no immediate complications  Dressing:  Sterile dressing applied    5 ML 1 % lidocaine injection with 25 gauge needle as local anesthetic         Scribe Attestation      I,:  Jose Gates am acting as a scribe while in the presence of the attending physician.:       I,:  Valerie Crockett MD personally performed the services described in this documentation    as scribed in my presence.:

## 2024-05-10 ENCOUNTER — APPOINTMENT (OUTPATIENT)
Dept: LAB | Facility: CLINIC | Age: 59
End: 2024-05-10
Payer: COMMERCIAL

## 2024-05-10 DIAGNOSIS — R26.89 BALANCE DISORDER: ICD-10-CM

## 2024-05-10 LAB
TSH SERPL DL<=0.05 MIU/L-ACNC: 0.65 UIU/ML (ref 0.45–4.5)
VIT B12 SERPL-MCNC: 363 PG/ML (ref 180–914)

## 2024-05-10 PROCEDURE — 82607 VITAMIN B-12: CPT

## 2024-05-10 PROCEDURE — 84443 ASSAY THYROID STIM HORMONE: CPT

## 2024-05-10 PROCEDURE — 83918 ORGANIC ACIDS TOTAL QUANT: CPT

## 2024-05-10 PROCEDURE — 36415 COLL VENOUS BLD VENIPUNCTURE: CPT

## 2024-05-14 LAB — METHYLMALONATE SERPL-SCNC: 170 NMOL/L (ref 0–378)

## 2024-05-15 ENCOUNTER — OFFICE VISIT (OUTPATIENT)
Dept: NEUROLOGY | Facility: CLINIC | Age: 59
End: 2024-05-15
Payer: COMMERCIAL

## 2024-05-15 VITALS
TEMPERATURE: 97.5 F | BODY MASS INDEX: 29.64 KG/M2 | HEART RATE: 104 BPM | DIASTOLIC BLOOD PRESSURE: 78 MMHG | WEIGHT: 177.9 LBS | HEIGHT: 65 IN | SYSTOLIC BLOOD PRESSURE: 124 MMHG

## 2024-05-15 DIAGNOSIS — G43.109 MIGRAINE WITH AURA AND WITHOUT STATUS MIGRAINOSUS, NOT INTRACTABLE: ICD-10-CM

## 2024-05-15 DIAGNOSIS — R51.9 CHRONIC NONINTRACTABLE HEADACHE, UNSPECIFIED HEADACHE TYPE: ICD-10-CM

## 2024-05-15 DIAGNOSIS — R26.89 BALANCE DISORDER: Primary | ICD-10-CM

## 2024-05-15 DIAGNOSIS — G89.29 CHRONIC NONINTRACTABLE HEADACHE, UNSPECIFIED HEADACHE TYPE: ICD-10-CM

## 2024-05-15 PROCEDURE — 99214 OFFICE O/P EST MOD 30 MIN: CPT | Performed by: NURSE PRACTITIONER

## 2024-05-15 RX ORDER — IBUPROFEN 800 MG/1
800 TABLET ORAL 3 TIMES DAILY PRN
COMMUNITY
Start: 2024-04-25

## 2024-05-15 RX ORDER — AMITRIPTYLINE HYDROCHLORIDE 25 MG/1
25 TABLET, FILM COATED ORAL
Qty: 30 TABLET | Refills: 11 | Status: SHIPPED | OUTPATIENT
Start: 2024-05-15

## 2024-05-15 NOTE — PATIENT INSTRUCTIONS
- Continue amitriptyline 25 mg at night  - Continue with tylenol or ibuprofen for headaches  - Continue staying active, eating well, drinking well, and sleeping well.   - Let us know if there are issues, concerns, or worsening/more frequent of headaches  - Follow up in 6 months

## 2024-05-15 NOTE — PROGRESS NOTES
Patient ID: Riccardo Chu is a 58 y.o. female with  left vestibular schwannoma c/b obstructive hydrocephalus, s/p resection, who is returning to Neurology office for follow up of her spells of decreased responsiveness, headaches, and balance difficulty.      Assessment/Plan:    Migraine with aura and without status migrainosus, not intractable  Transitioned from gabapentin to amitriptyline at her last visit. Since then she has only had one headache during transition off of lexapro. Her headaches, mood, sleep and quality of life have greatly improved with this transition.     Recommended she continue current dose of amitriptyline 25 mg HS. With breakthrough headaches she will continue with tylenol or ibuprofen PRN. Encouraged continuing to stay active, eating and drinking well, as well as getting adequate sleep.     She will notify the office with any worsening or increased frequency of headaches. Amitriptyline dose could be increased if needed. Follow up in 6 months or sooner if needed.     Balance disorder  Ongoing difficulties with balance and asymmetric hearing loss from schwannoma s/p resection. Balance has recently improved with knee interventions. She will continue following with neurosurgery for surveillance imaging. Most recent imaging continues to have stable findings.     She will Return for 6 months with Dr Jimenez.      Subjective:  Riccardo Chu is a 58 y.o. female with  left vestibular schwannoma c/b obstructive hydrocephalus, s/p resection, who is returning to Neurology office for follow up of her spells of decreased responsiveness, headaches, and balance difficulty. He was last seen in the office by Dr Jimenez on 2/5/2024. At that time, she continued to have frequent headaches and was not tolerating higher doses of gabapentin due to side effects. Noted poor sleep and some other symptoms that were likely unrelated to migraines. Recommended changing gabapentin to amitriptyline. She was also to  "discuss ongoing issues with anxiety and depression with her PCP. Blood work was ordered to check further into her fatigue including TSH, B12, and methylmalonic acid. There were continued issues with balance and asymmetric hearing loss due to schwannoma s/p resection. She was to continue following with neurosurgery for ongoing surveillance. Recommended 3 month follow up.     Since her last visit, she has been feeling really well. She is no longer fatigued or tired. Sleep is good. No longer stays up late at night. She is walking more since having fluid removed from her left knee, surgery/ fluid removed from the right knee, and was given steroid shots bilaterally. Balance seems to be better with this being done and she has been much more active.    She has been taking amitriptyline 25 gm HS. Since transition only one headache while weaning off of lexapro. She has been totally off of lexapro for about 2 weeks.     No longer taking gabapentin or lexapro. She feels that the lexapro may have ledesma her more depressed. She did have shingles recently as well which is now all healed up. Weaned off of gabapentin in February.     Current neurology prescribed medications:  - amitriptyline 25 mg HS  Other medications as per Epic.     Latest Reference Range & Units 05/10/24 10:02   METHYLMALONIC ACID, S 0 - 378 nmol/L 170   Vitamin B-12 180 - 914 pg/mL 363      Latest Reference Range & Units 05/10/24 10:02   TSH 3RD GENERATON 0.450 - 4.500 uIU/mL 0.652       Her daughter was also present at today's visit.      I reviewed prior neurology notes, most recent labs, most recent imaging report, [facility], and summarized above.        Objective:    Blood pressure 124/78, pulse 104, temperature 97.5 °F (36.4 °C), temperature source Temporal, height 5' 5\" (1.651 m), weight 80.7 kg (177 lb 14.4 oz), not currently breastfeeding.    Physical Exam  No apparent distress.   Appears well nourished.   Mood appropriate for " situation     Neurologic Exam  Mental status- alert and oriented to person, place, and time. Speech appropriate for conversation. Good attention and knowledge.     Cranial Nerves- PERRL, EOMS normal, facial sensation and muscles symmetric, hearing intact to finger rubs on right, unable to hear finger rubs on left, tongue midline, palate rise symmetrical, shoulder shrug symmetrical.    Motor- No pronator drift. Appropriate strength. Moves all extremities freely. No tremor.    Sensory-  Intact distally in all extremities to light touch.     DTRs- 2+ and symmetric in all extremities.     Gait- normal casual gait.     Coordination- FNF intact.     ROS:  Review of Systems   Constitutional: Negative.    HENT: Negative.     Eyes: Negative.    Respiratory: Negative.     Cardiovascular: Negative.    Gastrointestinal: Negative.    Endocrine: Negative.    Genitourinary: Negative.    Musculoskeletal: Negative.    Skin: Negative.    Allergic/Immunologic: Negative.    Neurological: Negative.    Hematological: Negative.    Psychiatric/Behavioral: Negative.       ROS obtained by MA and reviewed by myself.     This note may have been created using voice recognition software. There may be unintentional errors such as grammatical errors, spelling errors, or pronoun errors.

## 2024-05-15 NOTE — ASSESSMENT & PLAN NOTE
Transitioned from gabapentin to amitriptyline at her last visit. Since then she has only had one headache during transition off of lexapro. Her headaches, mood, sleep and quality of life have greatly improved with this transition.     Recommended she continue current dose of amitriptyline 25 mg HS. With breakthrough headaches she will continue with tylenol or ibuprofen PRN. Encouraged continuing to stay active, eating and drinking well, as well as getting adequate sleep.     She will notify the office with any worsening or increased frequency of headaches. Amitriptyline dose could be increased if needed. Follow up in 6 months or sooner if needed.

## 2024-05-15 NOTE — ASSESSMENT & PLAN NOTE
Ongoing difficulties with balance and asymmetric hearing loss from schwannoma s/p resection. Balance has recently improved with knee interventions. She will continue following with neurosurgery for surveillance imaging. Most recent imaging continues to have stable findings.

## 2024-10-04 ENCOUNTER — HOSPITAL ENCOUNTER (OUTPATIENT)
Dept: MRI IMAGING | Facility: HOSPITAL | Age: 59
End: 2024-10-04
Payer: COMMERCIAL

## 2024-10-04 DIAGNOSIS — D33.3 CEREBELLOPONTINE ANGLE TUMOR (HCC): ICD-10-CM

## 2024-10-04 PROCEDURE — G1004 CDSM NDSC: HCPCS

## 2024-10-04 PROCEDURE — 70553 MRI BRAIN STEM W/O & W/DYE: CPT

## 2024-10-04 PROCEDURE — A9585 GADOBUTROL INJECTION: HCPCS | Performed by: NURSE PRACTITIONER

## 2024-10-04 RX ORDER — GADOBUTROL 604.72 MG/ML
8 INJECTION INTRAVENOUS
Status: COMPLETED | OUTPATIENT
Start: 2024-10-04 | End: 2024-10-04

## 2024-10-04 RX ADMIN — GADOBUTROL 8 ML: 604.72 INJECTION INTRAVENOUS at 11:19

## 2024-10-07 NOTE — ASSESSMENT & PLAN NOTE
1-year follow up s/p debulking of left CP angle mass 8/18/21 with Dr. Ayoub.  Pathology: Schwannoma.  Continues to note HA, left neck and ear pain especially with seasonal changes, left facial numbness  Exam: non-focal except for left cheek numbness     Imaging:  MRI brain/IAC 10/4/24: Stable postoperative appearance from prior subtotal resection of the left cerebellopontine angle vestibular schwannoma with unchanged small residual lesion centered in the left porous acoustical internus. Unchanged postoperative encephalomalacia and gliosis involving dorsolateral left fabian, brachium pontis, and left cerebellum    Plan:  Reviewed imaging with patient and compared to previous. There is stable residual schwannoma seen.    No neurosurgical intervention recommended  Recommend continued surveillance.  Advised to let us know if symptoms change/worsen.  Follow up in 1 year with updated MRI brain IAC w/wo to see AP.  Anticipate annual surveillance for 5 years postoperatively, then can consider spacing out follow-up if imaging stable  Call sooner with any questions or concerns.    Orders:    BUN; Future    Creatinine, serum; Future    MRI brain IAC wo and w contrast; Future

## 2024-10-09 ENCOUNTER — OFFICE VISIT (OUTPATIENT)
Dept: NEUROSURGERY | Facility: CLINIC | Age: 59
End: 2024-10-09
Payer: COMMERCIAL

## 2024-10-09 VITALS
TEMPERATURE: 97.3 F | WEIGHT: 169 LBS | HEART RATE: 95 BPM | DIASTOLIC BLOOD PRESSURE: 76 MMHG | SYSTOLIC BLOOD PRESSURE: 124 MMHG | HEIGHT: 65 IN | OXYGEN SATURATION: 99 % | BODY MASS INDEX: 28.16 KG/M2 | RESPIRATION RATE: 18 BRPM

## 2024-10-09 DIAGNOSIS — D33.3 CEREBELLOPONTINE ANGLE TUMOR (HCC): Primary | ICD-10-CM

## 2024-10-09 PROCEDURE — 99214 OFFICE O/P EST MOD 30 MIN: CPT | Performed by: PHYSICIAN ASSISTANT

## 2024-10-09 NOTE — PROGRESS NOTES
Ambulatory Visit  Name: Riccardo Chu      : 1965      MRN: [id number]  Encounter Provider: Bethany Gray PA-C  Encounter Date: 10/9/2024   Encounter [facility]    Assessment & Plan  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HCC)  1-year follow up s/p debulking of left CP angle mass 21 with Dr. Ayoub.  Pathology: Schwannoma.  Continues to note HA, left neck and ear pain especially with seasonal changes, left facial numbness  Exam: non-focal except for left cheek numbness     Imaging:  MRI brain/IAC 10/4/24: Stable postoperative appearance from prior subtotal resection of the left cerebellopontine angle vestibular schwannoma with unchanged small residual lesion centered in the left porous acoustical internus. Unchanged postoperative encephalomalacia and gliosis involving dorsolateral left fabian, brachium pontis, and left cerebellum    Plan:  Reviewed imaging with patient and compared to previous. There is stable residual schwannoma seen.    No neurosurgical intervention recommended  Recommend continued surveillance.  Advised to let us know if symptoms change/worsen.  Follow up in 1 year with updated MRI brain IAC w/wo to see AP.  Anticipate annual surveillance for 5 years postoperatively, then can consider spacing out follow-up if imaging stable  Call sooner with any questions or concerns.    Orders:    BUN; Future    Creatinine, serum; Future    MRI brain IAC wo and w contrast; Future      History of Present Illness   59 year old female seen for 1 year follow up s/p left retromastoid craniotomy for debulking of CP angle mass 21 by Dr. Ayoub.  Pathology was schwannoma. She originally presented 21 with headaches, vertigo and hearing loss then represented to the ED on 8/15/21 with questionable seizure-like activity and transient LOC after fall.  Patient is noting more pressure headaches, especially with changing of the seasons.  She feels this is a  tough time due to increasing headaches, neck as well as left ear pain.  She notes chronic tinnitus for years, worse on the left.  Also has some numbness to the left side of her face which is unchanged.      Review of Systems   Constitutional: Negative.    HENT:  Positive for tinnitus (constant ringing).    Eyes:  Negative for visual disturbance.   Respiratory: Negative.     Cardiovascular: Negative.    Gastrointestinal:  Positive for nausea (laying down at night that started a few weeks ago).   Endocrine: Negative.    Genitourinary: Negative.    Musculoskeletal: Negative.    Skin: Negative.    Allergic/Immunologic: Negative.    Neurological:  Positive for headaches (almost daily headaches, will take tylenol ans lay down but then headache is back the next day). Negative for speech difficulty and weakness. Numbness: always has numbness on the left side of face.       Brain fog   Hematological: Negative.    Psychiatric/Behavioral: Negative.  Confusion: can be forgetful.      I have personally reviewed the MA's review of systems and made changes as necessary.    Past Medical History   Past Medical History:   Diagnosis Date    HL (hearing loss) Feb 2021    Hypertension     Migraine Years     Past Surgical History:   Procedure Laterality Date    CRANIOTOMY Left 8/18/2021    Procedure: Image guided left retromastoid craniotomy for debulking of CP angle mass; intraoperative monitoring;  Surgeon: Negro Ayoub MD;  Location: [facility];  Service: Neurosurgery    IR CHEST TUBE PLACEMENT  8/21/2021     Family History   Problem Relation Age of Onset    Stroke Maternal Grandmother      Current Outpatient Medications on File Prior to Visit   Medication Sig Dispense Refill    acetaminophen (TYLENOL) 325 mg tablet Take 3 tablets (975 mg total) by mouth every 8 (eight) hours (Patient taking differently: Take 975 mg by mouth every 8 (eight) hours as needed)  0    amitriptyline (ELAVIL) 25 mg tablet Take 1 tablet (25 mg total) by  "mouth daily at bedtime 30 tablet 11    ibuprofen (MOTRIN) 800 mg tablet Take 800 mg by mouth 3 (three) times a day as needed      losartan (COZAAR) 50 mg tablet Take 50 mg by mouth daily      metroNIDAZOLE (METROGEL) 1 % gel Apply 1 Application topically if needed       No current facility-administered medications on file prior to visit.   No Known Allergies   Social History     Tobacco Use    Smoking status: Former     Current packs/day: 0.00     Types: Cigarettes     Quit date: 1/1/2021     Years since quitting: 3.7    Smokeless tobacco: Never   Vaping Use    Vaping status: Never Used   Substance and Sexual Activity    Alcohol use: Yes     Alcohol/week: 5.0 standard drinks of alcohol     Types: 5 Cans of beer per week     Comment: Social    Drug use: Never    Sexual activity: Yes     Partners: Male     Birth control/protection: Post-menopausal     Objective     /76 (BP Location: Left arm, Patient Position: Sitting, Cuff Size: Adult)   Pulse 95   Temp (!) 97.3 °F (36.3 °C) (Temporal)   Resp 18   Ht 5' 5\" (1.651 m)   Wt 76.7 kg (169 lb)   SpO2 99%   BMI 28.12 kg/m²     Physical Exam  Vitals reviewed.   Constitutional:       General: She is awake.      Appearance: Normal appearance.   HENT:      Head: Normocephalic and atraumatic.   Eyes:      Extraocular Movements: EOM normal.      Conjunctiva/sclera: Conjunctivae normal.      Pupils: Pupils are equal, round, and reactive to light.   Cardiovascular:      Rate and Rhythm: Normal rate.   Pulmonary:      Effort: Pulmonary effort is normal.   Skin:     General: Skin is warm and dry.   Neurological:      Mental Status: She is alert and oriented to person, place, and time.      Motor: Motor strength is normal.     Coordination: Finger-Nose-Finger Test normal.      Gait: Gait is intact.      Deep Tendon Reflexes:      Reflex Scores:       Bicep reflexes are 2+ on the right side and 2+ on the left side.       Brachioradialis reflexes are 2+ on the right side " and 2+ on the left side.       Patellar reflexes are 2+ on the right side and 2+ on the left side.  Psychiatric:         Attention and Perception: Attention and perception normal.         Mood and Affect: Mood and affect normal.         Speech: Speech normal.         Behavior: Behavior normal. Behavior is cooperative.         Thought Content: Thought content normal.         Cognition and Memory: Cognition and memory normal.         Judgment: Judgment normal.       Neurologic Exam     Mental Status   Oriented to person, place, and time.   Oriented to year and month.   Follows 2 step commands.   Attention: normal. Concentration: normal.   Speech: speech is normal   Level of consciousness: alert  Knowledge: good. Able to perform simple calculations.   Able to name object.     Cranial Nerves     CN III, IV, VI   Pupils are equal, round, and reactive to light.  Extraocular motions are normal.   Right pupil: Shape: regular. Reactivity: brisk. Consensual response: intact.   Left pupil: Shape: regular. Reactivity: brisk. Consensual response: intact.   CN III: no CN III palsy  CN VI: no CN VI palsy  Nystagmus: none   Ophthalmoparesis: none  Upgaze: normal  Downgaze: normal  Conjugate gaze: present    CN V   Right facial sensation deficit: none  Left facial sensation deficit: cheeks    CN VII   Facial expression full, symmetric.     CN VIII   CN VIII normal.     CN XI   Right trapezius strength: normal  Left trapezius strength: normal    CN XII   CN XII normal.     Motor Exam   Muscle bulk: normal  Overall muscle tone: normal  Right arm pronator drift: absent  Left arm pronator drift: absent    Strength   Strength 5/5 throughout.     Sensory Exam   Light touch normal.     Gait, Coordination, and Reflexes     Gait  Gait: normal    Coordination   Finger to nose coordination: normal    Tremor   Resting tremor: absent  Intention tremor: absent  Action tremor: absent    Reflexes   Right brachioradialis: 2+  Left brachioradialis:  2+  Right biceps: 2+  Left biceps: 2+  Right patellar: 2+  Left patellar: 2+  Right Ye: absent  Left Ye: absent      I personally reviewed the following image studies in PACS and associated radiology reports: MRI brain. My interpretation of the radiology images/reports is: as above.    Administrative Statements   I have spent a total time of 40 minutes in caring for this patient on the day of the visit/encounter including Diagnostic results, Instructions for management, Patient and family education, Impressions, Counseling / Coordination of care, Documenting in the medical record, Reviewing / ordering tests, medicine, procedures  , and Obtaining or reviewing history  .

## 2024-11-20 ENCOUNTER — OFFICE VISIT (OUTPATIENT)
Dept: NEUROLOGY | Facility: CLINIC | Age: 59
End: 2024-11-20
Payer: COMMERCIAL

## 2024-11-20 VITALS
WEIGHT: 173 LBS | SYSTOLIC BLOOD PRESSURE: 131 MMHG | DIASTOLIC BLOOD PRESSURE: 83 MMHG | HEIGHT: 65 IN | BODY MASS INDEX: 28.82 KG/M2 | HEART RATE: 97 BPM

## 2024-11-20 DIAGNOSIS — R51.9 CHRONIC NONINTRACTABLE HEADACHE, UNSPECIFIED HEADACHE TYPE: ICD-10-CM

## 2024-11-20 DIAGNOSIS — G43.109 MIGRAINE WITH AURA AND WITHOUT STATUS MIGRAINOSUS, NOT INTRACTABLE: ICD-10-CM

## 2024-11-20 DIAGNOSIS — G89.29 CHRONIC NONINTRACTABLE HEADACHE, UNSPECIFIED HEADACHE TYPE: ICD-10-CM

## 2024-11-20 PROCEDURE — 99214 OFFICE O/P EST MOD 30 MIN: CPT | Performed by: PSYCHIATRY & NEUROLOGY

## 2024-11-20 RX ORDER — DEXAMETHASONE 2 MG/1
TABLET ORAL
Qty: 15 TABLET | Refills: 0 | Status: SHIPPED | OUTPATIENT
Start: 2024-11-20

## 2024-11-20 NOTE — PATIENT INSTRUCTIONS
-- For your headache, you can start taking Dexamethasone (steroid) for your headache, as instructed on the pill bottle.     -- Please increase your amitriptyline to be 1.5 tabs (37.5 mg) nightly. If you are still having migraines in about a month, let us know and we can increase this further.     -- when you get a headache, you can take Naproxen 220-440 mg up to every 12 hours.

## 2024-11-20 NOTE — PROGRESS NOTES
Neurology Ambulatory Visit  Name: Riccardo Chu       : 1965       MRN: [id number]   Encounter Provider: Ernesto Jimenez MD   Encounter Date: 2024  Encounter department: NEUROLOGY [facility]    Assessment and Plan  Assessment & Plan  Migraine with aura and without status migrainosus, not intractable  She has been having more frequent migraines, which have been more persistent for the last 2 weeks. She has been using tylenol daily and although this hasn't been long enough to cause medication overuse headache, I would like to get her headache to improve to avoid this.     -- I will have her take a dexamethasone taper over the next week to try to break her current headache cycle. She will also increase amitriptyline to be 37.5 mg nightly. If she continues to have headaches after 1 month, her amitriptyline could be increased further    -- when she gets a headache, I would prefer she avoid taking tylenol more than 1-2 times a week. In the meantime while the above medications are taking effect, she can start Naproxen 220-440 mg  Q12 PRN for her headache.     She will Return in about 3 months (around 2025).    History of Present Illness     HPI   Riccardo Chu is a 59 y.o. female with left vestibular schwannoma c/b obstructive hydrocephalus, s/p resection, who is returning to Neurology office for follow up of her spells of decreased responsiveness, headaches, and balance difficulty    Current medications:  1. Amitriptyline 25 mg nighlty  Other medications as per Epic.    Since her last visit, she has overall been doing okay. She has not had any additional spells of decreased responsiveness.     A few weeks ago, she started to have more trouble with migraines. She still gets a visual aura of wavy lines in her vision and then will get a bad headache. She has severe photophobia and phonophobia. Because of her migraines, she has been using tylenol about daily for the last 2 weeks. It  "may take the edge off, but the headache will come right back.      Prior Medications: none for seizures.     Her history was also obtained from her , who was present at today's visit.         Review of Systems  I have personally reviewed the MA's review of systems and made changes as necessary that was entered in a separate note    Objective   /83 (BP Location: Left arm, Patient Position: Sitting, Cuff Size: Large)   Pulse 97   Ht 5' 5\" (1.651 m)   Wt 78.5 kg (173 lb)   BMI 28.79 kg/m²    Physical Exam  Neurologic Exam      Voice recognition software was used in the generation of this note. There may be unintentional errors including grammatical errors, spelling errors, or pronoun errors.   "

## 2024-11-20 NOTE — PROGRESS NOTES
Review of Systems   Constitutional:  Negative for appetite change, fatigue and fever.   HENT:  Positive for tinnitus. Negative for hearing loss, trouble swallowing and voice change.    Eyes: Negative.  Negative for photophobia, pain and visual disturbance.   Respiratory: Negative.  Negative for shortness of breath.    Cardiovascular: Negative.  Negative for palpitations.   Gastrointestinal: Negative.  Negative for nausea and vomiting.   Endocrine: Negative.  Negative for cold intolerance.   Genitourinary: Negative.  Negative for dysuria, frequency and urgency.   Musculoskeletal:  Negative for back pain, gait problem, myalgias, neck pain and neck stiffness.   Skin: Negative.  Negative for rash.   Allergic/Immunologic: Negative.    Neurological:  Positive for headaches (started a few weeks ago- pressure/ pain). Negative for dizziness, tremors, seizures, syncope, facial asymmetry, speech difficulty, weakness, light-headedness and numbness.   Hematological: Negative.  Does not bruise/bleed easily.   Psychiatric/Behavioral: Negative.  Negative for confusion, hallucinations and sleep disturbance.

## 2024-11-21 NOTE — ASSESSMENT & PLAN NOTE
She has been having more frequent migraines, which have been more persistent for the last 2 weeks. She has been using tylenol daily and although this hasn't been long enough to cause medication overuse headache, I would like to get her headache to improve to avoid this.     -- I will have her take a dexamethasone taper over the next week to try to break her current headache cycle. She will also increase amitriptyline to be 37.5 mg nightly. If she continues to have headaches after 1 month, her amitriptyline could be increased further    -- when she gets a headache, I would prefer she avoid taking tylenol more than 1-2 times a week. In the meantime while the above medications are taking effect, she can start Naproxen 220-440 mg  Q12 PRN for her headache.

## 2024-11-23 NOTE — PHYSICAL THERAPY NOTE
Physical Therapy Evaluation     Patient's Name: Monika Isaacs    Admitting Diagnosis  Obstructive hydrocephalus ([facility] [address] ) [G91 1]  Seizure ([address] ) [R56 9]  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]  Seizure-like activity ([address] ) [R56 9]  Injury, unspecified, initial encounter [T14 [id number]]    Problem List  Patient Active Problem List   Diagnosis    Anxiety    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[address] )    Hydrocephalus ([address] )    Hypertension    Asymmetrical hearing loss    Fall with possible seizure-like activity ([address] )    Leukocytosis    Headache    Pneumothorax       Past Medical History  Past Medical History:   Diagnosis Date    Hypertension        Past Surgical History  Past Surgical History:   Procedure Laterality Date    CRANIOTOMY Left 8/18/2021    Procedure: Image guided left retromastoid craniotomy for debulking of CP angle mass; intraoperative monitoring;  Surgeon: Omid Barton MD;  [facility];  Service: Neurosurgery        08/20/21 0949   PT Last Visit   PT Visit Date 08/20/21   Note Type   Note type Re-Evaluation   Pain Assessment   Pain Assessment Tool FLACC   Pain Rating: FLACC (Rest) - Face 1   Pain Rating: FLACC (Rest) - Legs 0   Pain Rating: FLACC (Rest) - Activity 0   Pain Rating: FLACC (Rest) - Cry 1   Pain Rating: FLACC (Rest) - Consolability 0   Score: FLACC (Rest) 2   Pain Rating: FLACC (Activity) - Face 1   Pain Rating: FLACC (Activity) - Legs 0   Pain Rating: FLACC (Activity) - Activity 0   Pain Rating: FLACC (Activity) - Cry 1   Pain Rating: FLACC (Activity) - Consolability 0   Score: FLACC (Activity) 2   Home Living   Type of Home House   Home Layout Multi-level; Able to live on main level with bedroom/bathroom; Performs ADLs on one level;Stairs to enter with rails   Bathroom Shower/Tub Tub only   Bathroom Toilet Standard   Bathroom Equipment Shower chair;Commode   [address] Walker   Prior Function   Level of [place] Independent with ADLs [ Incomplete Pressure Recording ] and functional mobility   Lives With Spouse; Family   Receives Help From Family   ADL Assistance Independent   IADLs Needs assistance   Falls in the last 6 months >10   Vocational Unemployed   Restrictions/Precautions   Weight Bearing Precautions Per Order No   Braces or Orthoses   (OT PROVIDED EYE PATCH TO ASSIST WITH DIPOPLIA)   Other Precautions Pain; Fall Risk;Telemetry;Multiple lines; Bed Alarm; Chair Alarm;Cognitive; Impulsive  (CHEST TUBE TO SUCTION, CHAIR ALARM ACTIVE POST RE-EVAL )   General   Additional Pertinent History PATIENT IS RE-EVALUATION TODAY SECONDARY TO UNDERGOING THE FOLLOW NEUROSURGICAL PROCEDURE AND ADMISSION TO ICU POST UP:  IMAGE GUIDED RETROMASTOID CRANIOTOMY FOR DEBULKING OF CP ANGLE MASS ON 8/18/2021  POST-OP PATIENT ALSO HAD L CHEST TUBE PLACED SECONDARY TO PNEUMOTHORAX  Family/Caregiver Present Yes   Cognition   Overall Cognitive Status Impaired   Arousal/Participation Alert   Attention Attends with cues to redirect   Orientation Level Oriented X4   Memory Decreased recall of precautions;Decreased recall of recent events;Decreased short term memory   Following Commands Follows one step commands without difficulty   Comments PLEASANT, COOPERATIVE, MEMORY DEFICITS AND MILDLY IMPULSIVE   RUE Assessment   RUE Assessment WFL   LUE Assessment   LUE Assessment WFL   RLE Assessment   RLE Assessment WFL   LLE Assessment   LLE Assessment WFL   Light Touch   RLE Light Touch Grossly intact   LLE Light Touch Grossly intact   Bed Mobility   Supine to Sit Unable to assess   Sit to Supine Unable to assess   Additional Comments IN CHAIR PRE AND POST PT EVAL    Transfers   Sit to Stand 4  Minimal assistance   Additional items Assist x 1;Verbal cues   Stand to Sit 4  Minimal assistance   Additional items Assist x 1;Verbal cues   Ambulation/Elevation   Gait pattern Excessively slow; Step to;Short stride;Decreased foot clearance   Gait Assistance 4  Minimal assist   Additional items Assist x 1   Assistive Device Rolling walker   Distance 3 FEET X 2 (LIMITED BY CT TO SUCTION)   Balance   Static Sitting Fair +   Static Standing Fair -   Ambulatory Poor +   Endurance Deficit   Endurance Deficit Yes   Endurance Deficit Description REPORTED WEAKNESS IN LEGS, FATIGUE POST-OP   Activity Tolerance   Activity Tolerance Patient limited by fatigue;Patient limited by pain   Medical Staff Made Aware THIS RE-EVALUATION WAS PERFORMED WITH AN OCCUPATIONAL THERAPIST DUE TO THE PATIENT'S CO-MORBIDITIES, CLINICALLY UNSTABLE PRESENTATION, AND PRESENT IMPAIRMENTS WHICH ARE A REGRESSION FROM THE PATIENT'S BASELINE  Nurse Made Aware MELISSA TO SEE PER RN VICKIE    Assessment   Prognosis Good   Problem List Decreased strength;Decreased endurance; Impaired balance;Decreased mobility; Decreased safety awareness; Impaired judgement;Decreased cognition   Assessment PT COMPLETED RE-EVALUATION OF 55YEAR OLD FEMALE ADMITTED TO [facility] ON 8/14/2021 AFTER 3 FALLS AND 3RD FALL WITH POSSIBLE SEIZURE LIKE ACTIVITY  PATIENT WAS RECENTLY AT [facility] (8/8-8/12) FOR EVALUATION OF PROGRESSIVE NEUROLOGICAL SYMPTOMS AND WAS FOUND TO WITH HYDROCEPHALUS AND 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  PATIENT WAS EVALUATED BY NEUROSURGERY AT THAT TIME WITH PLAN FOR OPERATIVE INTERVENTION IN THE FUTURE  DURING THIS ADMISSION, PATIENT WAS EVALUATED BY PT ON 8/16/2021 DURING WHICH SHE AMBULATED 25 FEET WITH RW MIN-AX1 AND WAS RECOMMENDED FOR HOME WITH OUTPATIENT PT  PATIENT IS RE-EVALUATION TODAY SECONDARY TO UNDERGOING THE FOLLOW NEUROSURGICAL PROCEDURE AND ADMISSION TO ICU POST UP:  IMAGE GUIDED RETROMASTOID CRANIOTOMY FOR DEBULKING OF CP ANGLE MASS ON 8/18/2021  POST-OP PATIENT ALSO HAD L CHEST TUBE PLACED SECONDARY TO PNEUMOTHORAX  DURING RE-EVALUATION TODAY, PATIENT WAS RECEIVED OOB IN CHAIR  SHE PRESENTS WITH GROSSLY 5/5 STRENGTH IN B/L LE AND INTACT LE COORDINATION   REPORTS INTERMITTENT DIPOPLIA AND PRESENTS WITH COGNITIVE DEFICITS (IMPULSIVE)  SHE WAS ABLE TO PERFORM SIT<-->STAND TRANSFER AND AMBULATION MIN-AX1 W/ VC  SHE AMBULATED 3 FEET FORWARD + 3 FEET BACKWARDS (LIMITED BY CHEST TUBE TO SUCTION) WITH USE OF RW  THIS PATIENT PRESENTS WITH BOTH COGNITIVE AND PHYSICAL DEFICITS POST-OP AND IS RECOMMENDED FOR REHAB UPON D/C  PATIENT WILL BENEFIT FROM CONTINUED SKILLED PT THIS ADMISSION TO ACHIEVE MAXIMAL FUNCTION AND SAFETY  Goals   Patient Goals TO BE INDEPENDENT AGAIN    LTG Expiration Date 09/03/21   Long Term Goal #1 1) PERFORM BED MOBILITY MOD-I TO PARTICIPATE IN FREQUENT REPOSITIONING AND IMPROVE SKIN INTEGRITY; 2) PERFORM SIT<-->STAND TRANSFER MOD-I TO PROMOTE I WITH TOILETING AND OOB MOBILITY; 3) AMBULATE 200 FEET MOD-I W/ LEAST RESTRICTIVE DEVICE TO PARTICIPATE IN HOUSEHOLD AND COMMUNITY LEVEL AMBULATION; 4) IMPROVE B/L LE STRENGTH BY 1/2 GRADE TO IMPROVE EFFICIENCY OF TRANSFERS; 5) IMPROVE BALANCE BY 1 GRADE TO REDUCE RISK FOR FALLS; 6) NAVIGATE 12 STEPS S LEVEL IN ORDER TO SAFELY NAVIGATE MULTIPLE FLOORS AT HOME  PT Treatment Day 0   Plan   Treatment/Interventions Functional transfer training;LE strengthening/ROM; Elevations; Therapeutic exercise; Endurance training;Patient/family training;Equipment eval/education; Bed mobility;Gait training;OT;Spoke to nursing   PT Frequency Other (Comment)  (3-6X/WK)   Recommendation   PT Discharge Recommendation Post acute rehabilitation services  (PHYSIATRY CONSULT)   Equipment Recommended Carly Daly   PT - OK to Discharge Yes  (TO [facility] )   Joycelyn 8 in Bed Without Bedrails 3   Lying on Back to Sitting on Edge of Flat Bed 3   Moving Bed to Chair 3   Standing Up From Chair 3   Walk in Room 3   Climb 3-5 Stairs 2   Basic Mobility Inpatient Raw Score 17   Basic Mobility Standardized Score 39 67     The patient's AM-PAC Basic Mobility Inpatient Standardized Score is less than 42 9, suggesting this patient may benefit from discharge to post-acute rehabilitation services  Please also refer to the recommendation of the Physical Therapist for safe discharge planning          Jordan Rodriguez PT, DPT

## 2025-02-19 ENCOUNTER — OFFICE VISIT (OUTPATIENT)
Dept: NEUROLOGY | Facility: CLINIC | Age: 60
End: 2025-02-19

## 2025-02-19 VITALS
BODY MASS INDEX: 30.99 KG/M2 | WEIGHT: 186 LBS | SYSTOLIC BLOOD PRESSURE: 142 MMHG | HEIGHT: 65 IN | DIASTOLIC BLOOD PRESSURE: 84 MMHG

## 2025-02-19 DIAGNOSIS — D33.3 CEREBELLOPONTINE ANGLE TUMOR (HCC): ICD-10-CM

## 2025-02-19 DIAGNOSIS — G43.109 MIGRAINE WITH AURA AND WITHOUT STATUS MIGRAINOSUS, NOT INTRACTABLE: Primary | ICD-10-CM

## 2025-02-19 DIAGNOSIS — G89.29 CHRONIC NONINTRACTABLE HEADACHE, UNSPECIFIED HEADACHE TYPE: ICD-10-CM

## 2025-02-19 DIAGNOSIS — R51.9 CHRONIC NONINTRACTABLE HEADACHE, UNSPECIFIED HEADACHE TYPE: ICD-10-CM

## 2025-02-19 PROCEDURE — 99212 OFFICE O/P EST SF 10 MIN: CPT | Performed by: NURSE PRACTITIONER

## 2025-02-19 RX ORDER — COVID-19 ANTIGEN TEST
220 KIT MISCELLANEOUS 2 TIMES DAILY
COMMUNITY

## 2025-02-19 NOTE — ASSESSMENT & PLAN NOTE
Orders:    amitriptyline (ELAVIL) 25 mg tablet; Take 2 tablets (50 mg total) by mouth daily at bedtime

## 2025-02-19 NOTE — ASSESSMENT & PLAN NOTE
59 y.o.female, with left vestibular schwannoma c/b obstructive hydrocephalus, s/p resection, who is returning to Neurology office for follow up of her spells of decreased responsiveness, headaches, and balance difficulty.    Diagnosis: Migraine headache    Plan:  Prevention:  Start Magnesium oxide supplement 400mg daily and Riboflavin (B2) supplement 400mg daily   Continue amitriptyline, increase dose to 50 mg at bedtime    Abortive:  Patient was cautioned to note take NSAIDS (Tylenol, Ibuprofen, Excedrin) more than 3 times a week as this can lead to rebound headaches.    Lifestyle modifications were also discussed, including drinking 48-64 oz of water daily, eating regular meals, getting adequate sleep, and regular exercise.     Follow-up: 1 year  Orders:    amitriptyline (ELAVIL) 25 mg tablet; Take 2 tablets (50 mg total) by mouth daily at bedtime

## 2025-02-19 NOTE — PROGRESS NOTES
Name: Riccardo Chu      : 1965      MRN: [id number]  Encounter Provider: ONELIA Pennington  Encounter Date: 2025   Encounter department: NEUROLOGY [facility]  :  Assessment & Plan  Migraine with aura and without status migrainosus, not intractable  59 y.o.female, with left vestibular schwannoma c/b obstructive hydrocephalus, s/p resection, who is returning to Neurology office for follow up of her spells of decreased responsiveness, headaches, and balance difficulty.    Diagnosis: Migraine headache    Plan:  Prevention:  Start Magnesium oxide supplement 400mg daily and Riboflavin (B2) supplement 400mg daily   Continue amitriptyline, increase dose to 50 mg at bedtime    Abortive:  Patient was cautioned to note take NSAIDS (Tylenol, Ibuprofen, Excedrin) more than 3 times a week as this can lead to rebound headaches.    Lifestyle modifications were also discussed, including drinking 48-64 oz of water daily, eating regular meals, getting adequate sleep, and regular exercise.     Follow-up: 1 year  Orders:    amitriptyline (ELAVIL) 25 mg tablet; Take 2 tablets (50 mg total) by mouth daily at bedtime    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HCC)  Continue follow-up with Neurosurgery             History of Present Illness   Riccardo Chu is a 59 y.o.female, with left vestibular schwannoma c/b obstructive hydrocephalus, s/p resection, who is returning to Neurology office for follow up of her spells of decreased responsiveness, headaches, and balance difficulty.    Since the last office visit with Dr. Jimenez on 24, patient has not experienced any further episodes of passing out.  However, she endorses events where her head will feel bubbly, as if it is filled with air and floating.    Patient's headaches have improved, are now occurring about once a week, lasting anywhere from a few hours to 1 day.  They are relieved with Aleve and rest.  Headaches are described as beginning with an  aura of squiggly lines progressing to a pounding throbbing sensation bitemporally or left-sided, + phonophobia and photophobia.  Excessive talking seems to precipitate her headaches.  She was prescribed a Medrol Dosepak which helped to break her headache cycle.      Current Medications for headaches:   1. Amitriptyline 37.5 mg nighlty   Other medications as per Epic      Past Antiepileptic Medications:  none      Prior Evaluation:     -MRI brain IAC w/wo, 10/4/23: Stable postoperative changes related to subtotal resection of left cerebellopontine angle vestibular schwannoma with an unchanged residual enhancing lesion compared to 8/22/2022.No acute infarction, edema, or pathologic intra-axial enhancement.    - MRI brain: 8/19/2021: Patient is status post removal of the vast majority of the previously identified left CP angle tumor.  There is some residual enhancing tumor remaining in the anterior aspect of the CP angle with improving mass effect upon the brainstem and brachium   Pontis. There is no restricted diffusion identified within the left posterior lateral fabian and brachium pontis consistent with acute ischemia. Persistent mild hydrocephalus involving the lateral ventricles and 3rd ventricle.  Increased FLAIR signal adjacent to the surface of the lateral ventricles may represent transependymal flow of CSF, unchanged.  4th ventricle is not enlarged and there is   improving mass effect upon the 4th ventricle.    -MRI Brain: 8/5/2021: Large left CP angle mass extending into the internal auditory canal, most consistent with acoustic neuroma.  Moderate mass effect upon the posterior fossa structures on the left without edema. Interval development of hydrocephalus involving the lateral ventricles and 3rd ventricle which may be a result of the above described mass and its mass effect upon the 4th ventricle or disruption of CSF flow within the posterior fossa.  Mild increased signal on FLAIR imaging adjacent to the  "lateral ventricles may represent minor transependymal flow of CSF    - Routine EE2021: normal  Imaging:       Physical Exam:     Objective   /84 (BP Location: Right arm, Patient Position: Sitting, Cuff Size: Standard)   Ht 5' 5\" (1.651 m)   Wt 84.4 kg (186 lb)   BMI 30.95 kg/m²      General Exam  In general, patient is well appearing and in no distress.    Neurologic Exam  Mental Status: Alert and oriented x 3  Language: normal fluency and comprehension  Cranial Nerves:  PERRL, EOMI, no nystagmus, Face symmetric, Tongue/palate midline, No dysarthria   Motor: No pronator drift. Normal bulk and tone. Strength 5/5 throughout  Coordination: Finger to nose intact  Gait: normal casual gait, able to tandem walk without difficulty  Romberg negative       Administrative Statements     Voice recognition software was used in the generation of this note. There may be unintentional errors including grammatical errors, spelling errors, or pronoun errors.     "

## 2025-02-19 NOTE — PATIENT INSTRUCTIONS
Continue the Amitriptyline, increase to 50mg at bedtime.     Try wrist weights and/or weighted utensils.

## 2025-07-08 NOTE — ASSESSMENT & PLAN NOTE
Lab Results   Component Value Date    WBC 15 52 (H) 08/29/2021    WBC 16 80 (H) 08/27/2021    WBC 17 02 (H) 08/26/2021   Suspect steroid-induced  · Monitor as needed  Returned call to patient. NP Delia's message relayed. Patient verbalized understanding, she will continue up to the 10th day.

## (undated) DEVICE — CUSA® EXCEL 36 KHZ CEM™ NOSECONE: Brand: CUSA® EXCEL

## (undated) DEVICE — MARKER REFLECTIVE RADIOPAQUE SPHERE

## (undated) DEVICE — GLOVE INDICATOR PI UNDERGLOVE SZ 8.5 BLUE

## (undated) DEVICE — DRAPE CAMERA/LASER

## (undated) DEVICE — ANTIBACTERIAL VIOLET BRAIDED (POLYGLACTIN 910), SYNTHETIC ABSORBABLE SUTURE: Brand: COATED VICRYL

## (undated) DEVICE — SURGICEL 4 X 8

## (undated) DEVICE — SUT MONOCRYL 2-0 SH 27 IN Y417H

## (undated) DEVICE — DRAPE SHEET THREE QUARTER

## (undated) DEVICE — PROXIMATE PLUS MD MULTI-DIRECTIONAL RELEASE SKIN STAPLERS CONTAINS 35 STAINLESS STEEL STAPLES APPROXIMATE CLOSED DIMENSIONS: 6.9MM X 3.9MM WIDE: Brand: PROXIMATE

## (undated) DEVICE — ADHESIVE SKIN HIGH VISCOSITY EXOFIN 1ML

## (undated) DEVICE — SPECIMEN CONTAINER STERILE PEEL PACK

## (undated) DEVICE — INTENDED FOR TISSUE SEPARATION, AND OTHER PROCEDURES THAT REQUIRE A SHARP SURGICAL BLADE TO PUNCTURE OR CUT.: Brand: BARD-PARKER ® CARBON RIB-BACK BLADES

## (undated) DEVICE — DISPOSABLE SLIM BIPOLAR FORCEPS, NON-STICK,: Brand: SPETZLER-MALIS

## (undated) DEVICE — TOOL 9MH30 LEGEND 9CM 3MM MH: Brand: MIDAS REX

## (undated) DEVICE — MAYFIELD® DISPOSABLE ADULT SKULL PIN (PLASTIC BASE): Brand: MAYFIELD®

## (undated) DEVICE — 3M™ STERI-STRIP™ REINFORCED ADHESIVE SKIN CLOSURES, R1547, 1/2 IN X 4 IN (12 MM X 100 MM), 6 STRIPS/ENVELOPE: Brand: 3M™ STERI-STRIP™

## (undated) DEVICE — SURGIFOAM 8.5 X 12.5

## (undated) DEVICE — PACK CRANIOTOMY PBDS RF

## (undated) DEVICE — SWABSTCK, BENZOIN TINCTURE, 1/PK, STRL: Brand: APLICARE

## (undated) DEVICE — SUPPLY FEE STD

## (undated) DEVICE — Device: Brand: IQ SYSTEM

## (undated) DEVICE — SPONGE PVP SCRUB WING STERILE

## (undated) DEVICE — DRAPE FLUID WARMER (BIRD BATH)

## (undated) DEVICE — INTENDED FOR TISSUE SEPARATION, AND OTHER PROCEDURES THAT REQUIRE A SHARP SURGICAL BLADE TO PUNCTURE OR CUT.: Brand: BARD-PARKER SAFETY BLADES SIZE 15, STERILE

## (undated) DEVICE — PREP SURGICAL PURPREP 26ML

## (undated) DEVICE — DRAPE INTESTINAL ISOLATION BAG

## (undated) DEVICE — TRAY FOLEY 16FR URIMETER SURESTEP

## (undated) DEVICE — HEMOSTATIC MATRIX SURGIFLO 8ML W/THROMBIN

## (undated) DEVICE — IV SET 15 DROP STERILE 0/Y GRAVITY

## (undated) DEVICE — ELECTRODE 8227410 PAIRED 2 CH SET ROHS

## (undated) DEVICE — TELFA 1/2" X 3": Brand: TELFA

## (undated) DEVICE — SUT NUROLON 4-0 TF CR/8 18 IN C584D

## (undated) DEVICE — CUSA® EXCEL 36KHZ CUSA® MANIFOLD TUBING SET: Brand: CUSA® EXCEL

## (undated) DEVICE — SUT VICRYL PLUS 3-0 RB-1 CR/8 18 IN VCP713D

## (undated) DEVICE — SUT MONOCRYL PLUS 4-0 PS-2 18 IN MCP496G

## (undated) DEVICE — MONITORING SPINAL IMPULSE CASE FEE

## (undated) DEVICE — DURASEAL MICROMYST APPLICATOR TIP

## (undated) DEVICE — GOWN,SLEEVE,STERILE,W/CSR WRAP,1/P: Brand: MEDLINE

## (undated) DEVICE — PETRI DISH STERILE

## (undated) DEVICE — LIGHT HANDLE COVER SLEEVE DISP BLUE STELLAR

## (undated) DEVICE — CUSA® EXCEL 36KHZ 1.57MM MICROTIP™ CURVED EXTENDED TIP: Brand: CUSA® EXCEL

## (undated) DEVICE — TOOL F2/8TA23 LEGEND 8CM 2.3MM TAPER: Brand: MIDAS REX™

## (undated) DEVICE — NEURO PATTIES 1/2 X 1/2

## (undated) DEVICE — 40601 PROLONGED POSITIONING SYSTEM: Brand: 40601 PROLONGED POSITIONING SYSTEM

## (undated) DEVICE — DRAPE MICROSCOPE OPMI PENTERO

## (undated) DEVICE — TIBURON SPLIT SHEET: Brand: CONVERTORS

## (undated) DEVICE — DURASEAL 5ML

## (undated) DEVICE — PAD GROUNDING ADULT

## (undated) DEVICE — IMPLANTABLE DEVICE
Type: IMPLANTABLE DEVICE | Site: CRANIAL | Status: NON-FUNCTIONAL
Brand: THINFLAP SYSTEM

## (undated) DEVICE — IV CATH INTROCAN 18G X 1 1/4 SAFETY

## (undated) DEVICE — BETADINE OINTMENT FOIL PACK

## (undated) DEVICE — NEURO PATTIES 1/4 X 1/4

## (undated) DEVICE — GLOVE SRG BIOGEL 8

## (undated) DEVICE — 3M™ IOBAN™ 2 ANTIMICROBIAL INCISE DRAPE 6650EZ: Brand: IOBAN™ 2